# Patient Record
Sex: MALE | Race: WHITE | Employment: OTHER | URBAN - METROPOLITAN AREA
[De-identification: names, ages, dates, MRNs, and addresses within clinical notes are randomized per-mention and may not be internally consistent; named-entity substitution may affect disease eponyms.]

---

## 2017-02-09 ENCOUNTER — TRANSFERRED RECORDS (OUTPATIENT)
Dept: HEALTH INFORMATION MANAGEMENT | Facility: CLINIC | Age: 72
End: 2017-02-09

## 2017-02-15 ENCOUNTER — TRANSFERRED RECORDS (OUTPATIENT)
Dept: HEALTH INFORMATION MANAGEMENT | Facility: CLINIC | Age: 72
End: 2017-02-15

## 2017-02-21 ENCOUNTER — APPOINTMENT (OUTPATIENT)
Dept: CT IMAGING | Facility: CLINIC | Age: 72
End: 2017-02-21
Attending: EMERGENCY MEDICINE
Payer: COMMERCIAL

## 2017-02-21 ENCOUNTER — HOSPITAL ENCOUNTER (EMERGENCY)
Facility: CLINIC | Age: 72
Discharge: HOME | End: 2017-02-21
Attending: EMERGENCY MEDICINE | Admitting: EMERGENCY MEDICINE
Payer: COMMERCIAL

## 2017-02-21 VITALS
TEMPERATURE: 98.1 F | RESPIRATION RATE: 16 BRPM | DIASTOLIC BLOOD PRESSURE: 96 MMHG | SYSTOLIC BLOOD PRESSURE: 144 MMHG | OXYGEN SATURATION: 100 % | HEART RATE: 89 BPM

## 2017-02-21 DIAGNOSIS — R07.89 ATYPICAL CHEST PAIN: ICD-10-CM

## 2017-02-21 LAB
ANION GAP SERPL CALCULATED.3IONS-SCNC: 9 MMOL/L (ref 3–14)
BASOPHILS # BLD AUTO: 0 10E9/L (ref 0–0.2)
BASOPHILS NFR BLD AUTO: 0 %
BUN SERPL-MCNC: 13 MG/DL (ref 7–30)
CALCIUM SERPL-MCNC: 8.7 MG/DL (ref 8.5–10.1)
CHLORIDE SERPL-SCNC: 96 MMOL/L (ref 94–109)
CO2 SERPL-SCNC: 25 MMOL/L (ref 20–32)
CREAT SERPL-MCNC: 0.69 MG/DL (ref 0.66–1.25)
D DIMER PPP FEU-MCNC: 0.9 UG/ML FEU (ref 0–0.5)
DIFFERENTIAL METHOD BLD: ABNORMAL
EOSINOPHIL # BLD AUTO: 0.1 10E9/L (ref 0–0.7)
EOSINOPHIL NFR BLD AUTO: 1 %
ERYTHROCYTE [DISTWIDTH] IN BLOOD BY AUTOMATED COUNT: 13.5 % (ref 10–15)
GFR SERPL CREATININE-BSD FRML MDRD: ABNORMAL ML/MIN/1.7M2
GLUCOSE SERPL-MCNC: 127 MG/DL (ref 70–99)
HCT VFR BLD AUTO: 35.9 % (ref 40–53)
HGB BLD-MCNC: 11.9 G/DL (ref 13.3–17.7)
LYMPHOCYTES # BLD AUTO: 2.3 10E9/L (ref 0.8–5.3)
LYMPHOCYTES NFR BLD AUTO: 22 %
MCH RBC QN AUTO: 28.8 PG (ref 26.5–33)
MCHC RBC AUTO-ENTMCNC: 33.1 G/DL (ref 31.5–36.5)
MCV RBC AUTO: 87 FL (ref 78–100)
METAMYELOCYTES # BLD: 1.1 10E9/L
METAMYELOCYTES NFR BLD MANUAL: 11 %
MONOCYTES # BLD AUTO: 0.5 10E9/L (ref 0–1.3)
MONOCYTES NFR BLD AUTO: 5 %
MYELOCYTES # BLD: 0.4 10E9/L
MYELOCYTES NFR BLD MANUAL: 4 %
NEUTROPHILS # BLD AUTO: 5.9 10E9/L (ref 1.6–8.3)
NEUTROPHILS NFR BLD AUTO: 57 %
PLATELET # BLD AUTO: 235 10E9/L (ref 150–450)
PLATELET # BLD EST: NORMAL 10*3/UL
POTASSIUM SERPL-SCNC: 3.9 MMOL/L (ref 3.4–5.3)
RBC # BLD AUTO: 4.13 10E12/L (ref 4.4–5.9)
RBC MORPH BLD: ABNORMAL
SODIUM SERPL-SCNC: 130 MMOL/L (ref 133–144)
TROPONIN I SERPL-MCNC: NORMAL UG/L (ref 0–0.04)
WBC # BLD AUTO: 10.3 10E9/L (ref 4–11)

## 2017-02-21 PROCEDURE — 85379 FIBRIN DEGRADATION QUANT: CPT | Performed by: EMERGENCY MEDICINE

## 2017-02-21 PROCEDURE — 96374 THER/PROPH/DIAG INJ IV PUSH: CPT

## 2017-02-21 PROCEDURE — 71260 CT THORAX DX C+: CPT

## 2017-02-21 PROCEDURE — 85025 COMPLETE CBC W/AUTO DIFF WBC: CPT | Performed by: EMERGENCY MEDICINE

## 2017-02-21 PROCEDURE — 80048 BASIC METABOLIC PNL TOTAL CA: CPT | Performed by: EMERGENCY MEDICINE

## 2017-02-21 PROCEDURE — 25000128 H RX IP 250 OP 636: Performed by: EMERGENCY MEDICINE

## 2017-02-21 PROCEDURE — 93005 ELECTROCARDIOGRAM TRACING: CPT

## 2017-02-21 PROCEDURE — 25500064 ZZH RX 255 OP 636: Performed by: EMERGENCY MEDICINE

## 2017-02-21 PROCEDURE — 99285 EMERGENCY DEPT VISIT HI MDM: CPT | Mod: 25

## 2017-02-21 PROCEDURE — 96361 HYDRATE IV INFUSION ADD-ON: CPT

## 2017-02-21 PROCEDURE — 84484 ASSAY OF TROPONIN QUANT: CPT | Performed by: EMERGENCY MEDICINE

## 2017-02-21 RX ORDER — SODIUM CHLORIDE 9 MG/ML
1000 INJECTION, SOLUTION INTRAVENOUS CONTINUOUS
Status: DISCONTINUED | OUTPATIENT
Start: 2017-02-21 | End: 2017-02-21 | Stop reason: HOSPADM

## 2017-02-21 RX ORDER — IOPAMIDOL 755 MG/ML
500 INJECTION, SOLUTION INTRAVASCULAR ONCE
Status: COMPLETED | OUTPATIENT
Start: 2017-02-21 | End: 2017-02-21

## 2017-02-21 RX ORDER — KETOROLAC TROMETHAMINE 15 MG/ML
15 INJECTION, SOLUTION INTRAMUSCULAR; INTRAVENOUS ONCE
Status: COMPLETED | OUTPATIENT
Start: 2017-02-21 | End: 2017-02-21

## 2017-02-21 RX ORDER — ASPIRIN 81 MG/1
324 TABLET, CHEWABLE ORAL ONCE
Status: DISCONTINUED | OUTPATIENT
Start: 2017-02-21 | End: 2017-02-21 | Stop reason: CLARIF

## 2017-02-21 RX ORDER — LIDOCAINE 40 MG/G
CREAM TOPICAL
Status: DISCONTINUED | OUTPATIENT
Start: 2017-02-21 | End: 2017-02-21 | Stop reason: HOSPADM

## 2017-02-21 RX ADMIN — KETOROLAC TROMETHAMINE 15 MG: 15 INJECTION, SOLUTION INTRAMUSCULAR; INTRAVENOUS at 20:05

## 2017-02-21 RX ADMIN — SODIUM CHLORIDE 1000 ML: 9 INJECTION, SOLUTION INTRAVENOUS at 20:04

## 2017-02-21 RX ADMIN — SODIUM CHLORIDE 85 ML: 9 INJECTION, SOLUTION INTRAVENOUS at 20:44

## 2017-02-21 RX ADMIN — IOPAMIDOL 71 ML: 755 INJECTION, SOLUTION INTRAVENOUS at 20:44

## 2017-02-21 ASSESSMENT — ENCOUNTER SYMPTOMS
FEVER: 0
NAUSEA: 0
VOMITING: 0
ABDOMINAL PAIN: 0
MYALGIAS: 1
SHORTNESS OF BREATH: 0
DIARRHEA: 1

## 2017-02-21 NOTE — ED AVS SNAPSHOT
[facility] Emergency Department    [address]    [facility] [address]    Phone:  [phone]    Fax:  [phone]                                       John Batista   MRN: [id number]    Department:  [facility] Emergency Department   Date of Visit:  2/21/2017           After Visit Summary Signature Page     I have received my discharge instructions, and my questions have been answered. I have discussed any challenges I see with this plan with the nurse or doctor.    ..........................................................................................................................................  Patient/Patient Representative Signature      ..........................................................................................................................................  Patient Representative Print Name and Relationship to Patient    ..................................................               ................................................  Date                                            Time    ..........................................................................................................................................  Reviewed by Signature/Title    ...................................................              ..............................................  Date                                                            Time

## 2017-02-21 NOTE — ED AVS SNAPSHOT
[facility] Emergency Department    [address] [place] [address]    Phone:  [phone]    Fax:  [phone]                                       John Batista   MRN: [id number]    Department:  [facility] Emergency Department   Date of Visit:  2/21/2017           Patient Information     Date Of Birth          1945        Your diagnoses for this visit were:     Atypical chest pain        You were seen by Luis Alberto Montanez MD.      Follow-up Information     Follow up with Rosario Martinez MD In 3 days.    Specialty:  Internal Medicine    Why:  As needed    Contact information:    [facility]  [address]  [facility] [id number] [phone]          Discharge Instructions       Discharge Instructions  Chest Pain    You have been seen today for chest pain or discomfort.  At this time, your doctor has found no signs that your chest pain is due to a serious or life-threatening condition, (or you have declined more testing and/or admission to the hospital). However, sometimes there is a serious problem that does not show up right away. Your evaluation today may not be complete and you may need further testing and evaluation.     You need to follow-up with your regular doctor within 3 days.    Return to the Emergency Department if:    Your chest pain changes, gets worse, starts to happen more often, or comes with less activity.    You are short of breath.    You get very weak or tired.    You pass out or faint.    You have any new symptoms, like fever, cough, numb legs, or you cough up blood.    You have anything else that worries you.    Until you follow-up with your regular doctor please do the following:    Take one aspirin daily unless you have an allergy or are told not to by your doctor.    If a stress test appointment has been made, go to the appointment.    If you have questions, contact your regular doctor.    If your doctor  today has told you to follow-up with your regular doctor, it is very important that you make an appointment with your clinic and go to the appointment.  If you do not follow-up with your primary doctor, it may result in missing an important development which could result in permanent injury or disability and/or lasting pain.  If there is any problem keeping your appointment, call your doctor or return to the Emergency Department.    If you were given a prescription for medicine here today, be sure to read all of the information (including the package insert) that comes with your prescription.  This will include important information about the medicine, its side effects, and any warnings that you need to know about.  The pharmacist who fills the prescription can provide more information and answer questions you may have about the medicine.  If you have questions or concerns that the pharmacist cannot address, please call or return to the Emergency Department.     Opioid Medication Information    Pain medications are among the most commonly prescribed medicines, so we are including this information for all our patients. If you did not receive pain medication or get a prescription for pain medicine, you can ignore it.     You may have been given a prescription for an opioid (narcotic) pain medicine and/or have received a pain medicine while here in the Emergency Department. These medicines can make you drowsy or impaired. You must not drive, operate dangerous equipment, or engage in any other dangerous activities while taking these medications. If you drive while taking these medications, you could be arrested for DUI, or driving under the influence. Do not drink any alcohol while you are taking these medications.     Opioid pain medications can cause addiction. If you have a history of chemical dependency of any type, you are at a higher risk of becoming addicted to pain medications.  Only take these prescribed  medications to treat your pain when all other options have been tried. Take it for as short a time and as few doses as possible. Store your pain pills in a secure place, as they are frequently stolen and provide a dangerous opportunity for children or visitors in your house to start abusing these powerful medications. We will not replace any lost or stolen medicine.  As soon as your pain is better, you should flush all your remaining medication.     Many prescription pain medications contain Tylenol  (acetaminophen), including Vicodin , Tylenol #3 , Norco , Lortab , and Percocet .  You should not take any extra pills of Tylenol  if you are using these prescription medications or you can get very sick.  Do not ever take more than 3000 mg of acetaminophen in any 24 hour period.    All opioids tend to cause constipation. Drink plenty of water and eat foods that have a lot of fiber, such as fruits, vegetables, prune juice, apple juice and high fiber cereal.  Take a laxative if you don t move your bowels at least every other day. Miralax , Milk of Magnesia, Colace , or Senna  can be used to keep you regular.      Remember that you can always come back to the Emergency Department if you are not able to see your regular doctor in the amount of time listed above, if you get any new symptoms, or if there is anything that worries you.         Future Appointments        Provider Department Dept Phone Center    3/16/2017 9:10 AM David Galvan MD [facility] [phone] [facility]      24 Hour Appointment Hotline       To make an appointment at any [facility], call [phone] ([phone]). If you don't have a family doctor or clinic, we will help you find one. [place] clinics are conveniently located to serve the needs of you and your family.             Review of your medicines      Our records show that you are taking the medicines listed below. If these are  incorrect, please call your family doctor or clinic.        Dose / Directions Last dose taken    ELIGARD 45 MG kit   Dose:  45 mg   Generic drug:  leuprolide        Inject 45 mg Subcutaneous every 6 months.   Refills:  0        enzalutamide 40 MG capsule   Commonly known as:  XTANDI   Dose:  160 mg        Take 160 mg by mouth daily   Refills:  0        ferrous sulfate 325 (65 FE) MG tablet   Commonly known as:  IRON   Dose:  325 mg   Quantity:  100 tablet        Take 1 tablet (325 mg) by mouth 2 times daily   Refills:  0        oxyCODONE 5 MG IR tablet   Commonly known as:  ROXICODONE   Dose:  1 tablet        Take 1 tablet by mouth nightly as needed   Refills:  0        vitamin D 1000 UNITS capsule   Dose:  1 capsule        Take 1 capsule by mouth daily   Refills:  0                Procedures and tests performed during your visit     Basic metabolic panel    CBC with platelets differential    Cardiac Continuous Monitoring    Chest CT, IV contrast only - PE protocol    D dimer quantitative    EKG 12 lead    EKG 12-lead, tracing only    Peripheral IV: Standard    Pulse oximetry nursing    Troponin I      Orders Needing Specimen Collection     None      Pending Results     Date and Time Order Name Status Description    2/21/2017 2026 Chest CT, IV contrast only - PE protocol Preliminary     2/21/2017 1942 EKG 12 lead Preliminary             Pending Culture Results     No orders found from 2/19/2017 to 2/22/2017.             Test Results from your hospital stay     2/21/2017  8:44 PM - Interface, [employer] Results      Component Results     Component Value Ref Range & Units Status    WBC 10.3 4.0 - 11.0 10e9/L Final    RBC Count 4.13 (L) 4.4 - 5.9 10e12/L Final    Hemoglobin 11.9 (L) 13.3 - 17.7 g/dL Final    Hematocrit 35.9 (L) 40.0 - 53.0 % Final    MCV 87 78 - 100 fl Final    MCH 28.8 26.5 - 33.0 pg Final    MCHC 33.1 31.5 - 36.5 g/dL Final    RDW 13.5 10.0 - 15.0 % Final    Platelet Count 235 150 - 450 10e9/L Final     Diff Method Manual Differential  Final    % Neutrophils 57.0 % Final    % Lymphocytes 22.0 % Final    % Monocytes 5.0 % Final    % Eosinophils 1.0 % Final    % Basophils 0.0 % Final    % Metamyelocytes 11.0 % Final    % Myelocytes 4.0 % Final    Absolute Neutrophil 5.9 1.6 - 8.3 10e9/L Final    Absolute Lymphocytes 2.3 0.8 - 5.3 10e9/L Final    Absolute Monocytes 0.5 0.0 - 1.3 10e9/L Final    Absolute Eosinophils 0.1 0.0 - 0.7 10e9/L Final    Absolute Basophils 0.0 0.0 - 0.2 10e9/L Final    Absolute Metamyelocytes 1.1 (H) 0 10e9/L Final    Absolute Myelocytes 0.4 (H) 0 10e9/L Final    RBC Morphology   Final    Consistent with reported results    Platelet Estimate Normal  Final         2/21/2017  8:25 PM - Interface, [employer] Results      Component Results     Component Value Ref Range & Units Status    Sodium 130 (L) 133 - 144 mmol/L Final    Potassium 3.9 3.4 - 5.3 mmol/L Final    Chloride 96 94 - 109 mmol/L Final    Carbon Dioxide 25 20 - 32 mmol/L Final    Anion Gap 9 3 - 14 mmol/L Final    Glucose 127 (H) 70 - 99 mg/dL Final    Urea Nitrogen 13 7 - 30 mg/dL Final    Creatinine 0.69 0.66 - 1.25 mg/dL Final    GFR Estimate >90  Non  GFR Calc   >60 mL/min/1.7m2 Final    GFR Estimate If Black >90   GFR Calc   >60 mL/min/1.7m2 Final    Calcium 8.7 8.5 - 10.1 mg/dL Final         2/21/2017  8:25 PM - Interface, [employer] Results      Component Results     Component Value Ref Range & Units Status    Troponin I ES  0.000 - 0.045 ug/L Final    <0.015  The 99th percentile for upper reference range is 0.045 ug/L.  Troponin values in   the range of 0.045 - 0.120 ug/L may be associated with risks of adverse   clinical events.           2/21/2017  8:17 PM - Interface, [employer] Results      Component Results     Component Value Ref Range & Units Status    D Dimer 0.9 (H) 0.0 - 0.50 ug/ml FEU Final    This D-dimer assay is intended for use in conjuntion with a clinical pretest   probability  assessment model to exclude pulmonary embolism (PE) and as an aid   in the diagnosis of deep venous thrombosis (DVT) in outpatients suspected of   PE   or DVT. The cut-off value is 0.5 g/mL FEU.           2/21/2017  9:05 PM - Interface, Radiant Ib      Narrative     CT CHEST PULMONARY EMBOLISM WITH CONTRAST 2/21/2017 8:52 PM     HISTORY: Prostate cancer, d-dimer 0.9 chest pain, history of  metastatic disease, evaluate for PE.    CONTRAST DOSE:  71 mL [id number].    Radiation dose for this scan was reduced using automated exposure  control, adjustment of the mA and/or kV according to patient size, or  iterative reconstruction technique.    FINDINGS:  Contrast bolus within the pulmonary arteries is adequate.  No pulmonary arterial filling defects are demonstrated to indicate  pulmonary embolism. Contrast enhancement is present within the aorta.  There is no evidence of aortic dissection. Coronary artery  calcification is noted. The mediastinum and rene are otherwise  unremarkable. Calcified granulomas are noted at the lung bases.  Nonspecific 0.3 cm nodule is noted adjacent to the confluence of the  right major and minor fissures on image 71. Nonspecific 1.2 cm nodule  is also noted in the right middle lobe abutting the minor fissure.  Calcified pleural plaques are noted posteriorly and along the right  hemidiaphragm.        Impression     IMPRESSION:  1. No CT evidence of pulmonary embolism.  2. Coronary artery calcification.  3. Calcified pleural plaques suggesting asbestos-related pleural  disease.  4. Old granulomatous disease with bilateral pulmonary basilar  calcified granulomas.  5. Right lung 1.2 cm and 0.3 cm nonspecific nodules. Since neoplastic  disease cannot be excluded, at the very least, a 3-month follow-up is  recommended.                Clinical Quality Measure: Blood Pressure Screening     Your blood pressure was checked while you were in the emergency department today. The last reading we obtained  "was  BP: (!) 171/107 . Please read the guidelines below about what these numbers mean and what you should do about them.  If your systolic blood pressure (the top number) is less than 120 and your diastolic blood pressure (the bottom number) is less than 80, then your blood pressure is normal. There is nothing more that you need to do about it.  If your systolic blood pressure (the top number) is 120-139 or your diastolic blood pressure (the bottom number) is 80-89, your blood pressure may be higher than it should be. You should have your blood pressure rechecked within a year by a primary care provider.  If your systolic blood pressure (the top number) is 140 or greater or your diastolic blood pressure (the bottom number) is 90 or greater, you may have high blood pressure. High blood pressure is treatable, but if left untreated over time it can put you at risk for heart attack, stroke, or kidney failure. You should have your blood pressure rechecked by a primary care provider within the next 4 weeks.  If your provider in the emergency department today gave you specific instructions to follow-up with your doctor or provider even sooner than that, you should follow that instruction and not wait for up to 4 weeks for your follow-up visit.        Thank you for choosing [facility]       Thank you for choosing [facility] for your care. Our goal is always to provide you with excellent care. Hearing back from our patients is one way we can continue to improve our services. Please take a few minutes to complete the written survey that you may receive in the mail after you visit with us. Thank you!        [facility] Information     [facility] lets you send messages to your doctor, view your test results, renew your prescriptions, schedule appointments and more. To sign up, go to [email or web address] . Click on \"Log in\" on the left side of the screen, which will take you to the Welcome page. Then click on \"Sign up Now\" on the right " side of the page.     You will be asked to enter the access code listed below, as well as some personal information. Please follow the directions to create your username and password.     Your access code is: [email or web address]  Expires: 3/2/2017  8:48 AM     Your access code will  in 90 days. If you need help or a new code, please call your [place] clinic or [phone].        Care EveryWhere ID     This is your Care EveryWhere ID. This could be used by other organizations to access your [place] medical records  [id number]        After Visit Summary       This is your record. Keep this with you and show to your community pharmacist(s) and doctor(s) at your next visit.

## 2017-02-22 LAB — INTERPRETATION ECG - MUSE: NORMAL

## 2017-02-22 NOTE — ED NOTES
Patient requesting to use restroom. Patient tolerated well. Did not need assistance. Will continue to monitor at this time

## 2017-02-22 NOTE — ED PROVIDER NOTES
History     Chief Complaint:  Chest pain     HPI   John Batista is a 71 year old male with a history of metastatic prostate cancer who presents for evaluation of chest pain. The patient reports onset of intermittent mid sternal chest pain/aching at approximately [id number] this morning. The patient denies any exacerbating factors and pain does not radiate anywhere. The patient is also noting myalgias. He has not taken anything for pain. The patient is currently undergoing chemotherapy. The patient has no cardiac history. The patient's wife is concerned for dehydration as the patient has had some recent diarrhea as well. The patient denies any shortness of breath, vomiting or any other symptoms.    Cardiac/PE/DVT Risk Factors:  The patient has no history of hypertension, hyperlipidemia or diabetes and is a former smoker.  He reports no family history of CAD.  He denies any personal or familial history of PE, DVT or clotting disorder.  He reports no recent travel, surgery or other immobilizations.  He is not on hormone therapy and has a known malignancy.     Allergies:  No known drug allergies.     Medications:    ferrous sulfate (IRON) 325 (65 FE) MG tablet  enzalutamide (XTANDI) 40 MG capsule  Cholecalciferol (VITAMIN D) 1000 UNITS capsule  leuprolide (ELIGARD) 45 MG injection    Past Medical History:    Metastatic prostate cancer    Past Surgical History:    Procedure Laterality Date   Hernia repair, inguinal rt/lt       Family History:    History reviewed. No pertinent family history.     Social History:  Marital Status:     Smoking status: Former smoker  Alcohol use: No   Presents to the ED with significant other     Review of Systems   Constitutional: Negative for fever.   Respiratory: Negative for shortness of breath.    Cardiovascular: Positive for chest pain.   Gastrointestinal: Positive for diarrhea. Negative for abdominal pain, nausea and vomiting.   Musculoskeletal: Positive for myalgias.    All other systems reviewed and are negative.      Physical Exam     Patient Vitals for the past 24 hrs:   BP Temp Temp src Pulse Heart Rate Resp SpO2   02/21/17 2154 - - - - - - 100 %   02/21/17 2152 (!) 144/96 - - - - - 97 %   02/21/17 2141 - - - - 89 - 99 %   02/21/17 2140 - - - - 92 - 100 %   02/21/17 2130 (!) 171/107 - - - - - -   02/21/17 2115 150/83 - - - 86 - 99 %   02/21/17 1949 - 98.1  F (36.7  C) Oral - - - -   02/21/17 1939 (!) 174/114 97.1  F (36.2  C) Temporal 89 89 16 100 %        Physical Exam   Constitutional: He is oriented to person, place, and time. He appears well-developed.   HENT:   Head: Normocephalic and atraumatic.   Right Ear: External ear normal.   Mouth/Throat: Oropharynx is clear and moist.   Eyes: Conjunctivae and EOM are normal. Pupils are equal, round, and reactive to light.   Neck: Normal range of motion. Neck supple. No JVD present.   Cardiovascular: Normal rate, regular rhythm and normal heart sounds.    Pulmonary/Chest: Effort normal and breath sounds normal.   Abdominal: Soft. Bowel sounds are normal. He exhibits no distension. There is no tenderness. There is no rebound.   Musculoskeletal: Normal range of motion.   Lymphadenopathy:     He has no cervical adenopathy.   Neurological: He is alert and oriented to person, place, and time. He displays normal reflexes. No cranial nerve deficit. He exhibits normal muscle tone. Coordination normal.   Skin: Skin is warm and dry.   Psychiatric: He has a normal mood and affect. His behavior is normal. Judgment normal.   Nursing note and vitals reviewed.        Emergency Department Course   ECG:  @ 1944  Indication: chest pain   Vent. Rate 88 bpm. LA interval 184 ms. QRS duration 84 ms. QT/QTc 374/452 ms. P-R-T axis 57 25 39.   Sinus rhythm with premature supraventricular complexes. Otherwise normal ECG.    Read @ 1950 by Dr. Montanez.     Imaging:  Radiographic findings were communicated with the patient who voiced understanding of the  findings.    CT Chest with contrast  IMPRESSION:  1. No CT evidence of pulmonary embolism.  2. Coronary artery calcification.  3. Calcified pleural plaques suggesting asbestos-related pleural  disease.  4. Old granulomatous disease with bilateral pulmonary basilar  calcified granulomas.  5. Right lung 1.2 cm and 0.3 cm nonspecific nodules. Since neoplastic  disease cannot be excluded, at the very least, a 3-month follow-up is  recommended.  Preliminary radiology read.       Laboratory:  CBC:  WBC 10.3, HGB 11.9 (L),   BMP: Na 130 (L), Glucose 127 (H), otherwise WNL (Creatinine 0.69)  Troponin: <0.015  D dimer: 0.9 (H)      Interventions:  (2004) Normal Saline, 1 liter, IV bolus   (2005) Toradol 15 mg IV    Emergency Department Course:  Nursing notes and vitals reviewed.  (1945) I performed an exam of the patient as documented above.    EKG was done, interpretation as above.   Blood was drawn from the patient. This was sent for laboratory testing, findings above.   The patient was sent for a chest CT while in the emergency department, findings above.      Findings and plan explained to the patient. Patient discharged home with instructions regarding supportive care, medications, and reasons to return. The importance of close follow-up was reviewed.     Impression & Plan      Medical Decision Making:  Patient presents with mid sternal chest pain. Patient has history of prostate cancer, is at low risk for PE, his D dimer is positive. CTA shows no sign of PE. There is mention of coronary artery calcification though patient states he regularly exercises at the gym and does not have pain with exercise. Patient was offered admission for serial troponin's and/or stress test due to age and known coronary artery calcification. Patient refuses this at this time. I feel this is reasonable as patient's symptoms completely resolved with IV fluids. Patient does have a history of metastatic prostate cancer. Differential  diagnosis does include chest wall pain. Patient is encouraged to follow up with PCP and return for worsening symptoms.     Diagnosis:    ICD-10-CM   1. Atypical chest pain R07.89       Disposition:  Patient is discharged to home.       Tramaine Louise  2/21/2017   [facility] EMERGENCY DEPARTMENT    I, Tramaine Louise, am serving as a scribe on 2/21/2017 at 7:43 PM to personally document services performed by Dr. Montanez based on my observations and the provider's statements to me.       Luis Alberto Montanez MD  02/27/17 0026

## 2017-02-22 NOTE — DISCHARGE INSTRUCTIONS
Discharge Instructions  Chest Pain    You have been seen today for chest pain or discomfort.  At this time, your doctor has found no signs that your chest pain is due to a serious or life-threatening condition, (or you have declined more testing and/or admission to the hospital). However, sometimes there is a serious problem that does not show up right away. Your evaluation today may not be complete and you may need further testing and evaluation.     You need to follow-up with your regular doctor within 3 days.    Return to the Emergency Department if:    Your chest pain changes, gets worse, starts to happen more often, or comes with less activity.    You are short of breath.    You get very weak or tired.    You pass out or faint.    You have any new symptoms, like fever, cough, numb legs, or you cough up blood.    You have anything else that worries you.    Until you follow-up with your regular doctor please do the following:    Take one aspirin daily unless you have an allergy or are told not to by your doctor.    If a stress test appointment has been made, go to the appointment.    If you have questions, contact your regular doctor.    If your doctor today has told you to follow-up with your regular doctor, it is very important that you make an appointment with your clinic and go to the appointment.  If you do not follow-up with your primary doctor, it may result in missing an important development which could result in permanent injury or disability and/or lasting pain.  If there is any problem keeping your appointment, call your doctor or return to the Emergency Department.    If you were given a prescription for medicine here today, be sure to read all of the information (including the package insert) that comes with your prescription.  This will include important information about the medicine, its side effects, and any warnings that you need to know about.  The pharmacist who fills the prescription can  provide more information and answer questions you may have about the medicine.  If you have questions or concerns that the pharmacist cannot address, please call or return to the Emergency Department.     Opioid Medication Information    Pain medications are among the most commonly prescribed medicines, so we are including this information for all our patients. If you did not receive pain medication or get a prescription for pain medicine, you can ignore it.     You may have been given a prescription for an opioid (narcotic) pain medicine and/or have received a pain medicine while here in the Emergency Department. These medicines can make you drowsy or impaired. You must not drive, operate dangerous equipment, or engage in any other dangerous activities while taking these medications. If you drive while taking these medications, you could be arrested for DUI, or driving under the influence. Do not drink any alcohol while you are taking these medications.     Opioid pain medications can cause addiction. If you have a history of chemical dependency of any type, you are at a higher risk of becoming addicted to pain medications.  Only take these prescribed medications to treat your pain when all other options have been tried. Take it for as short a time and as few doses as possible. Store your pain pills in a secure place, as they are frequently stolen and provide a dangerous opportunity for children or visitors in your house to start abusing these powerful medications. We will not replace any lost or stolen medicine.  As soon as your pain is better, you should flush all your remaining medication.     Many prescription pain medications contain Tylenol  (acetaminophen), including Vicodin , Tylenol #3 , Norco , Lortab , and Percocet .  You should not take any extra pills of Tylenol  if you are using these prescription medications or you can get very sick.  Do not ever take more than 3000 mg of acetaminophen in any 24 hour  period.    All opioids tend to cause constipation. Drink plenty of water and eat foods that have a lot of fiber, such as fruits, vegetables, prune juice, apple juice and high fiber cereal.  Take a laxative if you don t move your bowels at least every other day. Miralax , Milk of Magnesia, Colace , or Senna  can be used to keep you regular.      Remember that you can always come back to the Emergency Department if you are not able to see your regular doctor in the amount of time listed above, if you get any new symptoms, or if there is anything that worries you.

## 2017-02-22 NOTE — ED NOTES
Patient restarted chemotherapy this past Wednesday for prostate cancer.  Chest pain started today, body aches for  A couple of days.

## 2017-03-07 DIAGNOSIS — C61 PROSTATE CANCER (H): Primary | ICD-10-CM

## 2017-03-07 DIAGNOSIS — C61 MALIGNANT NEOPLASM OF PROSTATE (H): Primary | ICD-10-CM

## 2017-03-07 LAB — PSA SERPL-MCNC: 2.97 UG/L (ref 0–4)

## 2017-03-07 PROCEDURE — 36415 COLL VENOUS BLD VENIPUNCTURE: CPT | Performed by: INTERNAL MEDICINE

## 2017-03-07 PROCEDURE — 84153 ASSAY OF PSA TOTAL: CPT | Performed by: INTERNAL MEDICINE

## 2017-03-08 ENCOUNTER — TRANSFERRED RECORDS (OUTPATIENT)
Dept: HEALTH INFORMATION MANAGEMENT | Facility: CLINIC | Age: 72
End: 2017-03-08

## 2017-03-15 ENCOUNTER — TELEPHONE (OUTPATIENT)
Dept: FAMILY MEDICINE | Facility: CLINIC | Age: 72
End: 2017-03-15

## 2017-03-15 ENCOUNTER — TRANSFERRED RECORDS (OUTPATIENT)
Dept: HEALTH INFORMATION MANAGEMENT | Facility: CLINIC | Age: 72
End: 2017-03-15

## 2017-03-15 ENCOUNTER — TELEPHONE (OUTPATIENT)
Dept: NURSING | Facility: CLINIC | Age: 72
End: 2017-03-15

## 2017-03-15 NOTE — TELEPHONE ENCOUNTER
Patient walked into  Clinic today.   Presents with concerns about right forearm redness and bruising following 2nd chemotherapy treatment.     Right forearm reddened (3cm x 2cm) above infusion site and also bruised around area.   Slightly warm to touch, temp 98.3, pulse 80.     Patient and wife will contact [facility] regarding concerns.   He would like to continue with [facility], but at [place] location for chemotherapy if possible.      Virgie Fitzpatrick RN

## 2017-03-15 NOTE — TELEPHONE ENCOUNTER
Patient and wife calling that patient had had a chemo injection earlier in the week. States that there is a reddened area about the size of a quarter at the injection site. Also bruising that happens every time he has an injection. Requested to have appointment to have it looked at. Advised to come to Urgent care as the only provider at [facility] was booked for today. They asked if a nurse could look at it and advised it would need to be seen by a provider. They declined urgent care. Offered patient an appointment for tomorrow and he declined as he said he had other appointments tomorrow.

## 2017-03-15 NOTE — TELEPHONE ENCOUNTER
Patient and wife have call into [facility] and awaiting a return call for appt to assess right forearm.     Appt with [facility] today - they told patient that forearm condition is not uncommon and should resolve in about 1 week.   [facility] instructed pt to continue to watch forearm area for worsening symptoms &/or no resolution of symptoms.     Virgie Fitzpatrick RN

## 2017-03-16 ENCOUNTER — OFFICE VISIT (OUTPATIENT)
Dept: UROLOGY | Facility: CLINIC | Age: 72
End: 2017-03-16
Payer: COMMERCIAL

## 2017-03-16 VITALS
SYSTOLIC BLOOD PRESSURE: 110 MMHG | HEART RATE: 80 BPM | DIASTOLIC BLOOD PRESSURE: 68 MMHG | HEIGHT: 73 IN | WEIGHT: 190 LBS | BODY MASS INDEX: 25.18 KG/M2

## 2017-03-16 DIAGNOSIS — C61 PROSTATE CANCER (H): ICD-10-CM

## 2017-03-16 DIAGNOSIS — C61 MALIGNANT NEOPLASM OF PROSTATE (H): Primary | ICD-10-CM

## 2017-03-16 PROCEDURE — 99212 OFFICE O/P EST SF 10 MIN: CPT | Mod: 25 | Performed by: UROLOGY

## 2017-03-16 PROCEDURE — 96402 CHEMO HORMON ANTINEOPL SQ/IM: CPT | Performed by: UROLOGY

## 2017-03-16 RX ORDER — PREDNISONE 5 MG/1
TABLET ORAL
COMMUNITY
Start: 2017-03-08 | End: 2017-12-21

## 2017-03-16 RX ORDER — PROCHLORPERAZINE MALEATE 10 MG
TABLET ORAL
COMMUNITY
Start: 2017-02-15 | End: 2017-12-21

## 2017-03-16 RX ORDER — PIMECROLIMUS 1 %
CREAM (GRAM) TOPICAL
COMMUNITY
Start: 2017-03-07 | End: 2018-10-27

## 2017-03-16 RX ORDER — HYDROCORTISONE AND IODOQUINOL 10; 10 MG/G; MG/G
CREAM TOPICAL
COMMUNITY
Start: 2017-02-06 | End: 2017-12-21

## 2017-03-16 NOTE — NURSING NOTE
The following medication was given:     MEDICATION: Eligard 45 mg  ROUTE: SQ  SITE: LLQ - Abd.  DOSE: 45mg  LOT #: [id number]  :  Jessica  EXPIRATION DATE:  08/2018  NDC#: [id number]. Marychuy Yousif LPN

## 2017-03-16 NOTE — MR AVS SNAPSHOT
"              After Visit Summary   3/16/2017    John Batista    MRN: [id number]           Patient Information     Date Of Birth          1945        Visit Information        Provider Department      3/16/2017 9:10 AM David Galvan MD [facility] [place]        Today's Diagnoses     Malignant neoplasm of prostate (H)    -  1       Follow-ups after your visit        Follow-up notes from your care team     Return in about 6 months (around 2017) for eligard.      Who to contact     If you have questions or need follow up information about today's clinic visit or your schedule please contact [facility] directly at [phone].  Normal or non-critical lab and imaging results will be communicated to you by True Officehart, letter or phone within 4 business days after the clinic has received the results. If you do not hear from us within 7 days, please contact the clinic through True Officehart or phone. If you have a critical or abnormal lab result, we will notify you by phone as soon as possible.  Submit refill requests through [employer] or call your pharmacy and they will forward the refill request to us. Please allow 3 business days for your refill to be completed.          Additional Information About Your Visit        MyChart Information     [employer] lets you send messages to your doctor, view your test results, renew your prescriptions, schedule appointments and more. To sign up, go to [email or web address] . Click on \"Log in\" on the left side of the screen, which will take you to the Welcome page. Then click on \"Sign up Now\" on the right side of the page.     You will be asked to enter the access code listed below, as well as some personal information. Please follow the directions to create your username and password.     Your access code is: [email or web address]  Expires: 2017  9:33 AM     Your access code will  in 90 days. If " "you need help or a new code, please call your [place] clinic or [phone].        Care EveryWhere ID     This is your Care EveryWhere ID. This could be used by other organizations to access your [place] medical records  [id number]        Your Vitals Were     Pulse Height BMI (Body Mass Index)             80 1.854 m (6' 1\") 25.07 kg/m2          Blood Pressure from Last 3 Encounters:   03/16/17 110/68   02/21/17 (!) 144/96   12/14/16 118/60    Weight from Last 3 Encounters:   03/16/17 86.2 kg (190 lb)   12/14/16 88.5 kg (195 lb)   12/03/16 88.5 kg (195 lb)              Today, you had the following     No orders found for display       Primary Care Provider Office Phone # Fax #    Rosario Carson Martinez [phone]       [facility] [address] [facility] [address]        Thank you!     Thank you for choosing [facility] [place]  for your care. Our goal is always to provide you with excellent care. Hearing back from our patients is one way we can continue to improve our services. Please take a few minutes to complete the written survey that you may receive in the mail after your visit with us. Thank you!             Your Updated Medication List - Protect others around you: Learn how to safely use, store and throw away your medicines at www.disposemymeds.org.          This list is accurate as of: 3/16/17  9:33 AM.  Always use your most recent med list.                   Brand Name Dispense Instructions for use    DERMAZENE 1 % Crea   Generic drug:  Iodoquinol-HC          ELIDEL 1 % cream   Generic drug:  pimecrolimus          ELIGARD 45 MG kit   Generic drug:  leuprolide      Inject 45 mg Subcutaneous every 6 months.       enzalutamide 40 MG capsule    XTANDI     Take 160 mg by mouth daily Reported on 3/16/2017       ferrous sulfate 325 (65 FE) MG tablet    IRON    100 tablet    Take 1 tablet (325 mg) by mouth 2 times daily       oxyCODONE 5 " MG IR tablet    ROXICODONE     Take 1 tablet by mouth nightly as needed Reported on 3/16/2017       predniSONE 5 MG tablet    DELTASONE         prochlorperazine 10 MG tablet    COMPAZINE         vitamin D 1000 UNITS capsule      Take 1 capsule by mouth daily

## 2017-03-16 NOTE — PROGRESS NOTES
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 He is originally diagnosed in 2011 and had a PSA of 39.9 at diagnosis. He had clinical stage C tumor and was treated with hormone therapy and radiation . He is currently on prednisone and receiving Taxotere. He sees me every 6 months for Eligard. His most recent PSA is 2.97  Other past medical history: Rectal ulcer from radiation, iron deficiency anemia, colonic polyps  Medications: Oxycodone, Eligard, iron sulfate, Taxotere, prednisone, vitamin D  Allergies: None  Review of systems: Voiding comfortably, no dysuria or gross hematuria. He has had some GI bleeding-colonoscopy showed healed rectal ulcer  Exam: Normal appearance, normal vital signs, alert and oriented, normocephalic, normal respirations, neuro grossly intact. Digital rectal exam deferred  Assessment: Castrate resistant metastatic prostate cancer  Plan: Eligard every 6 months            PSA follow-ups through Dr. Borrero and Dr. Hugh valencia

## 2017-03-16 NOTE — LETTER
3/16/2017       RE: John Batista  [address] [facility] [address]     Dear Colleague,    Thank you for referring your patient, John Batista, to the [facility] [place] at [facility]. Please see a copy of my visit note below.    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 He is originally diagnosed in 2011 and had a PSA of 39.9 at diagnosis. He had clinical stage C tumor and was treated with hormone therapy and radiation . He is currently on prednisone and receiving Taxotere. He sees me every 6 months for [place]. His most recent PSA is 2.97  Other past medical history: Rectal ulcer from radiation, iron deficiency anemia, colonic polyps  Medications: Oxycodone, Eligard, iron sulfate, Taxotere, prednisone, vitamin D  Allergies: None  Review of systems: Voiding comfortably, no dysuria or gross hematuria. He has had some GI bleeding-colonoscopy showed healed rectal ulcer  Exam: Normal appearance, normal vital signs, alert and oriented, normocephalic, normal respirations, neuro grossly intact. Digital rectal exam deferred  Assessment: Castrate resistant metastatic prostate cancer  Plan: Eligard every 6 months            PSA follow-ups through Dr. Borrero and Dr. Reese only      Again, thank you for allowing me to participate in the care of your patient.      Sincerely,    David Galvan MD

## 2017-03-29 ENCOUNTER — TRANSFERRED RECORDS (OUTPATIENT)
Dept: HEALTH INFORMATION MANAGEMENT | Facility: CLINIC | Age: 72
End: 2017-03-29

## 2017-04-19 ENCOUNTER — TRANSFERRED RECORDS (OUTPATIENT)
Dept: HEALTH INFORMATION MANAGEMENT | Facility: CLINIC | Age: 72
End: 2017-04-19

## 2017-05-10 ENCOUNTER — TRANSFERRED RECORDS (OUTPATIENT)
Dept: HEALTH INFORMATION MANAGEMENT | Facility: CLINIC | Age: 72
End: 2017-05-10

## 2017-05-22 ENCOUNTER — TRANSFERRED RECORDS (OUTPATIENT)
Dept: HEALTH INFORMATION MANAGEMENT | Facility: CLINIC | Age: 72
End: 2017-05-22

## 2017-05-31 ENCOUNTER — TRANSFERRED RECORDS (OUTPATIENT)
Dept: HEALTH INFORMATION MANAGEMENT | Facility: CLINIC | Age: 72
End: 2017-05-31

## 2017-09-14 ENCOUNTER — DOCUMENTATION ONLY (OUTPATIENT)
Dept: OTHER | Facility: CLINIC | Age: 72
End: 2017-09-14

## 2017-09-14 ENCOUNTER — OFFICE VISIT (OUTPATIENT)
Dept: UROLOGY | Facility: CLINIC | Age: 72
End: 2017-09-14
Payer: COMMERCIAL

## 2017-09-14 VITALS
SYSTOLIC BLOOD PRESSURE: 124 MMHG | DIASTOLIC BLOOD PRESSURE: 70 MMHG | HEART RATE: 60 BPM | HEIGHT: 73 IN | BODY MASS INDEX: 24.39 KG/M2 | WEIGHT: 184 LBS

## 2017-09-14 DIAGNOSIS — C61 PROSTATE CANCER (H): ICD-10-CM

## 2017-09-14 DIAGNOSIS — C61 MALIGNANT NEOPLASM OF PROSTATE (H): Primary | ICD-10-CM

## 2017-09-14 PROCEDURE — 99212 OFFICE O/P EST SF 10 MIN: CPT | Mod: 25 | Performed by: UROLOGY

## 2017-09-14 PROCEDURE — 96402 CHEMO HORMON ANTINEOPL SQ/IM: CPT | Performed by: UROLOGY

## 2017-09-14 ASSESSMENT — PAIN SCALES - GENERAL: PAINLEVEL: NO PAIN (0)

## 2017-09-14 NOTE — MR AVS SNAPSHOT
"              After Visit Summary   9/14/2017    John Batista    MRN: [id number]           Patient Information     Date Of Birth          1945        Visit Information        Provider Department      9/14/2017 9:10 AM David Galvan MD [facility] [place]        Today's Diagnoses     Malignant neoplasm of prostate (H)    -  1    Prostate cancer (H)           Follow-ups after your visit        Follow-up notes from your care team     Return in about 6 months (around 3/14/2018) for eligard.      Your next 10 appointments already scheduled     Mar 15, 2018  9:50 AM CDT   Return Visit with David Galvan MD   [facility] [place]    [address]  [address]  [facility] [address] [phone]              Who to contact     If you have questions or need follow up information about today's clinic visit or your schedule please contact [facility] directly at [phone].  Normal or non-critical lab and imaging results will be communicated to you by MyChart, letter or phone within 4 business days after the clinic has received the results. If you do not hear from us within 7 days, please contact the clinic through [employer] or phone. If you have a critical or abnormal lab result, we will notify you by phone as soon as possible.  Submit refill requests through [employer] or call your pharmacy and they will forward the refill request to us. Please allow 3 business days for your refill to be completed.          Additional Information About Your Visit        Kitch[employer] Information     [employer] lets you send messages to your doctor, view your test results, renew your prescriptions, schedule appointments and more. To sign up, go to [email or web address] . Click on \"Log in\" on the left side of the screen, which will take you to the Welcome page. Then click on " "\"Sign up Now\" on the right side of the page.     You will be asked to enter the access code listed below, as well as some personal information. Please follow the directions to create your username and password.     Your access code is: [email or web address]  Expires: 2017  9:25 AM     Your access code will  in 90 days. If you need help or a new code, please call your [place] clinic or [phone].        Care EveryWhere ID     This is your Care EveryWhere ID. This could be used by other organizations to access your [place] medical records  [id number]        Your Vitals Were     Pulse Height BMI (Body Mass Index)             60 1.854 m (6' 1\") 24.28 kg/m2          Blood Pressure from Last 3 Encounters:   17 124/70   17 110/68   17 (!) 144/96    Weight from Last 3 Encounters:   17 83.5 kg (184 lb)   17 86.2 kg (190 lb)   16 88.5 kg (195 lb)              Today, you had the following     No orders found for display       Primary Care Provider Office Phone # Fax #    Rosario Martinez [phone] [address] [facility] [address]        Equal Access to Services     [facility] DAGOBERTO AH: Hadii aad ku hadasho Soomaali, waaxda luqadaha, qaybta kaalmada adeegyada, brandon iyer . So [facility] [phone].    ATENCIÓN: Si habla español, tiene a abdi disposición servicios gratuitos de asistencia lingüística. Llame al [phone].    We comply with applicable federal civil rights laws and [place] laws. We do not discriminate on the basis of race, color, national origin, age, disability sex, sexual orientation or gender identity.            Thank you!     Thank you for choosing [facility]OLOG[facility] CLIN[facility] [place]  for your care. Our goal is always to provide you with excellent care. Hearing back from our patients is one way we can continue to improve our services. Please take a few minutes to complete the written " survey that you may receive in the mail after your visit with us. Thank you!             Your Updated Medication List - Protect others around you: Learn how to safely use, store and throw away your medicines at www.disposemymeds.org.          This list is accurate as of: 9/14/17  9:41 AM.  Always use your most recent med list.                   Brand Name Dispense Instructions for use Diagnosis    DERMAZENE 1 % Crea   Generic drug:  Iodoquinol-HC           ELIDEL 1 % cream   Generic drug:  pimecrolimus           ELIGARD 45 MG kit   Generic drug:  leuprolide      Inject 45 mg Subcutaneous every 6 months.        enzalutamide 40 MG capsule    XTANDI     Take 160 mg by mouth daily Reported on 3/16/2017        ferrous sulfate 325 (65 FE) MG tablet    IRON    100 tablet    Take 1 tablet (325 mg) by mouth 2 times daily    Iron deficiency anemia due to chronic blood loss       oxyCODONE 5 MG IR tablet    ROXICODONE     Take 1 tablet by mouth nightly as needed Reported on 3/16/2017        predniSONE 5 MG tablet    DELTASONE          prochlorperazine 10 MG tablet    COMPAZINE     Reported on 3/16/2017        vitamin D 1000 UNITS capsule      Take 1 capsule by mouth daily

## 2017-09-14 NOTE — PROGRESS NOTES
John Batista is a 72-year-old male with metastatic prostate cancer to bone. He has completed Taxotere and off prednisone. He will be meeting with Dr. Lovett next week for PSA and discussion. He was recently radiated to his lower spine and has no lower extremity numbness, tingling or weakness. His pain has significantly improved.  He is having no voiding difficulty, dysuria or hematuria  Other past medical history: Former smoker, anemia  Medications: Iron sulfate, Eligard  Allergies: None  Exam: Normal appearance, normal vital signs, alert and oriented, normocephalic, normal respirations, neuro grossly intact. With spouse  Assessment: Metastatic, castrate resistant adenocarcinoma of the prostate. Discussed need for Eligard every 6 months. All questions answered  Plan: See me every 6 months for Eligard. Follow-up with Dr. Reese

## 2017-09-14 NOTE — NURSING NOTE
Here for prostate  cancer follow up. Will be seeing Dr Reese at [place] next week andria have PSA test at that time.Marychuy Yousif LPN

## 2017-09-14 NOTE — NURSING NOTE
The following medication was given:     MEDICATION: Eligard 45 mg  ROUTE: SQ  SITE: RLQ - Abd.  DOSE: 45mg  LOT #: [id number]  :  Jessica  EXPIRATION DATE:  02/2019  NDC#: [id number] Marychuy Yousif LPN

## 2017-09-14 NOTE — LETTER
9/14/2017       RE: John Batista  [address] [facility] [address]     Dear Colleague,    Thank you for referring your patient, John Batista, to the [facility] CL[facility] [place] at [facility]. Please see a copy of my visit note below.    John Batista is a 72-year-old male with metastatic prostate cancer to bone. He has completed Taxotere and off prednisone. He will be meeting with Dr. Lovett next week for PSA and discussion. He was recently radiated to his lower spine and has no lower extremity numbness, tingling or weakness. His pain has significantly improved.  He is having no voiding difficulty, dysuria or hematuria  Other past medical history: Former smoker, anemia  Medications: Iron sulfate, Eligard  Allergies: None  Exam: Normal appearance, normal vital signs, alert and oriented, normocephalic, normal respirations, neuro grossly intact. With spouse  Assessment: Metastatic, castrate resistant adenocarcinoma of the prostate. Discussed need for Eligard every 6 months. All questions answered  Plan: See me every 6 months for Eligard. Follow-up with Dr. Reese    Again, thank you for allowing me to participate in the care of your patient.      Sincerely,    David Galvan MD

## 2017-09-27 ENCOUNTER — DOCUMENTATION ONLY (OUTPATIENT)
Dept: OTHER | Facility: CLINIC | Age: 72
End: 2017-09-27

## 2017-10-11 DIAGNOSIS — C79.51 SECONDARY MALIGNANT NEOPLASM OF BONE AND BONE MARROW (H): ICD-10-CM

## 2017-10-11 DIAGNOSIS — C61 PROSTATE CANCER (H): Primary | ICD-10-CM

## 2017-10-11 DIAGNOSIS — C79.52 SECONDARY MALIGNANT NEOPLASM OF BONE AND BONE MARROW (H): ICD-10-CM

## 2017-10-11 PROCEDURE — 36415 COLL VENOUS BLD VENIPUNCTURE: CPT | Performed by: UROLOGY

## 2017-10-11 PROCEDURE — 84153 ASSAY OF PSA TOTAL: CPT | Performed by: UROLOGY

## 2017-10-12 LAB — PSA SERPL-MCNC: 0.75 UG/L (ref 0–4)

## 2017-12-19 ENCOUNTER — TRANSFERRED RECORDS (OUTPATIENT)
Dept: HEALTH INFORMATION MANAGEMENT | Facility: CLINIC | Age: 72
End: 2017-12-19

## 2017-12-21 ENCOUNTER — OFFICE VISIT (OUTPATIENT)
Dept: FAMILY MEDICINE | Facility: CLINIC | Age: 72
End: 2017-12-21
Payer: COMMERCIAL

## 2017-12-21 VITALS
OXYGEN SATURATION: 99 % | HEART RATE: 74 BPM | TEMPERATURE: 97.3 F | SYSTOLIC BLOOD PRESSURE: 116 MMHG | WEIGHT: 181.1 LBS | DIASTOLIC BLOOD PRESSURE: 66 MMHG | BODY MASS INDEX: 23.89 KG/M2 | RESPIRATION RATE: 16 BRPM

## 2017-12-21 DIAGNOSIS — C61 PROSTATE CANCER (H): Primary | ICD-10-CM

## 2017-12-21 DIAGNOSIS — C61 PROSTATE CANCER METASTATIC TO BONE (H): ICD-10-CM

## 2017-12-21 DIAGNOSIS — C79.51 PROSTATE CANCER METASTATIC TO BONE (H): ICD-10-CM

## 2017-12-21 PROCEDURE — 99214 OFFICE O/P EST MOD 30 MIN: CPT | Performed by: INTERNAL MEDICINE

## 2017-12-21 NOTE — PROGRESS NOTES
SUBJECTIVE:   John Batista is a 72 year old male who presents to clinic today for the following health issues:    Hospital Follow-up Visit:  Hospital/Nursing Home/IP Rehab Facility: [facility]  Date of Admission: 12/19/17  Date of Discharge: 12/20/17  Reason(s) for Admission: CT guided radiofrequency ablation, left inferior sacral metastasis            Problems taking medications regularly: None       Medication changes since discharge: None       Problems adhering to non-medication therapy: None    Summary of hospitalization: [facility] records reviewed  See outside records, reviewed. Pt indicates [place] was to send information via Fax.  Diagnostic Tests/Treatments reviewed. Follow-up needed:     Other Healthcare Providers Involved in Patient s Care:  Patient Care Team:  Rosario Martinez MD as PCP - General (Internal Medicine)   Dr. David Galvan, Urology  Dr. Reese, Urology     Update since discharge: improved following surgery    Post Discharge Medication Reconciliation: discharge medications reconciled and changed, per note/orders (see AVS).  Plan of care communicated with patient and family     Coding guidelines for this visit:  Type of Medical   Decision Making Face-to-Face Visit       within 7 Days of discharge Face-to-Face Visit        within 14 days of discharge   Moderate Complexity 17135 [id number]   High Complexity 38328 33300        Problem list and histories reviewed & adjusted, as indicated.  Additional history: as documented    BP Readings from Last 3 Encounters:   12/21/17 116/66   09/14/17 124/70   03/16/17 110/68    Wt Readings from Last 3 Encounters:   12/21/17 181 lb 1.6 oz (82.1 kg)   09/14/17 184 lb (83.5 kg)   03/16/17 190 lb (86.2 kg)        Reviewed and updated as needed this visit by clinical staffTobacco  Allergies  Meds  Med Hx  Surg Hx  Fam Hx  Soc Hx      Reviewed and updated as needed this visit by Provider       ROS:  CONSTITUTIONAL: NEGATIVE  for fever, chills, change in weight  RESP: NEGATIVE for significant cough or SOB  CV: NEGATIVE for chest pain, palpitations or peripheral edema  GI: NEGATIVE for nausea, abdominal pain, heartburn, or change in bowel habits  : Metastatic prostate cancer to bone, left inferior sacral metastasis - chemotherapy finished spring 2017, radiation summer 2017, recent CT guided radiofrequency ablation 12/19/17 per Dr. Reese, Urology; also following with Dr. Galvan, Urology for Eligard every 6 months; NEGATIVE for hematuria, dysuria, or difficulties voiding   MUSCULOSKELETAL: NEGATIVE for significant arthralgias or myalgia  NEURO: NEGATIVE for weakness, dizziness or paresthesias  ENDOCRINE: NEGATIVE for temperature intolerance, skin/hair changes  HEME/ALLERGY/IMMUNE: Metastatic prostate cancer to bone, left inferior sacral metastasis as noted; NEGATIVE for bleeding problems    This document serves as a record of the services and decisions personally performed and made by Rosario Martinez MD. It was created on her behalf by Shu Palafox, a trained medical scribe. The creation of this document is based on the provider's statements to the medical scribe.   Shu Palafox, 12:15 PM, December 21, 2017    OBJECTIVE:   /66  Pulse 74  Temp 97.3  F (36.3  C) (Oral)  Resp 16  Wt 181 lb 1.6 oz (82.1 kg)  SpO2 99%  BMI 23.89 kg/m2  Body mass index is 23.89 kg/(m^2).  GENERAL: healthy, alert and no distress  NECK: no carotid bruits  RESP: lungs clear to auscultation - no rales, rhonchi or wheezes  CV: regular rates and rhythm, normal S1 S2, peripheral pulses strong and no peripheral edema  ABDOMEN: soft, nontender and bowel sounds normal  MS: extremities normal- no gross deformities noted  NEURO: mentation intact and speech normal  PSYCH: mentation appears normal and affect normal/bright    Diagnostic Test Results:  none   ASSESSMENT/PLAN:   (C61) Prostate cancer (H) (primary encounter diagnosis)  Comment: metastatic prostate cancer  to bone; sacrum and ilium; chemotherapy finished spring 2017, radiation summer 2017; left inferior sacral metastasis - CT guided radiofrequency prostatic ablation 12/19/17 per Dr. Reese  Plan: pt following with Dr. Galvan, Urology, Eligard every 6 months     (C61,  C79.51) Prostate cancer metastatic to bone (H)  Comment: sacrum and ilium; Dr. Reese, Urology [facility], CT guided frequency ablation, left inferior sacral metastasis on 12/19/2017  Plan: pt following with Dr. Galvan, [facility], Eligard every 6 months     MEDICATIONS:   No orders of the defined types were placed in this encounter.    Medications Discontinued During This Encounter   Medication Reason     DERMAZENE 1-1 % CREA Therapy completed     enzalutamide (XTANDI) 40 MG capsule Therapy completed     ferrous sulfate (IRON) 325 (65 FE) MG tablet Therapy completed     prochlorperazine (COMPAZINE) 10 MG tablet Therapy completed     predniSONE (DELTASONE) 5 MG tablet Therapy completed         Rosario Martinez MD  Internal Medicine  [facility]    The information in this document, created by a medical scribe for me, accurately reflects the services I personally performed and the decisions made by me. I have reviewed and approved this document for accuracy.  Dr. Rosario Martinez, 12:30 PM, December 21, 2017

## 2017-12-21 NOTE — NURSING NOTE
"Chief Complaint   Patient presents with     Hospital F/U     [facility]       Initial /66  Pulse 74  Temp 97.3  F (36.3  C) (Oral)  Resp 16  Wt 181 lb 1.6 oz (82.1 kg)  SpO2 99%  BMI 23.89 kg/m2 Estimated body mass index is 23.89 kg/(m^2) as calculated from the following:    Height as of 9/14/17: 6' 1\" (1.854 m).    Weight as of this encounter: 181 lb 1.6 oz (82.1 kg).  Medication Reconciliation: complete    "

## 2017-12-21 NOTE — MR AVS SNAPSHOT
"              After Visit Summary   12/21/2017    John Batista    MRN: [id number]           Patient Information     Date Of Birth          1945        Visit Information        Provider Department      12/21/2017 11:30 AM Rosario Martinez MD [facility]        Today's Diagnoses     Prostate cancer (H)    -  1    Prostate cancer metastatic to bone (H)           Follow-ups after your visit        Your next 10 appointments already scheduled     Mar 15, 2018  9:50 AM CDT   Return Visit with David Galvan MD   [facility] [place] ([facility])    [address]  [address]  [facility] [address] [phone]              Who to contact     If you have questions or need follow up information about today's clinic visit or your schedule please contact [facility] directly at [phone].  Normal or non-critical lab and imaging results will be communicated to you by MyChart, letter or phone within 4 business days after the clinic has received the results. If you do not hear from us within 7 days, please contact the clinic through Aviacommhart or phone. If you have a critical or abnormal lab result, we will notify you by phone as soon as possible.  Submit refill requests through Co-Work or call your pharmacy and they will forward the refill request to us. Please allow 3 business days for your refill to be completed.          Additional Information About Your Visit        MyChart Information     Co-Work lets you send messages to your doctor, view your test results, renew your prescriptions, schedule appointments and more. To sign up, go to [email or web address] . Click on \"Log in\" on the left side of the screen, which will take you to the Welcome page. Then click on \"Sign up Now\" on the right side of the page.     You will be asked to enter the access code listed below, as well as some personal information. Please " follow the directions to create your username and password.     Your access code is: [email or web address]  Expires: 3/21/2018 12:29 PM     Your access code will  in 90 days. If you need help or a new code, please call your [facility] clinic or [phone].        Care EveryWhere ID     This is your Care EveryWhere ID. This could be used by other organizations to access your [facility] medical records  [id number]        Your Vitals Were     Pulse Temperature Respirations Pulse Oximetry BMI (Body Mass Index)       74 97.3  F (36.3  C) (Oral) 16 99% 23.89 kg/m2        Blood Pressure from Last 3 Encounters:   17 116/66   17 124/70   17 110/68    Weight from Last 3 Encounters:   17 181 lb 1.6 oz (82.1 kg)   17 184 lb (83.5 kg)   17 190 lb (86.2 kg)              Today, you had the following     No orders found for display       Primary Care Provider Office Phone # Fax #    Rosario Martinez [phone]       [address] [facility] [address]        Equal Access to Services     ANA ARENAS AH: Hadii aad ku hadasho Soomaali, waaxda luqadaha, qaybta kaalmada adeegyada, brandon duongin haydestiney iyer . So [facility] [place] [phone].    ATENCIÓN: Si habla español, tiene a abdi disposición servicios gratuitos de asistencia lingüística. Llame al [phone].    We comply with applicable federal civil rights laws and [place] laws. We do not discriminate on the basis of race, color, national origin, age, disability, sex, sexual orientation, or gender identity.            Thank you!     Thank you for choosing [facility]  for your care. Our goal is always to provide you with excellent care. Hearing back from our patients is one way we can continue to improve our services. Please take a few minutes to complete the written survey that you may receive in the mail after your visit with us. Thank you!             Your Updated Medication List - Protect others around  you: Learn how to safely use, store and throw away your medicines at www.disposemymeds.org.          This list is accurate as of: 12/21/17 12:29 PM.  Always use your most recent med list.                   Brand Name Dispense Instructions for use Diagnosis    ELIDEL 1 % cream   Generic drug:  pimecrolimus           ELIGARD 45 MG kit   Generic drug:  leuprolide      Inject 45 mg Subcutaneous every 6 months.        oxyCODONE IR 5 MG tablet    ROXICODONE     Take 1 tablet by mouth nightly as needed Reported on 3/16/2017        vitamin D 1000 UNITS capsule      Take 1 capsule by mouth daily

## 2017-12-22 LAB
AST SERPL-CCNC: 17 U/L (ref 13–39)
CREAT SERPL-MCNC: 0.88 MG/DL (ref 0.6–1.3)
GFR SERPL CREATININE-BSD FRML MDRD: 67.2 ML/MIN/1.73M2
TSH SERPL-ACNC: 1.83 UIU/ML (ref 0.32–5)

## 2018-01-09 ENCOUNTER — TRANSFERRED RECORDS (OUTPATIENT)
Dept: HEALTH INFORMATION MANAGEMENT | Facility: CLINIC | Age: 73
End: 2018-01-09

## 2018-02-01 DIAGNOSIS — C61 MALIGNANT NEOPLASM OF PROSTATE (H): Primary | ICD-10-CM

## 2018-02-01 PROCEDURE — 36415 COLL VENOUS BLD VENIPUNCTURE: CPT

## 2018-02-01 PROCEDURE — 99000 SPECIMEN HANDLING OFFICE-LAB: CPT

## 2018-02-22 DIAGNOSIS — Z00.00 LABORATORY EXAMINATION ORDERED AS PART OF A ROUTINE GENERAL MEDICAL EXAMINATION: Primary | ICD-10-CM

## 2018-02-22 PROCEDURE — 36415 COLL VENOUS BLD VENIPUNCTURE: CPT

## 2018-02-22 PROCEDURE — 99000 SPECIMEN HANDLING OFFICE-LAB: CPT

## 2018-03-15 ENCOUNTER — OFFICE VISIT (OUTPATIENT)
Dept: UROLOGY | Facility: CLINIC | Age: 73
End: 2018-03-15
Payer: COMMERCIAL

## 2018-03-15 VITALS — HEIGHT: 73 IN | HEART RATE: 72 BPM | WEIGHT: 181 LBS | OXYGEN SATURATION: 99 % | BODY MASS INDEX: 23.99 KG/M2

## 2018-03-15 DIAGNOSIS — C61 MALIGNANT NEOPLASM OF PROSTATE (H): Primary | ICD-10-CM

## 2018-03-15 DIAGNOSIS — C61 PROSTATE CANCER (H): ICD-10-CM

## 2018-03-15 PROCEDURE — 96402 CHEMO HORMON ANTINEOPL SQ/IM: CPT | Performed by: UROLOGY

## 2018-03-15 PROCEDURE — 99213 OFFICE O/P EST LOW 20 MIN: CPT | Mod: 25 | Performed by: UROLOGY

## 2018-03-15 ASSESSMENT — PAIN SCALES - GENERAL: PAINLEVEL: NO PAIN (0)

## 2018-03-15 NOTE — LETTER
3/15/2018      RE: John Batista  [address] [facility] [address]       John is a 72-year-old male with castrate resistant metastatic prostate cancer who receives Eligard 45 mg every 6 months. He is followed at [facility] by Ruben Reese M.D. His last PSA was 5.8. He is voiding comfortably, no dysuria or hematuria. His low back pain is mild.  He'll be starting a new medication-he is unsure of the drug name.  Other past medical history: Bilateral herniorrhaphies, colonoscopies, vasectomy, cystoscopy, former smoker  Family history: No colorectal cancer  Medications: Vitamin D, Eligard, oxycodone  Allergies: None  Exam: Normal appearance, normal vital signs. Alert and oriented, normocephalic, normal respirations, neuro grossly intact  Assessment: Hormone resistant, metastatic prostate cancer to bone.  Plan: See me every 6 months for Eligard            Follow-up with Dr. Reese as scheduled    David Galvan MD

## 2018-03-15 NOTE — NURSING NOTE
The following medication was given:     MEDICATION: Eligard 45 mg  ROUTE: SQ  SITE: LLQ - Abd.  DOSE: 45mg  LOT #: [id number]  :  pebbles  EXPIRATION DATE:    NDC#: 45630-815-29  CELSA Humphreys CMA

## 2018-03-15 NOTE — LETTER
3/15/2018       RE: John Batista  [address] [facility] [address]     Dear Colleague,    Thank you for referring your patient, John Batista, to the [facility] [place] [facility]. Please see a copy of my visit note below.    John is a 72-year-old male with castrate resistant metastatic prostate cancer who receives Eligard 45 mg every 6 months. He is followed at [facility] by Ruben Reese M.D. His last PSA was 5.8. He is voiding comfortably, no dysuria or hematuria. His low back pain is mild.  He'll be starting a new medication-he is unsure of the drug name.  Other past medical history: Bilateral herniorrhaphies, colonoscopies, vasectomy, cystoscopy, former smoker  Family history: No colorectal cancer  Medications: Vitamin D, Eligard, oxycodone  Allergies: None  Exam: Normal appearance, normal vital signs. Alert and oriented, normocephalic, normal respirations, neuro grossly intact  Assessment: Hormone resistant, metastatic prostate cancer to bone.  Plan: See me every 6 months for Eligard            Follow-up with Dr. Reese as scheduled    Again, thank you for allowing me to participate in the care of your patient.      Sincerely,    David Galvan MD

## 2018-03-15 NOTE — NURSING NOTE
Pt states he had his PSA at the [facility] and that it is 5.8 and was done on 3-1-18.  Pt unable to leave a UA at this time.  Pt states no voiding trouble.  CELSA Humphreys, CMA

## 2018-03-15 NOTE — PROGRESS NOTES
John is a 72-year-old male with castrate resistant metastatic prostate cancer who receives Eligard 45 mg every 6 months. He is followed at [facility] by Ruben Reese M.D. His last PSA was 5.8. He is voiding comfortably, no dysuria or hematuria. His low back pain is mild.  He'll be starting a new medication-he is unsure of the drug name.  Other past medical history: Bilateral herniorrhaphies, colonoscopies, vasectomy, cystoscopy, former smoker  Family history: No colorectal cancer  Medications: Vitamin D, Eligard, oxycodone  Allergies: None  Exam: Normal appearance, normal vital signs. Alert and oriented, normocephalic, normal respirations, neuro grossly intact  Assessment: Hormone resistant, metastatic prostate cancer to bone.  Plan: See me every 6 months for Eligard            Follow-up with Dr. Reese as scheduled

## 2018-03-15 NOTE — MR AVS SNAPSHOT
"              After Visit Summary   3/15/2018    John Batista    MRN: [id number]           Patient Information     Date Of Birth          1945        Visit Information        Provider Department      3/15/2018 9:50 AM David Galvan MD [facility]        Today's Diagnoses     Malignant neoplasm of prostate (H)    -  1       Follow-ups after your visit        Follow-up notes from your care team     Return in about 6 months (around 9/15/2018) for eligard.      Your next 10 appointments already scheduled     Sep 18, 2018 10:00 AM CDT   Return Visit with David Galvan MD   [facility] ([facility])    [address]  [address]  [facility] [address] [phone]              Who to contact     If you have questions or need follow up information about today's clinic visit or your schedule please contact [facility] directly at [phone].  Normal or non-critical lab and imaging results will be communicated to you by Nubefyhart, letter or phone within 4 business days after the clinic has received the results. If you do not hear from us within 7 days, please contact the clinic through Openerat or phone. If you have a critical or abnormal lab result, we will notify you by phone as soon as possible.  Submit refill requests through [employer] or call your pharmacy and they will forward the refill request to us. Please allow 3 business days for your refill to be completed.          Additional Information About Your Visit        Nubefyhart Information     [employer] lets you send messages to your doctor, view your test results, renew your prescriptions, schedule appointments and more. To sign up, go to [email or web address] . Click on \"Log in\" on the left side of the screen, which will take you to the Welcome page. Then click on \"Sign up Now\" on the right " "side of the page.     You will be asked to enter the access code listed below, as well as some personal information. Please follow the directions to create your username and password.     Your access code is: [email or web address]  Expires: 3/21/2018  1:29 PM     Your access code will  in 90 days. If you need help or a new code, please call your [facility] or [phone].        Care EveryWhere ID     This is your Care EveryWhere ID. This could be used by other organizations to access your [place] medical records  [id number]        Your Vitals Were     Pulse Height Pulse Oximetry BMI (Body Mass Index)          72 1.854 m (6' 1\") 99% 23.88 kg/m2         Blood Pressure from Last 3 Encounters:   17 116/66   17 124/70   17 110/68    Weight from Last 3 Encounters:   03/15/18 82.1 kg (181 lb)   17 82.1 kg (181 lb 1.6 oz)   17 83.5 kg (184 lb)              Today, you had the following     No orders found for display       Primary Care Provider Office Phone # Fax #    Rosario Martinez [phone]       [address] [facility] [address]        Equal Access to Services     ANA ARENAS AH: Hadii aad ku hadasho Soomaali, waaxda luqadaha, qaybta kaalmada adeegyada, brandon idiin hayaan erick iyer . So [facility] [phone].    ATENCIÓN: Si habla español, tiene a abdi disposición servicios gratuitos de asistencia lingüística. Llame al [phone].    We comply with applicable federal civil rights laws and [place] laws. We do not discriminate on the basis of race, color, national origin, age, disability, sex, sexual orientation, or gender identity.            Thank you!     Thank you for choosing [facility] [place]  for your care. Our goal is always to provide you with excellent care. Hearing back from our patients is one way we can continue to improve our services. Please take a few minutes to complete the written survey that " you may receive in the mail after your visit with us. Thank you!             Your Updated Medication List - Protect others around you: Learn how to safely use, store and throw away your medicines at www.disposemymeds.org.          This list is accurate as of 3/15/18 10:57 AM.  Always use your most recent med list.                   Brand Name Dispense Instructions for use Diagnosis    ELIDEL 1 % cream   Generic drug:  pimecrolimus           ELIGARD 45 MG kit   Generic drug:  leuprolide      Inject 45 mg Subcutaneous every 6 months.        oxyCODONE IR 5 MG tablet    ROXICODONE     Take 1 tablet by mouth nightly as needed Reported on 3/16/2017        vitamin D 1000 UNITS capsule      Take 1 capsule by mouth daily

## 2018-04-02 ENCOUNTER — TRANSFERRED RECORDS (OUTPATIENT)
Dept: HEALTH INFORMATION MANAGEMENT | Facility: CLINIC | Age: 73
End: 2018-04-02

## 2018-04-30 ENCOUNTER — TRANSFERRED RECORDS (OUTPATIENT)
Dept: HEALTH INFORMATION MANAGEMENT | Facility: CLINIC | Age: 73
End: 2018-04-30

## 2018-05-29 ENCOUNTER — TRANSFERRED RECORDS (OUTPATIENT)
Dept: HEALTH INFORMATION MANAGEMENT | Facility: CLINIC | Age: 73
End: 2018-05-29

## 2018-10-03 ENCOUNTER — TRANSFERRED RECORDS (OUTPATIENT)
Dept: HEALTH INFORMATION MANAGEMENT | Facility: CLINIC | Age: 73
End: 2018-10-03

## 2018-10-10 ENCOUNTER — TRANSFERRED RECORDS (OUTPATIENT)
Dept: HEALTH INFORMATION MANAGEMENT | Facility: CLINIC | Age: 73
End: 2018-10-10

## 2018-10-17 ENCOUNTER — TRANSFERRED RECORDS (OUTPATIENT)
Dept: HEALTH INFORMATION MANAGEMENT | Facility: CLINIC | Age: 73
End: 2018-10-17

## 2018-10-24 ENCOUNTER — TRANSFERRED RECORDS (OUTPATIENT)
Dept: HEALTH INFORMATION MANAGEMENT | Facility: CLINIC | Age: 73
End: 2018-10-24

## 2018-10-27 ENCOUNTER — HOSPITAL ENCOUNTER (INPATIENT)
Facility: CLINIC | Age: 73
LOS: 5 days | Discharge: HOME | DRG: 378 | End: 2018-11-01
Attending: EMERGENCY MEDICINE | Admitting: INTERNAL MEDICINE
Payer: COMMERCIAL

## 2018-10-27 DIAGNOSIS — D64.9 SYMPTOMATIC ANEMIA: ICD-10-CM

## 2018-10-27 DIAGNOSIS — K92.2 LOWER GI BLEED: ICD-10-CM

## 2018-10-27 PROBLEM — K92.1 MELENA: Status: ACTIVE | Noted: 2018-10-27

## 2018-10-27 LAB
ALBUMIN SERPL-MCNC: 3.8 G/DL (ref 3.4–5)
ALP SERPL-CCNC: 197 U/L (ref 40–150)
ALT SERPL W P-5'-P-CCNC: 17 U/L (ref 0–70)
ANION GAP SERPL CALCULATED.3IONS-SCNC: 10 MMOL/L (ref 3–14)
ANION GAP SERPL CALCULATED.3IONS-SCNC: 15 MMOL/L (ref 6–17)
AST SERPL W P-5'-P-CCNC: 13 U/L (ref 0–45)
BILIRUB SERPL-MCNC: 0.7 MG/DL (ref 0.2–1.3)
BLD PROD TYP BPU: NORMAL
BLD UNIT ID BPU: 0
BLOOD PRODUCT CODE: NORMAL
BPU ID: NORMAL
BUN SERPL-MCNC: 23 MG/DL (ref 7–30)
BUN SERPL-MCNC: 25 MG/DL (ref 7–30)
CA-I BLD-SCNC: 4.3 MG/DL (ref 4.4–5.2)
CALCIUM SERPL-MCNC: 8.3 MG/DL (ref 8.5–10.1)
CHLORIDE BLD-SCNC: 97 MMOL/L (ref 94–109)
CHLORIDE SERPL-SCNC: 98 MMOL/L (ref 94–109)
CO2 BLD-SCNC: 19 MMOL/L (ref 20–32)
CO2 SERPL-SCNC: 23 MMOL/L (ref 20–32)
CREAT BLD-MCNC: 0.6 MG/DL (ref 0.66–1.25)
CREAT SERPL-MCNC: 0.73 MG/DL (ref 0.66–1.25)
GFR SERPL CREATININE-BSD FRML MDRD: >90 ML/MIN/1.7M2
GFR SERPL CREATININE-BSD FRML MDRD: >90 ML/MIN/1.7M2
GLUCOSE BLD-MCNC: 106 MG/DL (ref 70–99)
GLUCOSE SERPL-MCNC: 103 MG/DL (ref 70–99)
HCT VFR BLD CALC: 28 %PCV (ref 40–53)
HGB BLD CALC-MCNC: 9.5 G/DL (ref 13.3–17.7)
HGB BLD-MCNC: 8.7 G/DL (ref 13.3–17.7)
INR PPP: 0.91 (ref 0.86–1.14)
POTASSIUM BLD-SCNC: 3.6 MMOL/L (ref 3.4–5.3)
POTASSIUM SERPL-SCNC: 3.6 MMOL/L (ref 3.4–5.3)
PROT SERPL-MCNC: 7.2 G/DL (ref 6.8–8.8)
SODIUM BLD-SCNC: 131 MMOL/L (ref 133–144)
SODIUM SERPL-SCNC: 131 MMOL/L (ref 133–144)
TRANSFUSION STATUS PATIENT QL: NORMAL
TRANSFUSION STATUS PATIENT QL: NORMAL
TROPONIN I BLD-MCNC: 0.02 UG/L (ref 0–0.08)
TROPONIN I SERPL-MCNC: <0.015 UG/L (ref 0–0.04)

## 2018-10-27 PROCEDURE — 84484 ASSAY OF TROPONIN QUANT: CPT

## 2018-10-27 PROCEDURE — 99222 1ST HOSP IP/OBS MODERATE 55: CPT | Mod: AI | Performed by: INTERNAL MEDICINE

## 2018-10-27 PROCEDURE — 86850 RBC ANTIBODY SCREEN: CPT | Performed by: EMERGENCY MEDICINE

## 2018-10-27 PROCEDURE — 25000125 ZZHC RX 250: Performed by: INTERNAL MEDICINE

## 2018-10-27 PROCEDURE — 93005 ELECTROCARDIOGRAM TRACING: CPT

## 2018-10-27 PROCEDURE — 85025 COMPLETE CBC W/AUTO DIFF WBC: CPT | Performed by: EMERGENCY MEDICINE

## 2018-10-27 PROCEDURE — 25000128 H RX IP 250 OP 636: Performed by: INTERNAL MEDICINE

## 2018-10-27 PROCEDURE — 25000132 ZZH RX MED GY IP 250 OP 250 PS 637: Performed by: INTERNAL MEDICINE

## 2018-10-27 PROCEDURE — 36430 TRANSFUSION BLD/BLD COMPNT: CPT

## 2018-10-27 PROCEDURE — 36415 COLL VENOUS BLD VENIPUNCTURE: CPT | Performed by: INTERNAL MEDICINE

## 2018-10-27 PROCEDURE — 85018 HEMOGLOBIN: CPT | Performed by: INTERNAL MEDICINE

## 2018-10-27 PROCEDURE — 86900 BLOOD TYPING SEROLOGIC ABO: CPT | Performed by: EMERGENCY MEDICINE

## 2018-10-27 PROCEDURE — P9016 RBC LEUKOCYTES REDUCED: HCPCS | Performed by: EMERGENCY MEDICINE

## 2018-10-27 PROCEDURE — 25000128 H RX IP 250 OP 636: Performed by: EMERGENCY MEDICINE

## 2018-10-27 PROCEDURE — 80047 BASIC METABLC PNL IONIZED CA: CPT

## 2018-10-27 PROCEDURE — 86923 COMPATIBILITY TEST ELECTRIC: CPT | Performed by: EMERGENCY MEDICINE

## 2018-10-27 PROCEDURE — 84484 ASSAY OF TROPONIN QUANT: CPT | Performed by: EMERGENCY MEDICINE

## 2018-10-27 PROCEDURE — 80053 COMPREHEN METABOLIC PANEL: CPT | Performed by: EMERGENCY MEDICINE

## 2018-10-27 PROCEDURE — 12000007 ZZH R&B INTERMEDIATE

## 2018-10-27 PROCEDURE — 99285 EMERGENCY DEPT VISIT HI MDM: CPT | Mod: 25

## 2018-10-27 PROCEDURE — 86901 BLOOD TYPING SEROLOGIC RH(D): CPT | Performed by: EMERGENCY MEDICINE

## 2018-10-27 PROCEDURE — 85610 PROTHROMBIN TIME: CPT | Performed by: EMERGENCY MEDICINE

## 2018-10-27 PROCEDURE — 85014 HEMATOCRIT: CPT

## 2018-10-27 RX ORDER — PROCHLORPERAZINE MALEATE 5 MG
5 TABLET ORAL EVERY 6 HOURS PRN
Status: DISCONTINUED | OUTPATIENT
Start: 2018-10-27 | End: 2018-11-01 | Stop reason: HOSPADM

## 2018-10-27 RX ORDER — ACETAMINOPHEN 325 MG/1
650 TABLET ORAL EVERY 4 HOURS PRN
Status: DISCONTINUED | OUTPATIENT
Start: 2018-10-27 | End: 2018-11-01 | Stop reason: HOSPADM

## 2018-10-27 RX ORDER — PROCHLORPERAZINE 25 MG
12.5 SUPPOSITORY, RECTAL RECTAL EVERY 12 HOURS PRN
Status: DISCONTINUED | OUTPATIENT
Start: 2018-10-27 | End: 2018-11-01 | Stop reason: HOSPADM

## 2018-10-27 RX ORDER — PREDNISONE 5 MG/1
5 TABLET ORAL 2 TIMES DAILY
Status: DISCONTINUED | OUTPATIENT
Start: 2018-10-27 | End: 2018-10-28

## 2018-10-27 RX ORDER — ONDANSETRON 2 MG/ML
4 INJECTION INTRAMUSCULAR; INTRAVENOUS EVERY 6 HOURS PRN
Status: DISCONTINUED | OUTPATIENT
Start: 2018-10-27 | End: 2018-11-01 | Stop reason: HOSPADM

## 2018-10-27 RX ORDER — LIDOCAINE 40 MG/G
CREAM TOPICAL
Status: DISCONTINUED | OUTPATIENT
Start: 2018-10-27 | End: 2018-11-01 | Stop reason: HOSPADM

## 2018-10-27 RX ORDER — OXYCODONE HYDROCHLORIDE 5 MG/1
5 TABLET ORAL EVERY 4 HOURS PRN
Status: DISCONTINUED | OUTPATIENT
Start: 2018-10-27 | End: 2018-11-01 | Stop reason: HOSPADM

## 2018-10-27 RX ORDER — SODIUM CHLORIDE 9 MG/ML
1000 INJECTION, SOLUTION INTRAVENOUS CONTINUOUS
Status: DISCONTINUED | OUTPATIENT
Start: 2018-10-27 | End: 2018-10-27

## 2018-10-27 RX ORDER — PANTOPRAZOLE SODIUM 40 MG/1
40 TABLET, DELAYED RELEASE ORAL
Status: DISCONTINUED | OUTPATIENT
Start: 2018-10-27 | End: 2018-10-28

## 2018-10-27 RX ORDER — SODIUM CHLORIDE 9 MG/ML
INJECTION, SOLUTION INTRAVENOUS CONTINUOUS
Status: DISCONTINUED | OUTPATIENT
Start: 2018-10-27 | End: 2018-11-01

## 2018-10-27 RX ORDER — ONDANSETRON 4 MG/1
4 TABLET, ORALLY DISINTEGRATING ORAL EVERY 6 HOURS PRN
Status: DISCONTINUED | OUTPATIENT
Start: 2018-10-27 | End: 2018-11-01 | Stop reason: HOSPADM

## 2018-10-27 RX ORDER — NITROGLYCERIN 0.4 MG/1
0.4 TABLET SUBLINGUAL EVERY 5 MIN PRN
Status: DISCONTINUED | OUTPATIENT
Start: 2018-10-27 | End: 2018-11-01 | Stop reason: HOSPADM

## 2018-10-27 RX ORDER — PREDNISONE 5 MG/1
5 TABLET ORAL 2 TIMES DAILY
COMMUNITY
End: 2019-01-01

## 2018-10-27 RX ORDER — NALOXONE HYDROCHLORIDE 0.4 MG/ML
.1-.4 INJECTION, SOLUTION INTRAMUSCULAR; INTRAVENOUS; SUBCUTANEOUS
Status: DISCONTINUED | OUTPATIENT
Start: 2018-10-27 | End: 2018-10-29

## 2018-10-27 RX ORDER — HYDROMORPHONE HYDROCHLORIDE 1 MG/ML
0.2 INJECTION, SOLUTION INTRAMUSCULAR; INTRAVENOUS; SUBCUTANEOUS
Status: DISCONTINUED | OUTPATIENT
Start: 2018-10-27 | End: 2018-11-01 | Stop reason: HOSPADM

## 2018-10-27 RX ADMIN — PANTOPRAZOLE SODIUM 40 MG: 40 TABLET, DELAYED RELEASE ORAL at 19:23

## 2018-10-27 RX ADMIN — ACETAMINOPHEN 650 MG: 325 TABLET, FILM COATED ORAL at 23:50

## 2018-10-27 RX ADMIN — SODIUM CHLORIDE: 9 INJECTION, SOLUTION INTRAVENOUS at 19:23

## 2018-10-27 RX ADMIN — Medication 1 MG: at 23:50

## 2018-10-27 RX ADMIN — ACETAMINOPHEN 650 MG: 325 TABLET, FILM COATED ORAL at 20:28

## 2018-10-27 RX ADMIN — SODIUM CHLORIDE 500 ML: 9 INJECTION, SOLUTION INTRAVENOUS at 14:49

## 2018-10-27 RX ADMIN — PREDNISONE 5 MG: 5 TABLET ORAL at 20:29

## 2018-10-27 ASSESSMENT — ACTIVITIES OF DAILY LIVING (ADL)
SWALLOWING: 0-->SWALLOWS FOODS/LIQUIDS WITHOUT DIFFICULTY
TOILETING: 0-->INDEPENDENT
AMBULATION: 0-->INDEPENDENT
DRESS: 0-->INDEPENDENT
BATHING: 0-->INDEPENDENT
ADLS_ACUITY_SCORE: 12
RETIRED_EATING: 0-->INDEPENDENT
TRANSFERRING: 0-->INDEPENDENT
COGNITION: 0 - NO COGNITION ISSUES REPORTED
FALL_HISTORY_WITHIN_LAST_SIX_MONTHS: NO
RETIRED_COMMUNICATION: 0-->UNDERSTANDS/COMMUNICATES WITHOUT DIFFICULTY

## 2018-10-27 ASSESSMENT — ENCOUNTER SYMPTOMS
ABDOMINAL PAIN: 0
BLOOD IN STOOL: 1
FATIGUE: 1

## 2018-10-27 NOTE — ED NOTES
Patient ambulated to bathroom with SBA, VSS, blood infusing. Patient had large bright red blood stool with multiple clots. No c/o lightheadedness or weakness. Patient did have dyspnea with this short walk to bathroom

## 2018-10-27 NOTE — IP AVS SNAPSHOT
[place] [address] [facility]    [address]    [facility] [address]    Phone:  [phone]    Fax:  [phone]                                       After Visit Summary   10/27/2018    John Batista    MRN: [id number]           After Visit Summary Signature Page     I have received my discharge instructions, and my questions have been answered. I have discussed any challenges I see with this plan with the nurse or doctor.    ..........................................................................................................................................  Patient/Patient Representative Signature      ..........................................................................................................................................  Patient Representative Print Name and Relationship to Patient    ..................................................               ................................................  Date                                   Time    ..........................................................................................................................................  Reviewed by Signature/Title    ...................................................              ..............................................  Date                                               Time          22EPIC Rev 08/18

## 2018-10-27 NOTE — PHARMACY-ADMISSION MEDICATION HISTORY
Admission medication history interview status for this patient is complete. See [facility] admission navigator for allergy information, prior to admission medications and immunization status.     Medication history interview source(s):Patient and Family  Medication history resources (including written lists, pill bottles, clinic record):None  [facility]    Changes made to PTA medication list:  Added: prednisone  Deleted: Elidel (discontinued), oxycodone (discontinued)  Changed: vitamin D (increase from 1,000 unit(s) daily to 4,000 unit(s) daily)    Actions taken by pharmacist (provider contacted, etc):None     Additional medication history information: John received Lupron injection about a month ago at Dr. Ac's office at [facility]. He said the next injection (~5 months from now) would be Eligard again.    Medication reconciliation/reorder completed by provider prior to medication history? No    Do you take OTC medications (eg tylenol, ibuprofen, fish oil, eye/ear drops, etc)? Y     Prior to Admission medications    Medication Sig Last Dose Taking? Auth Provider   Cholecalciferol (VITAMIN D) 1000 UNITS capsule Take 2,000 Units by mouth 2 times daily  10/26/2018 at PM Yes Reported, Patient   leuprolide (ELIGARD) 45 MG injection Inject 45 mg Subcutaneous every 6 months. Past Month Yes Reported, Patient   predniSONE (DELTASONE) 5 MG tablet Take 5 mg by mouth 2 times daily 10/27/2018 at 1100 Yes Reported, Patient

## 2018-10-27 NOTE — ED TRIAGE NOTES
Patient presents to the ED reporting heavy rectal bleeding. States for the past day has been passing large amounts of dark red blood approximately every hour. Also reports SOB and chest pain. Patient currently being treated for prostate cancer. Last chemo Wednesday.

## 2018-10-27 NOTE — ED PROVIDER NOTES
History     Chief Complaint:  Rectal Bleeding and Chest Pain      HPI   John Batista is a 73 year old male who presents to the emergency department for evaluation of rectal bleeding. The patient began having a heavy amount of blood in his stool yesterday around 1600. His wife reports that yesterday he had labs drawn at [facility] that revealed a hemoglobin in the 11's. He is easily fatigued on the walk to the bathroom. He reports his last bowel movement was mostly gas. His wife reports that he has had this once before in Dec 2016 when he was admitted for a week and given 5 units of blood. It was believed he had a diverticular bleed but the bleeding had stopped by the time his procedure took place. He denies abdominal pain or loss of consciousness.     Allergies:  No known Drug Allergies    Medications:    Cholecalciferol (VITAMIN D) 1000 UNITS capsule  ELIDEL 1 % cream  leuprolide (ELIGARD) 45 MG injection  oxyCODONE (ROXICODONE) 5 MG IR tablet      Past Medical History:    Prostate cancer    Past Surgical History:    Colonoscopy X2  Cytoscopy  Anus exam under anesthesia   Colonoscopy through stoma  Sigmoidoscopy flexible  Vasectomy     Family History:    History reviewed. No pertinent family history.     Social History:  Marital Status:   [2]  Social History   Substance Use Topics     Smoking status: Former Smoker     Start date: 9/8/1981     Smokeless tobacco: Former User     Alcohol use No        Review of Systems   Constitutional: Positive for fatigue.   Respiratory: Positive for shortness of breath.    Cardiovascular: Negative for chest pain.   Gastrointestinal: Positive for blood in stool. Negative for abdominal pain.   Neurological: Negative for syncope.   All other systems reviewed and are negative.        Physical Exam   First Vitals:  BP: 128/77  Pulse: 93  Heart Rate: 89  Temp: 97.4  F (36.3  C)  Resp: 18  SpO2: 100 %      Physical Exam  Nursing note and vitals  reviewed.  Constitutional: Cooperative.   HENT:   Mouth/Throat: Moist mucous membranes.   Eyes: EOMI, nonicteric sclera  Cardiovascular: Normal rate, regular rhythm, no murmurs, rubs, or gallops  Pulmonary/Chest: Effort normal and breath sounds normal. No respiratory distress. No wheezes. No rales.   Abdominal: Soft. Nontender, nondistended, no guarding or rigidity. BS present.   Musculoskeletal: Normal range of motion.   Neurological: Alert. Moves all extremities spontaneously.   Skin: Skin is warm and dry. No rash noted.   Psychiatric: Normal mood and affect.       Emergency Department Course     ECG:  ECG taken at 1433, ECG read at 1730  Normal sinus rhythm  No STEMI  Normal ECG  Rate 96 bpm. ME interval 154 ms. QRS duration 82 ms. QT/QTc 354/447 ms. P-R-T axes 25 53 58.    Laboratory:  Laboratory findings were communicated with the patient who voiced understanding of the findings.    Troponin POCT: 0.02  ISTAT basic Met: Na 131 (L), Glucose 106 (H), Ca2+ 4.3 (L), creatinine 0.6 (L), HGB 9.5 (L), total CO2 19 (L), hematocrit 28 (L)  Blood type: AB+  INR: 0.91  CBC: WBC 22.4 (H), HGB 8.9 (L),   CMP: Na 131 (L), glucose 103 (H), Ca 8.3 (L), ALKPHOS 197 (H), o/w WNL (Creatinine 0.73)    Interventions:  1449 NS 0.5L IV Bolus     Emergency Department Course:  Nursing notes and vitals reviewed.  I performed an exam of the patient as documented above.   I discussed the treatment plan with the patient. They expressed understanding of this plan and consented to admission. I discussed the patient with Dr. Lange, who will admit the patient to a monitored bed for further evaluation and treatment.     I personally reviewed the lab results with the Patient and spouse and answered all related questions prior to admission.  Impression & Plan      Medical Decision Making:  Pt presents with significant fatigue in the context of what's likely a lower GI bleed based on bright red blood and history of same. Vital signs  stable. Hgb down 2 points from yesterday. Will give unit of blood given pt is so symptomatic he's having difficulty ambulating back and forth to bathroom. Discussed with Dr. Lange from hospitalist service who accepts.     Diagnosis:    ICD-10-CM    1. Lower GI bleed K92.2 Blood component     Troponin I   2. Symptomatic anemia D64.9      Disposition:   Admission    Scribe Disclosure:  I, Baljit Webber, am serving as a scribe at 4:38 PM on 10/27/2018 to document services personally performed by Ramírez Flores MD, based on my observations and the provider's statements to me.      [facility] EMERGENCY DEPARTMENT       Ramírez Flores MD  10/31/18 1600

## 2018-10-27 NOTE — IP AVS SNAPSHOT
MRN:[id number]                      After Visit Summary   10/27/2018    John Batista    MRN: [id number]           Thank you!     Thank you for choosing [facility] for your care. Our goal is always to provide you with excellent care. Hearing back from our patients is one way we can continue to improve our services. Please take a few minutes to complete the written survey that you may receive in the mail after you visit. If you would like to speak to someone directly about your visit please contact Patient Relations at [phone]. Thank you!          Patient Information     Date Of Birth          1945        Designated Caregiver       Most Recent Value    Caregiver    Will someone help with your care after discharge? yes    Name of designated caregiver Wife    Phone number of caregiver Facesheet    Caregiver address Please ask      About your hospital stay     You were admitted on:  October 27, 2018 You last received care in the:  [place] [address] [facility]    You were discharged on:  November 1, 2018       Who to Call     For medical emergencies, please call 911.  For non-urgent questions about your medical care, please call your primary care provider or clinic, [phone]  For questions related to your surgery, please call your surgery clinic        Attending Provider     Provider Specialty    Morgan Lindo MD Emergency Medicine    [facility]    Mango Lange,  Internal Medicine       Primary Care Provider Office Phone # Fax #    Rosario Martinez [phone]      After Care Instructions     Activity       Your activity upon discharge: activity as tolerated            Diet       Follow this diet upon discharge: Regular                  Follow-up Appointments     Follow-up and recommended labs and tests        Follow up with primary care provider, Rosario Martinez, within 7 days for hospital  "follow- up.  The following labs/tests are recommended: CBC and BMP in 7 days.  Follow up with Gastroenterology in 1-2 weeks.                  Further instructions from your care team       The patient has received a copy of the Provation  report the doctor has written and discharge instructions have been discussed with the patient and responsible adult.  All questions were addressed and answered prior to patient discharge.    Pending Results     No orders found from 10/25/2018 to 10/28/2018.            Admission Information     Date & Time Provider Department Dept. Phone    10/27/2018 Mango Lange, DO [place] [address] [facility] [phone]      Your Vitals Were     Blood Pressure Pulse Temperature Respirations Height Weight    152/78 (BP Location: Left arm) 80 97.6  F (36.4  C) (Oral) 16 1.829 m (6') 82.1 kg (181 lb 1.6 oz)    Pulse Oximetry BMI (Body Mass Index)                98% 24.56 kg/m2          [employer] Information     [employer] lets you send messages to your doctor, view your test results, renew your prescriptions, schedule appointments and more. To sign up, go to [email or web address] . Click on \"Log in\" on the left side of the screen, which will take you to the Welcome page. Then click on \"Sign up Now\" on the right side of the page.     You will be asked to enter the access code listed below, as well as some personal information. Please follow the directions to create your username and password.     Your access code is: [email or web address]  Expires: 2019  4:20 PM     Your access code will  in 90 days. If you need help or a new code, please call your [place] clinic or [phone].        Care EveryWhere ID     This is your Care EveryWhere ID. This could be used by other organizations to access your [place] medical records  [id number]        Equal Access to Services     JAMAR ARENAS AH: Smitha Dominique, mayra burgos, qaleelata karavindra hobbs, brandon suarez " malgorzata anderson'aan ah. So [facility] [phone].    ATENCIÓN: Si deliala ron, tiene a abdi disposición servicios gratuitos de asistencia lingüística. Rob quintana [phone].    We comply with applicable federal civil rights laws and [place] laws. We do not discriminate on the basis of race, color, national origin, age, disability, sex, sexual orientation, or gender identity.               Review of your medicines      START taking        Dose / Directions    ferrous sulfate 325 (65 Fe) MG tablet   Commonly known as:  IRON   Used for:  Symptomatic anemia        Dose:  325 mg   Take 1 tablet (325 mg) by mouth 2 times daily (with meals)   Quantity:  60 tablet   Refills:  0       pantoprazole 40 MG EC tablet   Commonly known as:  PROTONIX   Used for:  Symptomatic anemia        Dose:  40 mg   Start taking on:  11/2/2018   Take 1 tablet (40 mg) by mouth every morning (before breakfast)   Quantity:  30 tablet   Refills:  0         CONTINUE these medicines which have NOT CHANGED        Dose / Directions    ELIGARD 45 MG kit   Generic drug:  leuprolide        Dose:  45 mg   Inject 45 mg Subcutaneous every 6 months.   Refills:  0       predniSONE 5 MG tablet   Commonly known as:  DELTASONE        Dose:  5 mg   Take 5 mg by mouth 2 times daily   Refills:  0       vitamin D 1000 units capsule        Dose:  2000 Units   Take 2,000 Units by mouth 2 times daily   Refills:  0            Where to get your medicines      These medications were sent to [facility] [address] [address] [address]     Phone:  [phone]     ferrous sulfate 325 (65 Fe) MG tablet    pantoprazole 40 MG EC tablet                Protect others around you: Learn how to safely use, store and throw away your medicines at www.disposemymeds.org.             Medication List: This is a list of all your medications and when to take them. Check marks below indicate your daily home schedule. Keep this  list as a reference.      Medications           Morning Afternoon Evening Bedtime As Needed    ELIGARD 45 MG kit   Inject 45 mg Subcutaneous every 6 months.   Generic drug:  leuprolide                                ferrous sulfate 325 (65 Fe) MG tablet   Commonly known as:  IRON   Take 1 tablet (325 mg) by mouth 2 times daily (with meals)                                      pantoprazole 40 MG EC tablet   Commonly known as:  PROTONIX   Take 1 tablet (40 mg) by mouth every morning (before breakfast)   Start taking on:  11/2/2018   Last time this was given:  40 mg on 11/1/2018  6:35 AM                                   predniSONE 5 MG tablet   Commonly known as:  DELTASONE   Take 5 mg by mouth 2 times daily   Last time this was given:  5 mg on 10/27/2018  8:29 PM                                      vitamin D 1000 units capsule   Take 2,000 Units by mouth 2 times daily

## 2018-10-27 NOTE — PLAN OF CARE
Problem: Patient Care Overview  Goal: Plan of Care/Patient Progress Review  Outcome: No Change    Care provided 1750 - 1900:    A&O X4. Up SBA. VSS on RA. Denies pain, c/o mild dizziness with ambulation. Bloody stools present, Hemoglobin 8.9 with 1 unit RBC infusing. Continue serial hemoglobins. Full liquid diet, GI consult in place for tomorrow. NS 75 mL/hr, IV Protonix. Tele SR. Discharge plan TBD.

## 2018-10-27 NOTE — ED NOTES
[facility]  ED Nurse Handoff Report    John Batista is a 73 year old male   ED Chief complaint: Rectal Bleeding and Chest Pain  . ED Diagnosis:   Final diagnoses:   Lower GI bleed   Symptomatic anemia     Allergies: No Known Allergies    Code Status: Full Code  Activity level - Baseline/Home:  Stand with Assist. Activity Level - Current:   Stand with Assist. Lift room needed: No. Bariatric: No   Needed: No   Isolation: No. Infection: Not Applicable.     Vital Signs:   Vitals:    10/27/18 1445 10/27/18 1515 10/27/18 1530 10/27/18 1545   BP:       Pulse:       Resp: 12 16 11 13   Temp:       TempSrc:       SpO2: 100% 96% 97% 98%       Cardiac Rhythm:  ,      Pain level:    Patient confused: No. Patient Falls Risk: Yes.   Elimination Status: Has voided   Patient Report - Initial Complaint: bloody stooly. Focused Assessment: GI  Tests Performed:   Labs Ordered and Resulted from Time of ED Arrival Up to the Time of Departure from the ED   CBC WITH PLATELETS DIFFERENTIAL - Abnormal; Notable for the following:        Result Value    WBC 22.4 (*)     RBC Count 2.92 (*)     Hemoglobin 8.9 (*)     Hematocrit 27.1 (*)     Absolute Neutrophil 19.3 (*)     Absolute Metamyelocytes 0.7 (*)     Absolute Myelocytes 0.4 (*)     All other components within normal limits   COMPREHENSIVE METABOLIC PANEL - Abnormal; Notable for the following:     Sodium 131 (*)     Glucose 103 (*)     Calcium 8.3 (*)     Alkaline Phosphatase 197 (*)     All other components within normal limits   ISTAT BASIC MET ICA HCT POCT - Abnormal; Notable for the following:     Sodium 131 (*)     Total CO2 19 (*)     Glucose 106 (*)     Creatinine 0.6 (*)     Calcium Ionized 4.3 (*)     Hemoglobin 9.5 (*)     Hematocrit - POCT 28 (*)     All other components within normal limits   INR   TROPONIN I   CARDIAC CONTINUOUS MONITORING   VITAL SIGNS   PULSE OXIMETRY NURSING   PERIPHERAL IV CATHETER   PERIPHERAL IV CATHETER   TROPONIN POCT    ABO/RH TYPE AND SCREEN   RED BLOOD CELL PREPARE ORDER UNIT   BLOOD COMPONENT   . Abnormal Results: see results.   Treatments provided:iv fluids , labs  Family Comments: wife at bedside  OBS brochure/video discussed/provided to patient:  Yes  ED Medications:   Medications   0.9% sodium chloride BOLUS (500 mLs Intravenous New Bag 10/27/18 4443)     Followed by   sodium chloride 0.9% infusion (not administered)     Drips infusing:  Yes  For the majority of the shift, the patient's behavior Green. Interventions performed were .     Severe Sepsis OR Septic Shock Diagnosis Present: No      ED Nurse Name/Phone Number: Lavinia Swift,   4:17 PM    RECEIVING UNIT ED HANDOFF REVIEW    Above ED Nurse Handoff Report was reviewed: Yes  Reviewed by: Radha Bonner on October 27, 2018 at 5:21 PM

## 2018-10-27 NOTE — H&P
[facility]  Hospitalist H&P    Name: John Batista      MRN: [id number]  YOB: 1945    Age: 73 year old  Date of admission: 10/27/2018  Primary care provider: Rosario Martinez            Assessment and Plan:   John Batista is a 73 year old male with a history of metastatic prostate cancer who presents with melena.    1.  Melena.  Start pantoprazole.  Gastroenterology consult.  IV fluids.  N.p.o. after midnight.    2.  Acute blood loss anemia.  Due to GI bleed.  Start pantoprazole.  Is receiving a unit of packed red blood cells in the emergency room.  Monitor hemoglobin every 6 hours n.p.o. after midnight.  Overnight.  Gastroenterology consult.    3.  Metastatic prostate cancer.  Restart home medications.  Follow-up with oncology in the outpatient setting.    4.  Hyponatremia.  Mild.  Start IV fluids.  Recheck metabolic panel in the morning.    Code status: Full code.  Admit to inpatient.  Prophylaxis: Pneumatic compression devices.  Avoid pharmacologic DVT prophylaxis due to GI bleed.            Chief Complaint:   Blood in stools.         History of Present Illness:   John Batista is a 73 year old male who presents with melena.  Began having dark blood in his stools yesterday afternoon.  Has had approximately 8 bowel movements since this began with blood in the stools.  He has had a small amount of bright red blood in addition to what appears to be quite dark blood.  Stools have been pudding-like in consistency.  He has had a similar episode once a few years ago.  At that time, no obvious source of bleeding was found.  He did require multiple units of packed red blood cells during that hospital stay.  He is feeling short of breath with minimal activity over the past 2 days.  Had not had any shortness of breath prior to yesterday.  Has not had any chest pain.  No cough.  No fevers or chills.  Does feel weak compared to usual.  He does have metastatic prostate cancer.  He  is currently undergoing chemotherapy.  Has noted that he does feel a bit constipated recently.  No other complaints.            Past Medical History:     Past Medical History:   Diagnosis Date     Prostate cancer (H) 12/2011    Dr. Galvan; now seeing Dr. Ruben Reese at [place]             Past Surgical History:     Past Surgical History:   Procedure Laterality Date     COLONOSCOPY N/A 11/29/2016    Procedure: COLONOSCOPY;  Surgeon: Alon Lo MD;  Location: [facility] OR     COLONOSCOPY N/A 12/3/2016    Procedure: COLONOSCOPY;  Surgeon: Carmenza Akbar MD;  Location: [facility] GI     CYSTOSCOPY       EXAM UNDER ANESTHESIA ANUS N/A 11/29/2016    Procedure: EXAM UNDER ANESTHESIA ANUS;  Surgeon: Alon Lo MD;  Location: [facility] OR     HC COLONOSCOPY THRU STOMA, DIAGNOSTIC  2003    normal, 2000 had polyps     HERNIA REPAIR, INGUINAL RT/LT       SIGMOIDOSCOPY FLEXIBLE N/A 9/10/2014    Procedure: SIGMOIDOSCOPY FLEXIBLE;  Surgeon: Madhuri Drake MD;  Location: [facility] GI     VASECTOMY               Social History:     Social History   Substance Use Topics     Smoking status: Former Smoker     Start date: 9/8/1981     Smokeless tobacco: Former User     Alcohol use No             Family History:   The family history was fully reviewed and non-contributory in this case.         Allergies:   No Known Allergies          Medications:     Prior to Admission medications    Medication Sig Last Dose Taking? Auth Provider   Cholecalciferol (VITAMIN D) 1000 UNITS capsule Take 2,000 Units by mouth 2 times daily  10/26/2018 at PM Yes Reported, Patient   leuprolide (ELIGARD) 45 MG injection Inject 45 mg Subcutaneous every 6 months. Past Month Yes Reported, Patient   predniSONE (DELTASONE) 5 MG tablet Take 5 mg by mouth 2 times daily 10/27/2018 at 1100 Yes Reported, Patient             Review of Systems:   A Comprehensive greater than 10 system review of systems was carried out.  Pertinent positives and negatives are noted above.  Otherwise  negative for contributory information.           Physical Exam:   Blood pressure 115/68, pulse 93, temperature (P) 97.8  F (36.6  C), temperature source (P) Oral, resp. rate 20, SpO2 100 %.  Wt Readings from Last 1 Encounters:   03/15/18 82.1 kg (181 lb)     Exam:  GENERAL: No apparent distress. Awake, alert, and fully oriented.  HEENT: Normocephalic, atraumatic. Extraocular movements intact.  CARDIOVASCULAR: Regular rate and rhythm without murmurs or rubs. No S3.  PULMONARY: Clear to auscultation bilaterally.  ABDOMINAL: Soft, non-tender, non-distended. Bowel sounds normoactive.   EXTREMITIES: No cyanosis or clubbing. No appreciable edema.  NEUROLOGICAL: CN 2-12 grossly intact, no focal neurological deficits.  DERMATOLOGICAL: No rash, ulcer, bruising, nor jaundice.          Data:   EKG:  Personally reviewed.  Sinus.  No obvious ischemia.    Laboratory:    Recent Labs  Lab 10/27/18  1448 10/27/18  1439   WBC 22.4*  --    HGB 8.9* 9.5*   HCT 27.1*  --    MCV 93  --      --        Recent Labs  Lab 10/27/18  1448 10/27/18  1439   * 131*   POTASSIUM 3.6 3.6   CHLORIDE 98 97   CO2 23  --    ANIONGAP 10 15   * 106*   BUN 25 23   CR 0.73  --    GFRESTIMATED >90 >90   GFRESTBLACK >90 >90   TIMBO 8.3*  --      No results for input(s): CULT in the last 168 hours.    Imaging:  No results found for this or any previous visit (from the past 24 hour(s)).

## 2018-10-28 ENCOUNTER — APPOINTMENT (OUTPATIENT)
Dept: GENERAL RADIOLOGY | Facility: CLINIC | Age: 73
DRG: 378 | End: 2018-10-28
Attending: INTERNAL MEDICINE
Payer: COMMERCIAL

## 2018-10-28 LAB
ALBUMIN UR-MCNC: NEGATIVE MG/DL
ANION GAP SERPL CALCULATED.3IONS-SCNC: 13 MMOL/L (ref 3–14)
APPEARANCE UR: CLEAR
BILIRUB UR QL STRIP: NEGATIVE
BLD PROD TYP BPU: NORMAL
BLD UNIT ID BPU: 0
BLOOD PRODUCT CODE: NORMAL
BPU ID: NORMAL
BUN SERPL-MCNC: 20 MG/DL (ref 7–30)
CALCIUM SERPL-MCNC: 8 MG/DL (ref 8.5–10.1)
CHLORIDE SERPL-SCNC: 102 MMOL/L (ref 94–109)
CO2 SERPL-SCNC: 17 MMOL/L (ref 20–32)
COLOR UR AUTO: YELLOW
CREAT SERPL-MCNC: 0.63 MG/DL (ref 0.66–1.25)
ERYTHROCYTE [DISTWIDTH] IN BLOOD BY AUTOMATED COUNT: 15.4 % (ref 10–15)
GFR SERPL CREATININE-BSD FRML MDRD: >90 ML/MIN/1.7M2
GLUCOSE BLDC GLUCOMTR-MCNC: 121 MG/DL (ref 70–99)
GLUCOSE BLDC GLUCOMTR-MCNC: 131 MG/DL (ref 70–99)
GLUCOSE BLDC GLUCOMTR-MCNC: 133 MG/DL (ref 70–99)
GLUCOSE BLDC GLUCOMTR-MCNC: 134 MG/DL (ref 70–99)
GLUCOSE SERPL-MCNC: 138 MG/DL (ref 70–99)
GLUCOSE UR STRIP-MCNC: NEGATIVE MG/DL
HCT VFR BLD AUTO: 22.5 % (ref 40–53)
HGB BLD-MCNC: 7.4 G/DL (ref 13.3–17.7)
HGB BLD-MCNC: 7.7 G/DL (ref 13.3–17.7)
HGB BLD-MCNC: 7.8 G/DL (ref 13.3–17.7)
HGB BLD-MCNC: 8.5 G/DL (ref 13.3–17.7)
HGB UR QL STRIP: NEGATIVE
KETONES UR STRIP-MCNC: 5 MG/DL
LACTATE BLD-SCNC: 2.5 MMOL/L (ref 0.7–2)
LACTATE BLD-SCNC: 3 MMOL/L (ref 0.7–2)
LACTATE BLD-SCNC: 5.9 MMOL/L (ref 0.7–2)
LEUKOCYTE ESTERASE UR QL STRIP: NEGATIVE
MCH RBC QN AUTO: 31 PG (ref 26.5–33)
MCHC RBC AUTO-ENTMCNC: 34.2 G/DL (ref 31.5–36.5)
MCV RBC AUTO: 91 FL (ref 78–100)
MRSA DNA SPEC QL NAA+PROBE: NEGATIVE
MUCOUS THREADS #/AREA URNS LPF: PRESENT /LPF
NITRATE UR QL: NEGATIVE
PH UR STRIP: 5 PH (ref 5–7)
PLATELET # BLD AUTO: 171 10E9/L (ref 150–450)
POTASSIUM SERPL-SCNC: 4.4 MMOL/L (ref 3.4–5.3)
RBC # BLD AUTO: 2.48 10E12/L (ref 4.4–5.9)
RBC #/AREA URNS AUTO: 1 /HPF (ref 0–2)
SODIUM SERPL-SCNC: 132 MMOL/L (ref 133–144)
SOURCE: ABNORMAL
SP GR UR STRIP: 1.02 (ref 1–1.03)
SPECIMEN SOURCE: NORMAL
TRANSFUSION STATUS PATIENT QL: NORMAL
UPPER GI ENDOSCOPY: NORMAL
UROBILINOGEN UR STRIP-MCNC: 0 MG/DL (ref 0–2)
WBC # BLD AUTO: 21.5 10E9/L (ref 4–11)
WBC #/AREA URNS AUTO: 2 /HPF (ref 0–5)

## 2018-10-28 PROCEDURE — 0DJ08ZZ INSPECTION OF UPPER INTESTINAL TRACT, VIA NATURAL OR ARTIFICIAL OPENING ENDOSCOPIC: ICD-10-PCS | Performed by: INTERNAL MEDICINE

## 2018-10-28 PROCEDURE — 83605 ASSAY OF LACTIC ACID: CPT | Performed by: INTERNAL MEDICINE

## 2018-10-28 PROCEDURE — 20000003 ZZH R&B ICU

## 2018-10-28 PROCEDURE — 25000128 H RX IP 250 OP 636: Performed by: INTERNAL MEDICINE

## 2018-10-28 PROCEDURE — 71045 X-RAY EXAM CHEST 1 VIEW: CPT

## 2018-10-28 PROCEDURE — G0500 MOD SEDAT ENDO SERVICE >5YRS: HCPCS | Performed by: INTERNAL MEDICINE

## 2018-10-28 PROCEDURE — 85018 HEMOGLOBIN: CPT | Performed by: INTERNAL MEDICINE

## 2018-10-28 PROCEDURE — 82947 ASSAY GLUCOSE BLOOD QUANT: CPT | Performed by: INTERNAL MEDICINE

## 2018-10-28 PROCEDURE — 87641 MR-STAPH DNA AMP PROBE: CPT | Performed by: INTERNAL MEDICINE

## 2018-10-28 PROCEDURE — 25000128 H RX IP 250 OP 636

## 2018-10-28 PROCEDURE — 87640 STAPH A DNA AMP PROBE: CPT | Performed by: INTERNAL MEDICINE

## 2018-10-28 PROCEDURE — 25000132 ZZH RX MED GY IP 250 OP 250 PS 637: Performed by: INTERNAL MEDICINE

## 2018-10-28 PROCEDURE — C9113 INJ PANTOPRAZOLE SODIUM, VIA: HCPCS | Performed by: INTERNAL MEDICINE

## 2018-10-28 PROCEDURE — 36415 COLL VENOUS BLD VENIPUNCTURE: CPT | Performed by: INTERNAL MEDICINE

## 2018-10-28 PROCEDURE — 81001 URINALYSIS AUTO W/SCOPE: CPT | Performed by: INTERNAL MEDICINE

## 2018-10-28 PROCEDURE — 99233 SBSQ HOSP IP/OBS HIGH 50: CPT | Performed by: INTERNAL MEDICINE

## 2018-10-28 PROCEDURE — 80048 BASIC METABOLIC PNL TOTAL CA: CPT | Performed by: INTERNAL MEDICINE

## 2018-10-28 PROCEDURE — 25000125 ZZHC RX 250: Performed by: INTERNAL MEDICINE

## 2018-10-28 PROCEDURE — 00000146 ZZHCL STATISTIC GLUCOSE BY METER IP

## 2018-10-28 PROCEDURE — P9016 RBC LEUKOCYTES REDUCED: HCPCS | Performed by: EMERGENCY MEDICINE

## 2018-10-28 PROCEDURE — 85027 COMPLETE CBC AUTOMATED: CPT | Performed by: INTERNAL MEDICINE

## 2018-10-28 PROCEDURE — 43235 EGD DIAGNOSTIC BRUSH WASH: CPT | Performed by: INTERNAL MEDICINE

## 2018-10-28 RX ORDER — FLUMAZENIL 0.1 MG/ML
0.2 INJECTION, SOLUTION INTRAVENOUS
Status: ACTIVE | OUTPATIENT
Start: 2018-10-28 | End: 2018-10-29

## 2018-10-28 RX ORDER — FENTANYL CITRATE 50 UG/ML
INJECTION, SOLUTION INTRAMUSCULAR; INTRAVENOUS
Status: COMPLETED
Start: 2018-10-28 | End: 2018-10-28

## 2018-10-28 RX ORDER — FLUMAZENIL 0.1 MG/ML
0.2 INJECTION, SOLUTION INTRAVENOUS
Status: ACTIVE | OUTPATIENT
Start: 2018-10-28 | End: 2018-10-30

## 2018-10-28 RX ORDER — NALOXONE HYDROCHLORIDE 0.4 MG/ML
.1-.4 INJECTION, SOLUTION INTRAMUSCULAR; INTRAVENOUS; SUBCUTANEOUS
Status: ACTIVE | OUTPATIENT
Start: 2018-10-28 | End: 2018-10-30

## 2018-10-28 RX ORDER — FENTANYL CITRATE 50 UG/ML
25 INJECTION, SOLUTION INTRAMUSCULAR; INTRAVENOUS EVERY 5 MIN PRN
Status: ACTIVE | OUTPATIENT
Start: 2018-10-28 | End: 2018-10-29

## 2018-10-28 RX ORDER — LIDOCAINE 40 MG/G
CREAM TOPICAL
Status: DISCONTINUED | OUTPATIENT
Start: 2018-10-28 | End: 2018-10-28 | Stop reason: HOSPADM

## 2018-10-28 RX ORDER — NALOXONE HYDROCHLORIDE 0.4 MG/ML
.1-.4 INJECTION, SOLUTION INTRAMUSCULAR; INTRAVENOUS; SUBCUTANEOUS
Status: DISCONTINUED | OUTPATIENT
Start: 2018-10-28 | End: 2018-10-29

## 2018-10-28 RX ORDER — FENTANYL CITRATE 50 UG/ML
50 INJECTION, SOLUTION INTRAMUSCULAR; INTRAVENOUS
Status: COMPLETED | OUTPATIENT
Start: 2018-10-28 | End: 2018-10-28

## 2018-10-28 RX ADMIN — ACETAMINOPHEN 650 MG: 325 TABLET, FILM COATED ORAL at 23:57

## 2018-10-28 RX ADMIN — SODIUM CHLORIDE, POTASSIUM CHLORIDE, SODIUM LACTATE AND CALCIUM CHLORIDE 500 ML: 600; 310; 30; 20 INJECTION, SOLUTION INTRAVENOUS at 06:05

## 2018-10-28 RX ADMIN — SODIUM CHLORIDE 8 MG/HR: 9 INJECTION, SOLUTION INTRAVENOUS at 14:44

## 2018-10-28 RX ADMIN — FENTANYL CITRATE 50 MCG: 50 INJECTION, SOLUTION INTRAMUSCULAR; INTRAVENOUS at 15:04

## 2018-10-28 RX ADMIN — MIDAZOLAM HYDROCHLORIDE 0.5 MG: 2 INJECTION, SOLUTION INTRAMUSCULAR; INTRAVENOUS at 15:06

## 2018-10-28 RX ADMIN — SODIUM CHLORIDE 8 MG/HR: 9 INJECTION, SOLUTION INTRAVENOUS at 21:27

## 2018-10-28 RX ADMIN — SODIUM CHLORIDE 8 MG/HR: 9 INJECTION, SOLUTION INTRAVENOUS at 07:00

## 2018-10-28 RX ADMIN — SODIUM CHLORIDE: 9 INJECTION, SOLUTION INTRAVENOUS at 21:27

## 2018-10-28 RX ADMIN — MIDAZOLAM HYDROCHLORIDE 1 MG: 2 INJECTION, SOLUTION INTRAMUSCULAR; INTRAVENOUS at 15:05

## 2018-10-28 RX ADMIN — HYDROCORTISONE SODIUM SUCCINATE 50 MG: 100 INJECTION, POWDER, FOR SOLUTION INTRAMUSCULAR; INTRAVENOUS at 10:42

## 2018-10-28 RX ADMIN — HYDROCORTISONE SODIUM SUCCINATE 50 MG: 100 INJECTION, POWDER, FOR SOLUTION INTRAMUSCULAR; INTRAVENOUS at 17:41

## 2018-10-28 RX ADMIN — SODIUM CHLORIDE 1000 ML: 9 INJECTION, SOLUTION INTRAVENOUS at 16:33

## 2018-10-28 RX ADMIN — SODIUM CHLORIDE: 9 INJECTION, SOLUTION INTRAVENOUS at 12:39

## 2018-10-28 RX ADMIN — FENTANYL CITRATE 50 MCG: 50 INJECTION INTRAMUSCULAR; INTRAVENOUS at 15:04

## 2018-10-28 RX ADMIN — Medication 1 MG: at 23:57

## 2018-10-28 RX ADMIN — MIDAZOLAM HYDROCHLORIDE 0.5 MG: 2 INJECTION, SOLUTION INTRAMUSCULAR; INTRAVENOUS at 15:10

## 2018-10-28 ASSESSMENT — PAIN DESCRIPTION - DESCRIPTORS: DESCRIPTORS: ACHING

## 2018-10-28 ASSESSMENT — ACTIVITIES OF DAILY LIVING (ADL)
ADLS_ACUITY_SCORE: 10
ADLS_ACUITY_SCORE: 9
ADLS_ACUITY_SCORE: 10
ADLS_ACUITY_SCORE: 9
ADLS_ACUITY_SCORE: 10

## 2018-10-28 NOTE — PLAN OF CARE
Problem: Patient Care Overview  Goal: Plan of Care/Patient Progress Review  Outcome: No Change  Hgb at 0200=7.8, Pt. just got back from restroom, c/o SOB, appears pale, triggered lactic. Has had 4-5 bloody stools tonight. Can have lab do his hemoglobin with lactic this AM.  MD paged.

## 2018-10-28 NOTE — PLAN OF CARE
Problem: Patient Care Overview  Goal: Plan of Care/Patient Progress Review  Outcome: No Change  ICU End of Shift Summary.  For vital signs and complete assessments, please see documentation flowsheets.     Pertinent assessments: Pt is alert and oriented. Reported abdominal aching this morning, pain improved throughout the day.Upon arrival to ICU pt has significant S.O.B. Pt reports that S.O.B has improved today. Pt had several bloody stools with clots today. Total of 18 bloody stools since admission. In sinus tachycardia. Rate increases to 150's with activity. Pt denies dizziness. Gets up with 1 assist to bedside commode. Currently infusing 4 th unit of PRBC since admission.  Major Shift Events: bedside upper GI done today. Pt is tolerating a clear liquid diet. At 16:45 informed Dr. Lange that pt had a larger bloody stool with clots. MD placed order to check hgb. Hgb came back at 7.4. MD aware and ordered another unit of PRBC's. Discussed to stop the current NS bolus that was infusing.   Plan (Upcoming Events): Infuse another unit of PRBC tonight.   Discharge/Transfer Needs: TBD    Bedside Shift Report Completed :   Bedside Safety Check Completed:

## 2018-10-28 NOTE — PLAN OF CARE
Problem: Patient Care Overview  Goal: Plan of Care/Patient Progress Review  Outcome: No Change  Pt. Report given and pt. Transferred to ICU  for closer monitoring. Wife at bedside and all belongings sent over with pt.

## 2018-10-28 NOTE — PROGRESS NOTES
X-Cover    Patient admitted with suspected UGI bleeding and melena. Hb slowly drifting down from 9.5 -> 7.8. Notified about pt having 4-5 bloody stools. BP is stable but becoming more tachycardic HR in 90's at present. Will check STAT labs, CBC, BMP and lactic acid. However, the current Hb may not reflect the recent blood loss, will go ahead and order 1 unit of RBC. Also change to Protonix drip. Monitor closely.     Update:  Patient's lactic acid came back at 5.9. On interview, he is resting in bed, feeling hungry, conversing and mentating normally. Has had 2 more bloody BM in the last hour. These are dark red looking with occasional more fresh blood. He feels that this is coming from lower abdomen. On exam, abd is soft, no significant tenderness, Vitals continue to be stable.     Temp: 97.1  F (36.2  C) Temp src: Oral BP: 134/72 Pulse: 88  Resp: 16 SpO2: 100 % O2 Device: None (Room air)      Of note, he had similar episode in 2016, he had EGD and colonoscopy done at the time. It was ultimately felt to be a diverticular bleed. He did have a rectal ulcer as well (due to radiation proctitis?) and a stomach ulcer and small AVM's although those were not felt to be the cause of bleeding.     # Acute GI Bleeding, based on previous history and current presentation I suspect this is more likely to be lower.   # Acute blood loss anemia. His morning Hb is stable at 7.7, although this probably does not reflect severity of acute bleeding   # Impressive lactic acidosis . Most likely due to the acute blood loss anemia, he has a leukocytosis but no fevers and no signs of sepsis. Hemodynamics are stable.   # Hx of diverticular bleed  # Hx of metastatic prostate cancer    PLAN:  -- 1 litre of LR bolus  -- 2 unit of RBC transfusion STAT  -- Serial lactate and Hb q4h x 3  -- Transfer to ICU for closer monitoring   -- Ensure two large bore peripheral IV's   -- Check UA and CXR for completeness sake   -- Continue NPO status, GI to see  this morning     Discussed plan with pt and bedside nurse.

## 2018-10-28 NOTE — PROGRESS NOTES
Dr. De La Torre from GI called for an update. Informed MD that pt did have a small loose red bloody stool shortly after arrival to ICU. Pt is currently receiving 2nd unit of PRBC. Heart rate increased to 150's with activity. Pt reports abdominal aching. MD is expected to see the patient this afternoon unless pt's condition gets worse and if bleeding continues. MD left his cell phone # to call and report changes.

## 2018-10-28 NOTE — CONSULTS
[facility]  Gastroenterology Consultation    John Batista  [address] [facility] [id number]  73 year old male    Admission Date/Time: 10/27/2018  Primary Care Provider: Rosario Martinez    We were asked to see the patient in consultation by Dr. Lange for evaluation of hematochezia.        HPI:  John Batista is a 73 year old male with PMH listed below including metastatic prostate cancer with radiation proctitis related to treatment for cancer and a hx of diverticular bleed in 12/2016, now presents with 2 day hx of hematochezia initially described as dark ,whitney stool but then progressively more bright red.  No abd pain.  No f/c.  No cp, + sob      ROS: A comprehensive ten point review of systems was negative aside from those in mentioned in the HPI.      MEDICATIONS:   No current facility-administered medications on file prior to encounter.   Current Outpatient Prescriptions on File Prior to Encounter:  Cholecalciferol (VITAMIN D) 1000 UNITS capsule Take 2,000 Units by mouth 2 times daily    leuprolide (ELIGARD) 45 MG injection Inject 45 mg Subcutaneous every 6 months.       ALLERGIES: No Known Allergies    Past Medical History:   Diagnosis Date     Prostate cancer (H) 12/2011    Dr. Galvan; now seeing Dr. Ruben Reese at [place]       Past Surgical History:   Procedure Laterality Date     COLONOSCOPY N/A 11/29/2016    Procedure: COLONOSCOPY;  Surgeon: Alon Lo MD;  Location: [facility] OR     COLONOSCOPY N/A 12/3/2016    Procedure: COLONOSCOPY;  Surgeon: Carmenza Akbar MD;  Location:  GI     CYSTOSCOPY       EXAM UNDER ANESTHESIA ANUS N/A 11/29/2016    Procedure: EXAM UNDER ANESTHESIA ANUS;  Surgeon: Alon Lo MD;  Location:  OR     HC COLONOSCOPY THRU STOMA, DIAGNOSTIC  2003    normal, 2000 had polyps     HERNIA REPAIR, INGUINAL RT/LT       SIGMOIDOSCOPY FLEXIBLE N/A 9/10/2014    Procedure: SIGMOIDOSCOPY FLEXIBLE;  Surgeon: Madhuri Drake MD;   Location: [facility] GI     VASECTOMY           SOCIAL HISTORY:  Social History   Substance Use Topics     Smoking status: Former Smoker     Start date: 9/8/1981     Smokeless tobacco: Former User     Alcohol use No       FAMILY HISTORY:  Family History   Problem Relation Age of Onset     C.A.D. No family hx of      Diabetes No family hx of      Hypertension No family hx of      Breast Cancer No family hx of      Cancer - colorectal No family hx of        PHYSICAL EXAM:   /64  Pulse 102  Temp 98.7  F (37.1  C) (Oral)  Resp 16  Ht 1.829 m (6')  Wt 77.4 kg (170 lb 10.2 oz)  SpO2 99%  BMI 23.14 kg/m2    Constitutional: NAD, comfortable  Cardiovascular: RRR, normal S1 and S2, no r/c/g/m  Respiratory: CTAB  Psychiatric: mentation appears normal and affect normal  Head: Normocephalic. Atraumatic.    Neck: Neck supple. No adenopathy. Thyroid symmetric, normal size, trachea midline  Eyes:  PERRL, no icterus  ENT: Hearing adequate, pharynx normal without erythema or exudate  Abdomen:   Auscultation: + bs,   Appearance: nd  Palpation: no hsm, no masses, soft  NEURO: grossly negative  SKIN: no suspicious lesions or rashes  LYMPH:   anterior cervical: no adenopathy  posterior cervical: no adenopathy  supraclavicular: no adenopathy          ADDITIONAL COMMENTS:   I reviewed the patient's new clinical lab test results.   Recent Labs   Lab Test  10/28/18   1255  10/28/18   0608  10/28/18   0200   10/27/18   1448   02/21/17 2000   WBC   --   21.5*   --    --   22.4*   --   10.3   HGB  8.5*  7.7*  7.8*   < >  8.9*   < >  11.9*   MCV   --   91   --    --   93   --   87   PLT   --   171   --    --   245   --   235   INR   --    --    --    --   0.91   --    --     < > = values in this interval not displayed.     Recent Labs   Lab Test  10/28/18   0608  10/27/18   1448  10/27/18   1439 12/22/17 02/21/17 2000   NA  132*  131*  131*   --   130*   POTASSIUM  4.4  3.6  3.6   --   3.9   CHLORIDE  102  98  97   --   96   CO2  17*   23   --    --   25   BUN  20  25  23   --   13   CR  0.63*  0.73   --   0.88  0.69   ANIONGAP  13  10  15   --   9   TIMBO  8.0*  8.3*   --    --   8.7   GLC  138*  103*  106*   --   127*     Recent Labs   Lab Test  10/28/18   0910  10/27/18   1448 12/22/17 11/28/16   1515  08/15/16   1547   11/08/11   1002  07/19/11   0800   ALBUMIN   --   3.8   --   3.5  3.9   < >   --    --    BILITOTAL   --   0.7   --   0.4  0.4   < >   --    --    ALT   --   17   --   20  24   < >   --    --    AST   --   13  17  14  16   < >   --    --    ALKPHOS   --   197*   --   79  72   < >   --    --    PROTEIN  Negative   --    --    --    --    --   Negative  Negative    < > = values in this interval not displayed.             .    CONSULTATION ASSESSMENT AND PLAN:    74 yo with likely lower gib - diverticular vs radiation proctitis (less likely); UGIB cannot be r/o  -- serial h/h  -- supportive measures  -- egd today to r/o ugib  -- clear liquid diet  Codey De La Torre Md  [facility]  Office:  [phone]

## 2018-10-28 NOTE — OR NURSING
Called in to assess with active gi bleed ,pt in ICU bed ,hemodynamically monitored by ICU Rn leola del valle Md spoken to pt and wife prior to procedure ,sedation given and documented in ICU mars by ICU Rn barbie ,pt received total of 2 mg versed and 50 mcg of fentanyl [see ICU mar for documentation's ,pt tolerated procedure well ,Md visited with pt and family post procedure for the plan of care ,ICU Rn continued monitoring pt

## 2018-10-28 NOTE — PROGRESS NOTES
[facility]    Sepsis Evaluation Progress Note    Date of Service: 10/28/2018    I was called to see John Batista due to abnormal vital signs triggering the Sepsis SIRS screening alert. He is not known to have an infection.     Physical Exam    Vital Signs:  Temp: (P) 96.3  F (35.7  C) Temp src: (P) Oral BP: (P) 131/68 Pulse: 88 Heart Rate: (P) 101 Resp: 16 SpO2: (P) 100 % O2 Device: (P) None (Room air)      Lab:  Lactate for Sepsis Protocol   Date Value Ref Range Status   10/28/2018 5.9 (HH) 0.7 - 2.0 mmol/L Final     Comment:     Critical Value called to and read back by  REX RAE [id number] 10.28.18 JFR         The patient is at baseline mental status.    The rest of their physical exam is significant for no abdominal tenderness, conversant.    Assessment and Plan    The SIRS and exam findings are likely due to acute GI bleeding, there is no sign of sepsis at this time.    Disposition: The patient will be transferred to the ICU. Attending Physician, Payam Guevara MD, was notified.    Payam Guevara MD

## 2018-10-28 NOTE — PLAN OF CARE
Problem: Patient Care Overview  Goal: Plan of Care/Patient Progress Review  Outcome: No Change  Pt. A&Ox4, up with SBA, Tele: SR/ST, IVF infusing without difficulty. Pt. Up several times during the night to have BM. 0200 Hgb=7.8, this AM, MD paged. around 0600. Pt. Up to the restroom and became SOB and appears pale. MD paged and received orders to transfuse this AM. Plan: Transfuse 1 unit of PRBC, start protonix drip, GI consult, Monitor Hemoglobin, IVF, Safety. Pt. Denies any needs at this time. Will continue with POC.

## 2018-10-28 NOTE — PLAN OF CARE
Problem: Patient Care Overview  Goal: Plan of Care/Patient Progress Review  Outcome: Improving  Denies dizziness. Wife present and supportive. Had two bloody stools since admission at 1800. Last trip to the bathroom no stool. 2000 hgb = 8.7. Will recheck at 02. Had vanilla pudding with tylenol for headache and with prednisone.

## 2018-10-28 NOTE — PROVIDER NOTIFICATION
10/28/18 0617   Significant Event   Significant Event Other (see comments)  (Per lab: lactic acid: 5.9)   RN and MD notified

## 2018-10-28 NOTE — PROGRESS NOTES
[facility]  Hospitalist Progress Note  Mango Lange, DO 10/28/2018    Reason for Stay (Diagnosis): GI bleed.         Assessment and Plan:      Summary of Stay: John Batista is a 73 year old male admitted on 10/27/2018 with GI bleed.  Problem List:   1. GI bleed.  Due to suspected diverticular bleed.  Continue to have significant blood loss overnight.  Currently requiring ICU monitoring.  Is in the process of receiving third unit of packed red blood cells this morning.  Continue to monitor hemoglobin closely.  Gastroenterology consult pending.  Continue IV pantoprazole infusion.  2. Acute blood loss anemia.  Due to GI bleed.  Receiving unit #3 of packed red blood cells this morning.  Continue IV pantoprazole infusion.  Gastroenterology consult pending.  Monitor hemoglobin closely throughout the day.  3. Chronic steroid use.  Currently under significantly increased stress.  Will stop prednisone and start IV hydrocortisone 50 mg every 8 hours.  4. Tachycardia.  Possibly due to acute blood loss anemia or adrenal insufficiency.  Transfuse as needed.  Change prednisone to IV hydrocortisone for stress dosing.  5. Metastatic prostate cancer.  Likely follow-up with oncology in the outpatient setting.  6. Lactic acidosis.  Likely hypoperfusion from GI bleed.  Transfuse packed red blood cells as noted above.  Recheck lactic acid at noon and again this evening.  Continue IV fluids.  DVT Prophylaxis: Pneumatic Compression Devices  Code Status: Full Code  Discharge Dispo: Continue close monitoring in the intensive care unit today.  Estimated Disch Date / # of Days until Disch: 2        Interval History (Subjective):      Having more bright red blood in his stools this morning.  Feels dizzy and lightheaded.  Is a bit short of breath at times.  Denies chest pain, fevers, chills, nausea, or vomiting.                  Physical Exam:      Last Vital Signs:  /72  Pulse 102  Temp 98.8  F (37.1  C)  (Oral)  Resp 20  Ht 1.829 m (6')  Wt 77.4 kg (170 lb 10.2 oz)  SpO2 97%  BMI 23.14 kg/m2    Gen:  NAD, A&Ox3.  Eyes:  PERRL, sclera anicteric.  OP:  MMM, no lesions.  Neck:  Supple.  CV:  Regular, tachycardic, no murmurs.  Lung:  CTA b/l, normal effort.  Ab:  +BS, soft.  Skin:  Warm, dry to touch.  No rash.  Ext:  No pitting edema LE b/l.           Medications:      All current medications were reviewed with changes reflected in problem list.         Data:      All new lab and imaging data was reviewed.   Labs:    Recent Labs  Lab 10/28/18  0608   *   POTASSIUM 4.4   CHLORIDE 102   CO2 17*   ANIONGAP 13   *   BUN 20   CR 0.63*   GFRESTIMATED >90   GFRESTBLACK >90   TIMBO 8.0*       Recent Labs  Lab 10/28/18  0608   WBC 21.5*   HGB 7.7*   HCT 22.5*   MCV 91         Imaging:   Recent Results (from the past 24 hour(s))   XR Chest Port 1 View    Narrative    XR CHEST PORT 1 VW 10/28/2018 7:10 AM    HISTORY: Shortness of breath.     COMPARISON: None.      Impression    IMPRESSION: Bilateral pleural plaques. The lungs are otherwise clear.  No pleural effusions or pneumothorax. Normal heart and mediastinum.     LIBRADO TALAMANTES MD

## 2018-10-29 ENCOUNTER — APPOINTMENT (OUTPATIENT)
Dept: NUCLEAR MEDICINE | Facility: CLINIC | Age: 73
DRG: 378 | End: 2018-10-29
Attending: PHYSICIAN ASSISTANT
Payer: COMMERCIAL

## 2018-10-29 LAB
ANION GAP SERPL CALCULATED.3IONS-SCNC: 5 MMOL/L (ref 3–14)
BASOPHILS # BLD AUTO: 0 10E9/L (ref 0–0.2)
BASOPHILS NFR BLD AUTO: 0 %
BLD PROD TYP BPU: NORMAL
BLD PROD TYP BPU: NORMAL
BLD UNIT ID BPU: 0
BLD UNIT ID BPU: 0
BLOOD PRODUCT CODE: NORMAL
BLOOD PRODUCT CODE: NORMAL
BPU ID: NORMAL
BPU ID: NORMAL
BUN SERPL-MCNC: 14 MG/DL (ref 7–30)
CALCIUM SERPL-MCNC: 7.5 MG/DL (ref 8.5–10.1)
CHLORIDE SERPL-SCNC: 107 MMOL/L (ref 94–109)
CO2 SERPL-SCNC: 23 MMOL/L (ref 20–32)
CREAT SERPL-MCNC: 0.68 MG/DL (ref 0.66–1.25)
DIFFERENTIAL METHOD BLD: ABNORMAL
EOSINOPHIL # BLD AUTO: 0 10E9/L (ref 0–0.7)
EOSINOPHIL NFR BLD AUTO: 0 %
ERYTHROCYTE [DISTWIDTH] IN BLOOD BY AUTOMATED COUNT: 14.8 % (ref 10–15)
ERYTHROCYTE [DISTWIDTH] IN BLOOD BY AUTOMATED COUNT: 15 % (ref 10–15)
GFR SERPL CREATININE-BSD FRML MDRD: >90 ML/MIN/1.7M2
GLUCOSE BLDC GLUCOMTR-MCNC: 121 MG/DL (ref 70–99)
GLUCOSE SERPL-MCNC: 123 MG/DL (ref 70–99)
GLUCOSE SERPL-MCNC: 133 MG/DL (ref 70–99)
HCT VFR BLD AUTO: 22.1 % (ref 40–53)
HCT VFR BLD AUTO: 27.1 % (ref 40–53)
HGB BLD-MCNC: 7.4 G/DL (ref 13.3–17.7)
HGB BLD-MCNC: 7.4 G/DL (ref 13.3–17.7)
HGB BLD-MCNC: 7.6 G/DL (ref 13.3–17.7)
HGB BLD-MCNC: 7.6 G/DL (ref 13.3–17.7)
HGB BLD-MCNC: 8.9 G/DL (ref 13.3–17.7)
INTERPRETATION ECG - MUSE: NORMAL
LACTATE BLD-SCNC: 2 MMOL/L (ref 0.7–2)
LYMPHOCYTES # BLD AUTO: 1.3 10E9/L (ref 0.8–5.3)
LYMPHOCYTES NFR BLD AUTO: 6 %
MCH RBC QN AUTO: 30.5 PG (ref 26.5–33)
MCH RBC QN AUTO: 30.6 PG (ref 26.5–33)
MCHC RBC AUTO-ENTMCNC: 32.8 G/DL (ref 31.5–36.5)
MCHC RBC AUTO-ENTMCNC: 33.5 G/DL (ref 31.5–36.5)
MCV RBC AUTO: 91 FL (ref 78–100)
MCV RBC AUTO: 93 FL (ref 78–100)
METAMYELOCYTES # BLD: 0.7 10E9/L
METAMYELOCYTES NFR BLD MANUAL: 3 %
MONOCYTES # BLD AUTO: 0.7 10E9/L (ref 0–1.3)
MONOCYTES NFR BLD AUTO: 3 %
MYELOCYTES # BLD: 0.4 10E9/L
MYELOCYTES NFR BLD MANUAL: 2 %
NEUTROPHILS # BLD AUTO: 19.3 10E9/L (ref 1.6–8.3)
NEUTROPHILS NFR BLD AUTO: 86 %
PLATELET # BLD AUTO: 104 10E9/L (ref 150–450)
PLATELET # BLD AUTO: 245 10E9/L (ref 150–450)
PLATELET # BLD EST: ABNORMAL 10*3/UL
POTASSIUM SERPL-SCNC: 4.3 MMOL/L (ref 3.4–5.3)
RBC # BLD AUTO: 2.42 10E12/L (ref 4.4–5.9)
RBC # BLD AUTO: 2.92 10E12/L (ref 4.4–5.9)
RBC MORPH BLD: ABNORMAL
SODIUM SERPL-SCNC: 135 MMOL/L (ref 133–144)
TRANSFUSION STATUS PATIENT QL: NORMAL
WBC # BLD AUTO: 12.6 10E9/L (ref 4–11)
WBC # BLD AUTO: 22.4 10E9/L (ref 4–11)

## 2018-10-29 PROCEDURE — 85018 HEMOGLOBIN: CPT | Performed by: INTERNAL MEDICINE

## 2018-10-29 PROCEDURE — 20000003 ZZH R&B ICU

## 2018-10-29 PROCEDURE — 34300033 ZZH RX 343: Performed by: INTERNAL MEDICINE

## 2018-10-29 PROCEDURE — 25000132 ZZH RX MED GY IP 250 OP 250 PS 637: Performed by: INTERNAL MEDICINE

## 2018-10-29 PROCEDURE — 99232 SBSQ HOSP IP/OBS MODERATE 35: CPT | Performed by: INTERNAL MEDICINE

## 2018-10-29 PROCEDURE — 00000146 ZZHCL STATISTIC GLUCOSE BY METER IP

## 2018-10-29 PROCEDURE — P9016 RBC LEUKOCYTES REDUCED: HCPCS | Performed by: EMERGENCY MEDICINE

## 2018-10-29 PROCEDURE — 36415 COLL VENOUS BLD VENIPUNCTURE: CPT | Performed by: INTERNAL MEDICINE

## 2018-10-29 PROCEDURE — 83605 ASSAY OF LACTIC ACID: CPT | Performed by: INTERNAL MEDICINE

## 2018-10-29 PROCEDURE — 25000128 H RX IP 250 OP 636: Performed by: INTERNAL MEDICINE

## 2018-10-29 PROCEDURE — 78278 ACUTE GI BLOOD LOSS IMAGING: CPT

## 2018-10-29 PROCEDURE — 85027 COMPLETE CBC AUTOMATED: CPT | Performed by: INTERNAL MEDICINE

## 2018-10-29 PROCEDURE — A9560 TC99M LABELED RBC: HCPCS | Performed by: INTERNAL MEDICINE

## 2018-10-29 PROCEDURE — 80048 BASIC METABOLIC PNL TOTAL CA: CPT | Performed by: INTERNAL MEDICINE

## 2018-10-29 PROCEDURE — C9113 INJ PANTOPRAZOLE SODIUM, VIA: HCPCS | Performed by: INTERNAL MEDICINE

## 2018-10-29 PROCEDURE — 25000128 H RX IP 250 OP 636

## 2018-10-29 RX ORDER — MAGNESIUM CARB/ALUMINUM HYDROX 105-160MG
296 TABLET,CHEWABLE ORAL ONCE
Status: DISCONTINUED | OUTPATIENT
Start: 2018-10-30 | End: 2018-11-01 | Stop reason: HOSPADM

## 2018-10-29 RX ORDER — HEPARIN SODIUM (PORCINE) LOCK FLUSH IV SOLN 100 UNIT/ML 100 UNIT/ML
1 SOLUTION INTRAVENOUS ONCE
Status: COMPLETED | OUTPATIENT
Start: 2018-10-29 | End: 2018-10-29

## 2018-10-29 RX ADMIN — SODIUM CHLORIDE 8 MG/HR: 9 INJECTION, SOLUTION INTRAVENOUS at 13:42

## 2018-10-29 RX ADMIN — HYDROCORTISONE SODIUM SUCCINATE 50 MG: 100 INJECTION, POWDER, FOR SOLUTION INTRAMUSCULAR; INTRAVENOUS at 09:34

## 2018-10-29 RX ADMIN — POLYETHYLENE GLYCOL-3350 AND ELECTROLYTES 4000 ML: 236; 6.74; 5.86; 2.97; 22.74 POWDER, FOR SOLUTION ORAL at 18:03

## 2018-10-29 RX ADMIN — Medication 23 MILLICURIE: at 12:20

## 2018-10-29 RX ADMIN — SODIUM CHLORIDE 8 MG/HR: 9 INJECTION, SOLUTION INTRAVENOUS at 03:43

## 2018-10-29 RX ADMIN — HYDROCORTISONE SODIUM SUCCINATE 50 MG: 100 INJECTION, POWDER, FOR SOLUTION INTRAMUSCULAR; INTRAVENOUS at 18:46

## 2018-10-29 RX ADMIN — HYDROCORTISONE SODIUM SUCCINATE 50 MG: 100 INJECTION, POWDER, FOR SOLUTION INTRAMUSCULAR; INTRAVENOUS at 02:00

## 2018-10-29 RX ADMIN — SODIUM CHLORIDE, POTASSIUM CHLORIDE, SODIUM LACTATE AND CALCIUM CHLORIDE 500 ML: 600; 310; 30; 20 INJECTION, SOLUTION INTRAVENOUS at 20:01

## 2018-10-29 RX ADMIN — SODIUM CHLORIDE: 9 INJECTION, SOLUTION INTRAVENOUS at 09:34

## 2018-10-29 RX ADMIN — HEPARIN 1 ML: 100 SYRINGE at 12:20

## 2018-10-29 ASSESSMENT — ACTIVITIES OF DAILY LIVING (ADL)
ADLS_ACUITY_SCORE: 11

## 2018-10-29 NOTE — PROGRESS NOTES
GASTROENTEROLOGY PROGRESS NOTE       SUBJECTIVE:  Pt reports large black stool at 5 am. Just had a large red, bloody stool at 8:40. No abdominal pain. Receiving additional transfusion.     OBJECTIVE:    BP (!) 123/100  Pulse 102  Temp 97.6  F (36.4  C) (Oral)  Resp 20  Ht 1.829 m (6')  Wt 77.8 kg (171 lb 8.3 oz)  SpO2 99%  BMI 23.26 kg/m2  Temp (24hrs), Av.9  F (37.2  C), Min:97.6  F (36.4  C), Max:99.9  F (37.7  C)    Patient Vitals for the past 72 hrs:   Weight   10/29/18 0000 77.8 kg (171 lb 8.3 oz)   10/28/18 0757 77.4 kg (170 lb 10.2 oz)   10/27/18 1800 78.9 kg (174 lb)       Intake/Output Summary (Last 24 hours) at 10/29/18 0900  Last data filed at 10/29/18 0629   Gross per 24 hour   Intake          4269.25 ml   Output              375 ml   Net          3894.25 ml        PHYSICAL EXAM    Constitutional: pale  Cardiovascular: RRR  Respiratory: good transmission, CTAB  Abdomen: +bowel sounds, non tender, soft  NEURO: CN 2-12 grossly intact, no focal deficits  SKIN: No jaundice         Additional Comments:  ROS, FH, SH: See initial GI consult for details.     I have reviewed the patient's new clinical lab results:     Recent Labs   Lab Test  10/29/18   0545  10/28/18   2356  10/28/18   1703   10/28/18   0608   10/27/18   1448   WBC  12.6*   --    --    --   21.5*   --   22.4*   HGB  7.4*  7.4*  7.4*   < >  7.7*   < >  8.9*   MCV  91   --    --    --   91   --   93   PLT  104*   --    --    --   171   --   245   INR   --    --    --    --    --    --   0.91    < > = values in this interval not displayed.     Recent Labs   Lab Test  10/29/18   0545  10/28/18   0608  10/27/18   1448   POTASSIUM  4.3  4.4  3.6   CHLORIDE  107  102  98   CO2  23  17*  23   BUN  14  20  25   ANIONGAP  5  13  10     Recent Labs   Lab Test  10/28/18   0910  10/27/18   1448 17   1515  08/15/16   1547   11   1002  11   0800   ALBUMIN   --   3.8   --   3.5  3.9   < >   --    --    BILITOTAL   --   0.7    --   0.4  0.4   < >   --    --    ALT   --   17   --   20  24   < >   --    --    AST   --   13  17  14  16   < >   --    --    PROTEIN  Negative   --    --    --    --    --   Negative  Negative    < > = values in this interval not displayed.     EGD 10/28/18  Findings:        The examined esophagus was normal.        A few non-bleeding localized erosions were found in the stomach. There        were no stigmata of recent bleeding.        The exam of the stomach was otherwise normal.        The examined duodenum was normal.                                                                                     Impression:               - Gastric erosions without bleeding.   Recommendation:           - Clear liquid diet.                             - Hematochezia likely due to lower GIB (hx of                             diverticular bleed vs radiation proctitis)                             - observe and may consider flex sig tommorrow if                             bleeding continues      ASSESSMENT/ PLAN  72 yo male with GI bleeding (reporting black and now red stools). EGD 10/28/18 with non-bleeding gastric erosions. Ongoing bleeding likely diverticular. Also with history of radiation proctitis. H/o GI bleed in Dec 2016 suspected from diverticular bleeding.    -Continue to monitor H/H and transfuse PRN per ICU team  -Tagged red blood cell scan ordered    Sinai Beltran PA-C  [facility]

## 2018-10-29 NOTE — PROGRESS NOTES
Tagged RBC scan negative.  Will prep for colonoscopy tomorrow.  If negative, try for small bowel pill camera study Wednesday.  If starts bleeding tonight, could try to repeat tagged RBC study.

## 2018-10-29 NOTE — PLAN OF CARE
Problem: Patient Care Overview  Goal: Plan of Care/Patient Progress Review  Outcome: No Change  ICU End of Shift Summary.  For vital signs and complete assessments, please see documentation flowsheets.     Pertinent assessments: VSS. Afebrile, RA.  Up to bedside commode SBA.  AAOx4.  Reports minimal abdominal pain. Tele reads SR/ST.  LS-clear, some HOLLOWAY.   Tolerates clear liquids, denies nausea.  Voiding well.  Skin intact, redness to bottom.   Major Shift Events: HGB recheck 7.4, 1unit PRBC given this shift.  Pt has had 4 loose/watery red stool, with some small clots.    Plan (Upcoming Events): monitor HGB, monitor stools, IVF infusing, GI following-possible colonoscopy today.  Discharge/Transfer Needs: TBD    Bedside Shift Report Completed : yes  Bedside Safety Check Completed: yes

## 2018-10-29 NOTE — PROGRESS NOTES
[facility]  Hospitalist Progress Note  Mango Lange, DO 10/29/2018    Reason for Stay (Diagnosis): GI Bleed         Assessment and Plan:      Summary of Stay: John Batista is a 73 year old male admitted on 10/27/2018 with GI bleed.  Problem List:   1. GI bleed.  Due to suspected diverticular bleed.  GI following.  Taking red blood cell scan today negative.  Continue IV pantoprazole infusion.  2. Acute blood loss anemia.  Due to GI bleed.  Has now required 6 units of PRBC's in transfusion since admission.  Continue to monitor hemoglobin frequently.  3. Chronic steroid use.  Currently under significantly increased stress.  Continue IV hydrocortisone 50 mg every 8 hours.  4. Tachycardia.  Possibly due to acute blood loss anemia or adrenal insufficiency.  Transfuse as needed.  Continue hydrocortisone.  5. Metastatic prostate cancer.  Likely follow-up with oncology in the outpatient setting.  6. Lactic acidosis.  Likely hypoperfusion from GI bleed.  Transfuse packed red blood cells as noted above.  Now improved.  DVT Prophylaxis: Pneumatic Compression Devices  Code Status: Full Code  Discharge Dispo: Continue close monitoring in the intensive care unit today.  Estimated Disch Date / # of Days until Disch: 1-3        Interval History (Subjective):      Continues having blood in stools.  Short of breath at times.  Dizziness has improved.  Denies chest pain, fevers, chills, nausea, vomiting.                  Physical Exam:      Last Vital Signs:  /70  Pulse 102  Temp 97.7  F (36.5  C)  Resp 12  Ht 1.829 m (6')  Wt 77.8 kg (171 lb 8.3 oz)  SpO2 99%  BMI 23.26 kg/m2    Gen:  NAD, A&Ox3.  Eyes:  PERRL, sclera anicteric.  OP:  MMM, no lesions.  Neck:  Supple.  CV:  Regular, mildly tachycardic, no murmurs.  Lung:  CTA b/l, normal effort.  Ab:  +BS, soft.  Skin:  Warm, dry to touch.  No rash.  Ext:  No pitting edema LE b/l.           Medications:      All current medications were reviewed  with changes reflected in problem list.         Data:      All new lab and imaging data was reviewed.   Labs:    Recent Labs  Lab 10/29/18  0545      POTASSIUM 4.3   CHLORIDE 107   CO2 23   ANIONGAP 5   *   BUN 14   CR 0.68   GFRESTIMATED >90   GFRESTBLACK >90   TIMBO 7.5*       Recent Labs  Lab 10/29/18  1151 10/29/18  0545   WBC  --  12.6*   HGB 7.6* 7.4*   HCT  --  22.1*   MCV  --  91   PLT  --  104*      Imaging:   Recent Results (from the past 24 hour(s))   NM GI Bleed    Narrative    NUCLEAR MEDICINE GASTROINTESTINAL BLEEDING STUDY October 29, 2018 1:20  PM    HISTORY: Hematochezia.    COMPARISON: None.    TECHNIQUE: Tc labeled red blood cells were injected intravenously with  subsequent dynamic imaging of the abdomen for evaluation of GI bleed.     DOSE: 23.0 mCi 99mTc-ULTRATAG RBC's injected intravenously.    FINDINGS: There is no activity projecting within the GI tract  suspicious for GI bleed.      Impression    IMPRESSION: Negative tagged red cell study.    TENISHA LONG MD

## 2018-10-30 LAB
ABO + RH BLD: NORMAL
ABO + RH BLD: NORMAL
ANION GAP SERPL CALCULATED.3IONS-SCNC: 8 MMOL/L (ref 3–14)
BLD GP AB SCN SERPL QL: NORMAL
BLD PROD TYP BPU: NORMAL
BLD UNIT ID BPU: 0
BLD UNIT ID BPU: 0
BLOOD BANK CMNT PATIENT-IMP: NORMAL
BLOOD PRODUCT CODE: NORMAL
BLOOD PRODUCT CODE: NORMAL
BPU ID: NORMAL
BPU ID: NORMAL
BUN SERPL-MCNC: 13 MG/DL (ref 7–30)
CA-I SERPL ISE-MCNC: 4.5 MG/DL (ref 4.4–5.2)
CALCIUM SERPL-MCNC: 7.3 MG/DL (ref 8.5–10.1)
CHLORIDE SERPL-SCNC: 111 MMOL/L (ref 94–109)
CO2 SERPL-SCNC: 21 MMOL/L (ref 20–32)
COLONOSCOPY: NORMAL
CREAT SERPL-MCNC: 0.75 MG/DL (ref 0.66–1.25)
ERYTHROCYTE [DISTWIDTH] IN BLOOD BY AUTOMATED COUNT: 14.3 % (ref 10–15)
GFR SERPL CREATININE-BSD FRML MDRD: >90 ML/MIN/1.7M2
GLUCOSE SERPL-MCNC: 113 MG/DL (ref 70–99)
HCT VFR BLD AUTO: 20.3 % (ref 40–53)
HGB BLD-MCNC: 6.3 G/DL (ref 13.3–17.7)
HGB BLD-MCNC: 6.9 G/DL (ref 13.3–17.7)
HGB BLD-MCNC: 7.5 G/DL (ref 13.3–17.7)
HGB BLD-MCNC: 8 G/DL (ref 13.3–17.7)
HGB BLD-MCNC: 8.3 G/DL (ref 13.3–17.7)
INTERPRETATION ECG - MUSE: NORMAL
MAGNESIUM SERPL-MCNC: 1.6 MG/DL (ref 1.6–2.3)
MCH RBC QN AUTO: 31.1 PG (ref 26.5–33)
MCHC RBC AUTO-ENTMCNC: 34 G/DL (ref 31.5–36.5)
MCV RBC AUTO: 91 FL (ref 78–100)
NUM BPU REQUESTED: 8
PLATELET # BLD AUTO: 103 10E9/L (ref 150–450)
POTASSIUM SERPL-SCNC: 3.4 MMOL/L (ref 3.4–5.3)
RBC # BLD AUTO: 2.22 10E12/L (ref 4.4–5.9)
SODIUM SERPL-SCNC: 140 MMOL/L (ref 133–144)
SPECIMEN EXP DATE BLD: NORMAL
TRANSFUSION STATUS PATIENT QL: NORMAL
WBC # BLD AUTO: 9 10E9/L (ref 4–11)

## 2018-10-30 PROCEDURE — 25000128 H RX IP 250 OP 636: Performed by: INTERNAL MEDICINE

## 2018-10-30 PROCEDURE — 36415 COLL VENOUS BLD VENIPUNCTURE: CPT | Performed by: INTERNAL MEDICINE

## 2018-10-30 PROCEDURE — 85018 HEMOGLOBIN: CPT | Performed by: INTERNAL MEDICINE

## 2018-10-30 PROCEDURE — 88305 TISSUE EXAM BY PATHOLOGIST: CPT | Mod: 26 | Performed by: INTERNAL MEDICINE

## 2018-10-30 PROCEDURE — 45380 COLONOSCOPY AND BIOPSY: CPT | Performed by: INTERNAL MEDICINE

## 2018-10-30 PROCEDURE — 99233 SBSQ HOSP IP/OBS HIGH 50: CPT | Performed by: INTERNAL MEDICINE

## 2018-10-30 PROCEDURE — 82330 ASSAY OF CALCIUM: CPT | Performed by: INTERNAL MEDICINE

## 2018-10-30 PROCEDURE — C9113 INJ PANTOPRAZOLE SODIUM, VIA: HCPCS | Performed by: INTERNAL MEDICINE

## 2018-10-30 PROCEDURE — 83735 ASSAY OF MAGNESIUM: CPT

## 2018-10-30 PROCEDURE — G0500 MOD SEDAT ENDO SERVICE >5YRS: HCPCS | Performed by: INTERNAL MEDICINE

## 2018-10-30 PROCEDURE — 93010 ELECTROCARDIOGRAM REPORT: CPT | Performed by: INTERNAL MEDICINE

## 2018-10-30 PROCEDURE — 20000003 ZZH R&B ICU

## 2018-10-30 PROCEDURE — P9016 RBC LEUKOCYTES REDUCED: HCPCS | Performed by: EMERGENCY MEDICINE

## 2018-10-30 PROCEDURE — 45385 COLONOSCOPY W/LESION REMOVAL: CPT | Performed by: INTERNAL MEDICINE

## 2018-10-30 PROCEDURE — 93005 ELECTROCARDIOGRAM TRACING: CPT

## 2018-10-30 PROCEDURE — 0DBN8ZX EXCISION OF SIGMOID COLON, VIA NATURAL OR ARTIFICIAL OPENING ENDOSCOPIC, DIAGNOSTIC: ICD-10-PCS | Performed by: INTERNAL MEDICINE

## 2018-10-30 PROCEDURE — 80048 BASIC METABOLIC PNL TOTAL CA: CPT

## 2018-10-30 PROCEDURE — 40000275 ZZH STATISTIC RCP TIME EA 10 MIN

## 2018-10-30 PROCEDURE — 36415 COLL VENOUS BLD VENIPUNCTURE: CPT

## 2018-10-30 PROCEDURE — 99207 ZZC CDG-MDM COMPONENT: MEETS MODERATE - UP CODED: CPT | Performed by: INTERNAL MEDICINE

## 2018-10-30 PROCEDURE — 85027 COMPLETE CBC AUTOMATED: CPT | Performed by: INTERNAL MEDICINE

## 2018-10-30 PROCEDURE — 99153 MOD SED SAME PHYS/QHP EA: CPT | Performed by: INTERNAL MEDICINE

## 2018-10-30 PROCEDURE — 88305 TISSUE EXAM BY PATHOLOGIST: CPT | Performed by: INTERNAL MEDICINE

## 2018-10-30 PROCEDURE — 25000132 ZZH RX MED GY IP 250 OP 250 PS 637: Performed by: INTERNAL MEDICINE

## 2018-10-30 PROCEDURE — 0DJD8ZZ INSPECTION OF LOWER INTESTINAL TRACT, VIA NATURAL OR ARTIFICIAL OPENING ENDOSCOPIC: ICD-10-PCS | Performed by: INTERNAL MEDICINE

## 2018-10-30 RX ORDER — POTASSIUM CL/LIDO/0.9 % NACL 10MEQ/0.1L
10 INTRAVENOUS SOLUTION, PIGGYBACK (ML) INTRAVENOUS
Status: DISCONTINUED | OUTPATIENT
Start: 2018-10-30 | End: 2018-11-01 | Stop reason: HOSPADM

## 2018-10-30 RX ORDER — FENTANYL CITRATE 50 UG/ML
INJECTION, SOLUTION INTRAMUSCULAR; INTRAVENOUS PRN
Status: DISCONTINUED | OUTPATIENT
Start: 2018-10-30 | End: 2018-10-30 | Stop reason: HOSPADM

## 2018-10-30 RX ORDER — POTASSIUM CHLORIDE 7.45 MG/ML
10 INJECTION INTRAVENOUS
Status: DISCONTINUED | OUTPATIENT
Start: 2018-10-30 | End: 2018-11-01 | Stop reason: HOSPADM

## 2018-10-30 RX ORDER — MAGNESIUM SULFATE HEPTAHYDRATE 40 MG/ML
4 INJECTION, SOLUTION INTRAVENOUS EVERY 4 HOURS PRN
Status: DISCONTINUED | OUTPATIENT
Start: 2018-10-30 | End: 2018-11-01 | Stop reason: HOSPADM

## 2018-10-30 RX ORDER — NALOXONE HYDROCHLORIDE 0.4 MG/ML
.1-.4 INJECTION, SOLUTION INTRAMUSCULAR; INTRAVENOUS; SUBCUTANEOUS
Status: ACTIVE | OUTPATIENT
Start: 2018-10-30 | End: 2018-10-31

## 2018-10-30 RX ORDER — POTASSIUM CHLORIDE 1.5 G/1.58G
20-40 POWDER, FOR SOLUTION ORAL
Status: DISCONTINUED | OUTPATIENT
Start: 2018-10-30 | End: 2018-11-01 | Stop reason: HOSPADM

## 2018-10-30 RX ORDER — POTASSIUM CHLORIDE 1500 MG/1
20-40 TABLET, EXTENDED RELEASE ORAL
Status: DISCONTINUED | OUTPATIENT
Start: 2018-10-30 | End: 2018-11-01 | Stop reason: HOSPADM

## 2018-10-30 RX ORDER — LIDOCAINE 40 MG/G
CREAM TOPICAL
Status: DISCONTINUED | OUTPATIENT
Start: 2018-10-30 | End: 2018-11-01 | Stop reason: CLARIF

## 2018-10-30 RX ORDER — POTASSIUM CHLORIDE 29.8 MG/ML
20 INJECTION INTRAVENOUS
Status: DISCONTINUED | OUTPATIENT
Start: 2018-10-30 | End: 2018-11-01 | Stop reason: HOSPADM

## 2018-10-30 RX ORDER — FLUMAZENIL 0.1 MG/ML
0.2 INJECTION, SOLUTION INTRAVENOUS
Status: ACTIVE | OUTPATIENT
Start: 2018-10-30 | End: 2018-10-31

## 2018-10-30 RX ADMIN — HYDROCORTISONE SODIUM SUCCINATE 50 MG: 100 INJECTION, POWDER, FOR SOLUTION INTRAMUSCULAR; INTRAVENOUS at 02:18

## 2018-10-30 RX ADMIN — HYDROCORTISONE SODIUM SUCCINATE 50 MG: 100 INJECTION, POWDER, FOR SOLUTION INTRAMUSCULAR; INTRAVENOUS at 17:49

## 2018-10-30 RX ADMIN — SODIUM CHLORIDE 8 MG/HR: 9 INJECTION, SOLUTION INTRAVENOUS at 10:38

## 2018-10-30 RX ADMIN — SODIUM CHLORIDE 8 MG/HR: 9 INJECTION, SOLUTION INTRAVENOUS at 00:13

## 2018-10-30 RX ADMIN — SODIUM CHLORIDE: 9 INJECTION, SOLUTION INTRAVENOUS at 05:20

## 2018-10-30 RX ADMIN — SODIUM CHLORIDE: 9 INJECTION, SOLUTION INTRAVENOUS at 23:38

## 2018-10-30 RX ADMIN — ACETAMINOPHEN 650 MG: 325 TABLET, FILM COATED ORAL at 23:33

## 2018-10-30 RX ADMIN — HYDROCORTISONE SODIUM SUCCINATE 50 MG: 100 INJECTION, POWDER, FOR SOLUTION INTRAMUSCULAR; INTRAVENOUS at 12:27

## 2018-10-30 RX ADMIN — SODIUM CHLORIDE: 9 INJECTION, SOLUTION INTRAVENOUS at 15:30

## 2018-10-30 RX ADMIN — SODIUM CHLORIDE: 9 INJECTION, SOLUTION INTRAVENOUS at 00:13

## 2018-10-30 ASSESSMENT — ACTIVITIES OF DAILY LIVING (ADL)
ADLS_ACUITY_SCORE: 11

## 2018-10-30 NOTE — PLAN OF CARE
Problem: Gastrointestinal Bleeding (Adult)  Goal: Signs and Symptoms of Listed Potential Problems Will be Absent, Minimized or Managed (Gastrointestinal Bleeding)  Signs and symptoms of listed potential problems will be absent, minimized or managed by discharge/transition of care (reference Gastrointestinal Bleeding (Adult) CPG).   Outcome: Improving  ICU End of Shift Summary.  For vital signs and complete assessments, please see documentation flowsheets.     Pertinent assessments: states able to sleep better last night and feeling better this am. Hgb stable >8 today with no s/s bleeding. Colonoscopy done this am. Amb to BR with SBA. Wife at bedside, supportive.  Major Shift Events: HR with varying rate this am. Becoming bradycardic to 40's with freq PVCs not sustained and pt asymptomatic. DR Lange notified. EKG no signs of ischemia. Resolved this vicki.  Plan (Upcoming Events): Camera study in AM. Npo after MN.  Discharge/Transfer Needs: TBD    Bedside Shift Report Completed : Y  Bedside Safety Check Completed:Y

## 2018-10-30 NOTE — PROCEDURES
Pre-Endoscopy History and Physical     John Batista MRN# [id number]   YOB: 1945 Age: 73 year old     Date of Procedure: 10/27/2018  Primary care provider: Rosario Martinez  Type of Endoscopy: colonoscopy  Reason for Procedure: GI bleeding  Type of Anesthesia Anticipated: Moderate (conscious) sedation    HPI:    John is a 73 year old male who will be undergoing the above procedure.      A history and physical has been performed. The patient's medications and allergies have been reviewed. The risks and benefits of the procedure and the sedation options and risks were discussed with the patient.  All questions were answered and informed consent was obtained.      No Known Allergies     Current Facility-Administered Medications   Medication     acetaminophen (TYLENOL) tablet 650 mg     flumazenil (ROMAZICON) injection 0.2 mg     flumazenil (ROMAZICON) injection 0.2 mg     hydrocortisone sodium succinate PF (solu-CORTEF) injection 50 mg     HYDROmorphone (PF) (DILAUDID) injection 0.2 mg     lactated ringers BOLUS 500 mL     lidocaine (LMX4) cream     lidocaine (LMX4) cream     lidocaine 1 % 1 mL     lidocaine 1 % 1 mL     magnesium citrate solution 296 mL     magnesium sulfate 4 g in 100 mL sterile water (premade)     May continue current IV fluids if patient has IV fluids infusing.     May continue current IV fluids if patient has IV fluids infusing.     May take regular AM medications except those listed below     melatonin tablet 1 mg     naloxone (NARCAN) injection 0.1-0.4 mg     nitroGLYcerin (NITROSTAT) sublingual tablet 0.4 mg     ondansetron (ZOFRAN-ODT) ODT tab 4 mg    Or     ondansetron (ZOFRAN) injection 4 mg     oxyCODONE IR (ROXICODONE) tablet 5 mg     pantoprazole (PROTONIX) 80 mg in sodium chloride 0.9 % 100 mL infusion     potassium chloride (KLOR-CON) Packet 20-40 mEq     potassium chloride 10 mEq in 100 mL intermittent infusion with 10 mg lidocaine     potassium chloride 10 mEq  in 100 mL sterile water intermittent infusion (premix)     potassium chloride 20 mEq in 50 mL intermittent infusion     potassium chloride SA (K-DUR/KLOR-CON M) CR tablet 20-40 mEq     prochlorperazine (COMPAZINE) injection 5 mg    Or     prochlorperazine (COMPAZINE) tablet 5 mg    Or     prochlorperazine (COMPAZINE) Suppository 12.5 mg     sodium chloride (PF) 0.9% PF flush 3 mL     sodium chloride (PF) 0.9% PF flush 3 mL     sodium chloride (PF) 0.9% PF flush 3 mL     sodium chloride (PF) 0.9% PF flush 3 mL     sodium chloride (PF) 0.9% PF flush 3 mL     sodium chloride 0.9% infusion       Patient Active Problem List   Diagnosis     Benign neoplasm of colon     CARDIOVASCULAR SCREENING; LDL GOAL LESS THAN 160     Advance Care Planning     Iron deficiency anemia     Rectal ulcer     Prostate cancer (H)     Acute lower gastrointestinal hemorrhage     GI bleeding     Melena        Past Medical History:   Diagnosis Date     Prostate cancer (H) 12/2011    Dr. Galvan; now seeing Dr. Ruben Reese at [place]        Past Surgical History:   Procedure Laterality Date     COLONOSCOPY N/A 11/29/2016    Procedure: COLONOSCOPY;  Surgeon: Alon Lo MD;  Location: [facility] OR     COLONOSCOPY N/A 12/3/2016    Procedure: COLONOSCOPY;  Surgeon: Carmenza Akbar MD;  Location:  GI     CYSTOSCOPY       ESOPHAGOSCOPY, GASTROSCOPY, DUODENOSCOPY (EGD), COMBINED N/A 10/28/2018    Procedure: COMBINED ESOPHAGOSCOPY, GASTROSCOPY, DUODENOSCOPY (EGD);  Surgeon: Codey De La Torre MD;  Location:  GI     EXAM UNDER ANESTHESIA ANUS N/A 11/29/2016    Procedure: EXAM UNDER ANESTHESIA ANUS;  Surgeon: Alon Lo MD;  Location: [facility] OR     HC COLONOSCOPY THRU STOMA, DIAGNOSTIC  2003    normal, 2000 had polyps     HERNIA REPAIR, INGUINAL RT/LT       SIGMOIDOSCOPY FLEXIBLE N/A 9/10/2014    Procedure: SIGMOIDOSCOPY FLEXIBLE;  Surgeon: Madhuri Drake MD;  Location:  GI     VASECTOMY         Social History   Substance Use Topics      Smoking status: Former Smoker     Start date: 9/8/1981     Smokeless tobacco: Former User     Alcohol use No       Family History   Problem Relation Age of Onset     C.A.D. No family hx of      Diabetes No family hx of      Hypertension No family hx of      Breast Cancer No family hx of      Cancer - colorectal No family hx of             Medications:     Prescriptions Prior to Admission   Medication Sig Dispense Refill Last Dose     Cholecalciferol (VITAMIN D) 1000 UNITS capsule Take 2,000 Units by mouth 2 times daily    10/26/2018 at PM     leuprolide (ELIGARD) 45 MG injection Inject 45 mg Subcutaneous every 6 months.   Past Month     predniSONE (DELTASONE) 5 MG tablet Take 5 mg by mouth 2 times daily   10/27/2018 at 1100       Scheduled Medications:    hydrocortisone sodium succinate PF  50 mg Intravenous Q8H     lactated ringers  500 mL Intravenous Once     magnesium citrate  296 mL Oral Once     sodium chloride (PF)  3 mL Intracatheter Q8H     sodium chloride (PF)  3 mL Intracatheter Q8H       PRN:  acetaminophen, flumazenil, flumazenil, HYDROmorphone, lidocaine 4%, lidocaine 4%, lidocaine (buffered or not buffered), lidocaine (buffered or not buffered), magnesium sulfate, - MEDICATION INSTRUCTIONS -, - MEDICATION INSTRUCTIONS -, - MEDICATION INSTRUCTIONS -, melatonin, naloxone, nitroGLYcerin, ondansetron **OR** ondansetron, oxyCODONE IR, potassium chloride, potassium chloride with lidocaine, potassium chloride, potassium chloride, potassium chloride, prochlorperazine **OR** prochlorperazine **OR** prochlorperazine, sodium chloride (PF), sodium chloride (PF), sodium chloride (PF)    PHYSICAL EXAM:   /74  Pulse 95  Temp 98.9  F (37.2  C) (Oral)  Resp 25  Ht 1.829 m (6')  Wt 82.3 kg (181 lb 7 oz)  SpO2 100%  BMI 24.61 kg/m2 Estimated body mass index is 24.61 kg/(m^2) as calculated from the following:    Height as of this encounter: 1.829 m (6').    Weight as of this encounter: 82.3 kg (181 lb 7 oz).    RESP: lungs clear to auscultation - no rales, rhonchi or wheezes  CV: regular rates and rhythm    IMPRESSION   ASA Class 2 - Mild systemic disease      Signed Electronically by: Anatoly Tam MD  October 30, 2018    .

## 2018-10-30 NOTE — PROGRESS NOTES
Progress Note:    Patient has a sinus tachycardia.  Sitting on commode getting golytely prep.  Hemoglobin has been stable.  He has light brown stool.  Plan to give 500 mL lactated ringers.  Follow heart rate.  Next hemoglobin check at midnight.    Leonel Be MD

## 2018-10-30 NOTE — PLAN OF CARE
Problem: Gastrointestinal Bleeding (Adult)  Goal: Signs and Symptoms of Listed Potential Problems Will be Absent, Minimized or Managed (Gastrointestinal Bleeding)  Signs and symptoms of listed potential problems will be absent, minimized or managed by discharge/transition of care (reference Gastrointestinal Bleeding (Adult) CPG).   Outcome: No Change  ICU End of Shift Summary.  For vital signs and complete assessments, please see documentation flowsheets.     Pertinent assessments: Alert and oriented. Up to BR freq with black tarry to red bloody stools. 1UPRBCs given this am. Increase in stools. Taken to Nuc Med for red tag study. No bleeding noted at that time. This afternoon lrg maroon stool noted. Taken back to nuc Med. Started bowel prep. Dyspnea when he gets up. Becoming more tachycardic with activity 's. Hgb recheck 7.6 Tele HUB [place] notified. Waiting for MD  Major Shift Events: See above  Plan (Upcoming Events): Serial Hgb, Monitor bleeding.  Discharge/Transfer Needs:TBD     Bedside Shift Report Completed :Y   Bedside Safety Check Completed:Y

## 2018-10-30 NOTE — PROGRESS NOTES
[facility]  Hospitalist Progress Note  Mango Lange, DO 10/30/2018    Reason for Stay (Diagnosis): GI bleed         Assessment and Plan:      Summary of Stay: John Batista is a 73 year old male admitted on 10/27/2018 with GI bleed.  Problem List:   1. GI bleed.  GI following.  Colonoscopy done today which showed multiple diverticula and did have 2 polyps removed.  Plan for pill endoscopy tomorrow.  Continue IV pantoprazole infusion.  2. Acute blood loss anemia.  Due to GI bleed.  Did require additional 2 units of packed red blood cells early this morning.  Has now required 8 units of PRBC's in transfusion since admission.  Continue to monitor hemoglobin frequently.  3. Chronic steroid use.  Currently under significantly increased stress.  Continue IV hydrocortisone 50 mg every 8 hours.  4. Tachycardia.  Possibly due to acute blood loss anemia or adrenal insufficiency.  Transfuse as needed.  Continue hydrocortisone.  5. Metastatic prostate cancer.  Likely follow-up with oncology in the outpatient setting.  6. Lactic acidosis.  Likely hypoperfusion from GI bleed.  Transfuse packed red blood cells as noted above.  Now improved.  DVT Prophylaxis: Pneumatic Compression Devices  Code Status: Full Code  Discharge Dispo: Home  Estimated Disch Date / # of Days until Disch: 2        Interval History (Subjective):      Continues having blood in stools.  Denies chest pain, shortness of breath, fevers, chills, nausea, vomiting, or lightheadedness.                  Physical Exam:      Last Vital Signs:  BP (!) 106/92  Pulse 95  Temp 98.9  F (37.2  C) (Oral)  Resp 16  Ht 1.829 m (6')  Wt 82.3 kg (181 lb 7 oz)  SpO2 99%  BMI 24.61 kg/m2    Gen:  NAD, A&Ox3.  Eyes:  PERRL, sclera anicteric.  OP:  MMM, no lesions.  Neck:  Supple.  CV:  Mildly irregular, no murmurs.  Lung:  CTA b/l, normal effort.  Ab:  +BS, soft.  Skin:  Warm, dry to touch.  No rash.  Ext:  No pitting edema LE b/l.            Medications:      All current medications were reviewed with changes reflected in problem list.         Data:      All new lab and imaging data was reviewed.   Labs:    Recent Labs  Lab 10/30/18  0218      POTASSIUM 3.4   CHLORIDE 111*   CO2 21   ANIONGAP 8   *   BUN 13   CR 0.75   GFRESTIMATED >90   GFRESTBLACK >90   TIMBO 7.3*       Recent Labs  Lab 10/30/18  0948 10/30/18  0519   WBC  --  9.0   HGB 8.0* 6.9*   HCT  --  20.3*   MCV  --  91   PLT  --  103*      Imaging:   Recent Results (from the past 24 hour(s))   NM GI Bleed    Addendum: 10/30/2018    PATIENT: SHARON BANEGAS  ACCESSION NUMBER : [id number]    The original report on this patient was dictated by Dr. Long.    Patient was brought down for delayed images at 6 hours. No suspicious  activity in the GI tract or convincing evidence for GI bleed.    Ernesto Ricketts MD (Date of addendum: 10/29/2018 6:13 PM)    ERNESTO RICKETTS MD      Narrative    NUCLEAR MEDICINE GASTROINTESTINAL BLEEDING STUDY October 29, 2018 1:20  PM    HISTORY: Hematochezia.    COMPARISON: None.    TECHNIQUE: Tc labeled red blood cells were injected intravenously with  subsequent dynamic imaging of the abdomen for evaluation of GI bleed.     DOSE: 23.0 mCi 99mTc-ULTRATAG RBC's injected intravenously.    FINDINGS: There is no activity projecting within the GI tract  suspicious for GI bleed.      Impression    IMPRESSION: Negative tagged red cell study.    TENISHA LONG MD

## 2018-10-30 NOTE — PLAN OF CARE
Problem: Patient Care Overview  Goal: Plan of Care/Patient Progress Review  Outcome: Declining  ICU End of Shift Summary.  For vital signs and complete assessments, please see documentation flowsheets.     Pertinent assessments: Pt alert, oriented, pleasant.  BP became soft as morning went.  HR initially tachycardiac became very irregular with PACs, SA-labs drawn.  WNL.  Completed golytely prep-stools loose and bloody but not bright red blood.  Voiding per pt.  Hgb at MN was 6.3-received 1 unit PRBCs.  protonix drip infusing  Major Shift Events: transfusion, golytely prep  Plan (Upcoming Events): plan for colonoscopy today and possibly repeat tagged RBC, continue to monitor Hgb  Discharge/Transfer Needs: ?remain in ICU    Bedside Shift Report Completed : y  Bedside Safety Check Completed: y

## 2018-10-31 LAB
ALBUMIN SERPL-MCNC: 2.9 G/DL (ref 3.4–5)
ALP SERPL-CCNC: 114 U/L (ref 40–150)
ALT SERPL W P-5'-P-CCNC: 19 U/L (ref 0–70)
ANION GAP SERPL CALCULATED.3IONS-SCNC: 8 MMOL/L (ref 3–14)
AST SERPL W P-5'-P-CCNC: 28 U/L (ref 0–45)
BILIRUB SERPL-MCNC: 0.5 MG/DL (ref 0.2–1.3)
BUN SERPL-MCNC: 6 MG/DL (ref 7–30)
CA-I SERPL ISE-MCNC: 4.6 MG/DL (ref 4.4–5.2)
CALCIUM SERPL-MCNC: 7.7 MG/DL (ref 8.5–10.1)
CHLORIDE SERPL-SCNC: 108 MMOL/L (ref 94–109)
CO2 SERPL-SCNC: 24 MMOL/L (ref 20–32)
COPATH REPORT: NORMAL
CREAT SERPL-MCNC: 0.62 MG/DL (ref 0.66–1.25)
ERYTHROCYTE [DISTWIDTH] IN BLOOD BY AUTOMATED COUNT: 13.8 % (ref 10–15)
GFR SERPL CREATININE-BSD FRML MDRD: >90 ML/MIN/1.7M2
GLUCOSE BLDC GLUCOMTR-MCNC: 118 MG/DL (ref 70–99)
GLUCOSE BLDC GLUCOMTR-MCNC: 123 MG/DL (ref 70–99)
GLUCOSE SERPL-MCNC: 103 MG/DL (ref 70–99)
HCT VFR BLD AUTO: 24 % (ref 40–53)
HGB BLD-MCNC: 7.1 G/DL (ref 13.3–17.7)
HGB BLD-MCNC: 7.6 G/DL (ref 13.3–17.7)
HGB BLD-MCNC: 8 G/DL (ref 13.3–17.7)
HGB BLD-MCNC: 8.4 G/DL (ref 13.3–17.7)
MCH RBC QN AUTO: 31.2 PG (ref 26.5–33)
MCHC RBC AUTO-ENTMCNC: 35 G/DL (ref 31.5–36.5)
MCV RBC AUTO: 89 FL (ref 78–100)
PLATELET # BLD AUTO: 139 10E9/L (ref 150–450)
POTASSIUM SERPL-SCNC: 2.9 MMOL/L (ref 3.4–5.3)
POTASSIUM SERPL-SCNC: 3.1 MMOL/L (ref 3.4–5.3)
POTASSIUM SERPL-SCNC: 3.5 MMOL/L (ref 3.4–5.3)
PROT SERPL-MCNC: 5.2 G/DL (ref 6.8–8.8)
RBC # BLD AUTO: 2.69 10E12/L (ref 4.4–5.9)
SODIUM SERPL-SCNC: 140 MMOL/L (ref 133–144)
UPPER GI ENDOSCOPY: NORMAL
WBC # BLD AUTO: 16.8 10E9/L (ref 4–11)

## 2018-10-31 PROCEDURE — 12000007 ZZH R&B INTERMEDIATE

## 2018-10-31 PROCEDURE — 0DJ07ZZ INSPECTION OF UPPER INTESTINAL TRACT, VIA NATURAL OR ARTIFICIAL OPENING: ICD-10-PCS | Performed by: INTERNAL MEDICINE

## 2018-10-31 PROCEDURE — 99232 SBSQ HOSP IP/OBS MODERATE 35: CPT | Performed by: INTERNAL MEDICINE

## 2018-10-31 PROCEDURE — 25000132 ZZH RX MED GY IP 250 OP 250 PS 637

## 2018-10-31 PROCEDURE — G0500 MOD SEDAT ENDO SERVICE >5YRS: HCPCS | Performed by: INTERNAL MEDICINE

## 2018-10-31 PROCEDURE — 84132 ASSAY OF SERUM POTASSIUM: CPT | Performed by: INTERNAL MEDICINE

## 2018-10-31 PROCEDURE — 80053 COMPREHEN METABOLIC PANEL: CPT | Performed by: INTERNAL MEDICINE

## 2018-10-31 PROCEDURE — 36415 COLL VENOUS BLD VENIPUNCTURE: CPT | Performed by: INTERNAL MEDICINE

## 2018-10-31 PROCEDURE — 82330 ASSAY OF CALCIUM: CPT | Performed by: INTERNAL MEDICINE

## 2018-10-31 PROCEDURE — 85027 COMPLETE CBC AUTOMATED: CPT | Performed by: INTERNAL MEDICINE

## 2018-10-31 PROCEDURE — 25000125 ZZHC RX 250: Performed by: INTERNAL MEDICINE

## 2018-10-31 PROCEDURE — 25000128 H RX IP 250 OP 636: Performed by: INTERNAL MEDICINE

## 2018-10-31 PROCEDURE — 99153 MOD SED SAME PHYS/QHP EA: CPT | Performed by: INTERNAL MEDICINE

## 2018-10-31 PROCEDURE — 43235 EGD DIAGNOSTIC BRUSH WASH: CPT | Performed by: INTERNAL MEDICINE

## 2018-10-31 PROCEDURE — 85018 HEMOGLOBIN: CPT | Performed by: INTERNAL MEDICINE

## 2018-10-31 RX ORDER — FENTANYL CITRATE 50 UG/ML
INJECTION, SOLUTION INTRAMUSCULAR; INTRAVENOUS PRN
Status: DISCONTINUED | OUTPATIENT
Start: 2018-10-31 | End: 2018-10-31 | Stop reason: HOSPADM

## 2018-10-31 RX ORDER — NALOXONE HYDROCHLORIDE 0.4 MG/ML
.1-.4 INJECTION, SOLUTION INTRAMUSCULAR; INTRAVENOUS; SUBCUTANEOUS
Status: ACTIVE | OUTPATIENT
Start: 2018-10-31 | End: 2018-11-01

## 2018-10-31 RX ORDER — FLUMAZENIL 0.1 MG/ML
0.2 INJECTION, SOLUTION INTRAVENOUS
Status: ACTIVE | OUTPATIENT
Start: 2018-10-31 | End: 2018-10-31

## 2018-10-31 RX ORDER — PANTOPRAZOLE SODIUM 40 MG/1
40 TABLET, DELAYED RELEASE ORAL
Status: DISCONTINUED | OUTPATIENT
Start: 2018-11-01 | End: 2018-11-01 | Stop reason: HOSPADM

## 2018-10-31 RX ADMIN — HYDROCORTISONE SODIUM SUCCINATE 50 MG: 100 INJECTION, POWDER, FOR SOLUTION INTRAMUSCULAR; INTRAVENOUS at 22:03

## 2018-10-31 RX ADMIN — POTASSIUM CHLORIDE 40 MEQ: 1500 TABLET, EXTENDED RELEASE ORAL at 08:47

## 2018-10-31 RX ADMIN — HYDROCORTISONE SODIUM SUCCINATE 50 MG: 100 INJECTION, POWDER, FOR SOLUTION INTRAMUSCULAR; INTRAVENOUS at 11:17

## 2018-10-31 RX ADMIN — POTASSIUM CHLORIDE 20 MEQ: 1500 TABLET, EXTENDED RELEASE ORAL at 16:15

## 2018-10-31 RX ADMIN — SODIUM CHLORIDE: 9 INJECTION, SOLUTION INTRAVENOUS at 11:15

## 2018-10-31 RX ADMIN — SODIUM CHLORIDE: 9 INJECTION, SOLUTION INTRAVENOUS at 20:23

## 2018-10-31 RX ADMIN — POTASSIUM CHLORIDE 40 MEQ: 1500 TABLET, EXTENDED RELEASE ORAL at 06:59

## 2018-10-31 RX ADMIN — POTASSIUM CHLORIDE 40 MEQ: 1500 TABLET, EXTENDED RELEASE ORAL at 14:15

## 2018-10-31 RX ADMIN — HYDROCORTISONE SODIUM SUCCINATE 50 MG: 100 INJECTION, POWDER, FOR SOLUTION INTRAMUSCULAR; INTRAVENOUS at 02:02

## 2018-10-31 ASSESSMENT — ACTIVITIES OF DAILY LIVING (ADL)
ADLS_ACUITY_SCORE: 11

## 2018-10-31 ASSESSMENT — ENCOUNTER SYMPTOMS: SHORTNESS OF BREATH: 1

## 2018-10-31 NOTE — OR NURSING
Pt transferred back to his primary care unit ,senia  physician assistant  Spoken to charge nurse regarding pt diet while pt has the pill cam in

## 2018-10-31 NOTE — PROGRESS NOTES
Report called to AARON Griffin on 3rd floor.  Discussed course of hospital stay, reviewing previous testing that has been performed and results, discussed overnight hgb levels, tele and history of SA.  Informed AARON Griffin that pt is NPO for pill camera testing today.  All questions answered.  Will transfer pt over with all his belongings.

## 2018-10-31 NOTE — PROGRESS NOTES
Spoke with STANFORD Beltran from GI regarding diet plan as mentioned in her note, she states that she will enter an ADAT order for RN to advance at the appropriate times to CL and regular.  Also, she requested that evening shift staff keep all equipment from pill study (black bag, belt with case and pill if passed by pt) in the pt's room when it is removed at 1830.  That's where she will go to get it in the morning.

## 2018-10-31 NOTE — PLAN OF CARE
Problem: Patient Care Overview  Goal: Plan of Care/Patient Progress Review  Outcome: Improving  ICU End of Shift Summary.  For vital signs and complete assessments, please see documentation flowsheets.     Pertinent assessments: Pt alert, oriented, pleasant, slept little during the night.  BP stable, Tele SR, occ PVC.  Hgb was 8-dropped to 7.5, rechecked and was 8.  Pt had a bloody stool after that draw and morning recheck was 8.4.  MTOF  Major Shift Events: Slept, Hgb monitoring  Plan (Upcoming Events):  pill camera today  Discharge/Transfer Needs: transfer to [facility] this morning,    Bedside Shift Report Completed : y  Bedside Safety Check Completed: y

## 2018-10-31 NOTE — OR NURSING
Post procedure pt denies pain ,pt has the pill cam in and has to follow certain instruction regarding oral intake and timing ,full report given to addie rivas primary care Rn and the PHYSICIANS ASSISTANT WILL COMMUNICATE WITH PT  NURSE ,pt aware of instruction,

## 2018-10-31 NOTE — PLAN OF CARE
Problem: Patient Care Overview  Goal: Plan of Care/Patient Progress Review  Outcome: Improving  Vss, no co pain/cp/sob.   Tele SR. IVF continue, K+ 2.9, replaced with recheck pending, [employer], up SBA.  Pill study in progress, watch for passing and collect in small box in pt room, see progress note for details, able to advance diet SLOWLY, pt aware of this.  Bloody BM once this shift, PCDs on per pt preference as he has restless legs.  Continue poc and monitoring.

## 2018-10-31 NOTE — PROGRESS NOTES
Pill camera today. Set up in patient room but he was unable to swallow capsule.  Discussed with Dr. Tam and will plan for endoscopic placement of capsule in endo around 9 Am.    Discussed with nurse and charge nurse.    Pt okay to have clear liquids 2 hours after placement (which will be about 11:30 AM). Okay for light meal 4 hours after placement (which will be about 1:30 PM).    Okay to remove belt and recorder at 6:30 PM or once pill is passed, whichever is earlier, and place in briefcase.    I will  equipment in morning.    Please call/page with any questions.

## 2018-10-31 NOTE — PLAN OF CARE
Problem: Patient Care Overview  Goal: Plan of Care/Patient Progress Review  Outcome: Improving  Patient arrived to floor around 0600. Alert and oriented, stand by assist. Denies chest pain and shortness of breath. NS running at 100 ML/HR. Plan for pill endoscopy, NPO since MN.

## 2018-10-31 NOTE — OR NURSING
Pt in endoscopy for pill cam placement via egd as was unsuccessful in swallowing pill cam. Difficulty placing despite extra sedation but after placing overtube able to insert into duodenum using pediatric lockwood net. Report called to station nurse. Will keep pt in endoscopy for 30 minutes then send back to station on stretcher.

## 2018-10-31 NOTE — PROCEDURES
Pre-Endoscopy History and Physical     John Batista MRN# [id number]   YOB: 1945 Age: 73 year old     Date of Procedure: 10/27/2018  Primary care provider: Rosario Martinez  Type of Endoscopy: esophagogastroduodenoscopy (upper GI endoscopy)  Reason for Procedure: place pill cam  Type of Anesthesia Anticipated: Moderate (conscious) sedation    HPI:    John is a 73 year old male who will be undergoing the above procedure.      A history and physical has been performed. The patient's medications and allergies have been reviewed. The risks and benefits of the procedure and the sedation options and risks were discussed with the patient.  All questions were answered and informed consent was obtained.      No Known Allergies     Current Facility-Administered Medications   Medication     acetaminophen (TYLENOL) tablet 650 mg     hydrocortisone sodium succinate PF (solu-CORTEF) injection 50 mg     HYDROmorphone (PF) (DILAUDID) injection 0.2 mg     lactated ringers BOLUS 500 mL     lidocaine (LMX4) cream     lidocaine (LMX4) cream     lidocaine 1 % 1 mL     lidocaine 1 % 1 mL     magnesium citrate solution 296 mL     magnesium sulfate 4 g in 100 mL sterile water (premade)     May continue current IV fluids if patient has IV fluids infusing.     May continue current IV fluids if patient has IV fluids infusing.     May continue current IV fluids if patient has IV fluids infusing.     May take regular AM medications except those listed below     melatonin tablet 1 mg     naloxone (NARCAN) injection 0.1-0.4 mg     nitroGLYcerin (NITROSTAT) sublingual tablet 0.4 mg     ondansetron (ZOFRAN-ODT) ODT tab 4 mg    Or     ondansetron (ZOFRAN) injection 4 mg     oxyCODONE IR (ROXICODONE) tablet 5 mg     potassium chloride (KLOR-CON) Packet 20-40 mEq     potassium chloride 10 mEq in 100 mL intermittent infusion with 10 mg lidocaine     potassium chloride 10 mEq in 100 mL sterile water intermittent infusion (premix)      potassium chloride 20 mEq in 50 mL intermittent infusion     potassium chloride SA (K-DUR/KLOR-CON M) CR tablet 20-40 mEq     prochlorperazine (COMPAZINE) injection 5 mg    Or     prochlorperazine (COMPAZINE) tablet 5 mg    Or     prochlorperazine (COMPAZINE) Suppository 12.5 mg     sodium chloride (PF) 0.9% PF flush 3 mL     sodium chloride (PF) 0.9% PF flush 3 mL     sodium chloride (PF) 0.9% PF flush 3 mL     sodium chloride (PF) 0.9% PF flush 3 mL     sodium chloride (PF) 0.9% PF flush 3 mL     sodium chloride (PF) 0.9% PF flush 3 mL     sodium chloride 0.9% infusion       Patient Active Problem List   Diagnosis     Benign neoplasm of colon     CARDIOVASCULAR SCREENING; LDL GOAL LESS THAN 160     Advance Care Planning     Iron deficiency anemia     Rectal ulcer     Prostate cancer (H)     Acute lower gastrointestinal hemorrhage     GI bleeding     Melena        Past Medical History:   Diagnosis Date     Prostate cancer (H) 12/2011    Dr. Galvan; now seeing Dr. Ruben Reese at [place]        Past Surgical History:   Procedure Laterality Date     COLONOSCOPY N/A 11/29/2016    Procedure: COLONOSCOPY;  Surgeon: Alon Lo MD;  Location: [facility] OR     COLONOSCOPY N/A 12/3/2016    Procedure: COLONOSCOPY;  Surgeon: Carmenza Akbar MD;  Location: [facility] GI     CYSTOSCOPY       ESOPHAGOSCOPY, GASTROSCOPY, DUODENOSCOPY (EGD), COMBINED N/A 10/28/2018    Procedure: COMBINED ESOPHAGOSCOPY, GASTROSCOPY, DUODENOSCOPY (EGD);  Surgeon: Codey De La Torre MD;  Location: [facility] GI     EXAM UNDER ANESTHESIA ANUS N/A 11/29/2016    Procedure: EXAM UNDER ANESTHESIA ANUS;  Surgeon: Alon Lo MD;  Location: [facility] OR     HC COLONOSCOPY THRU STOMA, DIAGNOSTIC  2003    normal, 2000 had polyps     HERNIA REPAIR, INGUINAL RT/LT       SIGMOIDOSCOPY FLEXIBLE N/A 9/10/2014    Procedure: SIGMOIDOSCOPY FLEXIBLE;  Surgeon: Madhuri Drake MD;  Location: [facility] GI     VASECTOMY         Social History   Substance Use Topics     Smoking  status: Former Smoker     Start date: 9/8/1981     Smokeless tobacco: Former User     Alcohol use No       Family History   Problem Relation Age of Onset     C.A.D. No family hx of      Diabetes No family hx of      Hypertension No family hx of      Breast Cancer No family hx of      Cancer - colorectal No family hx of             Medications:     Prescriptions Prior to Admission   Medication Sig Dispense Refill Last Dose     Cholecalciferol (VITAMIN D) 1000 UNITS capsule Take 2,000 Units by mouth 2 times daily    10/26/2018 at PM     leuprolide (ELIGARD) 45 MG injection Inject 45 mg Subcutaneous every 6 months.   Past Month     predniSONE (DELTASONE) 5 MG tablet Take 5 mg by mouth 2 times daily   10/27/2018 at 1100       Scheduled Medications:    hydrocortisone sodium succinate PF  50 mg Intravenous Q8H     lactated ringers  500 mL Intravenous Once     magnesium citrate  296 mL Oral Once     sodium chloride (PF)  3 mL Intracatheter Q8H     sodium chloride (PF)  3 mL Intracatheter Q8H       PRN:  acetaminophen, HYDROmorphone, lidocaine 4%, lidocaine 4%, lidocaine (buffered or not buffered), lidocaine (buffered or not buffered), magnesium sulfate, - MEDICATION INSTRUCTIONS -, - MEDICATION INSTRUCTIONS -, - MEDICATION INSTRUCTIONS -, - MEDICATION INSTRUCTIONS -, melatonin, naloxone, nitroGLYcerin, ondansetron **OR** ondansetron, oxyCODONE IR, potassium chloride, potassium chloride with lidocaine, potassium chloride, potassium chloride, potassium chloride, prochlorperazine **OR** prochlorperazine **OR** prochlorperazine, sodium chloride (PF), sodium chloride (PF), sodium chloride (PF), sodium chloride (PF)    PHYSICAL EXAM:   BP (!) 154/93  Pulse 80  Temp 97.3  F (36.3  C) (Oral)  Resp 16  Ht 1.829 m (6')  Wt 82.1 kg (181 lb 1.6 oz)  SpO2 99%  BMI 24.56 kg/m2 Estimated body mass index is 24.56 kg/(m^2) as calculated from the following:    Height as of this encounter: 1.829 m (6').    Weight as of this encounter:  82.1 kg (181 lb 1.6 oz).   RESP: lungs clear to auscultation - no rales, rhonchi or wheezes  CV: regular rates and rhythm    IMPRESSION   ASA Class 2 - Mild systemic disease      Signed Electronically by: Anatoly Tam MD  October 31, 2018    .

## 2018-10-31 NOTE — PROGRESS NOTES
[facility]  Hospitalist Progress Note  Mango Lange, DO 10/31/2018    Reason for Stay (Diagnosis): GI bleed         Assessment and Plan:      Summary of Stay: John Batista is a 73 year old male admitted on 10/27/2018 with GI bleed.  Problem List:   1. GI bleed.  GI following.  Colonoscopy done 10/30 showed multiple diverticula and 2 polyps removed.  Plan for pill endoscopy today.  Change IV pantoprazole infusion to oral 40 mg/day.  2. Acute blood loss anemia.  Due to GI bleed.  Has now required 8 units of PRBC's in transfusion since admission.  Continue to monitor hemoglobin.  3. Chronic steroid use.  Change IV hydrocortisone 50 mg every 8 hours to every 12 hours.  4. Tachycardia.  Improved.  Continue to monitor on telemetry.  5. Metastatic prostate cancer.  Follow-up with oncology in the outpatient setting.  6. Lactic acidosis.  Likely hypoperfusion from GI bleed.  Transfuse packed red blood cells as noted above.  Now improved.  7. Hypokalemia.  Potassium replacement protocol.  DVT Prophylaxis: Pneumatic Compression Devices  Code Status: Full Code  Discharge Dispo: Home  Estimated Disch Date / # of Days until Disch: 1-2        Interval History (Subjective):      Has continued to have blood in stools today.  Denies chest pain, shortness of breath, fevers, chills, nausea, or vomiting.                  Physical Exam:      Last Vital Signs:  /66 (BP Location: Left arm)  Pulse 80  Temp 97.4  F (36.3  C) (Oral)  Resp 16  Ht 1.829 m (6')  Wt 82.1 kg (181 lb 1.6 oz)  SpO2 97%  BMI 24.56 kg/m2    Gen:  NAD, A&Ox3.  Eyes:  PERRL, sclera anicteric.  OP:  MMM, no lesions.  Neck:  Supple.  CV:  Regular, no murmurs.  Lung:  CTA b/l, normal effort.  Ab:  +BS, soft.  Skin:  Warm, dry to touch.  No rash.  Ext:  No pitting edema LE b/l.           Medications:      All current medications were reviewed with changes reflected in problem list.         Data:      All new lab and imaging data was  reviewed.   Labs:    Recent Labs  Lab 10/31/18  1302 10/31/18  0529   NA  --  140   POTASSIUM 3.1* 2.9*   CHLORIDE  --  108   CO2  --  24   ANIONGAP  --  8   GLC  --  103*   BUN  --  6*   CR  --  0.62*   GFRESTIMATED  --  >90   GFRESTBLACK  --  >90   TIMBO  --  7.7*       Recent Labs  Lab 10/31/18  0529   WBC 16.8*   HGB 8.4*   HCT 24.0*   MCV 89   *      Imaging:   No results found for this or any previous visit (from the past 24 hour(s)).

## 2018-11-01 ENCOUNTER — TRANSFERRED RECORDS (OUTPATIENT)
Dept: HEALTH INFORMATION MANAGEMENT | Facility: CLINIC | Age: 73
End: 2018-11-01

## 2018-11-01 VITALS
OXYGEN SATURATION: 98 % | SYSTOLIC BLOOD PRESSURE: 152 MMHG | HEIGHT: 72 IN | BODY MASS INDEX: 24.53 KG/M2 | DIASTOLIC BLOOD PRESSURE: 78 MMHG | WEIGHT: 181.1 LBS | HEART RATE: 80 BPM | RESPIRATION RATE: 16 BRPM | TEMPERATURE: 97.6 F

## 2018-11-01 LAB
ANION GAP SERPL CALCULATED.3IONS-SCNC: 8 MMOL/L (ref 3–14)
BUN SERPL-MCNC: 5 MG/DL (ref 7–30)
CALCIUM SERPL-MCNC: 7.8 MG/DL (ref 8.5–10.1)
CHLORIDE SERPL-SCNC: 106 MMOL/L (ref 94–109)
CO2 SERPL-SCNC: 25 MMOL/L (ref 20–32)
CREAT SERPL-MCNC: 0.68 MG/DL (ref 0.66–1.25)
ERYTHROCYTE [DISTWIDTH] IN BLOOD BY AUTOMATED COUNT: 14.4 % (ref 10–15)
GFR SERPL CREATININE-BSD FRML MDRD: >90 ML/MIN/1.7M2
GLUCOSE SERPL-MCNC: 93 MG/DL (ref 70–99)
HCT VFR BLD AUTO: 24.4 % (ref 40–53)
HGB BLD-MCNC: 8.1 G/DL (ref 13.3–17.7)
HGB BLD-MCNC: 8.3 G/DL (ref 13.3–17.7)
LACTATE BLD-SCNC: 0.9 MMOL/L (ref 0.7–2)
MCH RBC QN AUTO: 31 PG (ref 26.5–33)
MCHC RBC AUTO-ENTMCNC: 34 G/DL (ref 31.5–36.5)
MCV RBC AUTO: 91 FL (ref 78–100)
PLATELET # BLD AUTO: 209 10E9/L (ref 150–450)
POTASSIUM SERPL-SCNC: 3.4 MMOL/L (ref 3.4–5.3)
RBC # BLD AUTO: 2.68 10E12/L (ref 4.4–5.9)
SODIUM SERPL-SCNC: 139 MMOL/L (ref 133–144)
WBC # BLD AUTO: 29.7 10E9/L (ref 4–11)

## 2018-11-01 PROCEDURE — 85018 HEMOGLOBIN: CPT | Performed by: INTERNAL MEDICINE

## 2018-11-01 PROCEDURE — 25000132 ZZH RX MED GY IP 250 OP 250 PS 637: Performed by: INTERNAL MEDICINE

## 2018-11-01 PROCEDURE — 83605 ASSAY OF LACTIC ACID: CPT | Performed by: INTERNAL MEDICINE

## 2018-11-01 PROCEDURE — 99239 HOSP IP/OBS DSCHRG MGMT >30: CPT | Performed by: INTERNAL MEDICINE

## 2018-11-01 PROCEDURE — 36415 COLL VENOUS BLD VENIPUNCTURE: CPT | Performed by: INTERNAL MEDICINE

## 2018-11-01 PROCEDURE — 85027 COMPLETE CBC AUTOMATED: CPT | Performed by: INTERNAL MEDICINE

## 2018-11-01 PROCEDURE — 80048 BASIC METABOLIC PNL TOTAL CA: CPT | Performed by: INTERNAL MEDICINE

## 2018-11-01 PROCEDURE — 25000128 H RX IP 250 OP 636: Performed by: INTERNAL MEDICINE

## 2018-11-01 RX ORDER — FERROUS SULFATE 325(65) MG
325 TABLET ORAL 2 TIMES DAILY WITH MEALS
Qty: 60 TABLET | Refills: 0 | Status: SHIPPED | OUTPATIENT
Start: 2018-11-01 | End: 2019-01-01

## 2018-11-01 RX ORDER — PREDNISONE 5 MG/1
5 TABLET ORAL 2 TIMES DAILY
Status: DISCONTINUED | OUTPATIENT
Start: 2018-11-01 | End: 2018-11-01 | Stop reason: HOSPADM

## 2018-11-01 RX ORDER — HYDRALAZINE HYDROCHLORIDE 20 MG/ML
10 INJECTION INTRAMUSCULAR; INTRAVENOUS EVERY 4 HOURS PRN
Status: DISCONTINUED | OUTPATIENT
Start: 2018-11-01 | End: 2018-11-01 | Stop reason: HOSPADM

## 2018-11-01 RX ORDER — PANTOPRAZOLE SODIUM 40 MG/1
40 TABLET, DELAYED RELEASE ORAL
Qty: 30 TABLET | Refills: 0 | Status: SHIPPED | OUTPATIENT
Start: 2018-11-02 | End: 2018-11-12

## 2018-11-01 RX ORDER — FERROUS SULFATE 325(65) MG
325 TABLET ORAL 2 TIMES DAILY WITH MEALS
Status: DISCONTINUED | OUTPATIENT
Start: 2018-11-01 | End: 2018-11-01 | Stop reason: HOSPADM

## 2018-11-01 RX ADMIN — ACETAMINOPHEN 650 MG: 325 TABLET, FILM COATED ORAL at 00:36

## 2018-11-01 RX ADMIN — HYDROCORTISONE SODIUM SUCCINATE 50 MG: 100 INJECTION, POWDER, FOR SOLUTION INTRAMUSCULAR; INTRAVENOUS at 09:45

## 2018-11-01 RX ADMIN — PANTOPRAZOLE SODIUM 40 MG: 40 TABLET, DELAYED RELEASE ORAL at 06:35

## 2018-11-01 ASSESSMENT — ACTIVITIES OF DAILY LIVING (ADL)
ADLS_ACUITY_SCORE: 11

## 2018-11-01 NOTE — PLAN OF CARE
Problem: Patient Care Overview  Goal: Discharge Needs Assessment  Outcome: Adequate for Discharge Date Met: 11/01/18  Discharge order reviewed with patient and spouse. Verbalized understanding and all questions answered. Tele off. PIV removed. Prescription medication with pt. All belonging with patient. Hgb recheck 8.1 per MD okay to discharge home and follow up instruction given. Wheelchair off unit, discharged home spouse provide transportation.

## 2018-11-01 NOTE — PROVIDER NOTIFICATION
Dr luz at bedside: discussed stopping IVF, aware of elevated BP and will order lactic acid. Awaiting orders.    Orders received.

## 2018-11-01 NOTE — PLAN OF CARE
Problem: Patient Care Overview  Goal: Plan of Care/Patient Progress Review  Outcome: Improving  VSS. A&Ox4. SBA. Capsule has not passed as of now. K 3.5. Hgb 7.6. Continue to monitor. Continue w/ POC.

## 2018-11-01 NOTE — PLAN OF CARE
Problem: Patient Care Overview  Goal: Plan of Care/Patient Progress Review  Outcome: No Change  Tele-SR.  VSS.  Patient had pain in legs was given tylenol and pain decreased.

## 2018-11-01 NOTE — DISCHARGE SUMMARY
Discharge Summary  Hospitalist Service    John Batista MRN# [id number]   YOB: 1945 Age: 73 year old     Date of Admission:  10/27/2018  Date of Discharge:  11/1/2018  Admitting Physician:  Mango Lange DO  Discharge Physician: Mango Lange DO  Discharging Service: Hospitalist Service     Primary Provider: Rosario Martinez  Primary Care Physician Phone Number: [phone]         Discharge Diagnoses/Problem Oriented Hospital Course (Providers):    John Batista was admitted on 10/27/2018 by Mango Lange DO and I would refer you to their history and physical.  The following problems were addressed during his hospitalization:  1. GI bleed.  GI following.  Colonoscopy done 10/30 showed multiple diverticula and 2 polyps removed.  Pill endoscopy done 10/31/18 with results pending.  Continue pantoprazole 40 mg a day.  2. Acute blood loss anemia.  Due to GI bleed.  Did require 8 units of packed red blood cells during hospital stay and transfusion.  Recheck CBC with primary care visit follow-up in 1 week.  3. Chronic steroid use.  Did require IV hydrocortisone during hospital stay.  Now back to oral prednisone.  4. Tachycardia.  Due to GI bleed with anemia.  Was monitored on telemetry during hospital stay.  Has now resolved.  5. Metastatic prostate cancer.  Follow-up with oncology in the outpatient setting.  6. Lactic acidosis.  Likely hypoperfusion from GI bleed.  Transfuse packed red blood cells as noted above.  Now improved.  7. Hypokalemia.  Potassium replacement protocol during hospital stay.         Code Status:      Full Code          Pending Results:        Unresulted Labs Ordered in the Past 30 Days of this Admission     No orders found from 8/28/2018 to 10/28/2018.            Discharge Instructions and Follow-Up:      Follow-up Appointments     Follow-up and recommended labs and tests        Follow up with primary care provider, Rosario Martinez, within 7 days    for hospital follow- up.  The following labs/tests are recommended: CBC   and BMP in 7 days.  Follow up with Gastroenterology in 1-2 weeks.                      Discharge Disposition:      Discharged to home         Discharge Medications:        Current Discharge Medication List      START taking these medications    Details   ferrous sulfate (IRON) 325 (65 Fe) MG tablet Take 1 tablet (325 mg) by mouth 2 times daily (with meals)  Qty: 60 tablet, Refills: 0    Associated Diagnoses: Symptomatic anemia      pantoprazole (PROTONIX) 40 MG EC tablet Take 1 tablet (40 mg) by mouth every morning (before breakfast)  Qty: 30 tablet, Refills: 0    Associated Diagnoses: Symptomatic anemia         CONTINUE these medications which have NOT CHANGED    Details   Cholecalciferol (VITAMIN D) 1000 UNITS capsule Take 2,000 Units by mouth 2 times daily       leuprolide (ELIGARD) 45 MG injection Inject 45 mg Subcutaneous every 6 months.      predniSONE (DELTASONE) 5 MG tablet Take 5 mg by mouth 2 times daily               Allergies:       No Known Allergies        Condition and Physical on Discharge:      Discharge condition: Stable   Vitals: Blood pressure 152/78, pulse 80, temperature 97.6  F (36.4  C), temperature source Oral, resp. rate 16, height 1.829 m (6'), weight 82.1 kg (181 lb 1.6 oz), SpO2 98 %.   Gen:  NAD, A&Ox3.  Eyes:  PERRL, sclera anicteric.  OP:  MMM, no lesions.  Neck:  Supple.  CV:  Regular, no murmurs.  Lung:  CTA b/l, normal effort.  Ab:  +BS, soft.  Skin:  Warm, dry to touch.  No rash.  Ext:  No pitting edema LE b/l.        Discharge Time:      I spent 40 minutes with Mr. Batista and working on discharge on 11/1/18.        Image Results From This Hospital Stay (For Non-EPIC Providers):        Results for orders placed or performed during the hospital encounter of 10/27/18   XR Chest Port 1 View    Narrative    XR CHEST PORT 1 VW 10/28/2018 7:10 AM    HISTORY: Shortness of breath.     COMPARISON: None.       Impression    IMPRESSION: Bilateral pleural plaques. The lungs are otherwise clear.  No pleural effusions or pneumothorax. Normal heart and mediastinum.     LIBRADO TALAMANTES MD   NM GI Bleed    Addendum: 10/30/2018    PATIENT: SHARON BANEGAS  ACCESSION NUMBER : [id number]    The original report on this patient was dictated by Dr. Long.    Patient was brought down for delayed images at 6 hours. No suspicious  activity in the GI tract or convincing evidence for GI bleed.    Ernesto Ricketts MD (Date of addendum: 10/29/2018 6:13 PM)    ERNESTO RICKETTS MD      [facility]    NUCLEAR MEDICINE GASTROINTESTINAL BLEEDING STUDY October 29, 2018 1:20  PM    HISTORY: Hematochezia.    COMPARISON: None.    TECHNIQUE: Tc labeled red blood cells were injected intravenously with  subsequent dynamic imaging of the abdomen for evaluation of GI bleed.     DOSE: 23.0 mCi 99mTc-ULTRATAG RBC's injected intravenously.    FINDINGS: There is no activity projecting within the GI tract  suspicious for GI bleed.      Impression    IMPRESSION: Negative tagged red cell study.    TENISHA LONG MD           Most Recent Lab Results In EPIC (For Non-EPIC Providers):    Most Recent 3 CBC's:  Recent Labs   Lab Test  11/01/18   0654  10/31/18   2114  10/31/18   1502  10/31/18   0529   10/30/18   0519   WBC  29.7*   --    --   16.8*   --   9.0   HGB  8.3*  7.6*  7.1*  8.4*   < >  6.9*   MCV  91   --    --   89   --   91   PLT  209   --    --   139*   --   103*    < > = values in this interval not displayed.      Most Recent 3 BMP's:  Recent Labs   Lab Test  11/01/18   0654  10/31/18   2114  10/31/18   1302  10/31/18   0529  10/30/18   0218   NA  139   --    --   140  140   POTASSIUM  3.4  3.5  3.1*  2.9*  3.4   CHLORIDE  106   --    --   108  111*   CO2  25   --    --   24  21   BUN  5*   --    --   6*  13   CR  0.68   --    --   0.62*  0.75   ANIONGAP  8   --    --   8  8   TIMBO  7.8*   --    --   7.7*  7.3*   GLC  93   --    --   103*  113*     Most  Recent 3 Troponin's:No lab results found.  Most Recent 3 INR's:  Recent Labs   Lab Test  10/27/18   1448   INR  0.91     Most Recent 2 LFT's:  Recent Labs   Lab Test  10/31/18   0529  10/27/18   1448   AST  28  13   ALT  19  17   ALKPHOS  114  197*   BILITOTAL  0.5  0.7     Most Recent Cholesterol Panel:  Recent Labs   Lab Test  11/07/12   0918   CHOL  244*   LDL  159*   HDL  49   TRIG  185*     Most Recent 6 Bacteria Isolates From Any Culture (See EPIC Reports for Culture Details):  Recent Labs   Lab Test  06/14/11   1557   CULT  No growth     Most Recent TSH, T4 and HgbA1c:   Recent Labs   Lab Test 12/22/17   TSH  1.83

## 2018-11-01 NOTE — PROGRESS NOTES
GASTROENTEROLOGY PROGRESS NOTE       SUBJECTIVE:  Picked up pill camera equipment. Patient is feeling better. One small, loose stool yesterday reported without blood. Tolerating diet with good appetite.     OBJECTIVE:    /77 (BP Location: Right arm)  Pulse 80  Temp 97.6  F (36.4  C) (Oral)  Resp 16  Ht 1.829 m (6')  Wt 82.1 kg (181 lb 1.6 oz)  SpO2 98%  BMI 24.56 kg/m2  Temp (24hrs), Av.8  F (36.6  C), Min:97.4  F (36.3  C), Max:98.4  F (36.9  C)    Patient Vitals for the past 72 hrs:   Weight   10/31/18 0610 82.1 kg (181 lb 1.6 oz)   10/30/18 0515 82.3 kg (181 lb 7 oz)       Intake/Output Summary (Last 24 hours) at 18 0853  Last data filed at 18 0810   Gross per 24 hour   Intake            01514 ml   Output                0 ml   Net            92965 ml        PHYSICAL EXAM    Constitutional: no acute distress  NEURO: A&Ox4  SKIN: No jaundice         Additional Comments:  ROS, FH, SH: See initial GI consult for details.     I have reviewed the patient's new clinical lab results:     Recent Labs   Lab Test  11/01/18   0654  10/31/18   2114  10/31/18   1502  10/31/18   0529   10/30/18   0519   10/27/18   1448   WBC  29.7*   --    --   16.8*   --   9.0   < >  22.4*   HGB  8.3*  7.6*  7.1*  8.4*   < >  6.9*   < >  8.9*   MCV  91   --    --   89   --   91   < >  93   PLT  209   --    --   139*   --   103*   < >  245   INR   --    --    --    --    --    --    --   0.91    < > = values in this interval not displayed.     Recent Labs   Lab Test  11/01/18   0654  10/31/18   2114  10/31/18   1302  10/31/18   0529  10/30/18   0218   POTASSIUM  3.4  3.5  3.1*  2.9*  3.4   CHLORIDE  106   --    --   108  111*   CO2  25   --    --   24  21   BUN  5*   --    --   6*  13   ANIONGAP  8   --    --   8  8     Recent Labs   Lab Test  10/31/18   0529  10/28/18   0910  10/27/18   1448 17   1515   11   1002  11   0800   ALBUMIN  2.9*   --   3.8   --   3.5   < >   --    --     BILITOTAL  0.5   --   0.7   --   0.4   < >   --    --    ALT  19   --   17   --   20   < >   --    --    AST  28   --   13  17  14   < >   --    --    PROTEIN   --   Negative   --    --    --    --   Negative  Negative    < > = values in this interval not displayed.   10/28/18 EGD  Impression:               - Gastric erosions without bleeding.   Recommendation:           - Clear liquid diet.                             - Hematochezia likely due to lower GIB (hx of                             diverticular bleed vs radiation proctitis)                             - observe and may consider flex sig tommorrow if                             bleeding continues     10/30/18 Colonoscopy  Impression:               - Preparation of the colon was fair.                             - One 10 mm polyp in the transverse colon, removed                             with a cold snare. Resected and retrieved.                             - One 3 mm polyp in the transverse colon, removed                             with a cold biopsy forceps. Resected and retrieved.                             - Diverticulosis in the sigmoid colon, in the                             transverse colon and in the ascending colon.                             - Mucosal ulceration from prior radiaton.                             - Blood in the sigmoid colon.   Recommendation:           No reason for bleeding seen. Cannot exclude a                             diverticular bleed, but would have expected fresher                             blood. Similarly, the rectal ulcer would have                             caused fresh blood. Will proceed with a small bowel                             pill camera study tomorrow. When better, high fiber                             diet. Repeat the colonoscopy in three years.                                                                                ASSESSMENT/ PLAN  74 yo male with GI bleeding (reporting black and now  red stools). EGD 10/28/18 with non-bleeding gastric erosions, colonoscopy 10/30/18 polyps removed, diverticulosis and radiation proctitis. (-) Tagged RBC scan. H/o GI bleed in Dec 2016 suspected from diverticular bleeding. Bleed may have been diverticular but unable to rule out small bowel source given dark appearance so pill camera done yesterday, read pending. Bleeding has now stopped.      -Our office will review pill camera pictures and contact pt with results  -Okay for discharge from GI perspective  -Please call with further questions    Sinai Beltran PA-C  [facility]

## 2018-11-01 NOTE — PLAN OF CARE
Problem: Gastrointestinal Bleeding (Adult)  Goal: Signs and Symptoms of Listed Potential Problems Will be Absent, Minimized or Managed (Gastrointestinal Bleeding)  Signs and symptoms of listed potential problems will be absent, minimized or managed by discharge/transition of care (reference Gastrointestinal Bleeding (Adult) CPG).   Outcome: No Change  Rn- VSS, afeb. Alert and oriented. Denies pain. Up in room with SBA. Continues to have small amount of bright red blood with BM's. Pt reports that the volume has decreased. Had soup and ice cream for dinner. Denies nausea. NSR. K and HGB recheck at 2100 tonight. Continue current POC.

## 2018-11-01 NOTE — PLAN OF CARE
Problem: Patient Care Overview  Goal: Plan of Care/Patient Progress Review  Outcome: Improving  Vss ex elevated SBP this am during an anxiety provoking conversation about the camera passing or not passing, PRN hydralazine ordered for > 180 (did not need to give as recheck was lower), no co pain/cp/sob.   Tele SR. IVF dc'd, K+ 3.4, [place], up IND.  Still watching for it to pass as it has not yet per pt, able to tolerate diet, brown/watery BM once this shift.  HGB 8.3, recheck ordered for 1500, WBC up to 29.7 from 16.8 yesterday, lactic was 0.9.  OK to dc from GI standpoint.  Continue poc and monitoring.

## 2018-11-02 ENCOUNTER — TELEPHONE (OUTPATIENT)
Dept: FAMILY MEDICINE | Facility: CLINIC | Age: 73
End: 2018-11-02

## 2018-11-02 NOTE — TELEPHONE ENCOUNTER
"ED / Discharge Outreach Protocol    Patient Contact    Attempt # 1    Was call answered?  Yes.  \"May I please speak with John\"  Is patient available?   Yes    Hospital/TCU/ED for chronic condition Discharge Protocol    \"Hi, my name is Bertha Muhammad, a registered nurse, and I am calling from [facility].  I am calling to follow up and see how things are going for you after your recent emergency visit/hospital/TCU stay.\"    Tell me how you are doing now that you are home?\" Good, feel like I have a lot of fluid on me.     Discharge Instructions    \"Let's review your discharge instructions.  What is/are the follow-up recommendations?  Pt. Response: No questions. Just need to f/u w/ Dr. Martinez.     \"Has an appointment with your primary care provider been scheduled?\"   No (schedule appointment)    \"When you see the provider, I would recommend that you bring your medications with you.\"    Medications    \"Tell me what changed about your medicines when you discharged?\"    Changes to chronic meds?    0-1    \"What questions do you have about your medications?\"    None     New diagnoses of heart failure, COPD, diabetes, or MI?    No              Medication reconciliation completed? Yes  Was MTM referral placed (*Make sure to put transitions as reason for referral)?   No    Call Summary    \"What questions or concerns do you have about your recent visit and your follow-up care?\"     none    \"If you have questions or things don't continue to improve, we encourage you contact us through the main clinic number (give number).  Even if the clinic is not open, triage nurses are available 24/7 to help you.     We would like you to know that our clinic has extended hours (provide information).  We also have urgent care (provide details on closest location and hours/contact info)\"      \"Thank you for your time and take care!\"    Bertha MALDONADO RN              "

## 2018-11-02 NOTE — TELEPHONE ENCOUNTER
Please contact patient for In-patient follow up.  [phone] (home)     Visit date:  11-   Diagnosis listed: lower gi bleed  Number of visits in past 12 months:0/1

## 2018-11-12 ENCOUNTER — OFFICE VISIT (OUTPATIENT)
Dept: FAMILY MEDICINE | Facility: CLINIC | Age: 73
End: 2018-11-12
Payer: COMMERCIAL

## 2018-11-12 VITALS
SYSTOLIC BLOOD PRESSURE: 138 MMHG | DIASTOLIC BLOOD PRESSURE: 72 MMHG | WEIGHT: 177.7 LBS | OXYGEN SATURATION: 100 % | HEART RATE: 68 BPM | BODY MASS INDEX: 24.1 KG/M2 | TEMPERATURE: 98 F

## 2018-11-12 DIAGNOSIS — C61 PROSTATE CANCER (H): ICD-10-CM

## 2018-11-12 DIAGNOSIS — D62 ANEMIA DUE TO BLOOD LOSS, ACUTE: ICD-10-CM

## 2018-11-12 DIAGNOSIS — Z09 HOSPITAL DISCHARGE FOLLOW-UP: Primary | ICD-10-CM

## 2018-11-12 LAB — HGB BLD-MCNC: 9.8 G/DL (ref 13.3–17.7)

## 2018-11-12 PROCEDURE — 99495 TRANSJ CARE MGMT MOD F2F 14D: CPT | Performed by: INTERNAL MEDICINE

## 2018-11-12 PROCEDURE — 85018 HEMOGLOBIN: CPT | Performed by: INTERNAL MEDICINE

## 2018-11-12 PROCEDURE — 80048 BASIC METABOLIC PNL TOTAL CA: CPT | Performed by: INTERNAL MEDICINE

## 2018-11-12 PROCEDURE — 36415 COLL VENOUS BLD VENIPUNCTURE: CPT | Performed by: INTERNAL MEDICINE

## 2018-11-12 NOTE — PROGRESS NOTES
SUBJECTIVE:   John Batista is a 73 year old male who presents to clinic today for the following health issues:      Hospital Follow-up Visit:    Hospital/Nursing Home/IP Rehab Facility: [facility]  Date of Admission: 10/27/18  Date of Discharge: 11/1/18  Reason(s) for Admission: GI bleed, acute blood loss anemia, chronic steroid use, tachycardia, metastatic prostate cancer, lactic acidosis, hypokalemia            Problems taking medications regularly:  None       Medication changes since discharge: Iron 325 mg bid, Protonix 40 mg daily       Problems adhering to non-medication therapy:  None    Summary of hospitalization:  [facility] discharge summary reviewed  Diagnostic Tests/Treatments reviewed.  Follow up needed: reassess labs- HGB, GI and continue with Oncology  Other Healthcare Providers Involved in Patient s Care:         Specialist appointment - Oncology, GI  Update since discharge: improved.     Post Discharge Medication Reconciliation: discharge medications reconciled and changed, per note/orders (see AVS).  Plan of care communicated with patient and spouse     Coding guidelines for this visit:  Type of Medical   Decision Making Face-to-Face Visit       within 7 Days of discharge Face-to-Face Visit        within 14 days of discharge   Moderate Complexity [id number]   High Complexity [id number]              Problem list and histories reviewed & adjusted, as indicated.  Additional history: as documented    Patient Active Problem List   Diagnosis     Benign neoplasm of colon     CARDIOVASCULAR SCREENING; LDL GOAL LESS THAN 160     Advance Care Planning     Iron deficiency anemia     Rectal ulcer     Prostate cancer (H)     Acute lower gastrointestinal hemorrhage     GI bleeding     Melena     Past Surgical History:   Procedure Laterality Date     COLONOSCOPY N/A 11/29/2016    Procedure: COLONOSCOPY;  Surgeon: Alon Lo MD;  Location: [facility] OR     COLONOSCOPY N/A 12/3/2016     Procedure: COLONOSCOPY;  Surgeon: Carmenza Akbar MD;  Location: [facility] GI     COLONOSCOPY N/A 10/30/2018    Procedure: COMBINED COLONOSCOPY, SINGLE OR MULTIPLE BIOPSY/POLYPECTOMY BY BIOPSY;  Surgeon: Anatoly Tam MD;  Location: [facility] GI     CYSTOSCOPY       ESOPHAGOSCOPY, GASTROSCOPY, DUODENOSCOPY (EGD), COMBINED N/A 10/28/2018    Procedure: COMBINED ESOPHAGOSCOPY, GASTROSCOPY, DUODENOSCOPY (EGD);  Surgeon: Codey De La Torre MD;  Location: [facility] GI     ESOPHAGOSCOPY, GASTROSCOPY, DUODENOSCOPY (EGD), COMBINED Left 10/31/2018    Procedure: ESOPHAGOSCOPY, GASTROSCOPY, DUODENOSCOPY (EGD) with Pill Cam  Rm 334;  Surgeon: Anatoly Tam MD;  Location: [facility] GI     EXAM UNDER ANESTHESIA ANUS N/A 11/29/2016    Procedure: EXAM UNDER ANESTHESIA ANUS;  Surgeon: Alon Lo MD;  Location: [facility] OR     HC COLONOSCOPY THRU STOMA, DIAGNOSTIC  2003    normal, 2000 had polyps     HERNIA REPAIR, INGUINAL RT/LT       SIGMOIDOSCOPY FLEXIBLE N/A 9/10/2014    Procedure: SIGMOIDOSCOPY FLEXIBLE;  Surgeon: Madhuri Drake MD;  Location: [facility] GI     VASECTOMY         Social History   Substance Use Topics     Smoking status: Former Smoker     Start date: 9/8/1981     Smokeless tobacco: Former User     Alcohol use No     Family History   Problem Relation Age of Onset     C.A.D. No family hx of      Diabetes No family hx of      Hypertension No family hx of      Breast Cancer No family hx of      Cancer - colorectal No family hx of          Current Outpatient Prescriptions   Medication Sig Dispense Refill     Cholecalciferol (VITAMIN D) 1000 UNITS capsule Take 2,000 Units by mouth 2 times daily        ferrous sulfate (IRON) 325 (65 Fe) MG tablet Take 1 tablet (325 mg) by mouth 2 times daily (with meals) 60 tablet 0     leuprolide (ELIGARD) 45 MG injection Inject 45 mg Subcutaneous every 6 months.       predniSONE (DELTASONE) 5 MG tablet Take 5 mg by mouth 2 times daily       No Known Allergies  BP Readings from Last 3  Encounters:   11/12/18 138/72   11/01/18 152/78   12/21/17 116/66    Wt Readings from Last 3 Encounters:   11/12/18 177 lb 11.2 oz (80.6 kg)   10/31/18 181 lb 1.6 oz (82.1 kg)   03/15/18 181 lb (82.1 kg)                  Labs reviewed in EPIC    Reviewed and updated as needed this visit by clinical staff  Tobacco  Allergies  Meds  Problems  Med Hx  Surg Hx  Fam Hx  Soc Hx        Reviewed and updated as needed this visit by Provider  Allergies  Meds  Problems         ROS:  CONSTITUTIONAL: NEGATIVE for fever, chills, change in weight  INTEGUMENTARY/SKIN: no skin changes  ENT/MOUTH: NEGATIVE for ear, mouth and throat problems  RESP: NEGATIVE for significant cough or SOB  CV: NEGATIVE for chest pain, palpitations or peripheral edema  GI: recent GI bleed related to presumed diverticular bleed; GI bleeding evaluation included EGD, Colonoscopy; tagged RBC study; capsule study; [facility]- Dr. Tam reported ( to patient) probable source was distal small bowel diverticular bleed with spontaneous resolution;  : Prostate Cancer initial Dx 2011; recurrent and now metastatic to bone.   MUSCULOSKELETAL: NEGATIVE for significant arthralgias or myalgia  NEURO: NEGATIVE for weakness, dizziness or paresthesias  ENDOCRINE: NEGATIVE for temperature intolerance, skin/hair changes  HEME/ALLERGY/IMMUNE: significant GI bleed; received 8 units of PRBC and at time of discharge, HGB 8.1; he has been on iron supplementation 2 per day. feeling better, wife notes improved color, less pallor  PSYCHIATRIC: NEGATIVE for changes in mood or affect    OBJECTIVE:     /72  Pulse 68  Temp 98  F (36.7  C) (Oral)  Wt 177 lb 11.2 oz (80.6 kg)  SpO2 100%  BMI 24.1 kg/m2  Body mass index is 24.1 kg/(m^2).  GENERAL: healthy, alert and no distress  NECK: no adenopathy, no asymmetry, masses, or scars and thyroid normal to palpation  RESP: lungs clear to auscultation - no rales, rhonchi or wheezes  CV: regular rate and rhythm, normal S1 S2, no  S3 or S4, no murmur, click or rub, no peripheral edema and peripheral pulses strong  ABDOMEN: soft, nontender; bowel osunds present;  Rectal: deferred   (male): deferred  MS: no gross musculoskeletal defects noted, no edema  SKIN: no suspicious lesions or rashes  NEURO: Normal strength and tone, mentation intact and speech normal  PSYCH: mentation appears normal and affect normal/bright      ASSESSMENT/PLAN:     (Z09) Hospital discharge follow-up  (primary encounter diagnosis)  Comment: [facility] 10/27- 11/1/2018, GI Bleed; hospital records reviewed; 8 units of pRBC  Plan: reassess HGB    (D62) Anemia due to blood loss, acute  Comment: received 8 units of PRBC during recent hospitalization; HGB at time of discharge 8.1; desires recheck HGB  Plan: Hemoglobin, Basic metabolic panel          (C61) Prostate cancer (H)  Comment: bony metastases; seeing [facility]- chemotherapy  Plan: [facility]  Chemo later this tierra Martinez MD  Internal Medicine   [facility]

## 2018-11-12 NOTE — PATIENT INSTRUCTIONS
Lab Results   Component Value Date    HGB 9.8 11/12/2018    HGB 8.1 11/01/2018     Much improved Hemoglobin  Continue with oral iron twice per day.  Best taken with Vitamin C to enhance iron absorption.

## 2018-11-12 NOTE — MR AVS SNAPSHOT
"              After Visit Summary   11/12/2018    John Batista    MRN: [id number]           Patient Information     Date Of Birth          1945        Visit Information        Provider Department      11/12/2018 11:30 AM Rosario Martinez MD [facility]        Today's Diagnoses     Hospital discharge follow-up    -  1    Anemia due to blood loss, acute        Prostate cancer (H)          Care Instructions    Lab Results   Component Value Date    HGB 9.8 11/12/2018    HGB 8.1 11/01/2018     Much improved Hemoglobin  Continue with oral iron twice per day.  Best taken with Vitamin C to enhance iron absorption.          Follow-ups after your visit        Who to contact     If you have questions or need follow up information about today's clinic visit or your schedule please contact [facility] directly at [phone].  Normal or non-critical lab and imaging results will be communicated to you by TOOVIAhart, letter or phone within 4 business days after the clinic has received the results. If you do not hear from us within 7 days, please contact the clinic through TOOVIAhart or phone. If you have a critical or abnormal lab result, we will notify you by phone as soon as possible.  Submit refill requests through Autotask or call your pharmacy and they will forward the refill request to us. Please allow 3 business days for your refill to be completed.          Additional Information About Your Visit        MyChart Information     Autotask lets you send messages to your doctor, view your test results, renew your prescriptions, schedule appointments and more. To sign up, go to [email or web address] . Click on \"Log in\" on the left side of the screen, which will take you to the Welcome page. Then click on \"Sign up Now\" on the right side of the page.     You will be asked to enter the access code listed below, as well as some personal information. Please follow the directions to create your " username and password.     Your access code is: [email or web address]  Expires: 2019  3:20 PM     Your access code will  in 90 days. If you need help or a new code, please call your [facility] clinic or [phone].        Care EveryWhere ID     This is your Care EveryWhere ID. This could be used by other organizations to access your [facility] medical records  [id number]        Your Vitals Were     Pulse Temperature Pulse Oximetry BMI (Body Mass Index)          68 98  F (36.7  C) (Oral) 100% 24.1 kg/m2         Blood Pressure from Last 3 Encounters:   18 138/72   18 152/78   17 116/66    Weight from Last 3 Encounters:   18 177 lb 11.2 oz (80.6 kg)   10/31/18 181 lb 1.6 oz (82.1 kg)   03/15/18 181 lb (82.1 kg)              We Performed the Following     Basic metabolic panel     Hemoglobin          Today's Medication Changes          These changes are accurate as of 18 12:51 PM.  If you have any questions, ask your nurse or doctor.               Stop taking these medicines if you haven't already. Please contact your care team if you have questions.     pantoprazole 40 MG EC tablet   Commonly known as:  PROTONIX   Stopped by:  Rosario Martinez MD                    Primary Care Provider Office Phone # Fax #    Rosario Martinez [phone] [address] [facility] [address]        Equal Access to Services     [facility] AH: Hadii aad ku hadasho Soomaali, waaxda luqadaha, qaybta kaalmada adeegyada, brandon iyer . So [facility] [place] [phone].    ATENCIÓN: Si habla español, tiene a abdi disposición servicios gratuitos de asistencia lingüística. Llame al [phone].    We comply with applicable federal civil rights laws and [place] laws. We do not discriminate on the basis of race, color, national origin, age, disability, sex, sexual orientation, or gender identity.            Thank you!     Thank you for choosing [facility]  MARINA  for your care. Our goal is always to provide you with excellent care. Hearing back from our patients is one way we can continue to improve our services. Please take a few minutes to complete the written survey that you may receive in the mail after your visit with us. Thank you!             Your Updated Medication List - Protect others around you: Learn how to safely use, store and throw away your medicines at www.disposemymeds.org.          This list is accurate as of 11/12/18 12:51 PM.  Always use your most recent med list.                   Brand Name Dispense Instructions for use Diagnosis    ELIGARD 45 MG kit   Generic drug:  leuprolide      Inject 45 mg Subcutaneous every 6 months.        ferrous sulfate 325 (65 Fe) MG tablet    IRON    60 tablet    Take 1 tablet (325 mg) by mouth 2 times daily (with meals)    Symptomatic anemia       predniSONE 5 MG tablet    DELTASONE     Take 5 mg by mouth 2 times daily        vitamin D 1000 units capsule      Take 2,000 Units by mouth 2 times daily

## 2018-11-13 LAB
ANION GAP SERPL CALCULATED.3IONS-SCNC: 9 MMOL/L (ref 3–14)
BUN SERPL-MCNC: 12 MG/DL (ref 7–30)
CALCIUM SERPL-MCNC: 9 MG/DL (ref 8.5–10.1)
CHLORIDE SERPL-SCNC: 99 MMOL/L (ref 94–109)
CO2 SERPL-SCNC: 25 MMOL/L (ref 20–32)
CREAT SERPL-MCNC: 0.66 MG/DL (ref 0.66–1.25)
GFR SERPL CREATININE-BSD FRML MDRD: >90 ML/MIN/1.7M2
GLUCOSE SERPL-MCNC: 87 MG/DL (ref 70–99)
POTASSIUM SERPL-SCNC: 4.2 MMOL/L (ref 3.4–5.3)
SODIUM SERPL-SCNC: 133 MMOL/L (ref 133–144)

## 2018-11-14 ENCOUNTER — TRANSFERRED RECORDS (OUTPATIENT)
Dept: HEALTH INFORMATION MANAGEMENT | Facility: CLINIC | Age: 73
End: 2018-11-14

## 2018-12-05 ENCOUNTER — TRANSFERRED RECORDS (OUTPATIENT)
Dept: HEALTH INFORMATION MANAGEMENT | Facility: CLINIC | Age: 73
End: 2018-12-05

## 2018-12-18 ENCOUNTER — TRANSFERRED RECORDS (OUTPATIENT)
Dept: HEALTH INFORMATION MANAGEMENT | Facility: CLINIC | Age: 73
End: 2018-12-18

## 2018-12-26 ENCOUNTER — TRANSFERRED RECORDS (OUTPATIENT)
Dept: HEALTH INFORMATION MANAGEMENT | Facility: CLINIC | Age: 73
End: 2018-12-26

## 2019-01-01 ENCOUNTER — TRANSFERRED RECORDS (OUTPATIENT)
Dept: HEALTH INFORMATION MANAGEMENT | Facility: CLINIC | Age: 74
End: 2019-01-01

## 2019-01-01 ENCOUNTER — TELEPHONE (OUTPATIENT)
Dept: FAMILY MEDICINE | Facility: CLINIC | Age: 74
End: 2019-01-01

## 2019-01-01 ENCOUNTER — APPOINTMENT (OUTPATIENT)
Dept: CT IMAGING | Facility: CLINIC | Age: 74
End: 2019-01-01
Attending: PHYSICIAN ASSISTANT
Payer: COMMERCIAL

## 2019-01-01 ENCOUNTER — APPOINTMENT (OUTPATIENT)
Dept: MRI IMAGING | Facility: CLINIC | Age: 74
End: 2019-01-01
Attending: PHYSICIAN ASSISTANT
Payer: COMMERCIAL

## 2019-01-01 ENCOUNTER — OFFICE VISIT (OUTPATIENT)
Dept: UROLOGY | Facility: CLINIC | Age: 74
End: 2019-01-01
Payer: COMMERCIAL

## 2019-01-01 ENCOUNTER — HOSPITAL ENCOUNTER (EMERGENCY)
Facility: CLINIC | Age: 74
Discharge: HOME | End: 2019-09-01
Attending: NURSE PRACTITIONER | Admitting: NURSE PRACTITIONER
Payer: COMMERCIAL

## 2019-01-01 ENCOUNTER — APPOINTMENT (OUTPATIENT)
Dept: CT IMAGING | Facility: CLINIC | Age: 74
End: 2019-01-01
Attending: EMERGENCY MEDICINE
Payer: COMMERCIAL

## 2019-01-01 ENCOUNTER — ALLIED HEALTH/NURSE VISIT (OUTPATIENT)
Dept: UROLOGY | Facility: CLINIC | Age: 74
End: 2019-01-01
Payer: COMMERCIAL

## 2019-01-01 ENCOUNTER — HOSPITAL ENCOUNTER (EMERGENCY)
Facility: CLINIC | Age: 74
Discharge: HOME | End: 2019-08-28
Attending: EMERGENCY MEDICINE | Admitting: EMERGENCY MEDICINE
Payer: COMMERCIAL

## 2019-01-01 ENCOUNTER — HOSPITAL ENCOUNTER (OUTPATIENT)
Facility: CLINIC | Age: 74
Setting detail: OBSERVATION
Discharge: HOME | End: 2019-12-09
Attending: EMERGENCY MEDICINE | Admitting: INTERNAL MEDICINE
Payer: COMMERCIAL

## 2019-01-01 ENCOUNTER — APPOINTMENT (OUTPATIENT)
Dept: CT IMAGING | Facility: CLINIC | Age: 74
End: 2019-01-01
Attending: NURSE PRACTITIONER
Payer: COMMERCIAL

## 2019-01-01 ENCOUNTER — HOSPITAL ENCOUNTER (EMERGENCY)
Facility: CLINIC | Age: 74
Discharge: HOME | End: 2019-11-21
Attending: EMERGENCY MEDICINE | Admitting: EMERGENCY MEDICINE
Payer: COMMERCIAL

## 2019-01-01 ENCOUNTER — PREP FOR PROCEDURE (OUTPATIENT)
Dept: UROLOGY | Facility: CLINIC | Age: 74
End: 2019-01-01

## 2019-01-01 ENCOUNTER — HOSPITAL ENCOUNTER (OUTPATIENT)
Facility: CLINIC | Age: 74
Setting detail: OBSERVATION
Discharge: HOME | End: 2019-12-24
Attending: EMERGENCY MEDICINE | Admitting: INTERNAL MEDICINE
Payer: COMMERCIAL

## 2019-01-01 ENCOUNTER — HOSPITAL ENCOUNTER (OUTPATIENT)
Dept: LAB | Facility: CLINIC | Age: 74
Discharge: HOME | End: 2019-12-31
Attending: UROLOGY | Admitting: UROLOGY
Payer: COMMERCIAL

## 2019-01-01 ENCOUNTER — OFFICE VISIT (OUTPATIENT)
Dept: FAMILY MEDICINE | Facility: CLINIC | Age: 74
End: 2019-01-01
Payer: COMMERCIAL

## 2019-01-01 ENCOUNTER — HOSPITAL ENCOUNTER (OUTPATIENT)
Dept: ULTRASOUND IMAGING | Facility: CLINIC | Age: 74
Discharge: HOME | End: 2019-11-12
Attending: INTERNAL MEDICINE | Admitting: INTERNAL MEDICINE
Payer: COMMERCIAL

## 2019-01-01 ENCOUNTER — TELEPHONE (OUTPATIENT)
Dept: EMERGENCY MEDICINE | Facility: CLINIC | Age: 74
End: 2019-01-01

## 2019-01-01 ENCOUNTER — HOSPITAL ENCOUNTER (EMERGENCY)
Facility: CLINIC | Age: 74
Discharge: HOME | End: 2019-08-27
Attending: PHYSICIAN ASSISTANT | Admitting: PHYSICIAN ASSISTANT
Payer: COMMERCIAL

## 2019-01-01 ENCOUNTER — NURSE TRIAGE (OUTPATIENT)
Dept: NURSING | Facility: CLINIC | Age: 74
End: 2019-01-01

## 2019-01-01 VITALS
RESPIRATION RATE: 17 BRPM | BODY MASS INDEX: 22.7 KG/M2 | TEMPERATURE: 98.2 F | WEIGHT: 167.4 LBS | DIASTOLIC BLOOD PRESSURE: 60 MMHG | OXYGEN SATURATION: 99 % | SYSTOLIC BLOOD PRESSURE: 112 MMHG | HEART RATE: 82 BPM

## 2019-01-01 VITALS
SYSTOLIC BLOOD PRESSURE: 151 MMHG | RESPIRATION RATE: 18 BRPM | OXYGEN SATURATION: 98 % | HEART RATE: 97 BPM | TEMPERATURE: 98.4 F | DIASTOLIC BLOOD PRESSURE: 92 MMHG

## 2019-01-01 VITALS
HEART RATE: 104 BPM | OXYGEN SATURATION: 100 % | BODY MASS INDEX: 23.06 KG/M2 | WEIGHT: 170 LBS | TEMPERATURE: 98.2 F | SYSTOLIC BLOOD PRESSURE: 153 MMHG | RESPIRATION RATE: 16 BRPM | DIASTOLIC BLOOD PRESSURE: 78 MMHG

## 2019-01-01 VITALS
HEIGHT: 72 IN | DIASTOLIC BLOOD PRESSURE: 54 MMHG | TEMPERATURE: 98.6 F | OXYGEN SATURATION: 97 % | SYSTOLIC BLOOD PRESSURE: 112 MMHG | HEART RATE: 83 BPM | RESPIRATION RATE: 16 BRPM | WEIGHT: 151.4 LBS | BODY MASS INDEX: 20.51 KG/M2

## 2019-01-01 VITALS
TEMPERATURE: 98 F | OXYGEN SATURATION: 98 % | SYSTOLIC BLOOD PRESSURE: 130 MMHG | RESPIRATION RATE: 18 BRPM | HEART RATE: 80 BPM | DIASTOLIC BLOOD PRESSURE: 82 MMHG

## 2019-01-01 VITALS
TEMPERATURE: 97.8 F | OXYGEN SATURATION: 96 % | HEART RATE: 76 BPM | RESPIRATION RATE: 17 BRPM | BODY MASS INDEX: 21.66 KG/M2 | DIASTOLIC BLOOD PRESSURE: 60 MMHG | WEIGHT: 159.7 LBS | SYSTOLIC BLOOD PRESSURE: 134 MMHG

## 2019-01-01 VITALS
TEMPERATURE: 98.1 F | RESPIRATION RATE: 17 BRPM | WEIGHT: 162.5 LBS | BODY MASS INDEX: 22.04 KG/M2 | OXYGEN SATURATION: 97 % | DIASTOLIC BLOOD PRESSURE: 72 MMHG | SYSTOLIC BLOOD PRESSURE: 118 MMHG | HEART RATE: 84 BPM

## 2019-01-01 VITALS
TEMPERATURE: 98.8 F | HEIGHT: 72 IN | SYSTOLIC BLOOD PRESSURE: 107 MMHG | HEART RATE: 87 BPM | RESPIRATION RATE: 16 BRPM | DIASTOLIC BLOOD PRESSURE: 69 MMHG | OXYGEN SATURATION: 97 % | WEIGHT: 153.8 LBS | BODY MASS INDEX: 20.83 KG/M2

## 2019-01-01 VITALS — BODY MASS INDEX: 23.7 KG/M2 | HEART RATE: 76 BPM | HEIGHT: 72 IN | WEIGHT: 175 LBS | OXYGEN SATURATION: 98 %

## 2019-01-01 VITALS
OXYGEN SATURATION: 97 % | HEART RATE: 78 BPM | SYSTOLIC BLOOD PRESSURE: 154 MMHG | DIASTOLIC BLOOD PRESSURE: 73 MMHG | TEMPERATURE: 97.9 F | RESPIRATION RATE: 16 BRPM

## 2019-01-01 DIAGNOSIS — A04.72 C. DIFFICILE COLITIS: ICD-10-CM

## 2019-01-01 DIAGNOSIS — C61 PROSTATE CANCER (H): Primary | ICD-10-CM

## 2019-01-01 DIAGNOSIS — R68.83 CHILLS (WITHOUT FEVER): ICD-10-CM

## 2019-01-01 DIAGNOSIS — T45.1X5A CHEMOTHERAPY-INDUCED NEUTROPENIA (H): ICD-10-CM

## 2019-01-01 DIAGNOSIS — C79.51 PROSTATE CANCER METASTATIC TO BONE (H): ICD-10-CM

## 2019-01-01 DIAGNOSIS — D70.1 CHEMOTHERAPY-INDUCED NEUTROPENIA (H): ICD-10-CM

## 2019-01-01 DIAGNOSIS — M25.50 ARTHRALGIA, UNSPECIFIED JOINT: ICD-10-CM

## 2019-01-01 DIAGNOSIS — C61 PROSTATE CANCER METASTATIC TO BONE (H): Primary | ICD-10-CM

## 2019-01-01 DIAGNOSIS — C61 PROSTATE CANCER METASTATIC TO BONE (H): ICD-10-CM

## 2019-01-01 DIAGNOSIS — C79.51 PROSTATE CANCER METASTATIC TO BONE (H): Primary | ICD-10-CM

## 2019-01-01 DIAGNOSIS — Z86.19 HISTORY OF CLOSTRIDIUM DIFFICILE COLITIS: ICD-10-CM

## 2019-01-01 DIAGNOSIS — R73.09 ELEVATED GLUCOSE: ICD-10-CM

## 2019-01-01 DIAGNOSIS — R82.71 BACTERIURIA WITH PYURIA: ICD-10-CM

## 2019-01-01 DIAGNOSIS — Z01.818 PREOP GENERAL PHYSICAL EXAM: Primary | ICD-10-CM

## 2019-01-01 DIAGNOSIS — R31.0 GROSS HEMATURIA: Primary | ICD-10-CM

## 2019-01-01 DIAGNOSIS — E86.0 DEHYDRATION: Primary | ICD-10-CM

## 2019-01-01 DIAGNOSIS — A04.72 C. DIFFICILE COLITIS: Primary | ICD-10-CM

## 2019-01-01 DIAGNOSIS — K52.9 COLITIS: ICD-10-CM

## 2019-01-01 DIAGNOSIS — N39.0 URINARY TRACT INFECTION: ICD-10-CM

## 2019-01-01 DIAGNOSIS — R53.83 FATIGUE, UNSPECIFIED TYPE: ICD-10-CM

## 2019-01-01 DIAGNOSIS — R33.9 URINARY RETENTION: ICD-10-CM

## 2019-01-01 DIAGNOSIS — R82.81 BACTERIURIA WITH PYURIA: ICD-10-CM

## 2019-01-01 DIAGNOSIS — R31.9 HEMATURIA: ICD-10-CM

## 2019-01-01 DIAGNOSIS — R19.7 DIARRHEA: ICD-10-CM

## 2019-01-01 DIAGNOSIS — A04.72 COLITIS DUE TO CLOSTRIDIUM DIFFICILE: ICD-10-CM

## 2019-01-01 DIAGNOSIS — R68.83 CHILLS: ICD-10-CM

## 2019-01-01 DIAGNOSIS — R31.9 HEMATURIA: Primary | ICD-10-CM

## 2019-01-01 DIAGNOSIS — N13.30 HYDRONEPHROSIS, UNSPECIFIED HYDRONEPHROSIS TYPE: ICD-10-CM

## 2019-01-01 DIAGNOSIS — I82.409 DVT (DEEP VENOUS THROMBOSIS) (H): ICD-10-CM

## 2019-01-01 DIAGNOSIS — C61 PROSTATE CANCER, PRIMARY, WITH METASTASIS FROM PROSTATE TO OTHER SITE (H): ICD-10-CM

## 2019-01-01 DIAGNOSIS — E87.20 LACTIC ACIDOSIS: ICD-10-CM

## 2019-01-01 DIAGNOSIS — G47.9 SLEEP DISTURBANCE: ICD-10-CM

## 2019-01-01 DIAGNOSIS — Z01.812 PRE-OPERATIVE LABORATORY EXAMINATION: ICD-10-CM

## 2019-01-01 DIAGNOSIS — N13.30 BILATERAL HYDRONEPHROSIS: ICD-10-CM

## 2019-01-01 DIAGNOSIS — R31.0 GROSS HEMATURIA: ICD-10-CM

## 2019-01-01 DIAGNOSIS — R10.84 ABDOMINAL PAIN, GENERALIZED: ICD-10-CM

## 2019-01-01 LAB
ABO + RH BLD: NORMAL
ALBUMIN SERPL-MCNC: 3.4 G/DL (ref 3.4–5)
ALBUMIN SERPL-MCNC: 3.5 G/DL (ref 3.4–5)
ALBUMIN SERPL-MCNC: 3.7 G/DL (ref 3.4–5)
ALBUMIN SERPL-MCNC: 3.8 G/DL (ref 3.4–5)
ALBUMIN UR-MCNC: 100 MG/DL
ALBUMIN UR-MCNC: 30 MG/DL
ALBUMIN UR-MCNC: NEGATIVE MG/DL
ALBUMIN UR-MCNC: NEGATIVE MG/DL
ALP SERPL-CCNC: 142 U/L (ref 40–150)
ALP SERPL-CCNC: 174 U/L (ref 40–150)
ALP SERPL-CCNC: 176 U/L (ref 40–150)
ALP SERPL-CCNC: 79 U/L (ref 40–150)
ALT SERPL W P-5'-P-CCNC: 32 U/L (ref 0–70)
ALT SERPL W P-5'-P-CCNC: 32 U/L (ref 0–70)
ALT SERPL W P-5'-P-CCNC: 33 U/L (ref 0–70)
ALT SERPL W P-5'-P-CCNC: 47 U/L (ref 0–70)
ANION GAP SERPL CALCULATED.3IONS-SCNC: 5 MMOL/L (ref 3–14)
ANION GAP SERPL CALCULATED.3IONS-SCNC: 5 MMOL/L (ref 3–14)
ANION GAP SERPL CALCULATED.3IONS-SCNC: 6 MMOL/L (ref 3–14)
ANION GAP SERPL CALCULATED.3IONS-SCNC: 6 MMOL/L (ref 3–14)
ANION GAP SERPL CALCULATED.3IONS-SCNC: 7 MMOL/L (ref 3–14)
ANION GAP SERPL CALCULATED.3IONS-SCNC: 8 MMOL/L (ref 3–14)
ANION GAP SERPL CALCULATED.3IONS-SCNC: 9 MMOL/L (ref 3–14)
APPEARANCE UR: ABNORMAL
APPEARANCE UR: CLEAR
AST SERPL W P-5'-P-CCNC: 27 U/L (ref 0–45)
AST SERPL W P-5'-P-CCNC: 33 U/L (ref 0–45)
AST SERPL W P-5'-P-CCNC: 34 U/L (ref 0–45)
AST SERPL W P-5'-P-CCNC: 40 U/L (ref 0–45)
BACTERIA SPEC CULT: ABNORMAL
BACTERIA SPEC CULT: NO GROWTH
BASOPHILS # BLD AUTO: 0 10E9/L (ref 0–0.2)
BASOPHILS NFR BLD AUTO: 0 %
BASOPHILS NFR BLD AUTO: 0.1 %
BASOPHILS NFR BLD AUTO: 0.3 %
BASOPHILS NFR BLD AUTO: 0.4 %
BASOPHILS NFR BLD AUTO: 0.5 %
BILIRUB SERPL-MCNC: 0.3 MG/DL (ref 0.2–1.3)
BILIRUB SERPL-MCNC: 0.6 MG/DL (ref 0.2–1.3)
BILIRUB SERPL-MCNC: 0.7 MG/DL (ref 0.2–1.3)
BILIRUB SERPL-MCNC: 1.1 MG/DL (ref 0.2–1.3)
BILIRUB UR QL STRIP: NEGATIVE
BLD GP AB SCN SERPL QL: NORMAL
BLD GP AB SCN SERPL QL: NORMAL
BLOOD BANK CMNT PATIENT-IMP: NORMAL
BLOOD BANK CMNT PATIENT-IMP: NORMAL
BUN SERPL-MCNC: 10 MG/DL (ref 7–30)
BUN SERPL-MCNC: 10 MG/DL (ref 7–30)
BUN SERPL-MCNC: 13 MG/DL (ref 7–30)
BUN SERPL-MCNC: 15 MG/DL (ref 7–30)
BUN SERPL-MCNC: 15 MG/DL (ref 7–30)
BUN SERPL-MCNC: 17 MG/DL (ref 7–30)
BUN SERPL-MCNC: 17 MG/DL (ref 7–30)
BUN SERPL-MCNC: 22 MG/DL (ref 7–30)
BUN SERPL-MCNC: 7 MG/DL (ref 7–30)
BUN SERPL-MCNC: 9 MG/DL (ref 7–30)
C DIFF TOX B STL QL: POSITIVE
CALCIUM SERPL-MCNC: 8.2 MG/DL (ref 8.5–10.1)
CALCIUM SERPL-MCNC: 8.3 MG/DL (ref 8.5–10.1)
CALCIUM SERPL-MCNC: 8.7 MG/DL (ref 8.5–10.1)
CALCIUM SERPL-MCNC: 8.7 MG/DL (ref 8.5–10.1)
CALCIUM SERPL-MCNC: 8.9 MG/DL (ref 8.5–10.1)
CALCIUM SERPL-MCNC: 9.1 MG/DL (ref 8.5–10.1)
CALCIUM SERPL-MCNC: 9.2 MG/DL (ref 8.5–10.1)
CALCIUM SERPL-MCNC: 9.3 MG/DL (ref 8.5–10.1)
CALCIUM SERPL-MCNC: 9.4 MG/DL (ref 8.5–10.1)
CALCIUM SERPL-MCNC: 9.4 MG/DL (ref 8.5–10.1)
CHLORIDE SERPL-SCNC: 100 MMOL/L (ref 94–109)
CHLORIDE SERPL-SCNC: 101 MMOL/L (ref 94–109)
CHLORIDE SERPL-SCNC: 101 MMOL/L (ref 94–109)
CHLORIDE SERPL-SCNC: 102 MMOL/L (ref 94–109)
CHLORIDE SERPL-SCNC: 102 MMOL/L (ref 94–109)
CHLORIDE SERPL-SCNC: 103 MMOL/L (ref 94–109)
CHLORIDE SERPL-SCNC: 104 MMOL/L (ref 94–109)
CHLORIDE SERPL-SCNC: 104 MMOL/L (ref 94–109)
CHLORIDE SERPL-SCNC: 106 MMOL/L (ref 94–109)
CHLORIDE SERPL-SCNC: 109 MMOL/L (ref 94–109)
CO2 BLDCOV-SCNC: 21 MMOL/L (ref 21–28)
CO2 BLDCOV-SCNC: 21 MMOL/L (ref 21–28)
CO2 SERPL-SCNC: 22 MMOL/L (ref 20–32)
CO2 SERPL-SCNC: 22 MMOL/L (ref 20–32)
CO2 SERPL-SCNC: 23 MMOL/L (ref 20–32)
CO2 SERPL-SCNC: 23 MMOL/L (ref 20–32)
CO2 SERPL-SCNC: 24 MMOL/L (ref 20–32)
CO2 SERPL-SCNC: 25 MMOL/L (ref 20–32)
CO2 SERPL-SCNC: 25 MMOL/L (ref 20–32)
CO2 SERPL-SCNC: 27 MMOL/L (ref 20–32)
COLOR UR AUTO: ABNORMAL
CREAT BLD-MCNC: 0.5 MG/DL (ref 0.66–1.25)
CREAT SERPL-MCNC: 0.5 MG/DL (ref 0.66–1.25)
CREAT SERPL-MCNC: 0.55 MG/DL (ref 0.66–1.25)
CREAT SERPL-MCNC: 0.6 MG/DL (ref 0.66–1.25)
CREAT SERPL-MCNC: 0.64 MG/DL (ref 0.66–1.25)
CREAT SERPL-MCNC: 0.65 MG/DL (ref 0.66–1.25)
CREAT SERPL-MCNC: 0.69 MG/DL (ref 0.66–1.25)
CREAT SERPL-MCNC: 0.71 MG/DL (ref 0.66–1.25)
CREAT SERPL-MCNC: 0.73 MG/DL (ref 0.66–1.25)
CREAT SERPL-MCNC: 0.94 MG/DL (ref 0.66–1.25)
CREAT SERPL-MCNC: 0.96 MG/DL (ref 0.66–1.25)
DIFFERENTIAL METHOD BLD: ABNORMAL
DOHLE BOD BLD QL SMEAR: PRESENT
DOHLE BOD BLD QL SMEAR: PRESENT
EOSINOPHIL # BLD AUTO: 0 10E9/L (ref 0–0.7)
EOSINOPHIL NFR BLD AUTO: 0 %
EOSINOPHIL NFR BLD AUTO: 0.1 %
EOSINOPHIL NFR BLD AUTO: 0.2 %
EOSINOPHIL NFR BLD AUTO: 0.3 %
EOSINOPHIL NFR BLD AUTO: 1 %
ERYTHROCYTE [DISTWIDTH] IN BLOOD BY AUTOMATED COUNT: 13.2 % (ref 10–15)
ERYTHROCYTE [DISTWIDTH] IN BLOOD BY AUTOMATED COUNT: 13.3 % (ref 10–15)
ERYTHROCYTE [DISTWIDTH] IN BLOOD BY AUTOMATED COUNT: 13.5 % (ref 10–15)
ERYTHROCYTE [DISTWIDTH] IN BLOOD BY AUTOMATED COUNT: 13.6 % (ref 10–15)
ERYTHROCYTE [DISTWIDTH] IN BLOOD BY AUTOMATED COUNT: 13.7 % (ref 10–15)
ERYTHROCYTE [DISTWIDTH] IN BLOOD BY AUTOMATED COUNT: 13.8 % (ref 10–15)
ERYTHROCYTE [DISTWIDTH] IN BLOOD BY AUTOMATED COUNT: 13.8 % (ref 10–15)
ERYTHROCYTE [DISTWIDTH] IN BLOOD BY AUTOMATED COUNT: 13.9 % (ref 10–15)
ERYTHROCYTE [DISTWIDTH] IN BLOOD BY AUTOMATED COUNT: 13.9 % (ref 10–15)
ERYTHROCYTE [DISTWIDTH] IN BLOOD BY AUTOMATED COUNT: 14 % (ref 10–15)
ERYTHROCYTE [DISTWIDTH] IN BLOOD BY AUTOMATED COUNT: 15.1 % (ref 10–15)
GFR SERPL CREATININE-BSD FRML MDRD: 76 ML/MIN/{1.73_M2}
GFR SERPL CREATININE-BSD FRML MDRD: 78 ML/MIN/{1.73_M2}
GFR SERPL CREATININE-BSD FRML MDRD: 90 ML/MIN/{1.73_M2}
GFR SERPL CREATININE-BSD FRML MDRD: >90 ML/MIN/{1.73_M2}
GLUCOSE SERPL-MCNC: 104 MG/DL (ref 70–99)
GLUCOSE SERPL-MCNC: 112 MG/DL (ref 70–99)
GLUCOSE SERPL-MCNC: 116 MG/DL (ref 70–99)
GLUCOSE SERPL-MCNC: 120 MG/DL (ref 70–99)
GLUCOSE SERPL-MCNC: 120 MG/DL (ref 70–99)
GLUCOSE SERPL-MCNC: 122 MG/DL (ref 70–99)
GLUCOSE SERPL-MCNC: 124 MG/DL (ref 70–99)
GLUCOSE SERPL-MCNC: 128 MG/DL (ref 70–99)
GLUCOSE SERPL-MCNC: 142 MG/DL (ref 70–99)
GLUCOSE SERPL-MCNC: 96 MG/DL (ref 70–99)
GLUCOSE UR STRIP-MCNC: NEGATIVE MG/DL
HBA1C MFR BLD: 4.9 % (ref 0–5.6)
HCT VFR BLD AUTO: 24.2 % (ref 40–53)
HCT VFR BLD AUTO: 24.9 % (ref 40–53)
HCT VFR BLD AUTO: 25.1 % (ref 40–53)
HCT VFR BLD AUTO: 26.2 % (ref 40–53)
HCT VFR BLD AUTO: 27.3 % (ref 40–53)
HCT VFR BLD AUTO: 27.6 % (ref 40–53)
HCT VFR BLD AUTO: 27.6 % (ref 40–53)
HCT VFR BLD AUTO: 28.9 % (ref 40–53)
HCT VFR BLD AUTO: 29 % (ref 40–53)
HCT VFR BLD AUTO: 29.3 % (ref 40–53)
HCT VFR BLD AUTO: 30.1 % (ref 40–53)
HGB BLD-MCNC: 7.6 G/DL (ref 13.3–17.7)
HGB BLD-MCNC: 7.9 G/DL (ref 13.3–17.7)
HGB BLD-MCNC: 8 G/DL (ref 13.3–17.7)
HGB BLD-MCNC: 8.6 G/DL (ref 13.3–17.7)
HGB BLD-MCNC: 8.7 G/DL (ref 13.3–17.7)
HGB BLD-MCNC: 9.2 G/DL (ref 13.3–17.7)
HGB BLD-MCNC: 9.2 G/DL (ref 13.3–17.7)
HGB BLD-MCNC: 9.3 G/DL (ref 13.3–17.7)
HGB BLD-MCNC: 9.7 G/DL (ref 13.3–17.7)
HGB BLD-MCNC: 9.7 G/DL (ref 13.3–17.7)
HGB BLD-MCNC: 9.9 G/DL (ref 13.3–17.7)
HGB UR QL STRIP: ABNORMAL
HGB UR QL STRIP: NEGATIVE
IMM GRANULOCYTES # BLD: 0.1 10E9/L (ref 0–0.4)
IMM GRANULOCYTES NFR BLD: 0.8 %
IMM GRANULOCYTES NFR BLD: 0.8 %
IMM GRANULOCYTES NFR BLD: 1 %
IMM GRANULOCYTES NFR BLD: 1.7 %
INTERPRETATION ECG - MUSE: NORMAL
KETONES UR STRIP-MCNC: NEGATIVE MG/DL
LACTATE BLD-SCNC: 0.9 MMOL/L (ref 0.7–2)
LACTATE BLD-SCNC: 0.9 MMOL/L (ref 0.7–2.1)
LACTATE BLD-SCNC: 2.1 MMOL/L (ref 0.7–2)
LACTATE BLD-SCNC: 2.4 MMOL/L (ref 0.7–2.1)
LEUKOCYTE ESTERASE UR QL STRIP: ABNORMAL
LEUKOCYTE ESTERASE UR QL STRIP: ABNORMAL
LEUKOCYTE ESTERASE UR QL STRIP: NEGATIVE
LEUKOCYTE ESTERASE UR QL STRIP: NEGATIVE
LIPASE SERPL-CCNC: 119 U/L (ref 73–393)
LIPASE SERPL-CCNC: 69 U/L (ref 73–393)
LIPASE SERPL-CCNC: 99 U/L (ref 73–393)
LYMPHOCYTES # BLD AUTO: 0.2 10E9/L (ref 0.8–5.3)
LYMPHOCYTES # BLD AUTO: 0.3 10E9/L (ref 0.8–5.3)
LYMPHOCYTES # BLD AUTO: 0.7 10E9/L (ref 0.8–5.3)
LYMPHOCYTES # BLD AUTO: 0.7 10E9/L (ref 0.8–5.3)
LYMPHOCYTES # BLD AUTO: 0.8 10E9/L (ref 0.8–5.3)
LYMPHOCYTES # BLD AUTO: 1.1 10E9/L (ref 0.8–5.3)
LYMPHOCYTES NFR BLD AUTO: 25 %
LYMPHOCYTES NFR BLD AUTO: 3.8 %
LYMPHOCYTES NFR BLD AUTO: 5.4 %
LYMPHOCYTES NFR BLD AUTO: 6 %
LYMPHOCYTES NFR BLD AUTO: 7 %
LYMPHOCYTES NFR BLD AUTO: 7.2 %
LYMPHOCYTES NFR BLD AUTO: 9 %
LYMPHOCYTES NFR BLD AUTO: 9.9 %
Lab: ABNORMAL
Lab: NORMAL
MAGNESIUM SERPL-MCNC: 2 MG/DL (ref 1.6–2.3)
MCH RBC QN AUTO: 30.4 PG (ref 26.5–33)
MCH RBC QN AUTO: 30.6 PG (ref 26.5–33)
MCH RBC QN AUTO: 30.9 PG (ref 26.5–33)
MCH RBC QN AUTO: 31.5 PG (ref 26.5–33)
MCH RBC QN AUTO: 31.9 PG (ref 26.5–33)
MCH RBC QN AUTO: 31.9 PG (ref 26.5–33)
MCH RBC QN AUTO: 32 PG (ref 26.5–33)
MCH RBC QN AUTO: 32 PG (ref 26.5–33)
MCH RBC QN AUTO: 32.4 PG (ref 26.5–33)
MCH RBC QN AUTO: 32.8 PG (ref 26.5–33)
MCH RBC QN AUTO: 33.5 PG (ref 26.5–33)
MCHC RBC AUTO-ENTMCNC: 31.4 G/DL (ref 31.5–36.5)
MCHC RBC AUTO-ENTMCNC: 31.5 G/DL (ref 31.5–36.5)
MCHC RBC AUTO-ENTMCNC: 31.7 G/DL (ref 31.5–36.5)
MCHC RBC AUTO-ENTMCNC: 31.8 G/DL (ref 31.5–36.5)
MCHC RBC AUTO-ENTMCNC: 31.9 G/DL (ref 31.5–36.5)
MCHC RBC AUTO-ENTMCNC: 32.2 G/DL (ref 31.5–36.5)
MCHC RBC AUTO-ENTMCNC: 33.1 G/DL (ref 31.5–36.5)
MCHC RBC AUTO-ENTMCNC: 33.2 G/DL (ref 31.5–36.5)
MCHC RBC AUTO-ENTMCNC: 33.3 G/DL (ref 31.5–36.5)
MCHC RBC AUTO-ENTMCNC: 33.7 G/DL (ref 31.5–36.5)
MCHC RBC AUTO-ENTMCNC: 34.1 G/DL (ref 31.5–36.5)
MCV RBC AUTO: 100 FL (ref 78–100)
MCV RBC AUTO: 101 FL (ref 78–100)
MCV RBC AUTO: 101 FL (ref 78–100)
MCV RBC AUTO: 102 FL (ref 78–100)
MCV RBC AUTO: 90 FL (ref 78–100)
MCV RBC AUTO: 91 FL (ref 78–100)
MCV RBC AUTO: 93 FL (ref 78–100)
MCV RBC AUTO: 98 FL (ref 78–100)
MCV RBC AUTO: 99 FL (ref 78–100)
METAMYELOCYTES # BLD: 0.1 10E9/L
METAMYELOCYTES # BLD: 0.2 10E9/L
METAMYELOCYTES # BLD: 0.6 10E9/L
METAMYELOCYTES NFR BLD MANUAL: 15 %
METAMYELOCYTES NFR BLD MANUAL: 2 %
METAMYELOCYTES NFR BLD MANUAL: 6 %
MONOCYTES # BLD AUTO: 0.1 10E9/L (ref 0–1.3)
MONOCYTES # BLD AUTO: 0.1 10E9/L (ref 0–1.3)
MONOCYTES # BLD AUTO: 0.2 10E9/L (ref 0–1.3)
MONOCYTES # BLD AUTO: 0.5 10E9/L (ref 0–1.3)
MONOCYTES # BLD AUTO: 0.8 10E9/L (ref 0–1.3)
MONOCYTES # BLD AUTO: 0.8 10E9/L (ref 0–1.3)
MONOCYTES # BLD AUTO: 1 10E9/L (ref 0–1.3)
MONOCYTES # BLD AUTO: 1.3 10E9/L (ref 0–1.3)
MONOCYTES NFR BLD AUTO: 10 %
MONOCYTES NFR BLD AUTO: 10.3 %
MONOCYTES NFR BLD AUTO: 11 %
MONOCYTES NFR BLD AUTO: 11 %
MONOCYTES NFR BLD AUTO: 2 %
MONOCYTES NFR BLD AUTO: 4 %
MONOCYTES NFR BLD AUTO: 5.9 %
MONOCYTES NFR BLD AUTO: 6.1 %
MUCOUS THREADS #/AREA URNS LPF: PRESENT /LPF
MYELOCYTES # BLD: 0 10E9/L
MYELOCYTES # BLD: 0 10E9/L
MYELOCYTES NFR BLD MANUAL: 1 %
MYELOCYTES NFR BLD MANUAL: 1 %
NEUTROPHILS # BLD AUTO: 0.6 10E9/L (ref 1.6–8.3)
NEUTROPHILS # BLD AUTO: 11.6 10E9/L (ref 1.6–8.3)
NEUTROPHILS # BLD AUTO: 3.2 10E9/L (ref 1.6–8.3)
NEUTROPHILS # BLD AUTO: 3.3 10E9/L (ref 1.6–8.3)
NEUTROPHILS # BLD AUTO: 6 10E9/L (ref 1.6–8.3)
NEUTROPHILS # BLD AUTO: 7.2 10E9/L (ref 1.6–8.3)
NEUTROPHILS # BLD AUTO: 7.8 10E9/L (ref 1.6–8.3)
NEUTROPHILS # BLD AUTO: 9.4 10E9/L (ref 1.6–8.3)
NEUTROPHILS NFR BLD AUTO: 56 %
NEUTROPHILS NFR BLD AUTO: 74 %
NEUTROPHILS NFR BLD AUTO: 78 %
NEUTROPHILS NFR BLD AUTO: 78.2 %
NEUTROPHILS NFR BLD AUTO: 81.4 %
NEUTROPHILS NFR BLD AUTO: 87.6 %
NEUTROPHILS NFR BLD AUTO: 88 %
NEUTROPHILS NFR BLD AUTO: 91 %
NITRATE UR QL: NEGATIVE
NRBC # BLD AUTO: 0 10*3/UL
NRBC BLD AUTO-RTO: 0 /100
PCO2 BLDV: 24 MM HG (ref 40–50)
PCO2 BLDV: 29 MM HG (ref 40–50)
PH BLDV: 7.47 PH (ref 7.32–7.43)
PH BLDV: 7.55 PH (ref 7.32–7.43)
PH UR STRIP: 7 PH (ref 5–7)
PH UR STRIP: 7.5 PH (ref 5–7)
PH UR STRIP: 8 PH (ref 5–7)
PH UR STRIP: 8.5 PH (ref 5–7)
PLATELET # BLD AUTO: 102 10E9/L (ref 150–450)
PLATELET # BLD AUTO: 116 10E9/L (ref 150–450)
PLATELET # BLD AUTO: 124 10E9/L (ref 150–450)
PLATELET # BLD AUTO: 155 10E9/L (ref 150–450)
PLATELET # BLD AUTO: 155 10E9/L (ref 150–450)
PLATELET # BLD AUTO: 168 10E9/L (ref 150–450)
PLATELET # BLD AUTO: 215 10E9/L (ref 150–450)
PLATELET # BLD AUTO: 222 10E9/L (ref 150–450)
PLATELET # BLD AUTO: 264 10E9/L (ref 150–450)
PLATELET # BLD AUTO: 336 10E9/L (ref 150–450)
PLATELET # BLD AUTO: 343 10E9/L (ref 150–450)
PLATELET # BLD EST: ABNORMAL 10*3/UL
PO2 BLDV: 18 MM HG (ref 25–47)
PO2 BLDV: 27 MM HG (ref 25–47)
POTASSIUM SERPL-SCNC: 3.4 MMOL/L (ref 3.4–5.3)
POTASSIUM SERPL-SCNC: 3.5 MMOL/L (ref 3.4–5.3)
POTASSIUM SERPL-SCNC: 3.7 MMOL/L (ref 3.4–5.3)
POTASSIUM SERPL-SCNC: 3.8 MMOL/L (ref 3.4–5.3)
POTASSIUM SERPL-SCNC: 3.8 MMOL/L (ref 3.4–5.3)
POTASSIUM SERPL-SCNC: 3.9 MMOL/L (ref 3.4–5.3)
POTASSIUM SERPL-SCNC: 4 MMOL/L (ref 3.4–5.3)
POTASSIUM SERPL-SCNC: 4.9 MMOL/L (ref 3.4–5.3)
PROCALCITONIN SERPL-MCNC: 0.11 NG/ML
PROT SERPL-MCNC: 6.8 G/DL (ref 6.8–8.8)
PROT SERPL-MCNC: 7.5 G/DL (ref 6.8–8.8)
PROT SERPL-MCNC: 7.7 G/DL (ref 6.8–8.8)
PROT SERPL-MCNC: 7.9 G/DL (ref 6.8–8.8)
RBC # BLD AUTO: 2.41 10E12/L (ref 4.4–5.9)
RBC # BLD AUTO: 2.47 10E12/L (ref 4.4–5.9)
RBC # BLD AUTO: 2.5 10E12/L (ref 4.4–5.9)
RBC # BLD AUTO: 2.7 10E12/L (ref 4.4–5.9)
RBC # BLD AUTO: 2.78 10E12/L (ref 4.4–5.9)
RBC # BLD AUTO: 2.82 10E12/L (ref 4.4–5.9)
RBC # BLD AUTO: 2.88 10E12/L (ref 4.4–5.9)
RBC # BLD AUTO: 2.96 10E12/L (ref 4.4–5.9)
RBC # BLD AUTO: 2.99 10E12/L (ref 4.4–5.9)
RBC # BLD AUTO: 3.03 10E12/L (ref 4.4–5.9)
RBC # BLD AUTO: 3.24 10E12/L (ref 4.4–5.9)
RBC #/AREA URNS AUTO: <1 /HPF (ref 0–2)
RBC #/AREA URNS AUTO: >182 /HPF (ref 0–2)
RBC MORPH BLD: ABNORMAL
SAO2 % BLDV FROM PO2: 38 %
SAO2 % BLDV FROM PO2: 57 %
SODIUM SERPL-SCNC: 131 MMOL/L (ref 133–144)
SODIUM SERPL-SCNC: 132 MMOL/L (ref 133–144)
SODIUM SERPL-SCNC: 134 MMOL/L (ref 133–144)
SODIUM SERPL-SCNC: 135 MMOL/L (ref 133–144)
SODIUM SERPL-SCNC: 136 MMOL/L (ref 133–144)
SODIUM SERPL-SCNC: 138 MMOL/L (ref 133–144)
SOURCE: ABNORMAL
SP GR UR STRIP: 1.01 (ref 1–1.03)
SP GR UR STRIP: 1.01 (ref 1–1.03)
SP GR UR STRIP: 1.02 (ref 1–1.03)
SP GR UR STRIP: 1.02 (ref 1–1.03)
SPECIMEN EXP DATE BLD: NORMAL
SPECIMEN EXP DATE BLD: NORMAL
SPECIMEN SOURCE: ABNORMAL
SPECIMEN SOURCE: ABNORMAL
SPECIMEN SOURCE: NORMAL
SQUAMOUS #/AREA URNS AUTO: 1 /HPF (ref 0–1)
TOXIC GRANULES BLD QL SMEAR: PRESENT
TROPONIN I SERPL-MCNC: <0.015 UG/L (ref 0–0.04)
UROBILINOGEN UR STRIP-MCNC: NORMAL MG/DL (ref 0–2)
WBC # BLD AUTO: 0.4 10E9/L (ref 4–11)
WBC # BLD AUTO: 1 10E9/L (ref 4–11)
WBC # BLD AUTO: 12 10E9/L (ref 4–11)
WBC # BLD AUTO: 13.3 10E9/L (ref 4–11)
WBC # BLD AUTO: 17.9 10E9/L (ref 4–11)
WBC # BLD AUTO: 2 10E9/L (ref 4–11)
WBC # BLD AUTO: 3.6 10E9/L (ref 4–11)
WBC # BLD AUTO: 4.3 10E9/L (ref 4–11)
WBC # BLD AUTO: 7.6 10E9/L (ref 4–11)
WBC # BLD AUTO: 8.2 10E9/L (ref 4–11)
WBC # BLD AUTO: 9.6 10E9/L (ref 4–11)
WBC #/AREA URNS AUTO: 102 /HPF (ref 0–5)
WBC #/AREA URNS AUTO: 12 /HPF (ref 0–5)
WBC #/AREA URNS AUTO: 28 /HPF (ref 0–5)
WBC #/AREA URNS AUTO: <1 /HPF (ref 0–5)
WBC CLUMPS #/AREA URNS HPF: PRESENT /HPF

## 2019-01-01 PROCEDURE — 84145 PROCALCITONIN (PCT): CPT | Performed by: INTERNAL MEDICINE

## 2019-01-01 PROCEDURE — 87040 BLOOD CULTURE FOR BACTERIA: CPT | Performed by: PHYSICIAN ASSISTANT

## 2019-01-01 PROCEDURE — 80048 BASIC METABOLIC PNL TOTAL CA: CPT | Performed by: UROLOGY

## 2019-01-01 PROCEDURE — 85025 COMPLETE CBC W/AUTO DIFF WBC: CPT | Performed by: PHYSICIAN ASSISTANT

## 2019-01-01 PROCEDURE — 96361 HYDRATE IV INFUSION ADD-ON: CPT | Mod: 59

## 2019-01-01 PROCEDURE — 85025 COMPLETE CBC W/AUTO DIFF WBC: CPT | Performed by: EMERGENCY MEDICINE

## 2019-01-01 PROCEDURE — 83605 ASSAY OF LACTIC ACID: CPT | Performed by: INTERNAL MEDICINE

## 2019-01-01 PROCEDURE — 99285 EMERGENCY DEPT VISIT HI MDM: CPT | Mod: 25

## 2019-01-01 PROCEDURE — 80053 COMPREHEN METABOLIC PANEL: CPT | Performed by: EMERGENCY MEDICINE

## 2019-01-01 PROCEDURE — 25000132 ZZH RX MED GY IP 250 OP 250 PS 637: Performed by: INTERNAL MEDICINE

## 2019-01-01 PROCEDURE — 25800030 ZZH RX IP 258 OP 636: Performed by: INTERNAL MEDICINE

## 2019-01-01 PROCEDURE — 96374 THER/PROPH/DIAG INJ IV PUSH: CPT

## 2019-01-01 PROCEDURE — 87077 CULTURE AEROBIC IDENTIFY: CPT | Performed by: PHYSICIAN ASSISTANT

## 2019-01-01 PROCEDURE — 83690 ASSAY OF LIPASE: CPT | Performed by: EMERGENCY MEDICINE

## 2019-01-01 PROCEDURE — 96376 TX/PRO/DX INJ SAME DRUG ADON: CPT

## 2019-01-01 PROCEDURE — 73718 MRI LOWER EXTREMITY W/O DYE: CPT | Mod: RT

## 2019-01-01 PROCEDURE — 83036 HEMOGLOBIN GLYCOSYLATED A1C: CPT | Performed by: INTERNAL MEDICINE

## 2019-01-01 PROCEDURE — 96361 HYDRATE IV INFUSION ADD-ON: CPT

## 2019-01-01 PROCEDURE — 86850 RBC ANTIBODY SCREEN: CPT | Performed by: PHYSICIAN ASSISTANT

## 2019-01-01 PROCEDURE — 25000125 ZZHC RX 250: Performed by: EMERGENCY MEDICINE

## 2019-01-01 PROCEDURE — 99232 SBSQ HOSP IP/OBS MODERATE 35: CPT | Performed by: UROLOGY

## 2019-01-01 PROCEDURE — 99211 OFF/OP EST MAY X REQ PHY/QHP: CPT

## 2019-01-01 PROCEDURE — 86901 BLOOD TYPING SEROLOGIC RH(D): CPT | Performed by: UROLOGY

## 2019-01-01 PROCEDURE — 96402 CHEMO HORMON ANTINEOPL SQ/IM: CPT | Performed by: UROLOGY

## 2019-01-01 PROCEDURE — 87086 URINE CULTURE/COLONY COUNT: CPT | Performed by: NURSE PRACTITIONER

## 2019-01-01 PROCEDURE — 25000132 ZZH RX MED GY IP 250 OP 250 PS 637: Performed by: PHYSICIAN ASSISTANT

## 2019-01-01 PROCEDURE — 25800030 ZZH RX IP 258 OP 636: Performed by: EMERGENCY MEDICINE

## 2019-01-01 PROCEDURE — 36415 COLL VENOUS BLD VENIPUNCTURE: CPT | Performed by: PHYSICIAN ASSISTANT

## 2019-01-01 PROCEDURE — 74176 CT ABD & PELVIS W/O CONTRAST: CPT

## 2019-01-01 PROCEDURE — 25000128 H RX IP 250 OP 636: Performed by: NURSE PRACTITIONER

## 2019-01-01 PROCEDURE — 96374 THER/PROPH/DIAG INJ IV PUSH: CPT | Mod: 59

## 2019-01-01 PROCEDURE — 83735 ASSAY OF MAGNESIUM: CPT | Performed by: EMERGENCY MEDICINE

## 2019-01-01 PROCEDURE — 85027 COMPLETE CBC AUTOMATED: CPT

## 2019-01-01 PROCEDURE — 25000132 ZZH RX MED GY IP 250 OP 250 PS 637: Performed by: NURSE PRACTITIONER

## 2019-01-01 PROCEDURE — 99214 OFFICE O/P EST MOD 30 MIN: CPT | Performed by: INTERNAL MEDICINE

## 2019-01-01 PROCEDURE — 36415 COLL VENOUS BLD VENIPUNCTURE: CPT | Performed by: INTERNAL MEDICINE

## 2019-01-01 PROCEDURE — 25000131 ZZH RX MED GY IP 250 OP 636 PS 637: Performed by: PHYSICIAN ASSISTANT

## 2019-01-01 PROCEDURE — 82565 ASSAY OF CREATININE: CPT

## 2019-01-01 PROCEDURE — 93971 EXTREMITY STUDY: CPT | Mod: RT

## 2019-01-01 PROCEDURE — 87040 BLOOD CULTURE FOR BACTERIA: CPT | Performed by: EMERGENCY MEDICINE

## 2019-01-01 PROCEDURE — 85025 COMPLETE CBC W/AUTO DIFF WBC: CPT | Performed by: UROLOGY

## 2019-01-01 PROCEDURE — 85025 COMPLETE CBC W/AUTO DIFF WBC: CPT | Performed by: NURSE PRACTITIONER

## 2019-01-01 PROCEDURE — 51702 INSERT TEMP BLADDER CATH: CPT

## 2019-01-01 PROCEDURE — 80048 BASIC METABOLIC PNL TOTAL CA: CPT | Performed by: INTERNAL MEDICINE

## 2019-01-01 PROCEDURE — 25000128 H RX IP 250 OP 636: Performed by: EMERGENCY MEDICINE

## 2019-01-01 PROCEDURE — 87800 DETECT AGNT MULT DNA DIREC: CPT | Performed by: PHYSICIAN ASSISTANT

## 2019-01-01 PROCEDURE — G0378 HOSPITAL OBSERVATION PER HR: HCPCS

## 2019-01-01 PROCEDURE — 25000128 H RX IP 250 OP 636: Performed by: INTERNAL MEDICINE

## 2019-01-01 PROCEDURE — 71275 CT ANGIOGRAPHY CHEST: CPT

## 2019-01-01 PROCEDURE — 93005 ELECTROCARDIOGRAM TRACING: CPT

## 2019-01-01 PROCEDURE — 74177 CT ABD & PELVIS W/CONTRAST: CPT

## 2019-01-01 PROCEDURE — 80053 COMPREHEN METABOLIC PANEL: CPT | Performed by: PHYSICIAN ASSISTANT

## 2019-01-01 PROCEDURE — 83605 ASSAY OF LACTIC ACID: CPT

## 2019-01-01 PROCEDURE — 99207 ZZC CDG-CHARGE REQUIRED MANUAL ENTRY: CPT | Performed by: PHYSICIAN ASSISTANT

## 2019-01-01 PROCEDURE — 36415 COLL VENOUS BLD VENIPUNCTURE: CPT

## 2019-01-01 PROCEDURE — 85027 COMPLETE CBC AUTOMATED: CPT | Performed by: INTERNAL MEDICINE

## 2019-01-01 PROCEDURE — 25000125 ZZHC RX 250: Performed by: PHYSICIAN ASSISTANT

## 2019-01-01 PROCEDURE — 80048 BASIC METABOLIC PNL TOTAL CA: CPT | Performed by: EMERGENCY MEDICINE

## 2019-01-01 PROCEDURE — 99217 ZZC OBSERVATION CARE DISCHARGE: CPT | Performed by: PHYSICIAN ASSISTANT

## 2019-01-01 PROCEDURE — 25000132 ZZH RX MED GY IP 250 OP 250 PS 637

## 2019-01-01 PROCEDURE — 99220 ZZC INITIAL OBSERVATION CARE,LEVL III: CPT | Performed by: INTERNAL MEDICINE

## 2019-01-01 PROCEDURE — 87040 BLOOD CULTURE FOR BACTERIA: CPT | Performed by: INTERNAL MEDICINE

## 2019-01-01 PROCEDURE — 96360 HYDRATION IV INFUSION INIT: CPT | Mod: 59

## 2019-01-01 PROCEDURE — 99225 ZZC SUBSEQUENT OBSERVATION CARE,LEVEL II: CPT | Performed by: PHYSICIAN ASSISTANT

## 2019-01-01 PROCEDURE — 36415 COLL VENOUS BLD VENIPUNCTURE: CPT | Performed by: EMERGENCY MEDICINE

## 2019-01-01 PROCEDURE — 81001 URINALYSIS AUTO W/SCOPE: CPT | Performed by: EMERGENCY MEDICINE

## 2019-01-01 PROCEDURE — 87086 URINE CULTURE/COLONY COUNT: CPT | Performed by: PHYSICIAN ASSISTANT

## 2019-01-01 PROCEDURE — 87086 URINE CULTURE/COLONY COUNT: CPT | Performed by: EMERGENCY MEDICINE

## 2019-01-01 PROCEDURE — 82803 BLOOD GASES ANY COMBINATION: CPT | Mod: 91

## 2019-01-01 PROCEDURE — 36415 COLL VENOUS BLD VENIPUNCTURE: CPT | Performed by: UROLOGY

## 2019-01-01 PROCEDURE — 87186 SC STD MICRODIL/AGAR DIL: CPT | Performed by: PHYSICIAN ASSISTANT

## 2019-01-01 PROCEDURE — 99215 OFFICE O/P EST HI 40 MIN: CPT | Performed by: INTERNAL MEDICINE

## 2019-01-01 PROCEDURE — 83605 ASSAY OF LACTIC ACID: CPT | Performed by: EMERGENCY MEDICINE

## 2019-01-01 PROCEDURE — 86900 BLOOD TYPING SEROLOGIC ABO: CPT | Performed by: UROLOGY

## 2019-01-01 PROCEDURE — 86901 BLOOD TYPING SEROLOGIC RH(D): CPT | Performed by: PHYSICIAN ASSISTANT

## 2019-01-01 PROCEDURE — 84484 ASSAY OF TROPONIN QUANT: CPT | Performed by: PHYSICIAN ASSISTANT

## 2019-01-01 PROCEDURE — 81001 URINALYSIS AUTO W/SCOPE: CPT | Performed by: PHYSICIAN ASSISTANT

## 2019-01-01 PROCEDURE — 80048 BASIC METABOLIC PNL TOTAL CA: CPT | Performed by: NURSE PRACTITIONER

## 2019-01-01 PROCEDURE — 86850 RBC ANTIBODY SCREEN: CPT | Performed by: UROLOGY

## 2019-01-01 PROCEDURE — 87493 C DIFF AMPLIFIED PROBE: CPT | Performed by: EMERGENCY MEDICINE

## 2019-01-01 PROCEDURE — 99283 EMERGENCY DEPT VISIT LOW MDM: CPT

## 2019-01-01 PROCEDURE — 99212 OFFICE O/P EST SF 10 MIN: CPT | Mod: 25 | Performed by: UROLOGY

## 2019-01-01 PROCEDURE — 99213 OFFICE O/P EST LOW 20 MIN: CPT | Performed by: UROLOGY

## 2019-01-01 PROCEDURE — 85025 COMPLETE CBC W/AUTO DIFF WBC: CPT | Performed by: INTERNAL MEDICINE

## 2019-01-01 PROCEDURE — 25000131 ZZH RX MED GY IP 250 OP 636 PS 637: Performed by: INTERNAL MEDICINE

## 2019-01-01 PROCEDURE — 96375 TX/PRO/DX INJ NEW DRUG ADDON: CPT

## 2019-01-01 PROCEDURE — 25000128 H RX IP 250 OP 636: Performed by: PHYSICIAN ASSISTANT

## 2019-01-01 PROCEDURE — 86900 BLOOD TYPING SEROLOGIC ABO: CPT | Performed by: PHYSICIAN ASSISTANT

## 2019-01-01 PROCEDURE — 25000125 ZZHC RX 250: Performed by: NURSE PRACTITIONER

## 2019-01-01 PROCEDURE — 96365 THER/PROPH/DIAG IV INF INIT: CPT | Mod: 59

## 2019-01-01 RX ORDER — VANCOMYCIN HYDROCHLORIDE 50 MG/ML
125 KIT ORAL ONCE
Status: COMPLETED | OUTPATIENT
Start: 2019-01-01 | End: 2019-01-01

## 2019-01-01 RX ORDER — HYDROMORPHONE HYDROCHLORIDE 4 MG/1
4 TABLET ORAL EVERY 4 HOURS PRN
Status: ON HOLD | COMMUNITY
End: 2020-01-01

## 2019-01-01 RX ORDER — ALUMINA, MAGNESIA, AND SIMETHICONE 2400; 2400; 240 MG/30ML; MG/30ML; MG/30ML
SUSPENSION ORAL
Status: COMPLETED
Start: 2019-01-01 | End: 2019-01-01

## 2019-01-01 RX ORDER — VANCOMYCIN HYDROCHLORIDE 50 MG/ML
125 KIT ORAL 2 TIMES DAILY
Status: DISCONTINUED | OUTPATIENT
Start: 2019-01-01 | End: 2019-01-01 | Stop reason: HOSPADM

## 2019-01-01 RX ORDER — HYDROMORPHONE HYDROCHLORIDE 1 MG/ML
0.5 INJECTION, SOLUTION INTRAMUSCULAR; INTRAVENOUS; SUBCUTANEOUS ONCE
Status: COMPLETED | OUTPATIENT
Start: 2019-01-01 | End: 2019-01-01

## 2019-01-01 RX ORDER — DICYCLOMINE HCL 20 MG
20 TABLET ORAL 2 TIMES DAILY
Qty: 20 TABLET | Refills: 0 | Status: SHIPPED | OUTPATIENT
Start: 2019-01-01 | End: 2019-01-01

## 2019-01-01 RX ORDER — SODIUM CHLORIDE 9 MG/ML
INJECTION, SOLUTION INTRAVENOUS CONTINUOUS
Status: DISCONTINUED | OUTPATIENT
Start: 2019-01-01 | End: 2019-01-01 | Stop reason: HOSPADM

## 2019-01-01 RX ORDER — SACCHAROMYCES BOULARDII 250 MG
500 CAPSULE ORAL 2 TIMES DAILY
COMMUNITY

## 2019-01-01 RX ORDER — FERROUS SULFATE 325(65) MG
325 TABLET ORAL
Status: ON HOLD | COMMUNITY
End: 2019-01-01

## 2019-01-01 RX ORDER — PROCHLORPERAZINE 25 MG
12.5 SUPPOSITORY, RECTAL RECTAL EVERY 12 HOURS PRN
Status: DISCONTINUED | OUTPATIENT
Start: 2019-01-01 | End: 2019-01-01 | Stop reason: HOSPADM

## 2019-01-01 RX ORDER — AMOXICILLIN 250 MG
2 CAPSULE ORAL 2 TIMES DAILY PRN
Status: DISCONTINUED | OUTPATIENT
Start: 2019-01-01 | End: 2019-01-01 | Stop reason: HOSPADM

## 2019-01-01 RX ORDER — IOPAMIDOL 755 MG/ML
500 INJECTION, SOLUTION INTRAVASCULAR ONCE
Status: COMPLETED | OUTPATIENT
Start: 2019-01-01 | End: 2019-01-01

## 2019-01-01 RX ORDER — HYDROMORPHONE HYDROCHLORIDE 1 MG/ML
0.5 INJECTION, SOLUTION INTRAMUSCULAR; INTRAVENOUS; SUBCUTANEOUS
Status: DISCONTINUED | OUTPATIENT
Start: 2019-01-01 | End: 2019-01-01

## 2019-01-01 RX ORDER — CEFTRIAXONE 1 G/1
1 INJECTION, POWDER, FOR SOLUTION INTRAMUSCULAR; INTRAVENOUS EVERY 24 HOURS
Status: DISCONTINUED | OUTPATIENT
Start: 2019-01-01 | End: 2019-01-01

## 2019-01-01 RX ORDER — LIDOCAINE 4 G/G
1 PATCH TOPICAL
Status: DISCONTINUED | OUTPATIENT
Start: 2019-01-01 | End: 2019-01-01 | Stop reason: HOSPADM

## 2019-01-01 RX ORDER — FERROUS SULFATE 325(65) MG
325 TABLET ORAL
Status: DISCONTINUED | OUTPATIENT
Start: 2019-01-01 | End: 2019-01-01 | Stop reason: HOSPADM

## 2019-01-01 RX ORDER — AMOXICILLIN 250 MG
1 CAPSULE ORAL 2 TIMES DAILY PRN
Status: DISCONTINUED | OUTPATIENT
Start: 2019-01-01 | End: 2019-01-01 | Stop reason: HOSPADM

## 2019-01-01 RX ORDER — ACETAMINOPHEN 325 MG/1
650 TABLET ORAL EVERY 4 HOURS PRN
Status: DISCONTINUED | OUTPATIENT
Start: 2019-01-01 | End: 2019-01-01 | Stop reason: HOSPADM

## 2019-01-01 RX ORDER — METOCLOPRAMIDE HYDROCHLORIDE 5 MG/ML
5 INJECTION INTRAMUSCULAR; INTRAVENOUS EVERY 6 HOURS PRN
Status: DISCONTINUED | OUTPATIENT
Start: 2019-01-01 | End: 2019-01-01 | Stop reason: HOSPADM

## 2019-01-01 RX ORDER — ONDANSETRON 4 MG/1
4 TABLET, ORALLY DISINTEGRATING ORAL EVERY 6 HOURS PRN
Status: DISCONTINUED | OUTPATIENT
Start: 2019-01-01 | End: 2019-01-01 | Stop reason: HOSPADM

## 2019-01-01 RX ORDER — OXYCODONE HYDROCHLORIDE 5 MG/1
5-10 TABLET ORAL
Status: DISCONTINUED | OUTPATIENT
Start: 2019-01-01 | End: 2019-01-01 | Stop reason: ALTCHOICE

## 2019-01-01 RX ORDER — ONDANSETRON 2 MG/ML
4 INJECTION INTRAMUSCULAR; INTRAVENOUS EVERY 6 HOURS PRN
Status: DISCONTINUED | OUTPATIENT
Start: 2019-01-01 | End: 2019-01-01 | Stop reason: HOSPADM

## 2019-01-01 RX ORDER — OXYCODONE HYDROCHLORIDE 5 MG/1
5-10 TABLET ORAL
Status: DISCONTINUED | OUTPATIENT
Start: 2019-01-01 | End: 2019-01-01 | Stop reason: HOSPADM

## 2019-01-01 RX ORDER — ACETAMINOPHEN 500 MG
500 TABLET ORAL EVERY 4 HOURS PRN
COMMUNITY

## 2019-01-01 RX ORDER — HYDROMORPHONE HYDROCHLORIDE 1 MG/ML
0.5 INJECTION, SOLUTION INTRAMUSCULAR; INTRAVENOUS; SUBCUTANEOUS
Status: DISCONTINUED | OUTPATIENT
Start: 2019-01-01 | End: 2019-01-01 | Stop reason: HOSPADM

## 2019-01-01 RX ORDER — VANCOMYCIN HYDROCHLORIDE 50 MG/ML
125 KIT ORAL 4 TIMES DAILY
Qty: 100 ML | Refills: 0 | Status: SHIPPED | OUTPATIENT
Start: 2019-01-01 | End: 2019-01-01

## 2019-01-01 RX ORDER — ONDANSETRON 2 MG/ML
4 INJECTION INTRAMUSCULAR; INTRAVENOUS ONCE
Status: COMPLETED | OUTPATIENT
Start: 2019-01-01 | End: 2019-01-01

## 2019-01-01 RX ORDER — CEFDINIR 300 MG/1
300 CAPSULE ORAL EVERY 12 HOURS
Qty: 19 CAPSULE | Refills: 0 | Status: ON HOLD | OUTPATIENT
Start: 2019-01-01 | End: 2019-01-01

## 2019-01-01 RX ORDER — ALUMINA, MAGNESIA, AND SIMETHICONE 2400; 2400; 240 MG/30ML; MG/30ML; MG/30ML
30 SUSPENSION ORAL EVERY 4 HOURS PRN
Status: DISCONTINUED | OUTPATIENT
Start: 2019-01-01 | End: 2019-01-01 | Stop reason: HOSPADM

## 2019-01-01 RX ORDER — PREDNISONE 5 MG/1
5 TABLET ORAL DAILY
Status: ON HOLD | COMMUNITY
Start: 2019-01-01 | End: 2020-01-01

## 2019-01-01 RX ORDER — ATROPA BELLADONNA AND OPIUM 16.2; 3 MG/1; MG/1
15 SUPPOSITORY RECTAL ONCE
Status: COMPLETED | OUTPATIENT
Start: 2019-01-01 | End: 2019-01-01

## 2019-01-01 RX ORDER — LANOLIN ALCOHOL/MO/W.PET/CERES
3 CREAM (GRAM) TOPICAL
Status: DISCONTINUED | OUTPATIENT
Start: 2019-01-01 | End: 2019-01-01 | Stop reason: HOSPADM

## 2019-01-01 RX ORDER — CEFTRIAXONE 1 G/1
1 INJECTION, POWDER, FOR SOLUTION INTRAMUSCULAR; INTRAVENOUS ONCE
Status: COMPLETED | OUTPATIENT
Start: 2019-01-01 | End: 2019-01-01

## 2019-01-01 RX ORDER — VANCOMYCIN HYDROCHLORIDE 50 MG/ML
125 KIT ORAL 2 TIMES DAILY
Qty: 100 ML | Refills: 0 | Status: ON HOLD | OUTPATIENT
Start: 2019-01-01 | End: 2020-01-01

## 2019-01-01 RX ORDER — ACETAMINOPHEN 650 MG/1
650 SUPPOSITORY RECTAL EVERY 4 HOURS PRN
Status: DISCONTINUED | OUTPATIENT
Start: 2019-01-01 | End: 2019-01-01 | Stop reason: HOSPADM

## 2019-01-01 RX ORDER — OXYCODONE HYDROCHLORIDE 5 MG/1
5-10 TABLET ORAL EVERY 6 HOURS PRN
Qty: 28 TABLET | Refills: 0 | Status: ON HOLD | OUTPATIENT
Start: 2019-01-01 | End: 2019-01-01

## 2019-01-01 RX ORDER — FENTANYL CITRATE 50 UG/ML
50 INJECTION, SOLUTION INTRAMUSCULAR; INTRAVENOUS ONCE
Status: COMPLETED | OUTPATIENT
Start: 2019-01-01 | End: 2019-01-01

## 2019-01-01 RX ORDER — PREDNISONE 5 MG/1
5 TABLET ORAL DAILY
Status: DISCONTINUED | OUTPATIENT
Start: 2019-01-01 | End: 2019-01-01 | Stop reason: HOSPADM

## 2019-01-01 RX ORDER — CEFDINIR 300 MG/1
300 CAPSULE ORAL EVERY 12 HOURS SCHEDULED
Status: DISCONTINUED | OUTPATIENT
Start: 2019-01-01 | End: 2019-01-01 | Stop reason: HOSPADM

## 2019-01-01 RX ORDER — PROCHLORPERAZINE MALEATE 5 MG
5 TABLET ORAL EVERY 6 HOURS PRN
Status: DISCONTINUED | OUTPATIENT
Start: 2019-01-01 | End: 2019-01-01 | Stop reason: HOSPADM

## 2019-01-01 RX ORDER — NALOXONE HYDROCHLORIDE 0.4 MG/ML
.1-.4 INJECTION, SOLUTION INTRAMUSCULAR; INTRAVENOUS; SUBCUTANEOUS
Status: DISCONTINUED | OUTPATIENT
Start: 2019-01-01 | End: 2019-01-01 | Stop reason: HOSPADM

## 2019-01-01 RX ORDER — DOCUSATE SODIUM 100 MG/1
100 CAPSULE, LIQUID FILLED ORAL 2 TIMES DAILY
Status: DISCONTINUED | OUTPATIENT
Start: 2019-01-01 | End: 2019-01-01 | Stop reason: HOSPADM

## 2019-01-01 RX ORDER — LORAZEPAM 2 MG/ML
0.5 INJECTION INTRAMUSCULAR ONCE
Status: COMPLETED | OUTPATIENT
Start: 2019-01-01 | End: 2019-01-01

## 2019-01-01 RX ORDER — METOCLOPRAMIDE 5 MG/1
5 TABLET ORAL EVERY 6 HOURS PRN
Status: DISCONTINUED | OUTPATIENT
Start: 2019-01-01 | End: 2019-01-01 | Stop reason: HOSPADM

## 2019-01-01 RX ORDER — HYDROMORPHONE HYDROCHLORIDE 4 MG/1
4 TABLET ORAL EVERY 6 HOURS PRN
Status: DISCONTINUED | OUTPATIENT
Start: 2019-01-01 | End: 2019-01-01 | Stop reason: HOSPADM

## 2019-01-01 RX ORDER — CEFAZOLIN SODIUM 1 G
1 VIAL (EA) INJECTION SEE ADMIN INSTRUCTIONS
Status: CANCELLED | OUTPATIENT
Start: 2019-01-01

## 2019-01-01 RX ORDER — LANOLIN ALCOHOL/MO/W.PET/CERES
3-9 CREAM (GRAM) TOPICAL
Qty: 90 TABLET | Refills: 1 | Status: ON HOLD | COMMUNITY
Start: 2019-01-01 | End: 2020-01-01

## 2019-01-01 RX ORDER — SACCHAROMYCES BOULARDII 250 MG
500 CAPSULE ORAL 2 TIMES DAILY
Status: DISCONTINUED | OUTPATIENT
Start: 2019-01-01 | End: 2019-01-01 | Stop reason: HOSPADM

## 2019-01-01 RX ORDER — POLYETHYLENE GLYCOL 3350 17 G/17G
17 POWDER, FOR SOLUTION ORAL DAILY PRN
Status: DISCONTINUED | OUTPATIENT
Start: 2019-01-01 | End: 2019-01-01 | Stop reason: HOSPADM

## 2019-01-01 RX ORDER — KETOROLAC TROMETHAMINE 15 MG/ML
15 INJECTION, SOLUTION INTRAMUSCULAR; INTRAVENOUS ONCE
Status: DISCONTINUED | OUTPATIENT
Start: 2019-01-01 | End: 2019-01-01

## 2019-01-01 RX ADMIN — VANCOMYCIN HYDROCHLORIDE 125 MG: KIT at 12:36

## 2019-01-01 RX ADMIN — MELATONIN TAB 3 MG 3 MG: 3 TAB at 02:08

## 2019-01-01 RX ADMIN — LIDOCAINE 1 PATCH: 560 PATCH PERCUTANEOUS; TOPICAL; TRANSDERMAL at 10:42

## 2019-01-01 RX ADMIN — HYDROMORPHONE HYDROCHLORIDE 0.5 MG: 1 INJECTION, SOLUTION INTRAMUSCULAR; INTRAVENOUS; SUBCUTANEOUS at 22:58

## 2019-01-01 RX ADMIN — LORAZEPAM 0.5 MG: 2 INJECTION INTRAMUSCULAR; INTRAVENOUS at 22:49

## 2019-01-01 RX ADMIN — VANCOMYCIN HYDROCHLORIDE 125 MG: KIT at 20:23

## 2019-01-01 RX ADMIN — FERROUS SULFATE TAB 325 MG (65 MG ELEMENTAL FE) 325 MG: 325 (65 FE) TAB at 11:35

## 2019-01-01 RX ADMIN — Medication 500 MG: at 20:23

## 2019-01-01 RX ADMIN — SODIUM CHLORIDE: 9 INJECTION, SOLUTION INTRAVENOUS at 01:46

## 2019-01-01 RX ADMIN — SODIUM CHLORIDE: 9 INJECTION, SOLUTION INTRAVENOUS at 02:41

## 2019-01-01 RX ADMIN — SODIUM CHLORIDE: 9 INJECTION, SOLUTION INTRAVENOUS at 08:17

## 2019-01-01 RX ADMIN — VANCOMYCIN HYDROCHLORIDE 125 MG: KIT at 01:18

## 2019-01-01 RX ADMIN — SODIUM CHLORIDE 61 ML: 9 INJECTION, SOLUTION INTRAVENOUS at 23:48

## 2019-01-01 RX ADMIN — IOPAMIDOL 88 ML: 755 INJECTION, SOLUTION INTRAVENOUS at 18:01

## 2019-01-01 RX ADMIN — FENTANYL CITRATE 50 MCG: 50 INJECTION, SOLUTION INTRAMUSCULAR; INTRAVENOUS at 17:31

## 2019-01-01 RX ADMIN — SODIUM CHLORIDE 62 ML: 9 INJECTION, SOLUTION INTRAVENOUS at 18:01

## 2019-01-01 RX ADMIN — ACETAMINOPHEN 650 MG: 325 TABLET, FILM COATED ORAL at 08:24

## 2019-01-01 RX ADMIN — ALUMINA, MAGNESIA, AND SIMETHICONE 30 ML: 2400; 2400; 240 SUSPENSION ORAL at 18:32

## 2019-01-01 RX ADMIN — SODIUM CHLORIDE 1000 ML: 9 INJECTION, SOLUTION INTRAVENOUS at 18:14

## 2019-01-01 RX ADMIN — IOPAMIDOL 85 ML: 755 INJECTION, SOLUTION INTRAVENOUS at 23:47

## 2019-01-01 RX ADMIN — HYDROMORPHONE HYDROCHLORIDE 0.5 MG: 1 INJECTION, SOLUTION INTRAMUSCULAR; INTRAVENOUS; SUBCUTANEOUS at 05:44

## 2019-01-01 RX ADMIN — HYDROMORPHONE HYDROCHLORIDE 4 MG: 4 TABLET ORAL at 18:15

## 2019-01-01 RX ADMIN — Medication 500 MG: at 12:36

## 2019-01-01 RX ADMIN — HYDROMORPHONE HYDROCHLORIDE 0.5 MG: 1 INJECTION, SOLUTION INTRAMUSCULAR; INTRAVENOUS; SUBCUTANEOUS at 11:40

## 2019-01-01 RX ADMIN — FERROUS SULFATE TAB 325 MG (65 MG ELEMENTAL FE) 325 MG: 325 (65 FE) TAB at 08:20

## 2019-01-01 RX ADMIN — ALUMINUM HYDROXIDE, MAGNESIUM HYDROXIDE, AND DIMETHICONE 30 ML: 400; 400; 40 SUSPENSION ORAL at 18:32

## 2019-01-01 RX ADMIN — ACETAMINOPHEN 650 MG: 325 TABLET, FILM COATED ORAL at 18:15

## 2019-01-01 RX ADMIN — SODIUM CHLORIDE: 9 INJECTION, SOLUTION INTRAVENOUS at 12:54

## 2019-01-01 RX ADMIN — VANCOMYCIN HYDROCHLORIDE 125 MG: KIT at 20:19

## 2019-01-01 RX ADMIN — HYDROMORPHONE HYDROCHLORIDE 0.5 MG: 1 INJECTION, SOLUTION INTRAMUSCULAR; INTRAVENOUS; SUBCUTANEOUS at 22:03

## 2019-01-01 RX ADMIN — PREDNISONE 5 MG: 5 TABLET ORAL at 08:12

## 2019-01-01 RX ADMIN — CEFTRIAXONE SODIUM 1 G: 1 INJECTION, POWDER, FOR SOLUTION INTRAMUSCULAR; INTRAVENOUS at 21:30

## 2019-01-01 RX ADMIN — VANCOMYCIN HYDROCHLORIDE 125 MG: KIT at 08:24

## 2019-01-01 RX ADMIN — ONDANSETRON HYDROCHLORIDE 4 MG: 2 INJECTION, SOLUTION INTRAMUSCULAR; INTRAVENOUS at 17:30

## 2019-01-01 RX ADMIN — SODIUM CHLORIDE: 9 INJECTION, SOLUTION INTRAVENOUS at 11:43

## 2019-01-01 RX ADMIN — HYDROMORPHONE HYDROCHLORIDE 4 MG: 4 TABLET ORAL at 08:24

## 2019-01-01 RX ADMIN — HYDROMORPHONE HYDROCHLORIDE 4 MG: 4 TABLET ORAL at 17:21

## 2019-01-01 RX ADMIN — HYDROMORPHONE HYDROCHLORIDE 0.5 MG: 1 INJECTION, SOLUTION INTRAMUSCULAR; INTRAVENOUS; SUBCUTANEOUS at 01:49

## 2019-01-01 RX ADMIN — Medication 500 MG: at 08:24

## 2019-01-01 RX ADMIN — Medication 500 MG: at 08:12

## 2019-01-01 RX ADMIN — Medication 500 MG: at 20:19

## 2019-01-01 RX ADMIN — ACETAMINOPHEN 650 MG: 325 TABLET, FILM COATED ORAL at 13:55

## 2019-01-01 RX ADMIN — ATROPA BELLADONNA AND OPIUM 0.5 SUPPOSITORY: 16.2; 3 SUPPOSITORY RECTAL at 23:10

## 2019-01-01 RX ADMIN — OXYCODONE HYDROCHLORIDE 10 MG: 5 TABLET ORAL at 11:43

## 2019-01-01 RX ADMIN — DOCUSATE SODIUM 100 MG: 100 CAPSULE, LIQUID FILLED ORAL at 20:23

## 2019-01-01 RX ADMIN — PREDNISONE 5 MG: 5 TABLET ORAL at 08:19

## 2019-01-01 RX ADMIN — SODIUM CHLORIDE 80 ML: 9 INJECTION, SOLUTION INTRAVENOUS at 14:08

## 2019-01-01 RX ADMIN — HYDROMORPHONE HYDROCHLORIDE 0.5 MG: 1 INJECTION, SOLUTION INTRAMUSCULAR; INTRAVENOUS; SUBCUTANEOUS at 18:14

## 2019-01-01 RX ADMIN — SODIUM CHLORIDE: 9 INJECTION, SOLUTION INTRAVENOUS at 04:18

## 2019-01-01 RX ADMIN — HYDROMORPHONE HYDROCHLORIDE 0.5 MG: 1 INJECTION, SOLUTION INTRAMUSCULAR; INTRAVENOUS; SUBCUTANEOUS at 23:12

## 2019-01-01 RX ADMIN — Medication 1 MG: at 02:51

## 2019-01-01 RX ADMIN — OXYCODONE HYDROCHLORIDE 10 MG: 5 TABLET ORAL at 08:20

## 2019-01-01 RX ADMIN — PREDNISONE 5 MG: 5 TABLET ORAL at 08:24

## 2019-01-01 RX ADMIN — ACETAMINOPHEN 650 MG: 325 TABLET, FILM COATED ORAL at 04:01

## 2019-01-01 RX ADMIN — ONDANSETRON 4 MG: 2 INJECTION INTRAMUSCULAR; INTRAVENOUS at 13:09

## 2019-01-01 RX ADMIN — HYDROMORPHONE HYDROCHLORIDE 4 MG: 4 TABLET ORAL at 00:21

## 2019-01-01 RX ADMIN — VANCOMYCIN HYDROCHLORIDE 125 MG: KIT at 11:35

## 2019-01-01 RX ADMIN — OXYCODONE HYDROCHLORIDE 10 MG: 5 TABLET ORAL at 02:08

## 2019-01-01 RX ADMIN — OXYCODONE HYDROCHLORIDE 10 MG: 5 TABLET ORAL at 15:32

## 2019-01-01 RX ADMIN — IOPAMIDOL 65 ML: 755 INJECTION, SOLUTION INTRAVENOUS at 14:08

## 2019-01-01 RX ADMIN — HYDROMORPHONE HYDROCHLORIDE 4 MG: 4 TABLET ORAL at 22:55

## 2019-01-01 RX ADMIN — PREDNISONE 5 MG: 5 TABLET ORAL at 12:36

## 2019-01-01 RX ADMIN — SODIUM CHLORIDE 1000 ML: 9 INJECTION, SOLUTION INTRAVENOUS at 13:10

## 2019-01-01 RX ADMIN — HYDROMORPHONE HYDROCHLORIDE 4 MG: 4 TABLET ORAL at 04:32

## 2019-01-01 RX ADMIN — HYDROMORPHONE HYDROCHLORIDE 4 MG: 4 TABLET ORAL at 12:16

## 2019-01-01 RX ADMIN — SODIUM CHLORIDE 1000 ML: 9 INJECTION, SOLUTION INTRAVENOUS at 23:56

## 2019-01-01 RX ADMIN — SODIUM CHLORIDE 1000 ML: 9 INJECTION, SOLUTION INTRAVENOUS at 17:32

## 2019-01-01 RX ADMIN — VANCOMYCIN HYDROCHLORIDE 125 MG: KIT at 08:12

## 2019-01-01 ASSESSMENT — ENCOUNTER SYMPTOMS
HEMATURIA: 1
CHILLS: 1
ABDOMINAL PAIN: 1
FEVER: 0
SHORTNESS OF BREATH: 0
SHORTNESS OF BREATH: 0
HEMATURIA: 1
COUGH: 0
FATIGUE: 1
NAUSEA: 0
SHORTNESS OF BREATH: 1
CHILLS: 1
FEVER: 0
FEVER: 0
VOMITING: 0
APPETITE CHANGE: 1
DYSURIA: 0
CHILLS: 0
FEVER: 0
ABDOMINAL PAIN: 1
FEVER: 0
FEVER: 0
BLOOD IN STOOL: 0
DYSURIA: 1
VOMITING: 0
FLANK PAIN: 1
NAUSEA: 0
CHILLS: 0
DIAPHORESIS: 1
VOMITING: 1
DIAPHORESIS: 1
DIARRHEA: 1
DIARRHEA: 0
FLANK PAIN: 1
DIFFICULTY URINATING: 1
DIARRHEA: 1
HEMATURIA: 1
FREQUENCY: 0
ABDOMINAL PAIN: 1

## 2019-01-01 ASSESSMENT — MIFFLIN-ST. JEOR
SCORE: 1454.75
SCORE: 1561.79
SCORE: 1465.63
SCORE: 1452.02
SCORE: 1448.4
SCORE: 1484.68

## 2019-01-01 ASSESSMENT — PAIN SCALES - GENERAL: PAINLEVEL: NO PAIN (0)

## 2019-01-16 ENCOUNTER — TRANSFERRED RECORDS (OUTPATIENT)
Dept: HEALTH INFORMATION MANAGEMENT | Facility: CLINIC | Age: 74
End: 2019-01-16

## 2019-02-08 ENCOUNTER — TRANSFERRED RECORDS (OUTPATIENT)
Dept: HEALTH INFORMATION MANAGEMENT | Facility: CLINIC | Age: 74
End: 2019-02-08

## 2019-02-11 DIAGNOSIS — C61 PROSTATE CANCER (H): ICD-10-CM

## 2019-03-01 ENCOUNTER — TRANSFERRED RECORDS (OUTPATIENT)
Dept: HEALTH INFORMATION MANAGEMENT | Facility: CLINIC | Age: 74
End: 2019-03-01

## 2019-03-14 ENCOUNTER — OFFICE VISIT (OUTPATIENT)
Dept: FAMILY MEDICINE | Facility: CLINIC | Age: 74
End: 2019-03-14
Payer: COMMERCIAL

## 2019-03-14 VITALS
BODY MASS INDEX: 25.65 KG/M2 | WEIGHT: 189.4 LBS | SYSTOLIC BLOOD PRESSURE: 138 MMHG | DIASTOLIC BLOOD PRESSURE: 72 MMHG | TEMPERATURE: 97.7 F | HEART RATE: 81 BPM | RESPIRATION RATE: 16 BRPM | HEIGHT: 72 IN | OXYGEN SATURATION: 98 %

## 2019-03-14 DIAGNOSIS — D50.0 IRON DEFICIENCY ANEMIA DUE TO CHRONIC BLOOD LOSS: ICD-10-CM

## 2019-03-14 DIAGNOSIS — R39.89 URINARY PROBLEM: ICD-10-CM

## 2019-03-14 DIAGNOSIS — C61 PROSTATE CANCER METASTATIC TO BONE (H): ICD-10-CM

## 2019-03-14 DIAGNOSIS — R31.29 MICROSCOPIC HEMATURIA: ICD-10-CM

## 2019-03-14 DIAGNOSIS — R30.0 DYSURIA: Primary | ICD-10-CM

## 2019-03-14 DIAGNOSIS — C79.51 PROSTATE CANCER METASTATIC TO BONE (H): ICD-10-CM

## 2019-03-14 LAB
ALBUMIN UR-MCNC: 100 MG/DL
APPEARANCE UR: ABNORMAL
BILIRUB UR QL STRIP: NEGATIVE
COLOR UR AUTO: YELLOW
GLUCOSE UR STRIP-MCNC: NEGATIVE MG/DL
HGB UR QL STRIP: ABNORMAL
KETONES UR STRIP-MCNC: NEGATIVE MG/DL
LEUKOCYTE ESTERASE UR QL STRIP: ABNORMAL
MUCOUS THREADS #/AREA URNS LPF: PRESENT /LPF
NITRATE UR QL: NEGATIVE
PH UR STRIP: 7 PH (ref 5–7)
RBC #/AREA URNS AUTO: ABNORMAL /HPF
SOURCE: ABNORMAL
SP GR UR STRIP: 1.02 (ref 1–1.03)
UROBILINOGEN UR STRIP-ACNC: 0.2 EU/DL (ref 0.2–1)
WBC #/AREA URNS AUTO: ABNORMAL /HPF

## 2019-03-14 PROCEDURE — 81001 URINALYSIS AUTO W/SCOPE: CPT | Performed by: FAMILY MEDICINE

## 2019-03-14 PROCEDURE — 99213 OFFICE O/P EST LOW 20 MIN: CPT | Performed by: FAMILY MEDICINE

## 2019-03-14 PROCEDURE — 87086 URINE CULTURE/COLONY COUNT: CPT | Performed by: FAMILY MEDICINE

## 2019-03-14 RX ORDER — PIMECROLIMUS 10 MG/G
CREAM TOPICAL PRN
COMMUNITY
End: 2019-01-01

## 2019-03-14 RX ORDER — DOXEPIN HYDROCHLORIDE 50 MG/G
CREAM TOPICAL DAILY PRN
Status: ON HOLD | COMMUNITY
End: 2020-01-01

## 2019-03-14 ASSESSMENT — MIFFLIN-ST. JEOR: SCORE: 1632.11

## 2019-03-14 NOTE — PATIENT INSTRUCTIONS
Component      Latest Ref Rng & Units 3/14/2019   Color Urine       Yellow   Appearance Urine       Slightly Cloudy   Glucose Urine      NEG:Negative mg/dL Negative   Bilirubin Urine      NEG:Negative Negative   Ketones Urine      NEG:Negative mg/dL Negative   Specific Gravity Urine      1.003 - 1.035 1.020   Blood Urine      NEG:Negative Moderate (A)   pH Urine      5.0 - 7.0 pH 7.0   Protein Albumin Urine      NEG:Negative mg/dL 100 (A)   Urobilinogen Urine      0.2 - 1.0 EU/dL 0.2   Nitrite Urine      NEG:Negative Negative   Leukocyte Esterase Urine      NEG:Negative Trace (A)   Source       Midstream Urine   WBC Urine      OTO5:0 - 5 /HPF 5-10 (A)   RBC Urine      OTO2:O - 2 /HPF 10-25 (A)   Mucous Urine      NEG:Negative /LPF Present (A)       We are running a culture. We will let you know those results.  At this time, holding on antibiotics.

## 2019-03-14 NOTE — PROGRESS NOTES
SUBJECTIVE:   John Batista is a 73 year old male who presents to clinic today for the following health issues:      Genitourinary symptoms      Duration:x 2 days ago    Description:  dysuria and frequency    Intensity:  Mild to moderate    Accompanying signs and symptoms (fever/discharge/nausea/vomiting/back or abdominal pain):  None    History (frequent UTI's/kidney stones/prostate problems): prostate cancer   Sexually active: no     Precipitating or alleviating factors: None    Therapies tried and outcome: none   Outcome:           Problem list and histories reviewed & adjusted, as indicated.  Additional history:     See under ROS     Patient Active Problem List   Diagnosis     Benign neoplasm of colon     CARDIOVASCULAR SCREENING; LDL GOAL LESS THAN 160     Advance Care Planning     Iron deficiency anemia     Rectal ulcer     Prostate cancer (H)     Acute lower gastrointestinal hemorrhage     GI bleeding     Melena       Current Outpatient Medications   Medication Sig Dispense Refill     Cholecalciferol (VITAMIN D) 1000 UNITS capsule Take 2,000 Units by mouth 2 times daily        doxepin (ZONALON) 5 % external cream Apply topically as needed for itching       ferrous sulfate (IRON) 325 (65 Fe) MG tablet Take 1 tablet (325 mg) by mouth 2 times daily (with meals) 60 tablet 0     leuprolide (ELIGARD) 45 MG injection Inject 45 mg Subcutaneous every 6 months.       pimecrolimus (ELIDEL) 1 % external cream Apply topically as needed       predniSONE (DELTASONE) 5 MG tablet Take 5 mg by mouth 2 times daily       TMC 0.1 % as needed       UNABLE TO FIND MEDICATION NAME: N-A-C Sustain - takes 1 capsule daily           Reviewed and updated as needed this visit by clinical staff       Reviewed and updated as needed this visit by Provider         ROS:  CONSTITUTIONAL:NEGATIVE for fever, chills, change in weight  RESP:NEGATIVE for significant cough or SOB  CV: NEGATIVE for chest pain, palpitations or peripheral  edema  GI: NEGATIVE for nausea, abdominal pain, heartburn, or change in bowel habits  : see below  PSYCHIATRIC: NEGATIVE for changes in mood or affect    Has recently had skin irritation in groin and beyond... Around rectum.  Open sores near rectum.    Thinks has gone to penile area and may have a UTI.  In the last couple days has noted Dysuria. Burning sensation in bladder.   Frequency; not a lot of urine. Dribbling.   May have been related to cancer; this has been going on quite awhile.  [facility] is where he goes for his cancer.     Not feeling he is emptying bladder; this has been going on a couple weeks.    Dysuria a couple days ago.    On iron through [facility] for hemoglobin.   Wondering what his last hemoglobin was.    He has gone through several rounds of chemo for his prostate cancer. Notes overall he is tolerating well.     OBJECTIVE:     /72 (BP Location: Right arm, Cuff Size: Adult Regular)   Pulse 81   Temp 97.7  F (36.5  C) (Oral)   Resp 16   Ht 1.829 m (6')   Wt 85.9 kg (189 lb 6.4 oz)   SpO2 98%   BMI 25.69 kg/m    Body mass index is 25.69 kg/m .  GENERAL APPEARANCE: alert and no distress  CV: regular rates and rhythm  ABDOMEN: soft, nontender, without hepatosplenomegaly or masses and bowel sounds normal  PSYCH: mentation appears normal and affect normal/bright    Diagnostic Test Results:  Results for orders placed or performed in visit on 03/14/19 (from the past 24 hour(s))   *UA reflex to Microscopic and Culture ([place] and [facility] (except [place] and [place])   Result Value Ref Range    Color Urine Yellow     Appearance Urine Slightly Cloudy     Glucose Urine Negative NEG^Negative mg/dL    Bilirubin Urine Negative NEG^Negative    Ketones Urine Negative NEG^Negative mg/dL    Specific Gravity Urine 1.020 1.003 - 1.035    Blood Urine Moderate (A) NEG^Negative    pH Urine 7.0 5.0 - 7.0 pH    Protein Albumin Urine 100 (A) NEG^Negative mg/dL    Urobilinogen Urine 0.2 0.2 - 1.0 EU/dL     Nitrite Urine Negative NEG^Negative    Leukocyte Esterase Urine Trace (A) NEG^Negative    Source Midstream Urine    Urine Microscopic   Result Value Ref Range    WBC Urine 5-10 (A) OTO5^0 - 5 /HPF    RBC Urine 10-25 (A) OTO2^O - 2 /HPF    Mucous Urine Present (A) NEG^Negative /LPF       Hemoglobin   Date Value Ref Range Status   11/12/2018 9.8 (L) 13.3 - 17.7 g/dL Final     Comment:     Results confirmed by repeat test   11/01/2018 8.1 (L) 13.3 - 17.7 g/dL Final     I did not find another one more up to date on Care Everywhere.    ASSESSMENT/PLAN:     1. Dysuria  Urine not normal, but may not be infection either.   Symptoms have been indolent.  Will be doing UC; have held on antibiotics at this time.     2. Prostate cancer metastatic to bone (H)  Undergoing treatment at [facility].    3. Microscopic hematuria  This may be causing some irritation. Suspect it goes along with cancer or its treatment. Will be following [facility]    4. Urinary problem  As above.   - *UA reflex to Microscopic and Culture ([place] and [facility] (except [place] and [place])  - Urine Microscopic    5. Iron deficiency anemia due to chronic blood loss  Stated that [facility] has him on iron. He is anticipating that this may be checked next week.    He is going to [facility] next week; either Tuesday or Wednesday.      Patient Instructions         Component      Latest Ref Rng & Units 3/14/2019   Color Urine       Yellow   Appearance Urine       Slightly Cloudy   Glucose Urine      NEG:Negative mg/dL Negative   Bilirubin Urine      NEG:Negative Negative   Ketones Urine      NEG:Negative mg/dL Negative   Specific Gravity Urine      1.003 - 1.035 1.020   Blood Urine      NEG:Negative Moderate (A)   pH Urine      5.0 - 7.0 pH 7.0   Protein Albumin Urine      NEG:Negative mg/dL 100 (A)   Urobilinogen Urine      0.2 - 1.0 EU/dL 0.2   Nitrite Urine      NEG:Negative Negative   Leukocyte Esterase Urine      NEG:Negative Trace (A)   Source       Midstream Urine    WBC Urine      OTO5:0 - 5 /HPF 5-10 (A)   RBC Urine      OTO2:O - 2 /HPF 10-25 (A)   Mucous Urine      NEG:Negative /LPF Present (A)       We are running a culture. We will let you know those results.  At this time, holding on antibiotics.          Alyssa Purvis MD, MD  [facility]

## 2019-03-15 LAB
BACTERIA SPEC CULT: NO GROWTH
SPECIMEN SOURCE: NORMAL

## 2019-03-18 ENCOUNTER — TELEPHONE (OUTPATIENT)
Dept: FAMILY MEDICINE | Facility: CLINIC | Age: 74
End: 2019-03-18

## 2019-03-18 NOTE — TELEPHONE ENCOUNTER
Results of UA and culture given to patient and wife.     Patient stated that he is still having severe burning with urination. He has been less active and has wanted to sit in his office most of the time because he is having a burning feeling at [facility].     Home Care Instructions given for painful urination at this time.     Please review and advise.     Judi Villagomez RN Flex

## 2019-03-18 NOTE — TELEPHONE ENCOUNTER
Reason for call:  Patient reporting a symptom    Symptom or request: wife is calling and he is very uncomfortable. It is painful when he urinates. She would like Dr. Martinez to look over the results and give him something for the UTI.    Duration (how long have symptoms been present): days    Have you been treated for this before? Yes    Additional comments:     Phone Number patient can be reached at:  Home number on file [phone] (home)    Best Time:  any    Can we leave a detailed message on this number:  YES    Call taken on 3/18/2019 at 11:14 AM by Su Cartwright

## 2019-03-21 ENCOUNTER — TELEPHONE (OUTPATIENT)
Dept: FAMILY MEDICINE | Facility: CLINIC | Age: 74
End: 2019-03-21

## 2019-03-21 ENCOUNTER — OFFICE VISIT (OUTPATIENT)
Dept: FAMILY MEDICINE | Facility: CLINIC | Age: 74
End: 2019-03-21
Payer: COMMERCIAL

## 2019-03-21 VITALS
OXYGEN SATURATION: 97 % | HEART RATE: 87 BPM | WEIGHT: 188.5 LBS | BODY MASS INDEX: 25.57 KG/M2 | RESPIRATION RATE: 17 BRPM | DIASTOLIC BLOOD PRESSURE: 68 MMHG | SYSTOLIC BLOOD PRESSURE: 130 MMHG | TEMPERATURE: 97.3 F

## 2019-03-21 DIAGNOSIS — L30.9 DERMATITIS: ICD-10-CM

## 2019-03-21 DIAGNOSIS — R82.90 NONSPECIFIC FINDING ON EXAMINATION OF URINE: ICD-10-CM

## 2019-03-21 DIAGNOSIS — R30.0 DYSURIA: Primary | ICD-10-CM

## 2019-03-21 DIAGNOSIS — C61 PROSTATE CANCER METASTATIC TO BONE (H): ICD-10-CM

## 2019-03-21 DIAGNOSIS — C79.51 PROSTATE CANCER METASTATIC TO BONE (H): ICD-10-CM

## 2019-03-21 LAB
ALBUMIN UR-MCNC: 100 MG/DL
APPEARANCE UR: CLEAR
BACTERIA #/AREA URNS HPF: ABNORMAL /HPF
BILIRUB UR QL STRIP: NEGATIVE
COLOR UR AUTO: YELLOW
GLUCOSE UR STRIP-MCNC: NEGATIVE MG/DL
HGB UR QL STRIP: ABNORMAL
KETONES UR STRIP-MCNC: NEGATIVE MG/DL
LEUKOCYTE ESTERASE UR QL STRIP: ABNORMAL
NITRATE UR QL: NEGATIVE
NON-SQ EPI CELLS #/AREA URNS LPF: ABNORMAL /LPF
PH UR STRIP: 5.5 PH (ref 5–7)
RBC #/AREA URNS AUTO: ABNORMAL /HPF
SOURCE: ABNORMAL
SP GR UR STRIP: >1.03 (ref 1–1.03)
UROBILINOGEN UR STRIP-ACNC: 0.2 EU/DL (ref 0.2–1)
WBC #/AREA URNS AUTO: ABNORMAL /HPF

## 2019-03-21 PROCEDURE — 87086 URINE CULTURE/COLONY COUNT: CPT | Performed by: INTERNAL MEDICINE

## 2019-03-21 PROCEDURE — 99214 OFFICE O/P EST MOD 30 MIN: CPT | Performed by: INTERNAL MEDICINE

## 2019-03-21 PROCEDURE — 81001 URINALYSIS AUTO W/SCOPE: CPT | Performed by: INTERNAL MEDICINE

## 2019-03-21 RX ORDER — CIPROFLOXACIN 250 MG/1
250 TABLET, FILM COATED ORAL 2 TIMES DAILY
Qty: 20 TABLET | Refills: 0 | Status: SHIPPED | OUTPATIENT
Start: 2019-03-21 | End: 2019-04-11

## 2019-03-21 NOTE — TELEPHONE ENCOUNTER
Wife called and said he was in and saw Dr. Purvis) for a poss UTI and was negative and he has been in such pain since and they think he needs another culture or something and would want to see his PCP.  He also has a rash in groin area and wonders if that affected him. Extreme pain    Please call home number back to discuss further treatment

## 2019-03-21 NOTE — PROGRESS NOTES
SUBJECTIVE:   John Batista is a 73 year old male who presents to clinic today for the following health issues:      Rash      Duration: has been intermittent but clears up with ointment from Dermatology    Description  Location: groin, scrotum and base of penis  Itching: no    Intensity:  moderate    Accompanying signs and symptoms: None    History (similar episodes/previous evaluation): yes and being treated by dermatology with only intermittent relief    Precipitating or alleviating factors:  New exposures:  None  Recent travel: no      Therapies tried and outcome: Doxepin and TMC cream and Elidel.    When he uses the medication it clears and as soon as he stops the rash comes back.     Genitourinary symptoms      Duration: several weeks    Description:  dysuria, frequency, hesitancy and retention    Intensity:  moderate, severe    Accompanying signs and symptoms (fever/discharge/nausea/vomiting/back or abdominal pain):  Lower mid abdominal pain    History (frequent UTI's/kidney stones/prostate problems): None  Sexually active: no     Precipitating or alleviating factors: None    Therapies tried and outcome: cranberry juice    Outcome: has helped a little     Prostate Cancer with metastases to bone.   Followed by Dr. Reese at [facility]   He has recently received Taxotere and Carboplatin and is planning to receive Radium injections, along with another prostate cancer agent ( xoph?)        Problem list and histories reviewed & adjusted, as indicated.  Additional history: as documented    Patient Active Problem List   Diagnosis     Benign neoplasm of colon     CARDIOVASCULAR SCREENING; LDL GOAL LESS THAN 160     Advance Care Planning     Iron deficiency anemia     Rectal ulcer     Prostate cancer metastatic to bone (H)     Acute lower gastrointestinal hemorrhage     GI bleeding     Melena     Past Surgical History:   Procedure Laterality Date     COLONOSCOPY N/A 11/29/2016    Procedure: COLONOSCOPY;  Surgeon:  Alon Lo MD;  Location: [facility] OR     COLONOSCOPY N/A 12/3/2016    Procedure: COLONOSCOPY;  Surgeon: Carmenza Akbar MD;  Location: [facility] GI     COLONOSCOPY N/A 10/30/2018    Procedure: COMBINED COLONOSCOPY, SINGLE OR MULTIPLE BIOPSY/POLYPECTOMY BY BIOPSY;  Surgeon: Anatoly Tam MD;  Location: [facility] GI     CYSTOSCOPY       ESOPHAGOSCOPY, GASTROSCOPY, DUODENOSCOPY (EGD), COMBINED N/A 10/28/2018    Procedure: COMBINED ESOPHAGOSCOPY, GASTROSCOPY, DUODENOSCOPY (EGD);  Surgeon: Codey De La Torre MD;  Location: [facility] GI     ESOPHAGOSCOPY, GASTROSCOPY, DUODENOSCOPY (EGD), COMBINED Left 10/31/2018    Procedure: ESOPHAGOSCOPY, GASTROSCOPY, DUODENOSCOPY (EGD) with Pill Cam  Rm 334;  Surgeon: Anatoly Tam MD;  Location: [facility] GI     EXAM UNDER ANESTHESIA ANUS N/A 11/29/2016    Procedure: EXAM UNDER ANESTHESIA ANUS;  Surgeon: Alon Lo MD;  Location: [facility] OR     HC COLONOSCOPY THRU STOMA, DIAGNOSTIC  2003    normal, 2000 had polyps     HERNIA REPAIR, INGUINAL RT/LT       SIGMOIDOSCOPY FLEXIBLE N/A 9/10/2014    Procedure: SIGMOIDOSCOPY FLEXIBLE;  Surgeon: Madhuri Drake MD;  Location: [facility] GI     VASECTOMY         Social History     Tobacco Use     Smoking status: Former Smoker     Start date: 9/8/1981     Smokeless tobacco: Former User   Substance Use Topics     Alcohol use: No     Family History   Problem Relation Age of Onset     C.A.D. No family hx of      Diabetes No family hx of      Hypertension No family hx of      Breast Cancer No family hx of      Cancer - colorectal No family hx of          Current Outpatient Medications   Medication Sig Dispense Refill     Cholecalciferol (VITAMIN D) 1000 UNITS capsule Take 2,000 Units by mouth 2 times daily        doxepin (ZONALON) 5 % external cream Apply topically as needed for itching       ferrous sulfate (IRON) 325 (65 Fe) MG tablet Take 1 tablet (325 mg) by mouth 2 times daily (with meals) 60 tablet 0     leuprolide (ELIGARD) 45 MG injection  Inject 45 mg Subcutaneous every 6 months.       pimecrolimus (ELIDEL) 1 % external cream Apply topically as needed       predniSONE (DELTASONE) 5 MG tablet Take 5 mg by mouth 2 times daily       UNABLE TO FIND MEDICATION NAME: N-A-C Sustain - takes 1 capsule daily       No Known Allergies  BP Readings from Last 3 Encounters:   04/11/19 112/66   04/11/19 120/78   03/21/19 130/68    Wt Readings from Last 3 Encounters:   04/11/19 85.7 kg (189 lb)   04/11/19 83.9 kg (185 lb)   03/26/19 85.3 kg (188 lb)                  Labs reviewed in EPIC    Reviewed and updated as needed this visit by clinical staff  Tobacco  Allergies  Meds  Med Hx  Surg Hx  Fam Hx  Soc Hx      Reviewed and updated as needed this visit by Provider  Tobacco  Allergies  Meds  Problems  Med Hx  Surg Hx  Fam Hx         ROS:  CONSTITUTIONAL: NEGATIVE for fever, chills, change in weight  INTEGUMENTARY/SKIN: dermatitis- past dermatology evaluation  ENT/MOUTH: NEGATIVE for ear, mouth and throat problems  RESP: NEGATIVE for significant cough or SOB  CV: NEGATIVE for chest pain, palpitations or peripheral edema  : metastatic prostate cancer; treatment has included radiation; followed closely by urology and specialist.  MUSCULOSKELETAL: NEGATIVE for significant arthralgias or myalgia  NEURO: NEGATIVE for weakness, dizziness or paresthesias  HEME/ALLERGY/IMMUNE: oncology- metastatic prostate cancer.  PSYCHIATRIC: NEGATIVE for changes in mood or affect    UA reviewed  OBJECTIVE:     /68   Pulse 87   Temp 97.3  F (36.3  C) (Oral)   Resp 17   Wt 85.5 kg (188 lb 8 oz)   SpO2 97%   BMI 25.57 kg/m    Body mass index is 25.57 kg/m .  GENERAL: healthy, alert and no distress  NECK: no adenopathy, no asymmetry, masses, or scars and thyroid normal to palpation  RESP: lungs clear to auscultation - no rales, rhonchi or wheezes  CV: regular rates and rhythm, normal S1 S2, no S3 or S4, peripheral pulses strong and no peripheral edema  ABDOMEN: soft,  nontender, no hepatosplenomegaly, no masses and bowel sounds normal   (male): no costovertebral angle tenderness   MS: no gross musculoskeletal defects noted, no edema  SKIN: deferred  Psych: affect appropriate        ASSESSMENT/PLAN:       (R30.0) Dysuria  (primary encounter diagnosis)  Comment: UA suggests hemorrhagic cystitis; reviewed with pt and spouse about less risk for urinary infection in men; but he has metastatic prostate cancer and treated with radiation; treat with one week of antibiotics. Suspect he will note improvement within 2-3 days  Plan: UA reflex to Microscopic and Culture, Urine         Microscopic, Urine Culture Aerobic Bacterial,         ciprofloxacin (CIPRO) 250 MG tablet          (R82.90) Nonspecific finding on examination of urine  Comment:   Plan: Urine Culture Aerobic Bacterial          (C61,  C79.51) Prostate cancer metastatic to bone (H)  Comment: continue treatment plan per urology/oncolgy  Plan:     (L30.9) Dermatitis  Comment: request records from Dr. Lopez.  Continue topical treatments per derm.   Plan: per dermatology      Rosario Martinez MD  Internal Medicine   [facility]

## 2019-03-21 NOTE — PATIENT INSTRUCTIONS
Reviewed UA  Ciprofloxacin   Twice per day for 10 day    Recheck urinalysis in 3 weeks and will reassess at that appt.

## 2019-03-21 NOTE — TELEPHONE ENCOUNTER
Called patient/wife. Patient states it hurts/burns at area of bladder, especially when he urinates. They also wonder if rash has gone up into that area (at crease between legs to scrotum/penis and now rectum). They can't get into dermatologist until April 17th. Rash isn't something new but it has gotten bigger.     Patient states he feels he is not draining bladder completely and he is going frequently. No fever noted.    Advised Dr. Martinez has agreed to see him at 2:15 today if that works for them. Patient stated understanding and is in agreement with plan.    Maria Luisa ROBBINS, Triage RN

## 2019-03-22 LAB
BACTERIA SPEC CULT: NORMAL
SPECIMEN SOURCE: NORMAL

## 2019-03-26 ENCOUNTER — OFFICE VISIT (OUTPATIENT)
Dept: UROLOGY | Facility: CLINIC | Age: 74
End: 2019-03-26
Payer: COMMERCIAL

## 2019-03-26 VITALS — HEIGHT: 72 IN | OXYGEN SATURATION: 97 % | HEART RATE: 70 BPM | WEIGHT: 188 LBS | BODY MASS INDEX: 25.47 KG/M2

## 2019-03-26 DIAGNOSIS — C61 PROSTATE CANCER METASTATIC TO BONE (H): ICD-10-CM

## 2019-03-26 DIAGNOSIS — C79.51 PROSTATE CANCER METASTATIC TO BONE (H): ICD-10-CM

## 2019-03-26 DIAGNOSIS — C61 PROSTATE CANCER (H): Primary | ICD-10-CM

## 2019-03-26 PROCEDURE — 96402 CHEMO HORMON ANTINEOPL SQ/IM: CPT | Performed by: UROLOGY

## 2019-03-26 PROCEDURE — 99214 OFFICE O/P EST MOD 30 MIN: CPT | Mod: 25 | Performed by: UROLOGY

## 2019-03-26 ASSESSMENT — MIFFLIN-ST. JEOR: SCORE: 1625.76

## 2019-03-26 ASSESSMENT — PAIN SCALES - GENERAL: PAINLEVEL: NO PAIN (0)

## 2019-03-26 NOTE — NURSING NOTE
Pt here for lupron injection.  psa from [place] from 3-19-19... In care everywhere.  Pt states his Last lupron was 6 months ago at the [facility].  Pt denies gross hem or dysuria.  Pt on cipro 250mg BID for 10 days from dr. Martinez.  CELSA Parson CMA

## 2019-03-26 NOTE — PROGRESS NOTES
John Batista is a pleasant 73-year-old patient with a long history of prostate cancer, grade 4+4.  He is been treated with hormonal therapy, radiation therapy to the prostate, chemotherapy, Zytiga and Xtandi.  He was tested for receptors for checkpoint inhibitors in [place] and apparently is not a candidate for immunotherapy at this time.  Last PSA was 17.8 at [facility].  He now returns for his 6-month Lupron injection.  He recently was started on Cipro for a presumed UTI.  Urine culture was negative but urinalysis showed leukocytes and red blood cells.  He is unable to urinate for a specimen today.  CT scan at [facility] in August of last year was done with contrast and shows a stable bladder wall and no renal stones or tumors.  I suspect his hematuria is related to radiation but he will need to come back to see me for a urinalysis, urine FISH test and office cystoscopy.  Other past medical history, medications and allergies reviewed  Exam: With spouse, normal vital signs, normal appearance, alert and oriented.  Normal respirations, neuro grossly intact  Assessment: Pyuria and microhematuria, metastatic adenocarcinoma of the prostate  Plan: See him back for cystoscopy and urinalysis and urine FISH test.  Lupron every 6 months and keep follow-up with Dr. Reese and Dr. Borrero  25 minutes total time with greater than 50% discussion

## 2019-03-26 NOTE — NURSING NOTE
The following medication was given:     MEDICATION: Lupron Dep[place] 45 mg  ROUTE: IM  SITE: [facility]  DOSE: 45mg  LOT #: [id number]  :  neymar  EXPIRATION DATE:  30may2021  NDC#: 5734-7468-81  CELSA Parson CMA

## 2019-03-26 NOTE — LETTER
RE: John Batista  [address] [facility] [address]     Dear Colleague,    Thank you for referring your patient, John Batista, to the [facility] UROLOGY CLINIC [place] at [facility]. Please see a copy of my visit note below.    John Batista is a pleasant 73-year-old patient with a long history of prostate cancer, grade 4+4.  He is been treated with hormonal therapy, radiation therapy to the prostate, chemotherapy, Zytiga and Xtandi.  He was tested for receptors for checkpoint inhibitors in [place] and apparently is not a candidate for immunotherapy at this time.  Last PSA was 17.8 at [facility].  He now returns for his 6-month Lupron injection.  He recently was started on Cipro for a presumed UTI.  Urine culture was negative but urinalysis showed leukocytes and red blood cells.  He is unable to urinate for a specimen today.  CT scan at [facility] in August of last year was done with contrast and shows a stable bladder wall and no renal stones or tumors.  I suspect his hematuria is related to radiation but he will need to come back to see me for a urinalysis, urine FISH test and office cystoscopy.  Other past medical history, medications and allergies reviewed  Exam: With spouse, normal vital signs, normal appearance, alert and oriented.  Normal respirations, neuro grossly intact  Assessment: Pyuria and microhematuria, metastatic adenocarcinoma of the prostate  Plan: See him back for cystoscopy and urinalysis and urine FISH test.  Lupron every 6 months and keep follow-up with Dr. Reese and Dr. Borrero  25 minutes total time with greater than 50% discussion    Again, thank you for allowing me to participate in the care of your patient.      Sincerely,    David Galvan MD

## 2019-03-26 NOTE — LETTER
3/26/2019      RE: John Batista  [address] [facility] [address]       John Batista is a pleasant 73-year-old patient with a long history of prostate cancer, grade 4+4.  He is been treated with hormonal therapy, radiation therapy to the prostate, chemotherapy, Zytiga and Xtandi.  He was tested for receptors for checkpoint inhibitors in [place] and apparently is not a candidate for immunotherapy at this time.  Last PSA was 17.8 at [facility].  He now returns for his 6-month Lupron injection.  He recently was started on Cipro for a presumed UTI.  Urine culture was negative but urinalysis showed leukocytes and red blood cells.  He is unable to urinate for a specimen today.  CT scan at [facility] in August of last year was done with contrast and shows a stable bladder wall and no renal stones or tumors.  I suspect his hematuria is related to radiation but he will need to come back to see me for a urinalysis, urine FISH test and office cystoscopy.  Other past medical history, medications and allergies reviewed  Exam: With spouse, normal vital signs, normal appearance, alert and oriented.  Normal respirations, neuro grossly intact  Assessment: Pyuria and microhematuria, metastatic adenocarcinoma of the prostate  Plan: See him back for cystoscopy and urinalysis and urine FISH test.  Lupron every 6 months and keep follow-up with Dr. Reese and Dr. Borrero  25 minutes total time with greater than 50% discussion    David Galvan MD

## 2019-04-03 ENCOUNTER — TRANSFERRED RECORDS (OUTPATIENT)
Dept: HEALTH INFORMATION MANAGEMENT | Facility: CLINIC | Age: 74
End: 2019-04-03

## 2019-04-04 ENCOUNTER — DOCUMENTATION ONLY (OUTPATIENT)
Dept: FAMILY MEDICINE | Facility: CLINIC | Age: 74
End: 2019-04-04

## 2019-04-08 DIAGNOSIS — R31.9 HEMATURIA: Primary | ICD-10-CM

## 2019-04-11 ENCOUNTER — OFFICE VISIT (OUTPATIENT)
Dept: UROLOGY | Facility: CLINIC | Age: 74
End: 2019-04-11
Payer: COMMERCIAL

## 2019-04-11 ENCOUNTER — OFFICE VISIT (OUTPATIENT)
Dept: FAMILY MEDICINE | Facility: CLINIC | Age: 74
End: 2019-04-11
Payer: COMMERCIAL

## 2019-04-11 VITALS
WEIGHT: 189 LBS | RESPIRATION RATE: 15 BRPM | OXYGEN SATURATION: 97 % | HEART RATE: 77 BPM | BODY MASS INDEX: 25.63 KG/M2 | DIASTOLIC BLOOD PRESSURE: 66 MMHG | SYSTOLIC BLOOD PRESSURE: 112 MMHG | TEMPERATURE: 97.9 F

## 2019-04-11 VITALS
HEIGHT: 72 IN | WEIGHT: 185 LBS | HEART RATE: 60 BPM | BODY MASS INDEX: 25.06 KG/M2 | SYSTOLIC BLOOD PRESSURE: 120 MMHG | DIASTOLIC BLOOD PRESSURE: 78 MMHG

## 2019-04-11 DIAGNOSIS — R31.21 ASYMPTOMATIC MICROSCOPIC HEMATURIA: ICD-10-CM

## 2019-04-11 DIAGNOSIS — Z79.2 PROPHYLACTIC ANTIBIOTIC: Primary | ICD-10-CM

## 2019-04-11 DIAGNOSIS — C79.51 PROSTATE CANCER METASTATIC TO BONE (H): ICD-10-CM

## 2019-04-11 DIAGNOSIS — R31.9 HEMATURIA: ICD-10-CM

## 2019-04-11 DIAGNOSIS — N30.40 RADIATION CYSTITIS: Primary | ICD-10-CM

## 2019-04-11 DIAGNOSIS — C61 PROSTATE CANCER METASTATIC TO BONE (H): ICD-10-CM

## 2019-04-11 LAB
ALBUMIN UR-MCNC: NEGATIVE MG/DL
APPEARANCE UR: CLEAR
BILIRUB UR QL STRIP: NEGATIVE
COLOR UR AUTO: YELLOW
GLUCOSE UR STRIP-MCNC: NEGATIVE MG/DL
HGB UR QL STRIP: NEGATIVE
KETONES UR STRIP-MCNC: NEGATIVE MG/DL
LEUKOCYTE ESTERASE UR QL STRIP: NEGATIVE
NITRATE UR QL: NEGATIVE
PH UR STRIP: 5 PH (ref 5–7)
SOURCE: NORMAL
SP GR UR STRIP: 1.02 (ref 1–1.03)
UROBILINOGEN UR STRIP-ACNC: 0.2 EU/DL (ref 0.2–1)

## 2019-04-11 PROCEDURE — 88120 CYTP URNE 3-5 PROBES EA SPEC: CPT | Performed by: UROLOGY

## 2019-04-11 PROCEDURE — 52000 CYSTOURETHROSCOPY: CPT | Performed by: UROLOGY

## 2019-04-11 PROCEDURE — 81003 URINALYSIS AUTO W/O SCOPE: CPT | Performed by: UROLOGY

## 2019-04-11 PROCEDURE — 99213 OFFICE O/P EST LOW 20 MIN: CPT | Performed by: INTERNAL MEDICINE

## 2019-04-11 PROCEDURE — 99212 OFFICE O/P EST SF 10 MIN: CPT | Mod: 25 | Performed by: UROLOGY

## 2019-04-11 RX ORDER — CIPROFLOXACIN 500 MG/1
500 TABLET, FILM COATED ORAL ONCE
Qty: 1 TABLET | Refills: 0 | Status: SHIPPED | OUTPATIENT
Start: 2019-04-11 | End: 2019-01-01

## 2019-04-11 ASSESSMENT — MIFFLIN-ST. JEOR: SCORE: 1612.15

## 2019-04-11 ASSESSMENT — PAIN SCALES - GENERAL: PAINLEVEL: NO PAIN (0)

## 2019-04-11 NOTE — PATIENT INSTRUCTIONS

## 2019-04-11 NOTE — LETTER
4/11/2019      RE: John Batista  [address] [facility] [address]       John is a 73-year-old male with widely metastatic adenocarcinoma of the prostate.  He originally presented with high-grade disease in a PSA of 39.  He was treated with hormonal therapy and external beam radiation.  His  disease recurred several years later and he has been through several courses of chemotherapy, Zytiga, Xtandi, immunotherapy and will be receiving Xofigo.  He returns today because of microscopic hematuria and for cystoscopy  Other past medical history previously reviewed.  Medications and allergies reviewed.  Exam: Alert and oriented, normal appearance, normal vital signs, normal external genitalia  Flexible cystoscopy- normal urethra, no obstruction, prostatic fossa open with radiation changes, no raised erythema.  Open bladder neck, trabeculated bladder wall with radiation changes, small left lateral wall bladder diverticulum, no papillary tumors or raised erythema or stones.  Both ureteral orifices normal  Urine sent for FISH test  Assessment: Hematuria almost certainly due to radiation changes in bladder and prostate  Plan: Cipro x1 dose today.  See me in the fall for Lupron injection    David Galvan MD

## 2019-04-11 NOTE — NURSING NOTE
5mL 2% lidocaine hydrochloride Urojet instilled into urethra.    NDC# [address]-93  Lot #:[id number]  Expiration Date:  10/20 Marychuy Yousif LPN

## 2019-04-11 NOTE — LETTER
RE: John Batista  [address] [facility] [address]     Dear Colleague,    Thank you for referring your patient, John Batista, to the [facility] UROLOGY CLINIC [place] at [facility]. Please see a copy of my visit note below.    John is a 73-year-old male with widely metastatic adenocarcinoma of the prostate.  He originally presented with high-grade disease in a PSA of 39.  He was treated with hormonal therapy and external beam radiation.  His  disease recurred several years later and he has been through several courses of chemotherapy, Zytiga, Xtandi, immunotherapy and will be receiving Xofigo.  He returns today because of microscopic hematuria and for cystoscopy  Other past medical history previously reviewed.  Medications and allergies reviewed.  Exam: Alert and oriented, normal appearance, normal vital signs, normal external genitalia  Flexible cystoscopy- normal urethra, no obstruction, prostatic fossa open with radiation changes, no raised erythema.  Open bladder neck, trabeculated bladder wall with radiation changes, small left lateral wall bladder diverticulum, no papillary tumors or raised erythema or stones.  Both ureteral orifices normal  Urine sent for FISH test  Assessment: Hematuria almost certainly due to radiation changes in bladder and prostate  Plan: Cipro x1 dose today.  See me in the fall for Lupron injection    Again, thank you for allowing me to participate in the care of your patient.      Sincerely,    David Galvan MD

## 2019-04-11 NOTE — PROGRESS NOTES
John is a 73-year-old male with widely metastatic adenocarcinoma of the prostate.  He originally presented with high-grade disease in a PSA of 39.  He was treated with hormonal therapy and external beam radiation.  His  disease recurred several years later and he has been through several courses of chemotherapy, Zytiga, Xtandi, immunotherapy and will be receiving Xofigo.  He returns today because of microscopic hematuria and for cystoscopy  Other past medical history previously reviewed.  Medications and allergies reviewed.  Exam: Alert and oriented, normal appearance, normal vital signs, normal external genitalia  Flexible cystoscopy- normal urethra, no obstruction, prostatic fossa open with radiation changes, no raised erythema.  Open bladder neck, trabeculated bladder wall with radiation changes, small left lateral wall bladder diverticulum, no papillary tumors or raised erythema or stones.  Both ureteral orifices normal  Urine sent for FISH test  Assessment: Hematuria almost certainly due to radiation changes in bladder and prostate  Plan: Cipro x1 dose today.  See me in the fall for Lupron injection

## 2019-04-11 NOTE — PROGRESS NOTES
Chief Complaint   Patient presents with     Urinary Problem     follow up on U/A and was seen Urology this morning        SUBJECTIVE:   John Batista is a 73 year old male who presents to clinic today for the following   health issues:      Genitourinary symptoms      Duration: follow up     Description:  Symptoms have resolved     Intensity:  moderate    Accompanying signs and symptoms (fever/discharge/nausea/vomiting/back or abdominal pain):  None    History (frequent UTI's/kidney stones/prostate problems): hx of prostate cancer  Sexually active: no     Precipitating or alleviating factors: None    Therapies tried and outcome: course of antibiotics -    Outcome: improved symptoms     Urine obtained today was negative- no hematuria or signs of infection.     He has seen Dr. Galvan, Urology on 3/26/2019 and most recently 4/11/2019 for a cystoscopy and urine studies including studies related to metastatic prostate cancer with radiation changes noted in the bladder.   He is receiving Lupron injections every 6 months per Urology - Dr. Galvan.       Additional history: as documented    Reviewed  and updated as needed this visit by clinical staff  Tobacco  Allergies  Meds  Med Hx  Surg Hx  Fam Hx  Soc Hx        Reviewed and updated as needed this visit by Provider  Tobacco  Allergies  Meds  Problems  Med Hx  Surg Hx  Fam Hx         Patient Active Problem List   Diagnosis     Benign neoplasm of colon     CARDIOVASCULAR SCREENING; LDL GOAL LESS THAN 160     Advance Care Planning     Iron deficiency anemia     Rectal ulcer     Prostate cancer metastatic to bone (H)     Acute lower gastrointestinal hemorrhage     GI bleeding     Melena     Past Surgical History:   Procedure Laterality Date     COLONOSCOPY N/A 11/29/2016    Procedure: COLONOSCOPY;  Surgeon: Alon Lo MD;  Location:  OR     COLONOSCOPY N/A 12/3/2016    Procedure: COLONOSCOPY;  Surgeon: Carmenza Akbar MD;  Location:  GI      COLONOSCOPY N/A 10/30/2018    Procedure: COMBINED COLONOSCOPY, SINGLE OR MULTIPLE BIOPSY/POLYPECTOMY BY BIOPSY;  Surgeon: Anatoly Tam MD;  Location: [facility] GI     CYSTOSCOPY       ESOPHAGOSCOPY, GASTROSCOPY, DUODENOSCOPY (EGD), COMBINED N/A 10/28/2018    Procedure: COMBINED ESOPHAGOSCOPY, GASTROSCOPY, DUODENOSCOPY (EGD);  Surgeon: Codey De La Torre MD;  Location: [facility] GI     ESOPHAGOSCOPY, GASTROSCOPY, DUODENOSCOPY (EGD), COMBINED Left 10/31/2018    Procedure: ESOPHAGOSCOPY, GASTROSCOPY, DUODENOSCOPY (EGD) with Pill Cam  Rm 334;  Surgeon: Anatoly Tam MD;  Location: [facility] GI     EXAM UNDER ANESTHESIA ANUS N/A 11/29/2016    Procedure: EXAM UNDER ANESTHESIA ANUS;  Surgeon: Alon Lo MD;  Location: [facility] OR     HC COLONOSCOPY THRU STOMA, DIAGNOSTIC  2003    normal, 2000 had polyps     HERNIA REPAIR, INGUINAL RT/LT       SIGMOIDOSCOPY FLEXIBLE N/A 9/10/2014    Procedure: SIGMOIDOSCOPY FLEXIBLE;  Surgeon: Madhuri Drake MD;  Location: [facility] GI     VASECTOMY         Social History     Tobacco Use     Smoking status: Former Smoker     Start date: 9/8/1981     Smokeless tobacco: Former User   Substance Use Topics     Alcohol use: No     Family History   Problem Relation Age of Onset     C.A.D. No family hx of      Diabetes No family hx of      Hypertension No family hx of      Breast Cancer No family hx of      Cancer - colorectal No family hx of          Current Outpatient Medications   Medication Sig Dispense Refill     Cholecalciferol (VITAMIN D) 1000 UNITS capsule Take 2,000 Units by mouth 2 times daily        doxepin (ZONALON) 5 % external cream Apply topically as needed for itching       ferrous sulfate (IRON) 325 (65 Fe) MG tablet Take 1 tablet (325 mg) by mouth 2 times daily (with meals) 60 tablet 0     leuprolide (ELIGARD) 45 MG injection Inject 45 mg Subcutaneous every 6 months.       pimecrolimus (ELIDEL) 1 % external cream Apply topically as needed       predniSONE (DELTASONE) 5 MG tablet  Take 5 mg by mouth 2 times daily       UNABLE TO FIND MEDICATION NAME: N-A-C Sustain - takes 1 capsule daily       No Known Allergies  BP Readings from Last 3 Encounters:   04/11/19 112/66   04/11/19 120/78   03/21/19 130/68    Wt Readings from Last 3 Encounters:   04/11/19 85.7 kg (189 lb)   04/11/19 83.9 kg (185 lb)   03/26/19 85.3 kg (188 lb)                  Labs reviewed in EPIC    ROS:  CONSTITUTIONAL: NEGATIVE for fever, chills, change in weight  RESP: NEGATIVE for significant cough or SOB  CV: NEGATIVE for chest pain, palpitations or peripheral edema  : prostate cancer, periodic changes in urine symptoms  MUSCULOSKELETAL: NEGATIVE for significant arthralgias or myalgia  PSYCHIATRIC: NEGATIVE for changes in mood or affect    OBJECTIVE:     /66   Pulse 77   Temp 97.9  F (36.6  C) (Oral)   Resp 15   Wt 85.7 kg (189 lb)   SpO2 97%   BMI 25.63 kg/m    Body mass index is 25.63 kg/m .  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 cystoscopy  done 4/10/2019 was + for radiation changes; likely culprit 3/21  Plan:     (C61,  C79.51) Prostate cancer metastatic to bone (H)  Comment: working with urology at [facility], [facility] and [facility].   Plan: Lupron injection per urology in Sept 2019      Rosario Martinez MD  Internal Medicine   [facility]

## 2019-04-11 NOTE — NURSING NOTE

## 2019-04-18 ENCOUNTER — TRANSFERRED RECORDS (OUTPATIENT)
Dept: HEALTH INFORMATION MANAGEMENT | Facility: CLINIC | Age: 74
End: 2019-04-18

## 2019-04-24 LAB — COPATH REPORT: NORMAL

## 2019-08-27 NOTE — ED NOTES
Pt received discharge information and medication administration instructions. PT got information for follow up as indicated. PT VSS and ABC's intact.

## 2019-08-27 NOTE — ED TRIAGE NOTES
"Patient reports he has prostate cancer with metastasis and had chemotherapy a week ago. He has been having chills and sweats for 2 days \"and they told us to go to the ED\". Arrives very short of breath.  "

## 2019-08-27 NOTE — ED AVS SNAPSHOT
[facility] Emergency Department  [address]  [facility] [address]  Phone:  [phone]  Fax:  [phone]                                    John Batista   MRN: [id number]    Department:  [facility] Emergency Department   Date of Visit:  8/27/2019           After Visit Summary Signature Page    I have received my discharge instructions, and my questions have been answered. I have discussed any challenges I see with this plan with the nurse or doctor.    ..........................................................................................................................................  Patient/Patient Representative Signature      ..........................................................................................................................................  Patient Representative Print Name and Relationship to Patient    ..................................................               ................................................  Date                                   Time    ..........................................................................................................................................  Reviewed by Signature/Title    ...................................................              ..............................................  Date                                               Time          22EPIC Rev 08/18

## 2019-08-27 NOTE — ED PROVIDER NOTES
History     Chief Complaint:  Chills    HPI   John Batista is a 74 year old male, with a history of metastatic prostate cancer on Cabazitaxel chemo, GI bleed, and proctosigmoiditis s/p radiation, who presents with his wife to the ED for evaluation of chills. The patient reports he developed intermittent chills and diaphoresis 2 nights ago. He developed constant chills beginning at 1:00AM today. He has associated decreased appetite and shortness of breath. He has felt short of breath before due to dehydration. The patient's wife reports he has been staying hydrated. He had 2 bottles of pedialyte with water yesterday and V8 this morning. He began his new chemo a week ago. The patient denies any fever, cough, nausea, vomiting, or other symptoms/complaints.     Allergies:  No known drug allergies     Medications:    Vitamin D  Iron  Eligard  Multivitamin-minerals  Prednisone    Past Medical History:    Lower GI bleed  Anemia   Rectal ulcer  Prostate cancer, metastatic to bone   Colon neoplasm  Proctosigmoiditis, radiation     Past Surgical History:    Inguinal herniorrhaphy  Vasectomy   Right rotator cuff repair    Family History:    History reviewed. No pertinent family history.     Social History:  Smoking status: Former smoker, Quit 1982  Smokeless tobacco: Former user   Alcohol use: No  Presents to ED with wife    Marital Status:   [2]     Review of Systems   Constitutional: Positive for appetite change, chills and diaphoresis. Negative for fever.   Respiratory: Positive for shortness of breath. Negative for cough.    Gastrointestinal: Negative for nausea and vomiting.   All other systems reviewed and are negative.     Physical Exam     Patient Vitals for the past 24 hrs:   BP Temp Temp src Pulse Heart Rate Resp SpO2 Weight   08/27/19 1530 (!) 153/78 -- -- 104 105 16 100 % --   08/27/19 1500 (!) 157/83 -- -- 99 98 15 -- --   08/27/19 1430 -- 98.2  F (36.8  C) Oral -- -- -- -- --   08/27/19 1330 125/71  -- -- 97 95 30 100 % --   08/27/19 1251 (!) 143/71 98.1  F (36.7  C) Temporal -- 104 20 99 % 77.1 kg (170 lb)     Physical Exam  Constitutional: well appearing, no acute distress. No rigors.   Head: No external signs of trauma noted to head or face.   Eyes: Pupils are equal, round, and reactive to light. Conjunctiva normal. EOMI.  ENT: MMM. Oropharynx clear and moist without tonsillar enlargement or exudate. Normal voice.   Neck: normal ROM.    Cardiovascular: Normal rate, regular rhythm, and intact distal pulses.    Respiratory: Effort normal. No respiratory distress. Lungs clear to auscultation bilaterally. No wheezing, rales, or rhonchi.  GI: Soft. Non-tender. No rebound or guarding.   Musculoskeletal: No deformities appreciated. Normal ROM. No edema noted.  Neurological: Alert and Oriented x 3. Speech normal. Moves all extremities equally. CN II-XII intact. Coordination normal. Normal strength and sensation in extremities.   Psychiatric: Appropriate mood, affect, and behavior.   Skin: Skin is warm and dry. no rash or skin lesions noted.       Emergency Department Course     ECG (13:23:44):  Rate 95 bpm. AK interval 164. QRS duration 82. QT/QTc 384/482. P-R-T axes 39 56 53. Normal sinus rhythm. Prolonged QT. Abnormal ECG. Interpreted at 1433 by Dr. Barron and reviewed by Ginette Christianson PA-C.    Imaging:  Radiographic findings were communicated with the patient and family who voiced understanding of the findings.    CT Chest Pulmonary Embolism w Contrast  IMPRESSION:  1. Numerous bilateral pulmonary nodules that are new or larger. This  is consistent with malignancy. Recommend oncologic workup.  2. Large adenopathy at the mediastinum worrisome for malignant  adenopathy. Nonspecific mildly prominent right hilar lymph node as well.  3. No pulmonary embolism or other acute abnormality.  As read by Radiology.     Laboratory:  Laboratory findings were communicated with the patient and family who voiced understanding  of the findings.    Troponin I (1311): <0.015    CBC: WBC 0.4(LL), HGB 9.9(L), (L)  CMP: (L), Glucose 120(H), Creatinine 0.50(L), o/w WNL     Blood cultures x2: In process     UA: pH 8.5(H), o/w Negative     Interventions:  1309: Zofran 4mg IV  1310: NS 1L Bolus IV    Emergency Department Course:  Past medical records, nursing notes, and vitals reviewed.  1253: I performed an exam of the patient and obtained history, as documented above.    1311: IV inserted and blood drawn.    1323: EKG obtained.     The patient was sent for a chest CT while in the emergency department, findings above.    1434: I rechecked the patient. Explained findings to patient and wife.    1518: Patient provided a urine sample.     1535: I spoke with Dr. Simon of oncology. Dr. Simon advised he can be discharged if he is comfortable with the plan.     1546: I rechecked the patient. Explained plan to patient and wife.    Findings and plan explained to the Patient and spouse. Patient discharged home with instructions regarding supportive care, medications, and reasons to return. The importance of close follow-up was reviewed. The patient was prescribed Augmentin.     Impression & Plan      Medical Decision Makin year old male presenting with intermittent sweats and chills x 2 days. He is afebrile on arrival here and on multiple rechecks and has not had any antipyresis. Blood cultures were drawn. His labs are notable for WBC of 0.4 (not able to run differential due to it being so low). His hemoglobin and platelets appear stable. The remainder of his labs are unremarkable aside from mild hyponatremia. He has no specific complaints aside from some mild SOB. PE was a concern given his tachycardia on presentation and his history of cancer so a chest CT was obtained. This showed no evidence of PE, but did note new pulmonary nodules, likely mets. Patient was informed of this and reports this was already known. He has no evidence of  skin or soft tissue infection on exam. There is no evidence of pneumonia. No evidence of UTI at this time. He has a benign abdominal exam and I have low suspicion for any intra-abdominal pathology at this time. There is no other evidence of any active infection at this time. He is otherwise well appearing and has remained afebrile here. I discussed the patient with Dr. Simon of [place] [facility] who did not feel that he required admission for IV antibiotics given no fever, but did recommend starting him on oral antibiotics given his neutropenia. Patient feels comfortable being discharged home and will be discharged with course of Augmentin per oncology. He was instructed to follow-up with oncology clinic in 1-2 days for recheck. Instructed to return to the ED immediately for any new or worsening symptoms, including any fever, vomiting, or other new complaints.     Diagnosis:  1. Chemotherapy-induced neutropenia (H)    2. Chills (without fever)        Disposition: Patient discharged to home with wife     Discharge Medications:  New Prescriptions    AMOXICILLIN-CLAVULANATE (AUGMENTIN) 875-125 MG TABLET    Take 1 tablet by mouth 2 times daily for 7 days     Naida Diaz  8/27/2019   [facility] EMERGENCY DEPARTMENT    Scribe Disclosure:  I, Naida Diaz, am serving as a scribe at 12:53 PM on 8/27/2019 to document services personally performed by Ginette Christianson PA-C based on my observations and the provider's statements to me.        Ginette Christianson PA-C  08/27/19 [id number]

## 2019-08-28 NOTE — ED AVS SNAPSHOT
[facility] Emergency Department  [address]  [facility] [address]  Phone:  [phone]  Fax:  [phone]                                    John Batista   MRN: [id number]    Department:  [facility] Emergency Department   Date of Visit:  8/28/2019           After Visit Summary Signature Page    I have received my discharge instructions, and my questions have been answered. I have discussed any challenges I see with this plan with the nurse or doctor.    ..........................................................................................................................................  Patient/Patient Representative Signature      ..........................................................................................................................................  Patient Representative Print Name and Relationship to Patient    ..................................................               ................................................  Date                                   Time    ..........................................................................................................................................  Reviewed by Signature/Title    ...................................................              ..............................................  Date                                               Time          22EPIC Rev 08/18

## 2019-08-28 NOTE — ED PROVIDER NOTES
I received a call from lab about a positive blood culture result from ED visit yesterday. One of the cultures is growing gram positive cocci in clusters. While this is likely a contaminant, I did attempt to call the patient multiple times to tell him to return to the ED for re-evaluation. I called his home and cell phone at 11:33 am and 11:48 am. I did leave a message on his cell phone for him to return to the emergency department. I will attempt to call again if he does not return to the emergency department or call us back.        Specimen Description 08/27/2019  1:11    Blood Left Arm    Special Requests 08/27/2019  1:11    Aerobic and anaerobic bottles received    Culture Micro Abnormal  08/27/2019  1:11    Cultured on the 1st day of incubation:   Gram positive cocci in clusters          Ginette Christianson PA-C on 8/28/2019 at 11:52 AM       Ginette Christianson PA-C  08/28/19 1154

## 2019-08-29 NOTE — ED PROVIDER NOTES
I attempted to call John again and was able to speak to him and his wife Valerie. They were informed of positive blood culture result. He is feeling somewhat better today, but still having some chills tonight. No fevers though. I discussed that though this positive blood culture is likely a contaminant, it would be best for him to return to the ED as soon as possible for reassessment. They will come the ED tonight.       Gintete Christianson PA-C on 8/28/2019 at 7:32 PM       Ginette Christianson PA-C  08/28/19 1932

## 2019-08-29 NOTE — ED TRIAGE NOTES
"Pt presents with wife for call back for positive blood cultures. Was seen yesterday and told to come back. Has felt better since leaving yesterday \"but now I'm starting to feel worse again\". ABCs intact. Pt last chemo 1 week ago for prostate CA.   "

## 2019-08-29 NOTE — ED PROVIDER NOTES
History     Chief Complaint:    Abnormal Labs    HPI   John Batista is a 74 year old male with a history of metastatic prostate cancer on Cabazitaxel chemo, GI bleed, and proctosigmoiditis s/p radiation who presents with abnormal labs. Per chart review, the patient was evaluated in the emergency department yesterday for symptoms of chills and diaphoresis. He had x2 blood cultures obtained which 1 returned positive growing gram positive cocci in clusters therefore prompting his return to the emergency department. Upon arrival the patient reports that he started to experience chills, diaphoresis (hot flashes) 3 days ago but there was no recorded fever. Last night however he reports that he felt better than he has felt in a long time which lasted up until today around 1500 (6 hours ago) he started to feel unwell again. The patient denies nausea. Lastly, he reports that his most recent chemo was 8 days ago and he started a new type of chemotherapy.     Allergies:  No known drug allergies.       Medications:    Vitamin D   Zonalon  Eligard  Elidel  Deltaosone     Past Medical History:    Prostate cancer metastatic to bone  Rectal ulcer   Anemia  GI bleed  Melena     Past Surgical History:    Colonscopy x4  Cystoscopy  EGD x2  Exam under anesthesia anus  Sigmoidoscopy flexible  Hernia repair  Vasectomy   Tonsillectomy   Appendectomy (Unconfirmed/questionable, patient recalls possibly taken out during hernia repair surgery)  Rotator cuff repair     Family History:    Family history reviewed. No pertinent family history.     Social History:  The patient was accompanied to the ED by wife.  Smoking Status: Former Smoker  Smokeless Tobacco: Former User   Alcohol Use: Negative   Drug Use: Negative  PCP: Rosario Martinez   Marital Status:        Review of Systems   Constitutional: Positive for chills and diaphoresis. Negative for fever.   Gastrointestinal: Negative for nausea.   All other systems reviewed and  are negative.    Physical Exam     Patient Vitals for the past 24 hrs:   BP Temp Temp src Heart Rate Resp SpO2   08/28/19 2125 -- -- -- -- -- 98 %   08/28/19 2100 -- -- -- -- -- 98 %   08/28/19 2003 132/80 98  F (36.7  C) Oral 91 18 99 %      Physical Exam  Constitutional: Vital signs reviewed as above.   Head: No external signs of trauma noted.  Eyes: Pupils are equal, round, and reactive to light.   Neck: No JVD noted  Cardiovascular: Normal rate, regular rhythm and normal heart sounds.  No murmur heard. Equal B/L peripheral pulses.  Pulmonary/Chest: Effort normal and breath sounds normal. No respiratory distress. Patient has no wheezes. Patient has no rales.   Gastrointestinal: Soft. There is no tenderness.   Musculoskeletal/Extremities: No edema noted. Normal tone.  Neurological: Patient is alert and oriented to person, place, and time.   Skin: Skin is warm and dry. There is no diaphoresis noted.   Psychiatric: The patient appears calm.    Emergency Department Course     Laboratory:  Laboratory findings were communicated with the patient who voiced understanding of the findings.    CBC: WBC 1.0 (L), HGB 8.7 (L),  (L)  BMP: Glucose 116 (H), o/w WNL (Creatinine 0.55 (L))    Blood Culture x2: Pending     Emergency Department Course:    2039 IV was inserted and blood was drawn for laboratory testing, results above.     2046 Nursing notes and vitals reviewed. I performed an exam of the patient as documented above.     2302 I spoke with Dr. Yang of the oncology service regarding patient's presentation, findings, and plan of care.     2310 Prior to discharge, I personally reviewed the results with the patient and all related questions were answered. The patient verbalized understanding and is amenable to plan.     Impression & Plan      Medical Decision Making:  John Batista is a 74 year old male who presents to the emergency department today after being called back due to a positive blood culture.  Please  see the HPI and exam for specifics.  It is still likely the blood culture result is a skin contaminant.  The patient states he actually felt well earlier in the day and it was not until later that he started feeling chilled again but had not documented a fever.  The patient's white blood cell count is slightly better today.  I discussed the case with oncology and as the patient has otherwise been doing well we believe he still can be discharged.  He should continue his oral Augmentin and await culture results.  New blood cultures were ordered today.  Anticipatory guidance given prior to discharge.    Diagnosis:    ICD-10-CM    1. Chills R68.83      Disposition:   The patient is discharged to home.     Discharge Medications:    No discharge medications.    Scribe Disclosure:  I, Orla Severson, am serving as a scribe at 8:13 PM on 8/28/2019 to document services personally performed by Ramiro Akins DO based on my observations and the provider's statements to me.    [facility] EMERGENCY DEPARTMENT       Ramiro Akins DO  08/29/19 0346

## 2019-08-29 NOTE — DISCHARGE INSTRUCTIONS
Your blood culture result is non specific. At this time you can be discharged. You should follow up as scheduled and return to the ED if you feel worse. If the new blood cultures are also positive, you will be notified.

## 2019-08-31 NOTE — ED AVS SNAPSHOT
[facility] Emergency Department  [address]  [facility] [address]  Phone:  [phone]  Fax:  [phone]                                    John Batista   MRN: [id number]    Department:  [facility] Emergency Department   Date of Visit:  8/31/2019           After Visit Summary Signature Page    I have received my discharge instructions, and my questions have been answered. I have discussed any challenges I see with this plan with the nurse or doctor.    ..........................................................................................................................................  Patient/Patient Representative Signature      ..........................................................................................................................................  Patient Representative Print Name and Relationship to Patient    ..................................................               ................................................  Date                                   Time    ..........................................................................................................................................  Reviewed by Signature/Title    ...................................................              ..............................................  Date                                               Time          22EPIC Rev 08/18

## 2019-09-01 NOTE — ED TRIAGE NOTES
Pt recently dx with bacteremia. Placed on Amoxacillin 8/29 and has had watery diarrhea since. Concern for dehydration. ABC intact.

## 2019-09-01 NOTE — TELEPHONE ENCOUNTER
Patient's wife reports patient was prescribed Vancomycin for C-diff yesterday. He has been on Augmentin for several days. Doctor said it was okay to take both medications. But patient's wife is wondering if it's okay to take the medications at the same exact time. Transferred patient to [facility] to speak with a pharmacist.     Alma Nathan RN/[facility]    Reason for Disposition    Caller has URGENT medication question about med that PCP prescribed and triager unable to answer question    Protocols used: MEDICATION QUESTION CALL-A-AH

## 2019-09-01 NOTE — ED PROVIDER NOTES
History     Chief Complaint:  Diarrhea      HPI   John Batista is a 74 year old male who presents with diarrhea and abdominal discomfort.  He was recently diagnosed with bacteremia.  He had been placed on Augmentin since 8/28.  Over the last couple days he has had multiple episodes of diarrhea.  He noted a small amount of blood on the tissue as well.  He is concerned for dehydration.  No nausea vomiting.  No fevers.  No other concerns or complaints at this time.    Allergies:  No Known Allergies     Medications:      vancomycin (FIRVANQ) 50 MG/ML oral solution   amoxicillin-clavulanate (AUGMENTIN) 875-125 MG tablet   Cholecalciferol (VITAMIN D) 1000 UNITS capsule   doxepin (ZONALON) 5 % external cream   ferrous sulfate (IRON) 325 (65 Fe) MG tablet   leuprolide (ELIGARD) 45 MG injection   pimecrolimus (ELIDEL) 1 % external cream   predniSONE (DELTASONE) 5 MG tablet   UNABLE TO FIND       Problem List:    Patient Active Problem List    Diagnosis Date Noted     Melena 10/27/2018     Priority: Medium     GI bleeding 11/29/2016     Priority: Medium     Acute lower gastrointestinal hemorrhage 11/28/2016     Priority: Medium     Iron deficiency anemia 09/15/2013     Priority: Medium     secondary to rectal ulcer, slowly healing ; colonoscopy done 5/2013- Dr. Soni; radiation induced ulcer.  Bleeding remains intermittent but has been able to keep HGB >11 with iron supplementation; most rece  HGB     11.9   2/12/2014  HGB     11.7   1/27/2014        Rectal ulcer 09/15/2013     Priority: Medium     Prostate cancer metastatic to bone (H) 09/15/2013     Priority: Medium     12/17/2011 seed implantation; radiation; rectal ulceration developed; Dr. Farrukh LANCA[place] and Dr. Reese @[place]       Advance Care Planning 11/08/2011     Priority: Medium     Discussed advance care planning with patient; information given to patient to review. 11/8/2011        CARDIOVASCULAR SCREENING; LDL GOAL LESS THAN 160 06/14/2011     Priority:  Medium     Benign neoplasm of colon 11/25/2008     Priority: Medium        Past Medical History:    Past Medical History:   Diagnosis Date     Prostate cancer (H) 12/2011       Past Surgical History:    Past Surgical History:   Procedure Laterality Date     COLONOSCOPY N/A 11/29/2016    Procedure: COLONOSCOPY;  Surgeon: Alon Lo MD;  Location: [facility] OR     COLONOSCOPY N/A 12/3/2016    Procedure: COLONOSCOPY;  Surgeon: Carmenza Akbar MD;  Location: [facility] GI     COLONOSCOPY N/A 10/30/2018    Procedure: COMBINED COLONOSCOPY, SINGLE OR MULTIPLE BIOPSY/POLYPECTOMY BY BIOPSY;  Surgeon: Anatoly Tam MD;  Location: [facility] GI     CYSTOSCOPY       ESOPHAGOSCOPY, GASTROSCOPY, DUODENOSCOPY (EGD), COMBINED N/A 10/28/2018    Procedure: COMBINED ESOPHAGOSCOPY, GASTROSCOPY, DUODENOSCOPY (EGD);  Surgeon: Codey De La Torre MD;  Location: [facility] GI     ESOPHAGOSCOPY, GASTROSCOPY, DUODENOSCOPY (EGD), COMBINED Left 10/31/2018    Procedure: ESOPHAGOSCOPY, GASTROSCOPY, DUODENOSCOPY (EGD) with Pill Cam  Rm 334;  Surgeon: Anatoly Tam MD;  Location: [facility]     EXAM UNDER ANESTHESIA ANUS N/A 11/29/2016    Procedure: EXAM UNDER ANESTHESIA ANUS;  Surgeon: Alon Lo MD;  Location: [facility] OR     HC COLONOSCOPY THRU STOMA, DIAGNOSTIC  2003    normal, 2000 had polyps     HERNIA REPAIR, INGUINAL RT/LT       SIGMOIDOSCOPY FLEXIBLE N/A 9/10/2014    Procedure: SIGMOIDOSCOPY FLEXIBLE;  Surgeon: Madhuri Drake MD;  Location: [facility]     VASECTOMY         Family History:    Family History   Problem Relation Age of Onset     C.A.D. No family hx of      Diabetes No family hx of      Hypertension No family hx of      Breast Cancer No family hx of      Cancer - colorectal No family hx of        Social History:  Marital Status:   [2]  Social History     Tobacco Use     Smoking status: Former Smoker     Start date: 9/8/1981     Smokeless tobacco: Former User   Substance Use Topics     Alcohol use: No     Drug use: No         Review of Systems   Constitutional: Positive for fatigue. Negative for chills and fever.   Respiratory: Negative for shortness of breath.    Cardiovascular: Negative for chest pain.   Gastrointestinal: Positive for abdominal pain, diarrhea and vomiting.   Genitourinary: Negative for dysuria and frequency.   All other systems reviewed and are negative.      Physical Exam   First Vitals:  BP: (!) 153/81  Pulse: 78  Heart Rate: 102  Temp: 97.9  F (36.6  C)(last tyenol 1900)  Resp: 16  SpO2: 97 %      Physical Exam  General: Alert, Mild  discomfort, well kept  Eyes: PERRL, conjunctivae pink no scleral icterus or conjunctival injection  ENT:   Moist mucus membranes, posterior oropharynx clear without erythema or exudates, No lymphadenopathy, Normal voice  Resp:  Lungs clear to auscultation bilaterally, no crackles/rubs/wheezes. Good air movement  CV:  Normal rate and rhythm, no murmurs/rubs/gallops  GI:  Abdomen soft and non-distended.  Normoactive BS. RLQ tenderness,  No guarding or rebound, No masses  Skin:  Warm, dry.  No rashes or petechiae  Musculoskeletal: No peripheral edema or calf tenderness, Normal gross ROM   Neuro: Alert and oriented to person/place/time, normal sensation  Psychiatric: Normal affect, cooperative, good eye contact    Emergency Department Course       Imaging:  Recent Results (from the past 24 hour(s))   CT Abdomen Pelvis w Contrast    Narrative    EXAM: CT ABDOMEN PELVIS W CONTRAST  LOCATION: [facility]  DATE/TIME: 8/31/2019 11:45 PM    INDICATION: Diarrhea. Cancer history.  COMPARISON: CT pulmonary angiogram 08/27/2019.  TECHNIQUE: Helical enhanced thin-section CT scan of the abdomen and pelvis was performed following injection of IV contrast. Multiplanar reformats were obtained. Dose reduction techniques were used.  CONTRAST: 85 mL Isovue-370.    FINDINGS:   LUNG BASES: Bilateral pleural plaques compatible with previous asbestos exposure. 9 mm uncalcified nodule in the left  lung base.    ABDOMEN: Tiny hepatic cyst. Spleen, adrenal glands, and the kidneys are negative. 1.4 cm cyst anterior aspect of the proximal body of the pancreas. Vascular calcification.    PELVIS: Colonic diverticula without CT evidence for diverticulitis. There is mild colonic wall thickening and mucosal hyperenhancement compatible with segmental colitis. In addition, there are changes of proctitis present.    MUSCULOSKELETAL: Sclerotic metastatic lesions in the lower lumbar spine. Scoliosis. Lytic and sclerotic metastatic lesion in the sacrum with presacral soft tissue component.      Impression    CONCLUSION:   1.  Changes of pancolitis as well as changes of proctitis.    2.  Skeletal metastases involving the lumbar spine and sacrum where there is also a presacral soft tissue component.    3.  Small bladder diverticulum.    4.  Indeterminant 9 mm uncalcified nodule in the left lung base may be metastatic.    5.  Calcified pleural plaques compatible with previous asbestos exposure.         Laboratory:  Recent Results (from the past 8 hour(s))   CBC with platelets differential    Collection Time: 08/31/19 11:12 PM   Result Value Ref Range    WBC 12.0 (H) 4.0 - 11.0 10e9/L    RBC Count 3.03 (L) 4.4 - 5.9 10e12/L    Hemoglobin 9.2 (L) 13.3 - 17.7 g/dL    Hematocrit 27.6 (L) 40.0 - 53.0 %    MCV 91 78 - 100 fl    MCH 30.4 26.5 - 33.0 pg    MCHC 33.3 31.5 - 36.5 g/dL    RDW 13.3 10.0 - 15.0 %    Platelet Count 168 150 - 450 10e9/L    Diff Method Manual Differential     % Neutrophils 78.0 %    % Lymphocytes 9.0 %    % Monocytes 11.0 %    % Eosinophils 0.0 %    % Basophils 0.0 %    % Metamyelocytes 2.0 %    Absolute Neutrophil 9.4 (H) 1.6 - 8.3 10e9/L    Absolute Lymphocytes 1.1 0.8 - 5.3 10e9/L    Absolute Monocytes 1.3 0.0 - 1.3 10e9/L    Absolute Eosinophils 0.0 0.0 - 0.7 10e9/L    Absolute Basophils 0.0 0.0 - 0.2 10e9/L    Absolute Metamyelocytes 0.2 (H) 0 10e9/L    Dohle Bodies Present     Toxic Granulation Present      RBC Morphology Consistent with reported results     Platelet Estimate       Automated count confirmed.  Platelet morphology is normal.   Basic metabolic panel    Collection Time: 08/31/19 11:12 PM   Result Value Ref Range    Sodium 132 (L) 133 - 144 mmol/L    Potassium 3.5 3.4 - 5.3 mmol/L    Chloride 101 94 - 109 mmol/L    Carbon Dioxide 24 20 - 32 mmol/L    Anion Gap 7 3 - 14 mmol/L    Glucose 104 (H) 70 - 99 mg/dL    Urea Nitrogen 7 7 - 30 mg/dL    Creatinine 0.69 0.66 - 1.25 mg/dL    GFR Estimate >90 >60 mL/min/[1.73_m2]    GFR Estimate If Black >90 >60 mL/min/[1.73_m2]    Calcium 9.1 8.5 - 10.1 mg/dL       Interventions:  Medications   0.9% sodium chloride BOLUS (0 mLs Intravenous Stopped 9/1/19 0136)   iopamidol (ISOVUE-370) solution 500 mL (85 mLs Intravenous Given 8/31/19 2347)   sodium chloride 0.9 % bag 500mL for CT scan flush use (61 mLs Intravenous Given 8/31/19 2348)   vancomycin (FIRVANQ) oral solution 125 mg (125 mg Oral Given 9/1/19 0118)       Emergency Department Course:  patient evaluated    Recheck.  I discussed the laboratory and radiology results with the patient and he understands.  The patient felt improved after the above interventions.  The patient will be discharged home to follow up with primary care doctor per discharge instructions.  Indications for return to the ED were discussed and the patient understands.  All questions were answered prior to discharge.        Impression & Plan      Medical Decision Making:  John Batista is a 74 year old male who presents today for evaluation of diarrhea with concern for dehydration.  He recently been diagnosed with bacteremia and was placed on amoxicillin as a result.  Over the last 2 days he has developed diarrhea with multiple instances since being placed on this antibiotic.  He is concerned for dehydration therefore he presented today.  Mild 1 of blood on the tissue as well.  He was unable to produce stool while in the emergency  department.  However I did obtain a CT scan which shows pan colitis as well as proctitis.  Shows multiple other findings consistent with patient's history of metastatic cancer.  He also has a white blood cell count of 12 today which is up from 1 to 3 days ago.  He did not take any medication to increase his white blood cell count.  Given this picture and antibiotic use this most likely represents C. difficile.  As a result he is treatment empirically.  Again he was unable to produce stool in the emergency department.  Electrolytes are otherwise within normal limits.  No indication for further testing or imaging.  He is tolerating p.o. intake without difficulty.  His preference is to be discharged versus admitted for observational care and hydration as needed.  He was given his first dose of vancomycin tonight.  He is advised to take probiotics.  He does appear to be safe and appropriate for outpatient management follow-up and is discharged      Diagnosis:    ICD-10-CM    1. Diarrhea R19.7        Disposition:  discharged to home    Discharge Medications:  New Prescriptions    VANCOMYCIN (FIRVANQ) 50 MG/ML ORAL SOLUTION    Take 2.5 mLs (125 mg) by mouth 4 times daily for 10 days       EDDIE Steward CNP  8/31/2019   [facility] EMERGENCY DEPARTMENT       Fermin Ahmadi APRN CNP  09/01/19 0142

## 2019-09-05 NOTE — PROGRESS NOTES
Subjective     John Batista is a 74 year old male who presents to clinic today for the following health issues:    HPI   ED/UC Followup:    Facility:  [facility]  Date of visit: 8/27 8/28 8/31  Reason for visit: last visit was for diarrhea  ( was started on medication for D diff)   Current Status: starting to have some form to his stool.   Is still having trouble taking fluids and foods.  Has been drinking some Pedialyte but does not really have an appetite.       Colitis  Diarrhea  Neutropenia  Metastatic prostate cancer      Patient Active Problem List   Diagnosis     Benign neoplasm of colon     CARDIOVASCULAR SCREENING; LDL GOAL LESS THAN 160     Advance Care Planning     Iron deficiency anemia     Rectal ulcer     Prostate cancer metastatic to bone (H)     Acute lower gastrointestinal hemorrhage     GI bleeding     Melena     Past Surgical History:   Procedure Laterality Date     COLONOSCOPY N/A 11/29/2016    Procedure: COLONOSCOPY;  Surgeon: Alon Lo MD;  Location: [facility] OR     COLONOSCOPY N/A 12/3/2016    Procedure: COLONOSCOPY;  Surgeon: Carmenza Akbar MD;  Location:  GI     COLONOSCOPY N/A 10/30/2018    Procedure: COMBINED COLONOSCOPY, SINGLE OR MULTIPLE BIOPSY/POLYPECTOMY BY BIOPSY;  Surgeon: Anatoly Tam MD;  Location:  GI     CYSTOSCOPY       ESOPHAGOSCOPY, GASTROSCOPY, DUODENOSCOPY (EGD), COMBINED N/A 10/28/2018    Procedure: COMBINED ESOPHAGOSCOPY, GASTROSCOPY, DUODENOSCOPY (EGD);  Surgeon: Codey De La Torre MD;  Location:  GI     ESOPHAGOSCOPY, GASTROSCOPY, DUODENOSCOPY (EGD), COMBINED Left 10/31/2018    Procedure: ESOPHAGOSCOPY, GASTROSCOPY, DUODENOSCOPY (EGD) with Pill Cam  Rm 334;  Surgeon: Anatoly Tam MD;  Location:  GI     EXAM UNDER ANESTHESIA ANUS N/A 11/29/2016    Procedure: EXAM UNDER ANESTHESIA ANUS;  Surgeon: Alon Lo MD;  Location: [facility] OR     HC COLONOSCOPY THRU STOMA, DIAGNOSTIC  2003    normal, 2000 had polyps      HERNIA REPAIR, INGUINAL RT/LT       SIGMOIDOSCOPY FLEXIBLE N/A 9/10/2014    Procedure: SIGMOIDOSCOPY FLEXIBLE;  Surgeon: Madhuri Drake MD;  Location: [facility] GI     VASECTOMY         Social History     Tobacco Use     Smoking status: Former Smoker     Start date: 9/8/1981     Smokeless tobacco: Former User   Substance Use Topics     Alcohol use: No     Family History   Problem Relation Age of Onset     C.A.D. No family hx of      Diabetes No family hx of      Hypertension No family hx of      Breast Cancer No family hx of      Cancer - colorectal No family hx of          Current Outpatient Medications   Medication Sig Dispense Refill     Cholecalciferol (VITAMIN D) 1000 UNITS capsule Take 2,000 Units by mouth 2 times daily        doxepin (ZONALON) 5 % external cream Apply topically as needed for itching       ferrous sulfate (IRON) 325 (65 Fe) MG tablet Take 1 tablet (325 mg) by mouth 2 times daily (with meals) 60 tablet 0     leuprolide (ELIGARD) 45 MG injection Inject 45 mg Subcutaneous every 6 months.       melatonin 3 MG tablet Take 1-3 tablets (3-9 mg) by mouth nightly as needed for sleep 90 tablet 1     pimecrolimus (ELIDEL) 1 % external cream Apply topically as needed       predniSONE (DELTASONE) 5 MG tablet Take 5 mg by mouth 2 times daily       saccharomyces boulardii (FLORASTOR) 250 MG capsule Take 250 mg by mouth 2 times daily       UNABLE TO FIND MEDICATION NAME: N-A-C Sustain - takes 1 capsule daily       No Known Allergies  Recent Labs   Lab Test 08/31/19  2312 08/28/19  2039 08/27/19  1311  10/31/18  0529  10/27/18  1448  12/22/17 11/07/12  0918   LDL  --   --   --   --   --   --   --   --   --   --  159*   HDL  --   --   --   --   --   --   --   --   --   --  49   TRIG  --   --   --   --   --   --   --   --   --   --  185*   ALT  --   --  33  --  19  --  17  --   --    < > 43   CR 0.69 0.55* 0.50*   < > 0.62*   < > 0.73  --  0.88   < > 0.69   GFRESTIMATED >90 >90 >90   < > >90   < > >90   < > 67.2  "  < > >90   GFRESTBLACK >90 >90 >90   < > >90   < > >90   < >  --    < > >90   POTASSIUM 3.5 3.8 3.5   < > 2.9*   < > 3.6   < >  --    < > 4.6   TSH  --   --   --   --   --   --   --   --  1.83  --  1.39    < > = values in this interval not displayed.      BP Readings from Last 3 Encounters:   09/05/19 112/60   09/01/19 (!) 154/73   08/28/19 130/82    Wt Readings from Last 3 Encounters:       09/05/19 75.9 kg (167 lb 6.4 oz)   08/27/19 77.1 kg (170 lb)           Reviewed and updated as needed this visit by Provider  Tobacco  Allergies  Meds  Problems  Med Hx  Surg Hx  Fam Hx         Review of Systems   ROS COMP: CONSTITUTIONAL:monitoring weight; down a bit; \"not eating very well\"  ENT/MOUTH: NEGATIVE for ear, mouth and throat problems  RESP:NEGATIVE for significant cough or SOB  CV: NEGATIVE for chest pain, palpitations or peripheral edema  GI: diarrhea and recent Diagnosis of C. Diff  : metastatic prostate cancer; mets to bones; chemo per oncology team in [place]  HEME/ALLERGY/IMMUNE: neutropenia following chemo therapy  PSYCHIATRIC: NEGATIVE for changes in mood or affect; keep up beat; sleep challenges noted.       Objective    /60   Pulse 82   Temp 98.2  F (36.8  C) (Oral)   Resp 17   Wt 75.9 kg (167 lb 6.4 oz)   SpO2 99%   BMI 22.70 kg/m    Body mass index is 22.7 kg/m .  Physical Exam   GENERAL: alert and no distress  EYES: Eyes grossly normal to inspection and anicteric  RESP: lungs clear to auscultation - no rales, rhonchi or wheezes  CV: regular rates and rhythm, normal S1 S2, no S3 or S4 and no peripheral edema  ABDOMEN: soft, nontender and bowel sounds normal  MS: no gross musculoskeletal defects noted, no edema  SKIN: no suspicious lesions or rashes  PSYCH: mentation and affect are appropriate    Diagnostic Test Results:  Labs reviewed in Epic        Assessment & Plan     (E86.0) Dehydration  (primary encounter diagnosis)  Comment: related to decreased oral intake and " increased GI losses with CFiff diarrhea  Plan: continue therapy for CDiff; hydration is key    (C61,  C79.51) Prostate cancer metastatic to bone (H)  Comment: continue treatment as outlined by oncology  Plan:     (A04.72) C. difficile colitis  Comment: complete therapy as prescribed  Plan: keep hydrated    (G47.9) Sleep disturbance  Comment: discussed sleep challenges; OK to try OTC Melatonin 3mg-9 mg per night  Plan: melatonin 3 MG tablet          (D70.1,  T45.1X5A) Chemotherapy-induced neutropenia (H)  Comment: increased risk for infection when WBC low  Plan:        Return in about 4 weeks (around 10/3/2019) for if still having problems staying hydrated; .    Rosario Martinez MD  Internal Medicine   [facility]

## 2019-10-01 NOTE — PROGRESS NOTES
John is a 74-year-old male with a static adenocarcinoma of the prostate who was diagnosed in 2011.  He is on 6-month Lupron injections and being followed by Ruben Reese MD at [facility] as well as [facility] in [place].  He is due to receive additional chemotherapy this afternoon.  Dr. Reese saw him in August of this year after radium 223 treatments and PSA was up to 73.2.  A  C-11 PET choline scan revealed progressive disease  I last saw the patient 6 months ago for Lupron and a cystoscopy because of hematuria.  Radiation changes were noted but no evidence of bladder cancer, stone or renal disease.  He has had no bleeding since then.  He is having no trouble voiding.  Other past medical history: Surgeries reviewed  Medications: Vitamin D, iron sulfate, melatonin, prednisone, probiotic.  Recently finished vancomycin for C. Difficile  Allergies: None  Review of systems: No pain complaints.  No dysuria or hematuria  Exam: Alert and oriented, normal appearance, with spouse, normal vital signs.  Normal respirations, neuro grossly intact  Assessment: Progression of metastatic prostate cancer, castrate resistant.  Patient will continue Lupron every 6 months and chemotherapy.  Apparently not a candidate for immunotherapy

## 2019-10-01 NOTE — NURSING NOTE
The following medication was given:     MEDICATION: Lupron Depot 45 mg  ROUTE: IM  SITE: [facility]  DOSE: 45mg  LOT #: [id number]  :  neymar  EXPIRATION DATE:  18jan2022  NDC#: 1360-2235-65  CELSA Parson CMA

## 2019-10-01 NOTE — LETTER
10/1/2019      RE: John Batista  [address] [facility] [address]       John is a 74-year-old male with a static adenocarcinoma of the prostate who was diagnosed in 2011.  He is on 6-month Lupron injections and being followed by Ruben Reese MD at [facility] as well as [facility] in [place].  He is due to receive additional chemotherapy this afternoon.  Dr. Reese saw him in August of this year after radium 223 treatments and PSA was up to 73.2.  A  C-11 PET choline scan revealed progressive disease  I last saw the patient 6 months ago for Lupron and a cystoscopy because of hematuria.  Radiation changes were noted but no evidence of bladder cancer, stone or renal disease.  He has had no bleeding since then.  He is having no trouble voiding.  Other past medical history: Surgeries reviewed  Medications: Vitamin D, iron sulfate, melatonin, prednisone, probiotic.  Recently finished vancomycin for C. Difficile  Allergies: None  Review of systems: No pain complaints.  No dysuria or hematuria  Exam: Alert and oriented, normal appearance, with spouse, normal vital signs.  Normal respirations, neuro grossly intact  Assessment: Progression of metastatic prostate cancer, castrate resistant.  Patient will continue Lupron every 6 months and chemotherapy.  Apparently not a candidate for immunotherapy    David Galvan MD

## 2019-10-01 NOTE — LETTER
10/1/2019       RE: John Batista  [address] [facility] [address]     Dear Colleague,    Thank you for referring your patient, John Batista, to the [facility] [place] at [facility]. Please see a copy of my visit note below.    John is a 74-year-old male with a static adenocarcinoma of the prostate who was diagnosed in 2011.  He is on 6-month Lupron injections and being followed by Ruben Reese MD at [facility] as well as [facility] in [place].  He is due to receive additional chemotherapy this afternoon.  Dr. Reese saw him in August of this year after radium 223 treatments and PSA was up to 73.2.  A  C-11 PET choline scan revealed progressive disease  I last saw the patient 6 months ago for Lupron and a cystoscopy because of hematuria.  Radiation changes were noted but no evidence of bladder cancer, stone or renal disease.  He has had no bleeding since then.  He is having no trouble voiding.  Other past medical history: Surgeries reviewed  Medications: Vitamin D, iron sulfate, melatonin, prednisone, probiotic.  Recently finished vancomycin for C. Difficile  Allergies: None  Review of systems: No pain complaints.  No dysuria or hematuria  Exam: Alert and oriented, normal appearance, with spouse, normal vital signs.  Normal respirations, neuro grossly intact  Assessment: Progression of metastatic prostate cancer, castrate resistant.  Patient will continue Lupron every 6 months and chemotherapy.  Apparently not a candidate for immunotherapy    Again, thank you for allowing me to participate in the care of your patient.      Sincerely,    David Galvan MD

## 2019-10-01 NOTE — NURSING NOTE
[facility] PSA [facility].  [facility] note scanned into epic on 9-20-19.  Last lupron was here 6 months ago. (3-26-19)  Pt is going for 3rd chemo right when he leaves here.  CELSA Parson CMA

## 2019-10-01 NOTE — LETTER
10/1/2019      RE: John Batista  [address] [facility] [address]       John is a 74-year-old male with a static adenocarcinoma of the prostate who was diagnosed in 2011.  He is on 6-month Lupron injections and being followed by Ruben Reese MD at [facility] as well as [facility] in [place].  He is due to receive additional chemotherapy this afternoon.  Dr. Reese saw him in August of this year after radium 223 treatments and PSA was up to 73.2.  A  C-11 PET choline scan revealed progressive disease  I last saw the patient 6 months ago for Lupron and a cystoscopy because of hematuria.  Radiation changes were noted but no evidence of bladder cancer, stone or renal disease.  He has had no bleeding since then.  He is having no trouble voiding.  Other past medical history: Surgeries reviewed  Medications: Vitamin D, iron sulfate, melatonin, prednisone, probiotic.  Recently finished vancomycin for C. Difficile  Allergies: None  Review of systems: No pain complaints.  No dysuria or hematuria  Exam: Alert and oriented, normal appearance, with spouse, normal vital signs.  Normal respirations, neuro grossly intact  Assessment: Progression of metastatic prostate cancer, castrate resistant.  Patient will continue Lupron every 6 months and chemotherapy.  Apparently not a candidate for immunotherapy    David Galvan MD

## 2019-11-21 NOTE — ED PROVIDER NOTES
History     Chief Complaint:  Diarrhea      HPI   John Batista is a 74 year old male with a history of C Diff and metastatic prostate cancer who presents to the emergency department for evaluation of diarrhea. 6 weeks ago he was diagnosed with C Diff and has since finished his course of antibiotics. The patient finished a dosage of chemo 9 days ago and diarrhea began again today. The patient reports diarrhea, abdominal pain, and blood in his urine. The patient denies fever, back pain or vomiting.  He reports the diarrhea smells similar to previous C. Diff.      Allergies:  No known drug allergies    Medications:    Zonalon  Iron  Eligard  Elidel  Deltasone  Florastor    Past Medical History:    Benight neoplasm of colon  Iron deficiency anemia  Rectal ulcer  Prostate cancer metastatic to bone  Acute lower gastrointestinal hemorrhage  GI bleeding  Melena    Past Surgical History:    Colonoscopy x 4  EGD, combined x2  Exam under anesthesia anus  HC colonoscopy thru stoma, diagnostic  Hernia repair, inguinal RT/LT  Sigmoidoscopy flexible  Vasectomy    Family History:    CAD  Diabetes  Hypertension  Breast cancer  Colorectal cancer    Social History:  The patient reports today with his wife.  Former smoker  Negative for alcohol use.  Negative for drug use.  Marital Status:      Review of Systems   Constitutional: Negative for fever.   Gastrointestinal: Positive for abdominal pain and diarrhea. Negative for vomiting.   Genitourinary: Positive for hematuria.   All other systems reviewed and are negative.      Physical Exam     Patient Vitals for the past 24 hrs:   BP Temp Pulse Resp SpO2   11/21/19 1619 128/84 98.4  F (36.9  C) 137 18 98 %     Physical Exam  Nursing note and vitals reviewed.  Constitutional: Well nourished.   Eyes: Conjunctiva normal.  Pupils are equal, round, and reactive to light.   ENT: Nose normal. Mucous membranes pink and dry.    Neck: Normal range of motion.  CVS: Sinus tachycardia.   Normal heart sounds.  No murmur.  Pulmonary: Lungs clear to auscultation bilaterally. No wheezes/rales/rhonchi.  GI: Abdomen soft. Generalized tenderness. No rigidity or guarding.  No CVA tenderness.  MSK: No calf tenderness or swelling.  Neuro: Alert. Follows simple commands.     Skin: Skin is warm and dry. No rash noted. Generalized pallor.   Psychiatric: Normal affect.     Emergency Department Course     Imaging:  Radiology findings were communicated with the patient who voiced understanding of the findings.    Abd/Pelvis CT, IV contrast only TRAUMA / AAA:  1.  Inflammatory change demonstrated in the distal descending colon and rectum as well as in the transverse colon consistent with colitis and proctitis.  2.  Bilateral mild hydronephrosis with enhancement of both ureters suggests underlying reflux which is new when compared to the study of 10/08/2019.  3.  Moderately urine distended bladder with bladder diverticula.  4.  Postop prostatectomy.  5.  Metastatic bone disease stable and unchanged.  As per radiology.    Laboratory:  Laboratory findings were communicated with the patient who voiced understanding of the findings.    CBC: HGB 9.7 (L) o/w WNL. (WBC 8.2, )   CMP: Glucose 120 (H), Creatinine 0.60 (L), ALKPHOS 174 (H) o/w WNL    ISTAT gases lactate aide POCT: pH: 7.55 (H), PCO2: 24 (L), PO2: 18 (L), Bicarbonate: 21, O2 Sat: 38, Lactic acid: 2.4 (H)     ISTAT gases lactate aide POCT: pH: 7.47 (H), PCO2: 29 (L), PO2: 27, Bicarbonate: 21, O2 Sat: 57, Lactic acid: 0.9    1723 Lipase 99    1723 Magnesium 2.0     Clostridium difficile toxin B PCR In process    Urine Culture Aerobic Bacterial In process    UA with micro: Blood Moderate (A), pH 8.0 (H), RBC/HPF >182 (H), WBC/HPF 12 (H) o/w negative    Interventions:  1730 Zofran 4 mg IV    1731 Sublimaze 50 mcg IV    1732 NS 1L IV    Emergency Department Course:    1702 Nursing notes and vitals reviewed.    1704 I performed an exam of the patient as  documented above.     1723 IV was inserted and blood was drawn for laboratory testing, results above.    1724 Blood was drawn for laboratory testing, results above.    1800 The patient was sent for a Abd/pelvis  IV contrast only TRAUMA / AAA while in the emergency department, results above.     1835 The patient provided a urine sample here in the emergency department. This was sent for laboratory testing, findings above.    1835 A stool sample was obtained for laboratory testing as documented above.    1925 Blood was drawn for laboratory testing, results above.    Findings and plan explained to the Patient. Patient discharged home with instructions regarding supportive care, medications, and reasons to return. The importance of close follow-up was reviewed. The patient was prescribed Bentyl.    Impression & Plan     Medical Decision Making:  John Batista is a 74 year old male who presents to the emergency department today with abdominal pain/diarrhea.  Patient tachycardic on arrival though nontoxic-appearing.  He did have generalized abdominal tenderness.  Decision was made to pursue formal CT.  Labs without evidence of profound leukocytosis though lactic mildly elevated.  I do suspect this is more secondary to dehydration.  Repeat lactate improved after IV fluids.  No significant electrolyte derangements.  UA without gross infection though noted hematuria.  Patient noted to have bilateral hydronephrosis though no obstructing stones.  I discussed this may be secondary to ureteral reflux though recommended close f/u with PCP.  He also was noted to have concerns for colitis on CT.  Patient states this episode feels similar to his C. difficile colitis.  He is obviously at risk given immunosuppression and recent antibiotics as well as recent history of C. difficile colitis.  He provided a stool sample that will be sent.  I did discuss waiting for formal results to return to start before starting antibiotics though  both he and his wife expressed significant concerns about ernestine plans to initiate p.o. vancomycin at this time with plans for close PCP follow-up 1 to 2 days.  He will be discharged home with Bentyl for pain control.  His repeat abdominal exam is benign.  I recommended continue p.o. hydration and to return to the ED for fever, worsening abdominal pain, vomiting or should symptoms worsen or change.    Discharge Diagnosis:    ICD-10-CM    1. Colitis K52.9 Clostridium difficile toxin B PCR   2. Lactic acidosis E87.2    3. Abdominal pain, generalized R10.84    4. Hydronephrosis, unspecified hydronephrosis type N13.30        Disposition:  Discharged to home    Discharge Medications:  New Prescriptions    DICYCLOMINE (BENTYL) 20 MG TABLET    Take 1 tablet (20 mg) by mouth 2 times daily for 10 days       Scribe Disclosure:  NEGAR, Paul Murry, am serving as a scribe at 5:31 PM on 11/21/2019 to document services personally performed by Taina Reed DO based on my observations and the provider's statements to me.        Taina Reed DO  11/21/19 2024

## 2019-11-21 NOTE — TELEPHONE ENCOUNTER
ER sounds like the right move. Already checked in. No callback made.    Maria Luisa ROBBINS, Triage RN

## 2019-11-21 NOTE — ED TRIAGE NOTES
Patient presents to the ED with diarrhea. Began today, but reports stools have been smelling increasingly foul the past few days. Patient states was recently treated for c diff and is concerned it has returned. Patient is also being treated for prostate cancer with last chemo 9 days ago.

## 2019-11-21 NOTE — TELEPHONE ENCOUNTER
Reason for call:  Other   Patient called regarding (reason for call): call back  Additional comments: Pt's spouse Gregoria calling to report concerning symptoms after chemo. Pt is having some weird bowel movement (not diarrhea but dark color and strong odor) Pt would like to have a blood lab and stool specimen taken to discard any possible infection. Also, pt has been very dragged out, fatigue and not acting normal for the last couple of days.    Please call Mrs. Kinney for further questions.   Phone number to reach patient:  Home number on file [phone] (home)    Best Time:  ANY TIME    Can we leave a detailed message on this number?  YES

## 2019-11-22 NOTE — TELEPHONE ENCOUNTER
[facility] Emergency Department Lab result notification [Adult-Male]    [facility] ED lab result protocol used  Clostridium Difficil3    Reason for call  Notify of lab results, assess symptoms,  review ED providers recommendations/discharge instructions (if necessary) and advise per ED lab result f/u protocol    Lab Result (including Rx patient on, if applicable)  Final Clostridium difficile toxin B PCR is POSITIVE.  Toxin producing Clostridium difficile target DNA sequences detected, presumed positive for Clostridium difficile toxin B.   Rx (Flagyl or Vancomycin) prescribed upon discharge from the [facility] ED/UC:  None.  If No Rx, advise and/or treat per [facility] ED Lab Result protocol.    Information table from ED Provider visit on 11/21/19  Symptoms reported at ED visit (Chief complaint, HPI) John Batista is a 74 year old male with a history of C Diff and metastatic prostate cancer who presents to the emergency department for evaluation of diarrhea. 6 weeks ago he was diagnosed with C Diff and has since finished his course of antibiotics. The patient finished a dosage of chemo 9 days ago and diarrhea began again today. The patient reports diarrhea, abdominal pain, and blood in his urine. The patient denies fever, back pain or vomiting.  He reports the diarrhea smells similar to previous C. Diff.        Significant Medical hx, if applicable (i.e. CKD, diabetes) Cancer, D diff   Allergies No Known Allergies   Weight, if applicable Wt Readings from Last 2 Encounters:   10/01/19 79.4 kg (175 lb)   09/05/19 75.9 kg (167 lb 6.4 oz)      Coumadin/Warfarin [Yes /No] No   Creatinine Level (mg/dl) Creatinine   Date Value Ref Range Status   11/21/2019 0.60 (L) 0.66 - 1.25 mg/dL Final      Creatinine clearance (ml/min), if applicable Serum creatinine: 0.6 mg/dL (L) 11/21/19 1723  Estimated creatinine clearance: 117.6 mL/min (A)   ED providers Impression and Plan (applicable information) John AGUIRRE Lester is a  "74 year old male who presents to the emergency department today with abdominal pain/diarrhea.  Patient tachycardic on arrival though nontoxic-appearing.  He did have generalized abdominal tenderness.  Decision was made to pursue formal CT.  Labs without evidence of profound leukocytosis though lactic mildly elevated.  I do suspect this is more secondary to dehydration.  Repeat lactate improved after IV fluids.  No significant electrolyte derangements.  UA without gross infection though noted hematuria.  Patient noted to have bilateral hydronephrosis though no obstructing stones.  I discussed this may be secondary to ureteral reflux though recommended close f/u with PCP.  He also was noted to have concerns for colitis on CT.  Patient states this episode feels similar to his C. difficile colitis.  He is obviously at risk given immunosuppression and recent antibiotics as well as recent history of C. difficile colitis.  He provided a stool sample that will be sent.  I did discuss waiting for formal results to return to start before starting antibiotics though both he and his wife expressed significant concerns about ernestine plans to initiate p.o. vancomycin at this time with plans for close PCP follow-up 1 to 2 days.  He will be discharged home with Bentyl for pain control.  His repeat abdominal exam is benign.  I recommended continue p.o. hydration and to return to the ED for fever, worsening abdominal pain, vomiting or should symptoms worsen or change.      ED diagnosis  Colitis      ED provider Taina Reed, DO      RN Assessment (Patient s current Symptoms), include time called.  [Insert Left message here if message left]  John advised of results.  Did start Vanco 125 mg PO QID x 14 days.  Did confirm abx were prescribed.  Questions answered.  Did review infection control with John and wife.   John does report feeling \"better\" since starting meds last night.        Please Contact your PCP clinic or return " to the Emergency department if your:    Symptoms return.    Symptoms do not resolve after completing antibiotic.    Symptoms worsen or other concerning symptom's.    PCP follow-up Questions asked: YES       Lavinia Hanks, RN    [facility] [place]   Lung Nodule and ED Lab Results F/U RN  Epic pool (ED late result f/u RN) : P [id number]   # [phone]    Copy of Lab result  Order   Clostridium difficile toxin B PCR [[id number]] (Order [id number])   Order Requisition     Clostridium difficile toxin B PCR (Order #[id number]) on 11/21/19   Exam Information     Exam Date Exam Time Accession # Results    11/21/19  7:14 PM [id number]    Specimen Information: Feces        Component Value Flag Ref Range Units Status Collected Lab   Specimen Description Feces     Final 11/21/2019  7:14 PM FrRd   C Diff Toxin B PCR Positive  Abnormal   NEG^Negative  Final 11/21/2019  7:14 PM 51

## 2019-12-02 NOTE — PROGRESS NOTES
"Subjective     John Batista is a 74 year old male who presents to clinic today for the following health issues:    Is due to have his hemoglobin checked tomorrow.    HPI   ED/UC Followup:    Facility:  [facility] Emergency Department  Date of visit: 11/21/19  Reason for visit: diarrhea  Current Status: states is no longer having loose stools.  In that aspect feels much better continues to be very weak and just does not feel well.      Completed Vancomycin oral solution.    Joint Pain    Onset: ongoing for years but is having more trouble in the recent past    Description:   Location: right knee, right hip and outer aspect of right calf  Character: persistent muscle pain  Dull ache    Intensity: moderate    Progression of Symptoms: worse    Accompanying Signs & Symptoms:  Other symptoms: numbness and tingling    History:   Previous similar pain: YES      Precipitating factors:   Trauma or overuse: no     Alleviating factors:  Improved by: massage helps for a short period of time     Therapies Tried and outcome: has tried things in the past.       {additonal problems for provider to add (Optional):431740}    {HIST REVIEW/ LINKS 2 (Optional):374223}    {Additional problems for the provider to add (optional):081465}  Reviewed and updated as needed this visit by Provider  Tobacco  Allergies  Meds  Problems  Med Hx  Surg Hx  Fam Hx         Review of Systems   {ROS COMP (Optional):131639}      Objective    /72   Pulse 84   Temp 98.1  F (36.7  C) (Oral)   Resp 17   Wt 73.7 kg (162 lb 8 oz)   SpO2 97%   BMI 22.04 kg/m    Body mass index is 22.04 kg/m .  Physical Exam   {Exam List (Optional):562959}    {Diagnostic Test Results (Optional):079295::\"Diagnostic Test Results:\",\"Labs reviewed in Epic\"}        {PROVIDER CHARTING PREFERENCE:811481}      "

## 2019-12-02 NOTE — PATIENT INSTRUCTIONS
Patient Education     Back Exercises: Lower Back Stretch    To start, sit in a chair with your feet flat on the floor. Shift your weight slightly forward. Relax, and keep your ears, shoulders, and hips aligned while you do the following:    Sit with your feet well apart.    Bend forward and touch the floor with the backs of your hands. Relax and let your body drop.    Hold for 20 seconds. Return to starting position.    Repeat 2 times.   Date Last Reviewed: 11/1/2017 2000-2018 [employer]. [address] [place] [address]. All rights reserved. This information is not intended as a substitute for professional medical care. Always follow your healthcare professional's instructions.           Patient Education     Back Exercises: Lower Back Rotation    To start, lie on your back with your knees bent and feet flat on the floor. Don t press your neck or lower back to the floor. Breathe deeply. You should feel comfortable and relaxed in this position.    Drop both knees to one side. Turn your head to the other side. Keep your shoulders flat on the floor.    Do not push through pain.    Hold for 20 seconds.    Slowly switch sides.    Repeat 2 to 5 times.  Date Last Reviewed: 3/1/2018    9576-2419 [employer]. [address] [place] [address]. All rights reserved. This information is not intended as a substitute for professional medical care. Always follow your healthcare professional's instructions.           Patient Education     Back Exercises: Lower Back Stretch    To start, sit in a chair with your feet flat on the floor. Shift your weight slightly forward. Relax, and keep your ears, shoulders, and hips aligned while you do the following:    Sit with your feet well apart.    Bend forward and touch the floor with the backs of your hands. Relax and let your body drop.    Hold for 20 seconds. Return to starting position.    Repeat 2 times.   Date Last Reviewed:  11/1/2017 2000-2018 [employer]. [address] [place] [address]. All rights reserved. This information is not intended as a substitute for professional medical care. Always follow your healthcare professional's instructions.           Patient Education     Back Exercises: Lower Back Rotation    To start, lie on your back with your knees bent and feet flat on the floor. Don t press your neck or lower back to the floor. Breathe deeply. You should feel comfortable and relaxed in this position.    Drop both knees to one side. Turn your head to the other side. Keep your shoulders flat on the floor.    Do not push through pain.    Hold for 20 seconds.    Slowly switch sides.    Repeat 2 to 5 times.  Date Last Reviewed: 3/1/2018    0962-9892 [employer]. [address] [place] [address]. All rights reserved. This information is not intended as a substitute for professional medical care. Always follow your healthcare professional's instructions.           Patient Education     Back Exercises: Leg Reach      Do this exercise on your hands and knees. Keep your knees under your hips and your hands under your shoulders. Keep your spine in a neutral position (not arched or sagging). Be sure to maintain your neck s natural curve:    Extend one leg straight back. Don t arch your back or let your head or body sag.    Hold for 5 seconds. Return to starting position.    Repeat 5 times.    Switch legs.   Date Last Reviewed: 3/1/2018    4602-5595 [employer]. [address] [place] [address]. All rights reserved. This information is not intended as a substitute for professional medical care. Always follow your healthcare professional's instructions.           Patient Education     Back Exercises: Leg Pull    To start, lie on your back with your knees bent and feet flat on the floor. Don t press your neck or lower back to the floor. Breathe deeply. You should feel  comfortable and relaxed in this position.    Pull one knee to your chest.    Hold for 30 to 60 seconds. Return to starting position.    Repeat 2 times.    Switch legs.    For a double leg pull, pull both legs to your chest at the same time. Repeat 2 times.  For your safety, check with your healthcare provider before starting an exercise program.  Date Last Reviewed: 3/1/2018    4371-3515 [employer]. [address] [place] [address]. All rights reserved. This information is not intended as a substitute for professional medical care. Always follow your healthcare professional's instructions.           Patient Education     Back Exercises: Arm Reach    Do this exercise on your hands and knees. Keep your knees under your hips and your hands under your shoulders. Keep your spine in a neutral position (not arched or sagging). Be sure to maintain your neck s natural curve:    Stretch one arm straight out in front of you. Don t raise your head or let your supporting shoulder sag.    Hold for 5 seconds.    Return to starting position.    Repeat 5 times.    Switch arms.  Date Last Reviewed: 3/1/2018    5949-2473 [employer]. [address] [place] [address]. All rights reserved. This information is not intended as a substitute for professional medical care. Always follow your healthcare professional's instructions.           Patient Education     Back Exercises: Back Release  Do this exercise on your hands and knees. Keep your knees under your hips and your hands under your shoulders.        Relax your abdominal and buttocks muscles, lift your head, and let your back sag. Be sure to keep your weight evenly distributed. Don t sit back on your hips.     Hold for 5 seconds.    Return to starting position.    Tuck your head and lift (arch) your back.    Hold for 5 seconds    Return to starting position.    Repeat 5 times.  Date Last Reviewed: 3/1/2018    8991-2500 [employer]. [address] [place] [address]. All rights reserved. This information is not intended as a substitute for professional medical care. Always follow your healthcare professional's instructions.           Patient Education     Back Exercises: Knee Lift         To start, lie on your back with your knees bent and feet flat on the floor. Don t press your neck or lower back to the floor. Breathe deeply. You should feel comfortable and relaxed in this position:    Start by tightening your abdominal muscles.    Lift one bent knee off the floor 2 to 4 inches.    Hold for 10 seconds. Return to start position.    Repeat 3 times.    Switch legs.  Date Last Reviewed: 3/1/2018    1924-5983 [employer]. [address] [place] [address]. All rights reserved. This information is not intended as a substitute for professional medical care. Always follow your healthcare professional's instructions.

## 2019-12-08 NOTE — ED TRIAGE NOTES
R flank pain bilateral. Jumped when wife tried to massage the area. Tuesday had chemo for metastatic prostate cancer that has mets to bones. Trouble starting to void and blood noted in the urine.

## 2019-12-09 PROBLEM — R33.9 URINARY RETENTION: Status: ACTIVE | Noted: 2019-01-01

## 2019-12-09 NOTE — PLAN OF CARE
PRIMARY DIAGNOSIS: HEMATURIA/URINARY RETENTION/ACUTE BILATERAL FLANK PAIN   OUTPATIENT/OBSERVATION GOALS TO BE MET BEFORE DISCHARGE:    1. Pain Status: Improved-controlled with oral pain medications.Oxycodone prn.     2. Return to near baseline physical activity: Yes     3. Cleared for discharge by consultants (if involved): N/A     Discharge Planner Nurse   Safe discharge environment identified: Yes  Barriers to discharge: Yes       Entered by: Katina Gautam 12/09/2019 12:00 PM    /62 (BP Location: Left arm)   Pulse 91   Temp 98.7  F (37.1  C) (Oral)   Resp 16   Ht 1.829 m (6')   Wt 69.8 kg (153 lb 12.8 oz)   SpO2 96%   BMI 20.86 kg/m       A&Ox4, sleeping between cares. VSS. Up w/ SBA. Indwelling Rondon catheter patent w/ adequate amt.'s red colored urine output. BS active x4, states he has no appetite this AM, passing flatus. Patient will try to eat lunch. Plan is to ambulate hallway afterward. Denies nausea. Started on po Vancomycin. C/o flank pain, given po Oxycodone prn w/ relief. Hemoglobin 8.6 today. IVF infusing. Enteric precautions maintained. Spouse at bedside. Will continue to monitor.     Please review provider order for any additional goals.   Nurse to notify provider when observation goals have been met and patient is ready for discharge.

## 2019-12-09 NOTE — PLAN OF CARE
PRIMARY DIAGNOSIS: ACUTE BILATERAL KIDNEY PAIN & URINARY RETENTION/HEMATURIA  OUTPATIENT/OBSERVATION GOALS TO BE MET BEFORE DISCHARGE:  1. Pain Status: Improved-controlled with oral pain medications.    2. Return to near baseline physical activity: Yes    3. Cleared for discharge by consultants (if involved): N/A    Pt A&Ox4, VSS ex HR low 100s. Pt c/o 5/10 bilateral back/'kidney' pain and mild 'bladder' pain, PRN oxycodone given. Pt denies nausea, reports poor appetite, hasn't eaten since breakfast 12/8. Up SBA. Rondon in place, urine output red tinged. IVF infusing. IV rocephin for UTI. Enteric precautions maintained. Will continue to monitor.    Discharge Planner Nurse   Safe discharge environment identified: Yes  Barriers to discharge: Yes       Entered by: Sailaja Tobin 12/09/2019     Please review provider order for any additional goals.   Nurse to notify provider when observation goals have been met and patient is ready for discharge.

## 2019-12-09 NOTE — PHARMACY-ADMISSION MEDICATION HISTORY
Admission medication history interview status for this patient is complete. See [facility] admission navigator for allergy information, prior to admission medications and immunization status.     Medication history interview source(s):Patient and Family  Medication history resources (including written lists, pill bottles, clinic record):None    Changes made to PTA medication list:  Added: none  Deleted: elidel cream  Changed: ferrous sulfate and florastor    Actions taken by pharmacist (provider contacted, etc):None     Additional medication history information:None    Medication reconciliation/reorder completed by provider prior to medication history? No    For patients on insulin therapy: no (Yes/No)   Lantus/levemir/NPH/Mix 70/30 dose: ___ in AM/PM or twice daily   Sliding scale Novolog Y/N   If Yes, do you have a baseline novolog pre-meal dose: ______units with meals   Patients eat three meals a day: Y/N ---  How many episodes of hypoglycemia (low blood glucose) do you have weekly: ---   How many missed doses do you have a week: ---  How many times do you check your blood glucose per day: ---  Any Barriers to therapy: cost of medications/comfortable with giving injections (if applicable)/ comfortable and confident with current diabetes regimen ---      Prior to Admission medications    Medication Sig Last Dose Taking? Auth Provider   Cholecalciferol (VITAMIN D) 1000 UNITS capsule Take 2,000 Units by mouth 2 times daily  12/7/2019 at Unknown time Yes Reported, Patient   doxepin (ZONALON) 5 % external cream Apply topically daily as needed for itching  prn Yes Reported, Patient   ferrous sulfate (FEROSUL) 325 (65 Fe) MG tablet Take 325 mg by mouth 3 times daily (with meals) 12/7/2019 at Unknown time Yes Unknown, Entered By History   melatonin 3 MG tablet Take 1-3 tablets (3-9 mg) by mouth nightly as needed for sleep 12/7/2019 at Unknown time Yes Rosario Martinez MD   predniSONE (DELTASONE) 5 MG tablet Take 1 tablet  (5 mg) by mouth daily Per oncology 12/7/2019 at Unknown time Yes Rosario Martinez MD   saccharomyces boulardii (FLORASTOR) 250 MG capsule Take 500 mg by mouth 2 times daily  12/7/2019 at Unknown time Yes Reported, Patient   leuprolide (ELIGARD) 45 MG injection Inject 45 mg Subcutaneous every 6 months. More than a month at Unknown time  Reported, Patient

## 2019-12-09 NOTE — ED PROVIDER NOTES
History     Chief Complaint:    Flank Pain      The history is provided by the patient and the spouse.      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 He cannot distinguish if one side is more painful than the other. He rates his pain as a 7/10 in severity. He feels like he doesn't empty his bladder during urination. He had a large amount of blood in his urine a week ago and the amount decreased throughout the week. He had an appointment with his oncologist this week but didn't mention the hematuria. He denies dysuria, fever, nausea, or vomiting. The patient is not on any blood thinners.       Allergies:  No Known Allergies     Medications:    Iron  Eligard  Deltasone  Florastor    Past Medical History:    Prostate cancer metastatic to bone   Anemia   Benign neoplasm of colon     Past Surgical History:    Cystoscopy  EGD combined  Hernia repair, inguinal, bilateral   Sigmoidoscopy flexible  Vasectomy     Family History:    No past pertinent family history.    Social History:  Presents to the ED with his wife at the bedside  Tobacco Use: former smoker  Alcohol Use: no  Drug Use: no  Marital Status:   [2]     Review of Systems   Constitutional: Negative for chills and fever.   Respiratory: Negative for shortness of breath.    Cardiovascular: Negative for chest pain.   Gastrointestinal: Negative for blood in stool and diarrhea.   Genitourinary: Positive for difficulty urinating, dysuria, flank pain and hematuria.   All other systems reviewed and are negative.      Physical Exam     Patient Vitals for the past 24 hrs:   BP Temp Temp src Pulse Resp SpO2   12/08/19 2200 (!) 149/100 -- -- 103 -- 98 %   12/08/19 2100 (!) 146/81 -- -- 101 -- 96 %   12/08/19 2000 137/74 -- -- 99 -- 96 %   12/08/19 1900 (!) 157/86 -- -- 107 -- 97 %   12/08/19 1721 (!) 143/87 97.4  F (36.3  C) Oral 103 18 100 %        Physical Exam     General: Alert, Moderate discomfort, well kept, chronically ill appearing  Eyes: PERRL, conjunctivae pink no scleral icterus or conjunctival injection  ENT:   Moist mucus membranes, posterior oropharynx clear without erythema or exudates, No lymphadenopathy, Normal voice  Resp:  Lungs clear to auscultation bilaterally, no crackles/rubs/wheezes. Good air movement  CV:  Normal rate and rhythm, no murmurs/rubs/gallops  GI:  Abdomen soft and non-distended.  Normoactive BS.  Suprapubic and left flank tenderness, No guarding or rebound, No masses  Skin:  Warm, dry.  No rashes or petechiae  Musculoskeletal: No peripheral edema or calf tenderness, Normal gross ROM   Neuro: Alert and oriented to person/place/time, normal sensation  Psychiatric: Normal affect, cooperative, good eye contact    Emergency Department Course     Imaging:  Radiology findings were communicated with the patient who voiced understanding of the findings.    CT Abdomen/Pelvis w/o IV contrast-Stone Protocol:   1.  Moderate bilateral hydroureteronephrosis. Degree of necrosis is increased compared to the previous study. No visible ureteral calculi.  2.  The bladder is decompressed with a Rondon catheter but the wall is diffusely thickened. Correlate for cystitis.  3.  Osseous metastases are again seen.  4.  Diverticulosis.  5.  Basilar pulmonary nodules some of which have decreased in size.    Reading as per radiology.    Laboratory:  Laboratory findings were communicated with the patient who voiced understanding of the findings.    CBC+Differential: WBC: 4.3, HGB: 9.3 (L), PLT: 155  CMP: Glucose 122 (H), ALKPHOS 176 (H) o/w WNL (Creatinine 0.71)  Lipase: 69 (L)    UA with Microscopic: Blood large (A), Protein Albumin 100 (A), Leukocyte Esterase moderate (A), WBC/ (H), RBC/HPF >182 (H), Mucous present (A) o/w WNL    Interventions:  1814 0.9% NaCl Bolus 1000mLs IV   1814 Dilaudid 0.5 mg IV  2130 Rocephin 1g IV  2203 Dilaudid  0.5 mg IV  2249 Ativan 0.5 mg IV     Emergency Department Course:  Past medical records, nursing notes, and vitals reviewed.    IV was inserted and blood was drawn for laboratory testing, results above.  The patient provided a urine sample here in the emergency department. This was sent for laboratory testing, findings above.  The patient was sent for a CT while in the emergency department, results above.     1822: I performed an exam of the patient as documented above.   2018: I consulted with pharmacy and then rechecked and updated the patient.   2210: Patient rechecked and updated.   2214: Dr. Zapien is at the bedside evaluating the patient.   2317: I consulted with Dr. Zapien of the hospitalist services who is in agreement to accept the patient for admission.    Findings and plan explained to the Patient who consents to admission. Discussed the patient with Dr. Zapien, who will admit the patient to a observation bed for further monitoring, evaluation, and treatment.    I personally reviewed the laboratory results with the Patient and answered all related questions prior to admission.      Impression & Plan     Medical Decision Making:  John Batista is a 74 year old male who presents today for evaluation of abdominal pain, urinary retention, and hematuria.  His examination showed tender suprapubic and left-sided flank pain.  Laboratory studies showed urinary tract infection.  He has a normal white cell count.  He was retaining approximately 900 cc of urine.  This was hematuria.  There was no obvious clots.  Kidney function is normal.  He has been afebrile and otherwise vitally stable.  Does have a history of metastatic prostate cancer.  Initially he felt significantly improved after decompressing his bladder however as his pain continued to return and I did obtain a CT scan which showed hydroureter and hydronephrosis.  His pain is most likely associated with spasming due to the recent decompression  however he is requiring IV pain medications to manage his discomfort.  Antibiotics are started as above.  Culture is pending.  Plan will be to admit patient to manage his symptoms and pain control.  I spoke to hospitalist who did accept patient to his service.      Discharge Diagnosis:    ICD-10-CM    1. Urinary tract infection N39.0    2. Hematuria R31.9    3. Urinary retention R33.9        Disposition:  Admission to observation.     Scribe Disclosure:  I, Dea Emerson, am serving as a scribe at 6:19 PM on 12/8/2019 to document services personally performed by Fermin Ahmadi APRN based on my observations and the provider's statements to me.     12/8/2019   [facility] EMERGENCY DEPARTMENT       Fermin Ahmadi APRN CNP  12/09/19 0115

## 2019-12-09 NOTE — DISCHARGE SUMMARY
[facility]    Discharge Summary  Hospitalist    Date of Admission:  12/8/2019  Date of Discharge:  12/9/2019  8:27 PM  Discharging Provider: Meri Degroot PA-C  Date of Service (when I saw the patient): 12/09/19    Discharge Diagnoses   Urinary retention   Generalized weakness  Abnormal urinalysis      History of Present Illness   John Batista is a 74 year old male with a significant past medical history of metastatic prostate cancer who has been on chemotherapy who presented with abdominal pain and was admitted to observation on 12/8.  He had been complaining of difficulty urinating, dysuria flank pain hematuria and lower abdominal pain.  Work-up in the ED remarkable for an abnormal urinalysis, significant urinary retention prompting placement of an indwelling urinary catheter. CT scan with bilateral hydroureteronephrosis moderately increased, no calculi.   Patient was admitted to observation for further monitoring and supportive cares.     Hospital Course   John Batista was admitted on 12/8/2019.  The following problems were addressed during his hospitalization:    Urinary retention with abnormal urinalysis   Bladder scan on admission recorded 900 ml urine. Normal kidney function. No report of obstruction or traumatic catheter placement Urinalysis with hematuria, moderate leukocyte Estrace, pyuria, negative nitrates.   Patient follows with Dr. Galvan from urology.  I contacted the urology on-call provider, Dr. Edwards who I discussed the care of this patient with regarding decision to discharge home with indwelling catheter versus voiding trial in house.  Dr. Edwards recommended outpatient voiding trial, discharge home with indwelling catheter while treating urinary tract infection.  -Pain control, prn oxycodone  -Discharged home with indwelling urinary catheter, instructions provided for care  -Omnicef 300 mg BID for 10 days  -Follow-up in 1 to 2 weeks time with  Urology        Generalized weakness, with right leg pain  Likely due to physical deconditioning, malignancy, infection  Pain controlled with oxycodone. Afebrile, no lightheadedness, dizziness.    Planned to undergo imaging of right lower extremity as an outpatient per Oncologist for evaluation of mets however in light of infection and new catheter appointment this imaging would be obtained today instead of as scheduled later in the week.   -Results of right tibia MRI without contrast faxed to [facility] office, no acute fracture identified. Unable to review with patient during observation as he had discharged prior to interpretation  -Follow up as scheduled with Oncology        Metastatic Prostate cancer, adenocarcinoma  Follows with [facility] and [facility].  Currently undergoing chemotherapy with Cabazitaxel, most recent treatment 12/2. Diagnosed 2011.         History of Recent C diff infection  In light of recent resolution of antibiotic induced c diff infection, patient started prophylactic vancomycin oral dosing BID to continue until 10 days following course of antibiotics for urinary tract infection.   -Oral vancomycin twice daily prophylactic treatment to stop 10 days following course of antibiotic treatment for UTI       Chronic anemia on chemotherapy   Stable. History of prior GI bleeding. No complaints of blood in stools, melena.         Pending Results   These results will be followed up by Hospitalist.  Unresulted Labs Ordered in the Past 30 Days of this Admission     Date and Time Order Name Status Description    12/8/2019 2341 Urine Culture Preliminary           Code Status   Full Code       Primary Care Physician   Rosario Martinez      INTERVAL HISTORY  Patient is feeling improved however still somewhat weak.  Indwelling catheter patent with adequate output, still having slight hematuria.   Passing flatus, no bowel movement.  Tolerating oral intake, reports suppressed appetite that is not new  for him.  No nausea, vomiting, diarrhea.   No fevers, no chest pain, shortness of breath.     /62 (BP Location: Left arm)   Pulse 91   Temp 98.7  F (37.1  C) (Oral)   Resp 16   Ht 1.829 m (6')   Wt 69.8 kg (153 lb 12.8 oz)   SpO2 96%   BMI 20.86 kg/m      Constitutional: Awake, alert, no apparent distress  Respiratory:  Normal work of breathing. Lungs clear to auscultation bilaterally, no crackles or wheezing.  Cardiovascular: Regular rate and rhythm, normal S1 and S2, and no murmur appreciated.   GI: Bowel sounds present. soft, non-distended, non-tender.   Skin/Integument: Warm, dry. no peripheral edema. Slight pallor.  Neuro: No focal deficits. Moving all extremities with normal strength. Coordination and sensation grossly intact. Speech clear. No focal deficits.   Psych: Appropriate affect.        Discharge Disposition   Discharged to home  Condition at discharge: Stable    Consultations This Hospital Stay   None    Time Spent on this Encounter   I, Meri Degroot PA-C, personally saw the patient today and spent greater than 30 minutes discharging this patient.    Discharge Orders   No discharge procedures on file.  Discharge Medications   Discharge Medication List as of 12/9/2019  6:56 PM      START taking these medications    Details   cefdinir (OMNICEF) 300 MG capsule Take 1 capsule (300 mg) by mouth every 12 hours for 10 days, Disp-19 capsule, R-0, E-Prescribe      oxyCODONE (ROXICODONE) 5 MG tablet Take 1-2 tablets (5-10 mg) by mouth every 6 hours as needed for breakthrough pain, Disp-28 tablet, R-0, Local Print      vancomycin (FIRVANQ) 50 MG/ML oral solution Take 2.5 mLs (125 mg) by mouth 2 times daily for 20 days, Disp-100 mL, R-0, ROB, E-Prescribe         CONTINUE these medications which have NOT CHANGED    Details   Cholecalciferol (VITAMIN D) 1000 UNITS capsule Take 2,000 Units by mouth 2 times daily , Historical      doxepin (ZONALON) 5 % external cream Apply topically daily as  needed for itching Historical      ferrous sulfate (FEROSUL) 325 (65 Fe) MG tablet Take 325 mg by mouth 3 times daily (with meals), Historical      leuprolide (ELIGARD) 45 MG injection Inject 45 mg Subcutaneous every 6 months., Historical      melatonin 3 MG tablet Take 1-3 tablets (3-9 mg) by mouth nightly as needed for sleep, Disp-90 tablet, R-1, OTC      predniSONE (DELTASONE) 5 MG tablet Take 1 tablet (5 mg) by mouth daily Per oncology, Historical      saccharomyces boulardii (FLORASTOR) 250 MG capsule Take 500 mg by mouth 2 times daily , Historical           Allergies   No Known Allergies  Data      CBC + differential     Status: Abnormal   Result Value Ref Range     WBC 4.3 4.0 - 11.0 10e9/L     RBC Count 2.78 (L) 4.4 - 5.9 10e12/L     Hemoglobin 9.3 (L) 13.3 - 17.7 g/dL     Hematocrit 27.6 (L) 40.0 - 53.0 %     MCV 99 78 - 100 fl     MCH 33.5 (H) 26.5 - 33.0 pg     MCHC 33.7 31.5 - 36.5 g/dL     RDW 13.8 10.0 - 15.0 %     Platelet Count 155 150 - 450 10e9/L     Diff Method Manual Differential       % Neutrophils 74.0 %     % Lymphocytes 7.0 %     % Monocytes 4.0 %     % Eosinophils 0.0 %     % Basophils 0.0 %     % Metamyelocytes 15.0 %     Absolute Neutrophil 3.2 1.6 - 8.3 10e9/L     Absolute Lymphocytes 0.3 (L) 0.8 - 5.3 10e9/L     Absolute Monocytes 0.2 0.0 - 1.3 10e9/L     Absolute Eosinophils 0.0 0.0 - 0.7 10e9/L     Absolute Basophils 0.0 0.0 - 0.2 10e9/L     Absolute Metamyelocytes 0.6 (H) 0 10e9/L     RBC Morphology Consistent with reported results       Platelet Estimate           Automated count confirmed.  Platelet morphology is normal.   Comprehensive metabolic panel     Status: Abnormal   Result Value Ref Range     Sodium 135 133 - 144 mmol/L     Potassium 3.9 3.4 - 5.3 mmol/L     Chloride 103 94 - 109 mmol/L     Carbon Dioxide 23 20 - 32 mmol/L     Anion Gap 9 3 - 14 mmol/L     Glucose 122 (H) 70 - 99 mg/dL     Urea Nitrogen 17 7 - 30 mg/dL     Creatinine 0.71 0.66 - 1.25 mg/dL     GFR Estimate  >90 >60 mL/min/[1.73_m2]     GFR Estimate If Black >90 >60 mL/min/[1.73_m2]     Calcium 8.7 8.5 - 10.1 mg/dL     Bilirubin Total 0.6 0.2 - 1.3 mg/dL     Albumin 3.4 3.4 - 5.0 g/dL     Protein Total 6.8 6.8 - 8.8 g/dL     Alkaline Phosphatase 176 (H) 40 - 150 U/L     ALT 32 0 - 70 U/L     AST 40 0 - 45 U/L   Lipase     Status: Abnormal   Result Value Ref Range     Lipase 69 (L) 73 - 393 U/L   UA with Microscopic     Status: Abnormal   Result Value Ref Range     Color Urine Orange       Appearance Urine Slightly Cloudy       Glucose Urine Negative NEG^Negative mg/dL     Bilirubin Urine Negative NEG^Negative     Ketones Urine Negative NEG^Negative mg/dL     Specific Gravity Urine 1.015 1.003 - 1.035     Blood Urine Large (A) NEG^Negative     pH Urine 7.0 5.0 - 7.0 pH     Protein Albumin Urine 100 (A) NEG^Negative mg/dL     Urobilinogen mg/dL Normal 0.0 - 2.0 mg/dL     Nitrite Urine Negative NEG^Negative     Leukocyte Esterase Urine Moderate (A) NEG^Negative     Source Catheterized Urine       WBC Urine 102 (H) 0 - 5 /HPF     RBC Urine >182 (H) 0 - 2 /HPF     Mucous Urine Present (A) NEG^Negative /LPF                   Recent Results (from the past 48 hour(s))   Abd/pelvis CT no contrast - Stone Protocol     Narrative     EXAM: CT ABDOMEN PELVIS W/O CONTRAST  LOCATION: [facility]  DATE/TIME: 12/8/2019 10:31 PM     INDICATION: Flank pain, stone disease suspected - left  COMPARISON: 11/21/2019 and 08/27/2019  TECHNIQUE: CT scan of the abdomen and pelvis was performed without IV contrast. Multiplanar reformats were obtained. Dose reduction techniques were used.  CONTRAST: None.     FINDINGS:   LOWER CHEST: 2.6 x 2.3 cm right middle lobe nodule abutting the fissure recently measured 2.9 x 2.4 cm. Many of the other previously seen nodules are no longer visualized or decreased in size. For example there is a 6 mm left lower lobe nodule image 27   which previously measured 9 mm. Calcified pleural  plaque.     HEPATOBILIARY: Gallbladder is mildly distended. No biliary dilatation.     PANCREAS: Stable.     SPLEEN: Normal.     ADRENAL GLANDS: Normal.     KIDNEYS/BLADDER: Moderate bilateral hydroureteronephrosis and periureteral stranding. No visible ureteral calculi. Rondon within the bladder. The bladder is diffusely thickened.     BOWEL: Normal caliber. Diverticulosis. Normal appendix.     LYMPH NODES: Normal.     VASCULATURE: Atherosclerotic vascular calcification.     PELVIC ORGANS: Prostatectomy. Presacral edema. Fat-containing inguinal hernias.     OTHER: None.     MUSCULOSKELETAL: Degenerative change osseous structures. Sclerotic osseous metastases in the lower lumbar spine. Permeative change of the sacrum again seen.        Impression     IMPRESSION:   1.  Moderate bilateral hydroureteronephrosis. Degree of necrosis is increased compared to the previous study. No visible ureteral calculi.  2.  The bladder is decompressed with a Rondon catheter but the wall is diffusely thickened. Correlate for cystitis.  3.  Osseous metastases are again seen.  4.  Diverticulosis.  5.  Basilar pulmonary nodules some of which have decreased in size.              Meri Degroot PA-C  [facility]

## 2019-12-09 NOTE — PROGRESS NOTES
ROOM # 229    Living Situation (if not independent, order SW consult): home w/ family  Facility name: n/a  : Valerie (wife)    Activity level at baseline: ind  Activity level on admit: SBA      Patient registered to observation; given Patient Bill of Rights; given the opportunity to ask questions about observation status and their plan of care.  Patient has been oriented to the observation room, bathroom and call light is in place.    Discussed discharge goals and expectations with patient/family.

## 2019-12-09 NOTE — H&P
Hospitalist Observation Admission Note(Formerly [facility], [facility])    Name: John Batista    MRN: [id number]          YOB: 1945    Age: 74 year old    Date of admission: 12/8/2019    Primary care provider: Rosario Martinez  Admitting physician:Guillaume Zapien MD                 Assessment      Brief summary of admission assessment:John Batista is a 74 year old  male with a significant past medical history of metastatic prostate cancer who has been on chemotherapy who presents with abdominal pain.  He has been complaining of difficulty urinating, dysuria flank pain hematuria and lower abdominal pain.    ED evaluation revealed tachycardia but he was afebrile.  He was found to have urinary retention requiring placement of Rondon catheter with relief of his obstruction.  Urine test was concerning for infection and he received IV Rocephin as well.  Patient was admitted to observation team for monitoring.        #1.  Urinary retention in the setting of prostate cancer treated with chemotherapy and radiation.  Patient follows with Dr. Galvan from urology.  #2.  Generalized weakness  #3.  Prostate cancer: Metastatic to bones.  Last chemotherapy was on Tuesday.  #4.  Chronic anemia  #5.  Abnormal urinalysis with hematuria: Suspect UTI  #6.  Bilateral hydro-ureteronephrosis: Moderate start increased from previous study  #7.  Diverticulosis without diverticulitis  #8.  Pulmonary nodule, chronic        Reason for admission:    Observation admission for monitoring and management of urinary retention and abdominal pain    Observation Goals: Pain to be controlled, remained hemodynamically stable and likely discharged with Rondon to follow with Dr. Galvan           Comorbid conditions:      Adenocarcinoma of the prostate diagnosed in 2011.  Follows with [facility] in [place] and also with [facility]    History of C. difficile colitis             Plan     > Admission Status:Admit to  observation     >Care plan:  --Admitted observation unit  --Continue Rondon   --Continue ceftriaxone, changed to Keflex on discharge  --Follow urine culture    >Supportive care:IV fluids started  Nausea and vomiting control measures    >Diet:Diet advanced    >Activity:Advance activity as tolerated    >Education/Counseling :Discussed treatment plan with the patient    >Consults:None  >VTE prophylactic measures:prophylaxis against venous thromboembolism    >Therapies:none       >Additional orders  --Care plan discussed with the patient/family and agreed to care plan   --Patient will be transferred to care of hospitalist attending for further evaluation and management as appropriate   --Old medical orders reviewed   --imaging result independently reviewed by me     (See orders placed for this visit by me )     - Home medication reviewed and will be continued as appropriate once pharmacy reconciliation is completed             Code Status:     Full Code         Disposition:       >anticipate discharge to home and Anticipate discharge tomorrow            Disclaimer: This note consists of symbols derived from keyboarding, dictation and/or voice recognition software. As a result, there may be errors in the script that have gone undetected. Please consider this when interpreting information found in this chart.             Chief Complaint:     Abdominal pain flank pain and urinary retention     History is obtained from the patient          History of Present Illness:   This patient is a 74 year old  male with a significant past medical history of prostate cancer who presents with the following condition requiring a hospital admission:    Urinary retention    Patient is 74-year-old male with metastatic prostate cancer and chronic anemia who came in with lower abdominal pain and bilateral flank pain which started last evening.  Patient's pain is moderate in severity and intermittent.  He had difficulty emptying his bladder.   He noticed blood in his urine as well and has been having some dysuria.  He was diagnosed with prostate cancer in 2011 and was treated nonsurgically with chemotherapy and radiation.            Past Medical History:     Past Medical History:   Diagnosis Date     Prostate cancer (H) 12/2011    Dr. Galvan; now seeing Dr. Ruben Reese at [place]            Past Surgical History:     Past Surgical History:   Procedure Laterality Date     COLONOSCOPY N/A 11/29/2016    Procedure: COLONOSCOPY;  Surgeon: Alon Lo MD;  Location: [facility] OR     COLONOSCOPY N/A 12/3/2016    Procedure: COLONOSCOPY;  Surgeon: Carmenza Akbar MD;  Location: [facility] GI     COLONOSCOPY N/A 10/30/2018    Procedure: COMBINED COLONOSCOPY, SINGLE OR MULTIPLE BIOPSY/POLYPECTOMY BY BIOPSY;  Surgeon: Anatoly Tam MD;  Location: [facility] GI     CYSTOSCOPY       ESOPHAGOSCOPY, GASTROSCOPY, DUODENOSCOPY (EGD), COMBINED N/A 10/28/2018    Procedure: COMBINED ESOPHAGOSCOPY, GASTROSCOPY, DUODENOSCOPY (EGD);  Surgeon: Codey De La Torre MD;  Location: [facility] GI     ESOPHAGOSCOPY, GASTROSCOPY, DUODENOSCOPY (EGD), COMBINED Left 10/31/2018    Procedure: ESOPHAGOSCOPY, GASTROSCOPY, DUODENOSCOPY (EGD) with Pill Cam  Rm 334;  Surgeon: Anatoly Tam MD;  Location: [facility] GI     EXAM UNDER ANESTHESIA ANUS N/A 11/29/2016    Procedure: EXAM UNDER ANESTHESIA ANUS;  Surgeon: Alon Lo MD;  Location: [facility] OR     HC COLONOSCOPY THRU STOMA, DIAGNOSTIC  2003    normal, 2000 had polyps     HERNIA REPAIR, INGUINAL RT/LT       SIGMOIDOSCOPY FLEXIBLE N/A 9/10/2014    Procedure: SIGMOIDOSCOPY FLEXIBLE;  Surgeon: Madhuri Drake MD;  Location:  GI     VASECTOMY               Social History:     Social History     Tobacco Use     Smoking status: Former Smoker     Start date: 9/8/1981     Smokeless tobacco: Former User   Substance Use Topics     Alcohol use: No             Family History:     Family History   Problem Relation Age of Onset     C.A.D. No family hx of       Diabetes No family hx of      Hypertension No family hx of      Breast Cancer No family hx of      Cancer - colorectal No family hx of             Allergies:   No Known Allergies          Medications:         Prior to Admission medications    Medication Sig Last Dose Taking? Auth Provider   Cholecalciferol (VITAMIN D) 1000 UNITS capsule Take 2,000 Units by mouth 2 times daily  12/7/2019 at Unknown time Yes Reported, Patient   doxepin (ZONALON) 5 % external cream Apply topically daily as needed for itching  prn Yes Reported, Patient   ferrous sulfate (FEROSUL) 325 (65 Fe) MG tablet Take 325 mg by mouth 3 times daily (with meals) 12/7/2019 at Unknown time Yes Unknown, Entered By History   melatonin 3 MG tablet Take 1-3 tablets (3-9 mg) by mouth nightly as needed for sleep 12/7/2019 at Unknown time Yes Rosario Martinez MD   predniSONE (DELTASONE) 5 MG tablet Take 1 tablet (5 mg) by mouth daily Per oncology 12/7/2019 at Unknown time Yes Rosario Martinez MD   saccharomyces boulardii (FLORASTOR) 250 MG capsule Take 500 mg by mouth 2 times daily  12/7/2019 at Unknown time Yes Reported, Patient   leuprolide (ELIGARD) 45 MG injection Inject 45 mg Subcutaneous every 6 months. More than a month at Unknown time  Reported, Patient          Review of Systems:     A Comprehensive greater than 10 system review of systems was carried out.  Pertinent positives and negatives are noted above in HPI.  Otherwise negative for contributory information.              Physical Exam:     Vitals were reviewed    Temp:  [97.4  F (36.3  C)] 97.4  F (36.3  C)  Pulse:  [] 103  Resp:  [18] 18  BP: (137-157)/() 149/100  SpO2:  [96 %-100 %] 98 %      GEN:   Alert, oriented x 3, appears comfortable, NAD.  NECK:Supple ,no mass or thyromegaly   HEENT:   Normocephalic/atraumatic, no scleral icterus, no nasal discharge, mouth moist.  CV:   Regular rate and rhythm, no murmur or JVD.  S1 + S2 noted, no S3 or S4.  LUNGS:   Clear to  auscultation bilaterally without rales/rhonchi/wheezing/retractions.  Symmetric chest rise on inhalation noted.  ABD:  Active bowel sounds, soft, non-tender/non-distended.  No rebound/guarding/rigidity.  EXT:  No edema.  No cyanosis.  No joint synovitis noted.  SKIN:  Dry to touch, no exanthems noted in the visualized areas.  Neurologic:Grossly intact,non focal     Neuropsychiatric:  General: normal, calm and normal eye contact  Level of consciousness: alert / normal  Affect: normal  Orientation: oriented to self, place, time and situation           Data:       All laboratory and imaging data in the past 24 hours reviewed     Results for orders placed or performed during the hospital encounter of 12/08/19   CBC + differential     Status: Abnormal   Result Value Ref Range    WBC 4.3 4.0 - 11.0 10e9/L    RBC Count 2.78 (L) 4.4 - 5.9 10e12/L    Hemoglobin 9.3 (L) 13.3 - 17.7 g/dL    Hematocrit 27.6 (L) 40.0 - 53.0 %    MCV 99 78 - 100 fl    MCH 33.5 (H) 26.5 - 33.0 pg    MCHC 33.7 31.5 - 36.5 g/dL    RDW 13.8 10.0 - 15.0 %    Platelet Count 155 150 - 450 10e9/L    Diff Method Manual Differential     % Neutrophils 74.0 %    % Lymphocytes 7.0 %    % Monocytes 4.0 %    % Eosinophils 0.0 %    % Basophils 0.0 %    % Metamyelocytes 15.0 %    Absolute Neutrophil 3.2 1.6 - 8.3 10e9/L    Absolute Lymphocytes 0.3 (L) 0.8 - 5.3 10e9/L    Absolute Monocytes 0.2 0.0 - 1.3 10e9/L    Absolute Eosinophils 0.0 0.0 - 0.7 10e9/L    Absolute Basophils 0.0 0.0 - 0.2 10e9/L    Absolute Metamyelocytes 0.6 (H) 0 10e9/L    RBC Morphology Consistent with reported results     Platelet Estimate       Automated count confirmed.  Platelet morphology is normal.   Comprehensive metabolic panel     Status: Abnormal   Result Value Ref Range    Sodium 135 133 - 144 mmol/L    Potassium 3.9 3.4 - 5.3 mmol/L    Chloride 103 94 - 109 mmol/L    Carbon Dioxide 23 20 - 32 mmol/L    Anion Gap 9 3 - 14 mmol/L    Glucose 122 (H) 70 - 99 mg/dL    Urea Nitrogen 17  7 - 30 mg/dL    Creatinine 0.71 0.66 - 1.25 mg/dL    GFR Estimate >90 >60 mL/min/[1.73_m2]    GFR Estimate If Black >90 >60 mL/min/[1.73_m2]    Calcium 8.7 8.5 - 10.1 mg/dL    Bilirubin Total 0.6 0.2 - 1.3 mg/dL    Albumin 3.4 3.4 - 5.0 g/dL    Protein Total 6.8 6.8 - 8.8 g/dL    Alkaline Phosphatase 176 (H) 40 - 150 U/L    ALT 32 0 - 70 U/L    AST 40 0 - 45 U/L   Lipase     Status: Abnormal   Result Value Ref Range    Lipase 69 (L) 73 - 393 U/L   UA with Microscopic     Status: Abnormal   Result Value Ref Range    Color Urine Orange     Appearance Urine Slightly Cloudy     Glucose Urine Negative NEG^Negative mg/dL    Bilirubin Urine Negative NEG^Negative    Ketones Urine Negative NEG^Negative mg/dL    Specific Gravity Urine 1.015 1.003 - 1.035    Blood Urine Large (A) NEG^Negative    pH Urine 7.0 5.0 - 7.0 pH    Protein Albumin Urine 100 (A) NEG^Negative mg/dL    Urobilinogen mg/dL Normal 0.0 - 2.0 mg/dL    Nitrite Urine Negative NEG^Negative    Leukocyte Esterase Urine Moderate (A) NEG^Negative    Source Catheterized Urine     WBC Urine 102 (H) 0 - 5 /HPF    RBC Urine >182 (H) 0 - 2 /HPF    Mucous Urine Present (A) NEG^Negative /LPF            Recent Results (from the past 48 hour(s))   Abd/pelvis CT no contrast - Stone Protocol    Narrative    EXAM: CT ABDOMEN PELVIS W/O CONTRAST  LOCATION: [facility]  DATE/TIME: 12/8/2019 10:31 PM    INDICATION: Flank pain, stone disease suspected - left  COMPARISON: 11/21/2019 and 08/27/2019  TECHNIQUE: CT scan of the abdomen and pelvis was performed without IV contrast. Multiplanar reformats were obtained. Dose reduction techniques were used.  CONTRAST: None.    FINDINGS:   LOWER CHEST: 2.6 x 2.3 cm right middle lobe nodule abutting the fissure recently measured 2.9 x 2.4 cm. Many of the other previously seen nodules are no longer visualized or decreased in size. For example there is a 6 mm left lower lobe nodule image 27   which previously measured 9 mm.  Calcified pleural plaque.    HEPATOBILIARY: Gallbladder is mildly distended. No biliary dilatation.    PANCREAS: Stable.    SPLEEN: Normal.    ADRENAL GLANDS: Normal.    KIDNEYS/BLADDER: Moderate bilateral hydroureteronephrosis and periureteral stranding. No visible ureteral calculi. Rondon within the bladder. The bladder is diffusely thickened.    BOWEL: Normal caliber. Diverticulosis. Normal appendix.    LYMPH NODES: Normal.    VASCULATURE: Atherosclerotic vascular calcification.    PELVIC ORGANS: Prostatectomy. Presacral edema. Fat-containing inguinal hernias.    OTHER: None.    MUSCULOSKELETAL: Degenerative change osseous structures. Sclerotic osseous metastases in the lower lumbar spine. Permeative change of the sacrum again seen.      Impression    IMPRESSION:   1.  Moderate bilateral hydroureteronephrosis. Degree of necrosis is increased compared to the previous study. No visible ureteral calculi.  2.  The bladder is decompressed with a Rondon catheter but the wall is diffusely thickened. Correlate for cystitis.  3.  Osseous metastases are again seen.  4.  Diverticulosis.  5.  Basilar pulmonary nodules some of which have decreased in size.            All imaging studies reviewed by me.       Patient`s old medical records reviewed and case discussed with the ED physician.    ED course-Reviewed

## 2019-12-09 NOTE — PLAN OF CARE
PRIMARY DIAGNOSIS: HEMATURIA/URINARY RETENTION/ACUTE BILATERAL FLANK PAIN   OUTPATIENT/OBSERVATION GOALS TO BE MET BEFORE DISCHARGE:  1. Pain Status: Improved-controlled with oral pain medications.    2. Return to near baseline physical activity: Yes    3. Cleared for discharge by consultants (if involved): N/A    Discharge Planner Nurse   Safe discharge environment identified: Yes  Barriers to discharge: Yes       Entered by: Katina Gautam 12/09/2019 8:00 AM     /65 (BP Location: Left arm)   Pulse 91   Temp 99.8  F (37.7  C) (Oral)   Resp 18   Ht 1.829 m (6')   Wt 69.8 kg (153 lb 12.8 oz)   SpO2 96%   BMI 20.86 kg/m      Oriented x4, sleepy this AM. VSS. Up w/ SBA. Indwelling Rondon catheter patent w/ adequate amt.'s of clear pink/yellow urine output. BS active x4, states he has no appetite this AM, passing flatus. Denies nausea. Continues IV Rocephin. C/o flank pain, given po Oxycodone prn. Hemoglobin 8.6 today. IVF infusing. Patient wishes to rest this morning. Enteric precautions maintained. Will continue to monitor.    Please review provider order for any additional goals.   Nurse to notify provider when observation goals have been met and patient is ready for discharge.

## 2019-12-10 NOTE — TELEPHONE ENCOUNTER
"ED/Discharge Protocol    \"Hi, my name is Rosina Soares RN, a registered nurse, and I am calling on behalf of Dr. Dr. Martinez's office at [place].  I am calling to follow up and see how things are going for you after your recent visit.\"    \"I see that you were in the (ER/UC/IP) on 12/9/19.    How are you doing now that you are home?\" doing ok, the catheter is not that comfortable though    Is patient experiencing symptoms that may require a hospital visit?  no    Discharge Instructions    \"Let's review your discharge instructions.  What is/are the follow-up recommendations?  Pt. Response: He is waiting for a call back from Dr. Galvan's office to schedule there.  He does need to see Dr. Martinez    \"Were you instructed to make a follow-up appointment?\"  Pt. Response: Yes.  Has appointment been made?   No.  \"Can I help you schedule that appointment?\" yes      \"When you see the provider, I would recommend that you bring your discharge instructions with you.    Medications    \"How many new medications are you on since your hospitalization/ED visit?\"    2 or more - Epic MTM referral needed - acute meds - antibiotic and pain med - declines MTM at this time  \"How many of your current medicines changed (dose, timing, name, etc.) while you were in the hospital/ED visit?\"   0-1  \"Do you have questions about your medications?\"   No   \"Were you newly diagnosed with heart failure, COPD, diabetes or did you have a heart attack?\"   No  For patients on insulin: \"Did you start on insulin in the hospital or did you have your insulin dose changed?\"   No  Post Discharge Medication Reconciliation Status: discharge medications reconciled, continue medications without change.    Was MTM referral placed (*Make sure to put transitions as reason for referral)?   No    Call Summary    \"Do you have any questions or concerns about your condition or care plan at the moment?\"    No  Triage nurse advice given: none    Patient was in ER five in " "the past year (assess appropriateness of ER visits.)      \"If you have questions or things don't continue to improve, we encourage you contact us through the main clinic number,  [phone].  Even if the clinic is not open, triage nurses are available 24/7 to help you.     We would like you to know that our clinic has extended hours (provide information).  We also have urgent care (provide details on closest location and hours/contact info)\"      \"Thank you for your time and take care!\"        "

## 2019-12-10 NOTE — TELEPHONE ENCOUNTER
Please contact patient for In-patient follow up.  [phone] (home)     Visit date:  12 09 2019   Diagnosis listed:Urinary tract infection, Prostate Cancer metastatic to bone (H),   Number of visits in past 12 months: 5/0       done

## 2019-12-10 NOTE — PLAN OF CARE
Patient's After Visit Summary was reviewed with patient and/or spouse.   Patient verbalized understanding of After Visit Summary, recommended follow up and was given an opportunity to ask questions.   Discharge medications sent home with patient/family: YES   Discharged with spouse.    IV access removed. Indwelling Rondon catheter care education reviewed. All personal belongings returned.    OBSERVATION patient END time: 8:00 PM

## 2019-12-10 NOTE — PLAN OF CARE
PRIMARY DIAGNOSIS: HEMATURIA/URINARY RETENTION/ACUTE BILATERAL FLANK PAIN     OUTPATIENT/OBSERVATION GOALS TO BE MET BEFORE DISCHARGE:     1. Pain Status: Improved-controlled with oral pain medications.Oxycodone prn.     2. Return to near baseline physical activity: Yes     3. Cleared for discharge by consultants (if involved): N/A     Discharge Planner Nurse   Safe discharge environment identified: Yes  Barriers to discharge: Yes       Entered by: Katina Gautam 12/09/2019 4:00 PM     /69 (BP Location: Left arm)   Pulse 87   Temp 98.8  F (37.1  C) (Oral)   Resp 16   Ht 1.829 m (6')   Wt 69.8 kg (153 lb 12.8 oz)   SpO2 97%   BMI 20.86 kg/m       A&Ox4, sleeping between cares. VSS. Up w/ SBA. Indwelling Rondon catheter patent w/ adequate amt.'s red colored urine output. BS active x4, states he has no appetite this AM, passing flatus. Patient will try to eat lunch. Plan is to ambulate hallway afterward. Denies nausea. Started on po Vancomycin. C/o flank pain, given po Oxycodone prn w/ relief. Hemoglobin 8.6 today. IVF infusing. Enteric precautions maintained. Spouse at bedside. Plan is to discharge to home this evening w/ Indwelling Rondon catheter w/ spouse providing transportation. Will continue to monitor.      Please review provider order for any additional goals.   Nurse to notify provider when observation goals have been met and patient is ready for discharge.

## 2019-12-18 NOTE — PATIENT INSTRUCTIONS
Before Your Surgery      Call your surgeon if there is any change in your health. This includes signs of a cold or flu (such as a sore throat, runny nose, cough, rash or fever).    Do not smoke, drink alcohol or take over the counter medicine (unless your surgeon or primary care doctor tells you to) for the 24 hours before and after surgery.    If you take prescribed drugs: Follow your doctor s orders about which medicines to take and which to stop until after surgery.    Eating and drinking prior to surgery: follow the instructions from your surgeon    Take a shower or bath the night before surgery. Use the soap your surgeon gave you to gently clean your skin. If you do not have soap from your surgeon, use your regular soap. Do not shave or scrub the surgery site.  Wear clean pajamas and have clean sheets on your bed.     --Approval given to proceed with proposed procedure, without further diagnostic evaluation  --Pt advised to avoid NSAIDS one week prior to surgery/procedure (Motrin, Ibuprofen, Aleve or Naprosyn);  If needed, Tylenol or Acetaminophen are fine to use.  --meds reviewed; may hold meds on AM of surgery.  If procedure is later in the day, may take AM medications.   --Pain medications, time off from work and FMLA following surgery deferred to surgeon.

## 2019-12-18 NOTE — PROGRESS NOTES
John Batista comes into clinic today at the request of Dr Galvan Ordering Provider for catheter removal.  This service provided today was under the supervising provider of the day Dr Edwards, who was available if needed.     John arrives with wife  For medeiros removal. Urine is still grossly bloody  No viable clots. Urine culture was negative . Spoke to Dr Galvan who orders to leave medeiros in place  And encourage water drinking . Schedule for  Cystoscopy under anesthesia next week at [facility]. He is seeing primary MD this afternoon  So informed both to wife and patient to inform primary he needs H&P for clearance  For the procedure . Wife states he has chemo scheduled for 12-26 and this may nee to be delayed and they should   check with oncology . Jazmine will call patient with date and time of the procedure .  Marychuy Yousif LPN

## 2019-12-18 NOTE — PROGRESS NOTES
[facility]  [id number] [facility] [id number] [phone]  Dept: [phone]    PRE-OP EVALUATION:  Today's date: 2019    John Batista (: 1945) presents for pre-operative evaluation assessment as requested by Dr. David Galvan.  He requires evaluation and anesthesia risk assessment prior to undergoing surgery/procedure for treatment of hematuria .    Proposed Surgery/ Procedure: Cystoscopy   Date of Surgery/ Procedure: to be determined  Time of Surgery/ Procedure: to be determined  Hospital/Surgical Facility: [facility]  Fax number for surgical facility:   Primary Physician: Rosario Martinez  Type of Anesthesia Anticipated: General    Patient has a Health Care Directive or Living Will:  YES     1. NO - Do you have a history of heart attack, stroke, stent, bypass or surgery on an artery in the head, neck, heart or legs?  2. NO - Do you ever have any pain or discomfort in your chest?  3. NO - Do you have a history of  Heart Failure?  4. YES - ARE YOUR TROUBLED BY SHORTNESS OF BREATH WHEN WALKING ON THE LEVEL, UP A SLIGHT HILL OR AT NIGHT? Is short of breath due to anemia  5. NO - Do you currently have a cold, bronchitis or other respiratory infection?  6. NO - Do you have a cough, shortness of breath or wheezing?  7. YES - DO YOU SOMETIMES GET PAINS IN THE CALVES OF YOUR LEGS WHEN YOU WALK? Left calf is painful all of the time   8. NO - Do you or anyone in your family have previous history of blood clots?  9. YES - DO YOU OR DOES ANYONE IN YOUR FAMILY HAVE A SERIOUS BLEEDING PROBLEM SUCH AS PROLONGED BLEEDING FOLLOWING SURGERIES OR CUTS?   10. YES - HAVE YOU EVER HAD PROBLEMS WITH ANEMIA OR BEEN TOLD TO TAKE IRON PILLS? Currently on Iron and chronic anemia  11. YES - HAVE YOU HAD ANY ABNORMAL BLOOD LOSS SUCH AS BLACK, TARRY OR BLOODY STOOLS, OR ABNORMAL VAGINAL BLEEDING? hematuria  12. YES - HAVE YOU EVER HAD A BLOOD TRANSFUSION? Without  complications   13. NO - Have you or any of your relatives ever had problems with anesthesia?  14. NO - Do you have sleep apnea, excessive snoring or daytime drowsiness?  15. NO - Do you have any prosthetic heart valves?  16. NO - Do you have prosthetic joints?  17. NO - Is there any chance that you may be pregnant?        Subjective     John Batista is a 74 year old male who presents to clinic today for the following health issues:  He presents today with an indwelling medeiros catheter due to hematuria; recent culture was negative.  He is completing course of Cefdinir .  Since he has recently had C. Diff colitis he was again placed on Vancomycin while on antibiotics.   He denies diarrhea.  Dr. Galvan plans to remove the medeiros catheter and evaluate bladder under anesthesia to assess etiology of hematuria.       HPI       Hospital Follow-up Visit:    Hospital/Nursing Home/IP Rehab Facility: [facility]  Date of Admission: 12/8/19  Date of Discharge: 12/9/19  Reason(s) for Admission: hematuria            Problems taking medications regularly:  None       Medication changes since discharge: None       Problems adhering to non-medication therapy:  None    Summary of hospitalization:  [facility] discharge summary reviewed  Diagnostic Tests/Treatments reviewed.  Follow up needed: medeiros catheter in place   Other Healthcare Providers Involved in Patient s Care:         Oncology, Urology  Update since discharge: improved.     Post Discharge Medication Reconciliation: discharge medications reconciled and changed, per note/orders (see AVS).  Plan of care communicated with patient and spouse     Coding guidelines for this visit:  Type of Medical   Decision Making Face-to-Face Visit       within 7 Days of discharge Face-to-Face Visit        within 14 days of discharge   Moderate Complexity [id number]   High Complexity [id number]            HPI:     HPI related to upcoming procedure:     John Batista  is a 74 year old male who presents to clinic today for the following health issues:  Metastatic prostate cancer- undergoing chemotherapy.  He presents today with an indwelling medeiros catheter due to hematuria; recent culture was negative.  He is completing course of Cefdinir .  Since he has recently had C. Diff colitis he was again placed on Vancomycin while on antibiotics.   He denies diarrhea.  Dr. Galvan plans to remove the medeiros catheter and evaluate bladder under anesthesia to assess etiology of hematuria.       MEDICAL HISTORY:     Patient Active Problem List    Diagnosis Date Noted     Urinary retention 12/09/2019     Priority: Medium     Melena 10/27/2018     Priority: Medium     GI bleeding 11/29/2016     Priority: Medium     Acute lower gastrointestinal hemorrhage 11/28/2016     Priority: Medium     Iron deficiency anemia 09/15/2013     Priority: Medium     secondary to rectal ulcer, slowly healing ; colonoscopy done 5/2013- Dr. Soni; radiation induced ulcer.  Bleeding remains intermittent but has been able to keep HGB >11 with iron supplementation; most rece  HGB     11.9   2/12/2014  HGB     11.7   1/27/2014        Rectal ulcer 09/15/2013     Priority: Medium     Prostate cancer metastatic to bone (H) 09/15/2013     Priority: Medium     12/17/2011 seed implantation; radiation; rectal ulceration developed; Dr. Farrkuh [place] and Dr. Reese @[facility]       Advance Care Planning 11/08/2011     Priority: Medium     Discussed advance care planning with patient; information given to patient to review. 11/8/2011        CARDIOVASCULAR SCREENING; LDL GOAL LESS THAN 160 06/14/2011     Priority: Medium     Benign neoplasm of colon 11/25/2008     Priority: Medium      Past Medical History:   Diagnosis Date     Prostate cancer (H) 12/2011    Dr. Galvan; now seeing Dr. Ruben Reese at [facility]     Past Surgical History:   Procedure Laterality Date     COLONOSCOPY N/A 11/29/2016    Procedure: COLONOSCOPY;  Surgeon: Alon Lo  MD JIMBO;  Location: [facility] OR     COLONOSCOPY N/A 12/3/2016    Procedure: COLONOSCOPY;  Surgeon: Carmenza Akbar MD;  Location: [facility] GI     COLONOSCOPY N/A 10/30/2018    Procedure: COMBINED COLONOSCOPY, SINGLE OR MULTIPLE BIOPSY/POLYPECTOMY BY BIOPSY;  Surgeon: Anatoly Tam MD;  Location: [facility] GI     CYSTOSCOPY       ESOPHAGOSCOPY, GASTROSCOPY, DUODENOSCOPY (EGD), COMBINED N/A 10/28/2018    Procedure: COMBINED ESOPHAGOSCOPY, GASTROSCOPY, DUODENOSCOPY (EGD);  Surgeon: Codey De La Torre MD;  Location: [facility] GI     ESOPHAGOSCOPY, GASTROSCOPY, DUODENOSCOPY (EGD), COMBINED Left 10/31/2018    Procedure: ESOPHAGOSCOPY, GASTROSCOPY, DUODENOSCOPY (EGD) with Pill Cam  Rm 334;  Surgeon: Anatoly Tam MD;  Location: [facility] GI     EXAM UNDER ANESTHESIA ANUS N/A 11/29/2016    Procedure: EXAM UNDER ANESTHESIA ANUS;  Surgeon: Alon Lo MD;  Location: [facility] OR     HC COLONOSCOPY THRU STOMA, DIAGNOSTIC  2003    normal, 2000 had polyps     HERNIA REPAIR, INGUINAL RT/LT       SIGMOIDOSCOPY FLEXIBLE N/A 9/10/2014    Procedure: SIGMOIDOSCOPY FLEXIBLE;  Surgeon: Madhuri Drake MD;  Location: [facility] GI     VASECTOMY       Current Outpatient Medications   Medication Sig Dispense Refill     cefdinir (OMNICEF) 300 MG capsule Take 1 capsule (300 mg) by mouth every 12 hours for 10 days 19 capsule 0     Cholecalciferol (VITAMIN D) 1000 UNITS capsule Take 2,000 Units by mouth 2 times daily        doxepin (ZONALON) 5 % external cream Apply topically daily as needed for itching        ferrous sulfate (FEROSUL) 325 (65 Fe) MG tablet Take 325 mg by mouth 3 times daily (with meals)       leuprolide (ELIGARD) 45 MG injection Inject 45 mg Subcutaneous every 6 months.       melatonin 3 MG tablet Take 1-3 tablets (3-9 mg) by mouth nightly as needed for sleep 90 tablet 1     oxyCODONE (ROXICODONE) 5 MG tablet Take 1-2 tablets (5-10 mg) by mouth every 6 hours as needed for breakthrough pain 28 tablet 0     predniSONE (DELTASONE) 5 MG tablet  Take 1 tablet (5 mg) by mouth daily Per oncology       saccharomyces boulardii (FLORASTOR) 250 MG capsule Take 500 mg by mouth 2 times daily        vancomycin (FIRVANQ) 50 MG/ML oral solution Take 2.5 mLs (125 mg) by mouth 2 times daily for 20 days 100 mL 0     OTC products: None, except as noted above    No Known Allergies   Latex Allergy: NO    Social History     Tobacco Use     Smoking status: Former Smoker     Start date: 9/8/1981     Smokeless tobacco: Former User   Substance Use Topics     Alcohol use: No     History   Drug Use No       REVIEW OF SYSTEMS:   CONSTITUTIONAL:NEGATIVE for fever, chills, change in weight  Wt Readings from Last 3 Encounters:   12/18/19 72.4 kg (159 lb 11.2 oz)   12/09/19 69.8 kg (153 lb 12.8 oz)   12/02/19 73.7 kg (162 lb 8 oz)       ENT/MOUTH: NEGATIVE for ear, mouth and throat problems  RESP: NEGATIVE for significant cough or SOB  CV: NEGATIVE for chest pain, palpitations or peripheral edema  GI: hx of C. Diff colitis recently; currently on Vancomycin while on antibiotics.    male :hematuria; metastatic prostate cancer- oncology follows pt for chemotherapy  MUSCULOSKELETAL: NEGATIVE for significant arthralgias or myalgia  NEURO: NEGATIVE for weakness, dizziness or paresthesias  ENDOCRINE: NEGATIVE for temperature intolerance, skin/hair changes  HEME/ALLERGY/IMMUNE: hematuria; hx of anemia related to chemotherpay  PSYCHIATRIC: NEGATIVE for changes in mood or affect    EXAM:   /60   Pulse 76   Temp 97.8  F (36.6  C) (Oral)   Resp 17   Wt 72.4 kg (159 lb 11.2 oz)   SpO2 96%   BMI 21.66 kg/m      GENERAL APPEARANCE: alert and no distress     EYES: Eyes grossly normal to inspection and conjunctivae and sclerae normal     NECK: no adenopathy, no asymmetry, masses, or scars and thyroid normal to palpation     RESP: lungs clear to auscultation - no rales, rhonchi or wheezes     CV: regular rates and rhythm     ABDOMEN:  soft, nontender, no HSM or masses and bowel sounds  normal     GU_male: medeiros catheter in place; urine clear; hematuria noted     MS: extremities normal- no gross deformities noted, no evidence of inflammation in joints, FROM in all extremities.     NEURO: Normal strength and tone, sensory exam grossly normal, mentation intact and speech normal     PSYCH: mentation appears normal. and affect normal/bright    DIAGNOSTICS:       Recent Labs   Lab Test 12/09/19  0700 12/08/19  1803  10/27/18  1448   HGB 8.6* 9.3*   < > 8.9*   * 155   < > 245   INR  --   --   --  0.91    135   < > 131*   POTASSIUM 3.7 3.9   < > 3.6   CR 0.64* 0.71   < > 0.73    < > = values in this interval not displayed.        IMPRESSION:   Reason for surgery/procedure: hematuria, medeiros catheter in place temporarily  Diagnosis/reason for consult: prostate cancer, metastatic, hematuria, recent hx of C. Diff colitis    The proposed surgical procedure is considered LOW risk.    REVISED CARDIAC RISK INDEX  The patient has the following serious cardiovascular risks for perioperative complications such as (MI, PE, VFib and 3  AV Block):  No serious cardiac risks  INTERPRETATION: 0 risks: Class I (very low risk - 0.4% complication rate)    The patient has the following additional risks for perioperative complications:  No identified additional risks      ICD-10-CM    1. Preop general physical exam Z01.818    2. Gross hematuria R31.0 CBC with platelets   3. Elevated glucose R73.09 Basic metabolic panel     Hemoglobin A1c   4. Prostate cancer metastatic to bone (H) C61     C79.51    5. History of Clostridium difficile colitis Z86.19        RECOMMENDATIONS:     --Approval given to proceed with proposed procedure, without further diagnostic evaluation  --Pt advised to avoid NSAIDS (Motrin, Ibuprofen, Aleve or Naprosyn);  If needed, Tylenol or Acetaminophen are fine to use.    --Pain medications, time off from work and FMLA following surgery deferred to surgeon.      --Approval given to proceed with  proposed procedure, without further diagnostic evaluation  --Pt advised to avoid NSAIDS one week prior to surgery/procedure (Motrin, Ibuprofen, Aleve or Naprosyn);  If needed, Tylenol or Acetaminophen are fine to use.  --meds reviewed; may hold meds on AM of surgery.  If procedure is later in the day, may take AM medications.   --Pain medications, time off from work and FMLA following surgery deferred to surgeon.      APPROVAL GIVEN to proceed with proposed procedure, without further diagnostic evaluation       Signed Electronically by: MD Rosario Oden MD  Internal Medicine  electronically signed  45 minutes is spent with patient, over 50% of that time spent providing counselling, discussing and reviewing meds and potential side effects.      Copy of this evaluation report is provided to requesting physician.    [facility] Preop Guidelines    Revised Cardiac Risk Index

## 2019-12-22 PROBLEM — R10.9 FLANK PAIN: Status: ACTIVE | Noted: 2019-01-01

## 2019-12-22 NOTE — ED TRIAGE NOTES
Patient presents with complaints of abdomen pain and bilateral flank pain. Patient had a medeiros placed for hematuria about a week ago. Patient states that he is now having increased abdomen pain and bilateral flank pain. Patient states cath is still draining.  ABC intact without need for intervention at this time.

## 2019-12-22 NOTE — PHARMACY-ADMISSION MEDICATION HISTORY
Admission medication history interview status for this patient is complete. See [facility] admission navigator for allergy information, prior to admission medications and immunization status.     Medication history interview source(s):Patient  Medication history resources (including written lists, pill bottles, clinic record):[facility] list  [facility]    Changes made to [facility] medication list:  Added: none  Deleted: none  Changed: none    Actions taken by pharmacist (provider contacted, etc):None     Additional medication history information:None    Medication reconciliation/reorder completed by provider prior to medication history?  No     Do you take OTC medications (eg tylenol, ibuprofen, fish oil, eye/ear drops, etc)? Yes     For patients on insulin therapy: No      Prior to Admission medications    Medication Sig Last Dose Taking? Auth Provider   Cholecalciferol (VITAMIN D) 1000 UNITS capsule Take 2,000 Units by mouth 2 times daily  12/21/2019 at Unknown time Yes Reported, Patient   doxepin (ZONALON) 5 % external cream Apply topically daily as needed for itching  Past Week at Unknown time Yes Reported, Patient   ferrous sulfate (FEROSUL) 325 (65 Fe) MG tablet Take 325 mg by mouth 3 times daily (with meals) 12/21/2019 at Unknown time Yes Unknown, Entered By History   HYDROmorphone (DILAUDID) 4 MG tablet Take 4 mg by mouth every 6 hours as needed for severe pain 12/21/2019 at Unknown time Yes Unknown, Entered By History   melatonin 3 MG tablet Take 1-3 tablets (3-9 mg) by mouth nightly as needed for sleep 12/21/2019 at Unknown time Yes Rosario Martinez MD   Polysaccharide Iron Complex (FERREX 150 PO) Take 1 capsule by mouth three times daily 12/21/2019 at Unknown time Yes Unknown, Entered By History   predniSONE (DELTASONE) 5 MG tablet Take 1 tablet (5 mg) by mouth daily Per oncology 12/21/2019 at Unknown time Yes Rosario Martinez MD   saccharomyces boulardii (FLORASTOR) 250 MG capsule Take 500  mg by mouth 2 times daily  12/21/2019 at Unknown time Yes Reported, Patient   vancomycin (FIRVANQ) 50 MG/ML oral solution Take 2.5 mLs (125 mg) by mouth 2 times daily for 20 days 12/21/2019 at am (7 days left) Yes Meri Degroot PA-C   leuprolide (ELIGARD) 45 MG injection Inject 45 mg Subcutaneous every 6 months. 10/1/2019 at Unknown time  Reported, Patient

## 2019-12-22 NOTE — PLAN OF CARE
PRIMARY DIAGNOSIS: ACUTE PAIN in lower back and lower abdomen  OUTPATIENT/OBSERVATION GOALS TO BE MET BEFORE DISCHARGE:  1. Pain Status: Improved but still requiring IV narcotics.  Patient states that the medication has really helped relieve his pain.    2. Return to near baseline physical activity: Stand by assist at this time due to side effects of IV analgesics.    3. Cleared for discharge by consultants (if involved): No, to be seen by urology today.  Instructed patient that he should not eat or drink until  sees in am.  He stated that he understands.  Also instructed him to call when his back pain starts to return so that he can get medicated again, said that he would.  Patient states that he has had indwelling catheter for approximately 1 week due to inability to void, acknowledges that he does well taking care of the catheter.  Will continue to monitor and assess.    Discharge Planner Nurse   Safe discharge environment identified: Yes  Barriers to discharge: none identified.        Entered by: Rosario Levine 12/22/2019 3:18 AM     Please review provider order for any additional goals.   Nurse to notify provider when observation goals have been met and patient is ready for discharge.

## 2019-12-22 NOTE — PLAN OF CARE
PRIMARY DIAGNOSIS: ACUTE PAIN  OUTPATIENT/OBSERVATION GOALS TO BE MET BEFORE DISCHARGE:  1. Pain Status: Improved but still requiring IV narcotics.    2. Return to near baseline physical activity: Yes    3. Cleared for discharge by consultants (if involved): No    Blood pressure 128/70, pulse 82, temperature 98.6  F (37  C), resp. rate 16, height 1.829 m (6'), weight 68 kg (150 lb), SpO2 98 %.    VSS.  LS clear, adequate sats on room air.  Patient currently rates his pain a 3-4 on a 1/10 scale.  Patient changed to oral Dilaudid for pain control.  Patient is a standby assist; ambulating to the bathroom prn.  Rondon patent, urine output tea colored, adequate in amount.    PLAN:  Continue to provide supportive cares. Explore palliative cares through [facility]    Discharge Planner Nurse   Safe discharge environment identified: No  Barriers to discharge: Yes          Entered by: Karen M. Lesch 12/22/2019 16:00 PM     Please review provider order for any additional goals.   Nurse to notify provider when observation goals have been met and patient is ready for discharge.

## 2019-12-22 NOTE — H&P
[facility]  Hospitalist Admission Note  Name: John Batista    MRN: [id number]  YOB: 1945    Age: 74 year old  Date of admission: 12/21/2019  Primary care provider: Rosario Martinez    Chief Complaint:  Flank pain, hydronephrosis    John Batista is a 74 year old male with PMH including metastatic prostate cancer on chemotherapy, known hydronephrosis seen on previous CT scans, recent admission for urinary tract infection discharged from this hospital on 12/9, relatively recent C. difficile infection who presents with bilateral flank pain.  He is on chemotherapy and is actually due for his 7th cycle on December 26.  Following his most recent admission on December 8 to December 9 he was treated for urinary tract infection with 10 days of Omnicef in addition to prophylactic vancomycin.  He returns today with bilateral flank pain which has been mild but became much more severe today.  He has continued to have issues with hematuria via his Medeiros catheter since discharge.  Catheter has been draining.      Here in the ER he was hemodynamically stable.  Basic lab work-up was notable for leukocytosis with white count of 13.3.  Notably renal function was intact with creatinine of 0.96.  Lactic acid was elevated at 2.1.  Dr. Powell with urology was contacted to review his case.  At this point the plan is to admit him under further care and have formal urology consultation later this morning to consider treatment options which might include cystoscopy.    Assessment and Plan:   1. Bilateral flank pain with hydronephrosis: cause unclear.  ?malignancy or stricture.  Hasn't improved in spite of medeiros catheter w/ decompression of his bladder. UA not terribly impressive for infection but he does have leukocytosis.  --[place] under observation status  --Urology already aware of the patient, formal consult ordered  --Continue to monitor ins and outs, Medeiros catheter currently in place  --We will  treat pain with PRN Tylenol, oxycodone and if needed IV Dilaudid for severe breakthrough pain    2.  Metastatic Prostate cancer, adenocarcinoma:  Follows with [facility] and [facility].  Has followed with Dr. Galvan with urology as well but now also plugged in with urology at [facility].  Currently undergoing chemotherapy with Cabazitaxel, most recent treatment 12/2/19, due again for cycle 7 of 8 on 12/26.       3..History of Recent C diff infection: Completed treatment.  During most recent hospital stay was started on prophylactic vancomycin to continue at least 10 days following treatment with antibiotics for UTI.     4.  Chronic anemia on chemotherapy: Stable.   Does have bloody appearing urine.  Continue to monitor.      5.  Leukocytosis: 13.3.  Could be a stress response from pain, rule out infection.  Given metastatic cancer and recent urinary tract infection would be reasonable to check blood cultures, pro calcitonin.        DVT Prophylaxis: Ambulate with assistance  Code Status: Full Code  Discharge Dispo:       History of Present Illness:  John Batista is a 74 year old male with PMH including metastatic prostate cancer on chemotherapy, known hydronephrosis seen on previous CT scans, recent admission for urinary tract infection discharged from this hospital on 12/9, relatively recent C. difficile infection who presents with bilateral flank pain.  He is on chemotherapy and is actually due for his eighth cycle on December 26.  Following his most recent admission on December 8 to December 9 he was treated for urinary tract infection with 10 days of Omnicef in addition to prophylactic vancomycin.  He returns today with bilateral flank pain which has been mild but became much more severe today.  He has continued to have issues with hematuria via his Rondon catheter since discharge.  Catheter has been draining.  He has been following with urology as an outpatient and was supposed to undergo an outpatient  cystoscopy within the next week here at [facility].  He last saw urology in clinic on December 18.         Past Medical History:  Past Medical History:   Diagnosis Date     Prostate cancer (H) 12/2011    Dr. Galvan; now seeing Dr. Ruben Reese at [facility]     Past Surgical History:  Past Surgical History:   Procedure Laterality Date     COLONOSCOPY N/A 11/29/2016    Procedure: COLONOSCOPY;  Surgeon: Alon Lo MD;  Location: [facility] OR     COLONOSCOPY N/A 12/3/2016    Procedure: COLONOSCOPY;  Surgeon: Carmenza Akbar MD;  Location: [facility] GI     COLONOSCOPY N/A 10/30/2018    Procedure: COMBINED COLONOSCOPY, SINGLE OR MULTIPLE BIOPSY/POLYPECTOMY BY BIOPSY;  Surgeon: Anatoly Tam MD;  Location: [facility] GI     CYSTOSCOPY       ESOPHAGOSCOPY, GASTROSCOPY, DUODENOSCOPY (EGD), COMBINED N/A 10/28/2018    Procedure: COMBINED ESOPHAGOSCOPY, GASTROSCOPY, DUODENOSCOPY (EGD);  Surgeon: Codey De La Torre MD;  Location: [facility] GI     ESOPHAGOSCOPY, GASTROSCOPY, DUODENOSCOPY (EGD), COMBINED Left 10/31/2018    Procedure: ESOPHAGOSCOPY, GASTROSCOPY, DUODENOSCOPY (EGD) with Pill Cam  Rm 334;  Surgeon: Anatoly Tam MD;  Location: [facility] GI     EXAM UNDER ANESTHESIA ANUS N/A 11/29/2016    Procedure: EXAM UNDER ANESTHESIA ANUS;  Surgeon: Alon Lo MD;  Location: [facility] OR     HC COLONOSCOPY THRU STOMA, DIAGNOSTIC  2003    normal, 2000 had polyps     HERNIA REPAIR, INGUINAL RT/LT       SIGMOIDOSCOPY FLEXIBLE N/A 9/10/2014    Procedure: SIGMOIDOSCOPY FLEXIBLE;  Surgeon: Madhuri Drake MD;  Location: [facility] GI     VASECTOMY       Social History:  Social History     Tobacco Use     Smoking status: Former Smoker     Start date: 9/8/1981     Smokeless tobacco: Former User   Substance Use Topics     Alcohol use: No     Social History     Social History Narrative     Not on file     Family History:  Family History   Problem Relation Age of Onset     C.A.D. No family hx of      Diabetes No family hx of      Hypertension No family hx of       Breast Cancer No family hx of      Cancer - colorectal No family hx of      Allergies:  No Known Allergies  Medications:  lidocaine 2 % (URO-JET) jelly 5 mL    [] cefdinir (OMNICEF) 300 MG capsule, Take 1 capsule (300 mg) by mouth every 12 hours for 10 days  Cholecalciferol (VITAMIN D) 1000 UNITS capsule, Take 2,000 Units by mouth 2 times daily   doxepin (ZONALON) 5 % external cream, Apply topically daily as needed for itching   ferrous sulfate (FEROSUL) 325 (65 Fe) MG tablet, Take 325 mg by mouth 3 times daily (with meals)  leuprolide (ELIGARD) 45 MG injection, Inject 45 mg Subcutaneous every 6 months.  melatonin 3 MG tablet, Take 1-3 tablets (3-9 mg) by mouth nightly as needed for sleep  oxyCODONE (ROXICODONE) 5 MG tablet, Take 1-2 tablets (5-10 mg) by mouth every 6 hours as needed for breakthrough pain  predniSONE (DELTASONE) 5 MG tablet, Take 1 tablet (5 mg) by mouth daily Per oncology  saccharomyces boulardii (FLORASTOR) 250 MG capsule, Take 500 mg by mouth 2 times daily   vancomycin (FIRVANQ) 50 MG/ML oral solution, Take 2.5 mLs (125 mg) by mouth 2 times daily for 20 days        Review of Systems:  A Comprehensive greater than 10 system review of systems was carried out.  Pertinent positives and negatives are noted above.  Otherwise negative for contributory information.     Physical Exam:  Blood pressure 130/70, pulse 88, temperature 97.7  F (36.5  C), resp. rate 18, height 1.829 m (6'), weight 71.7 kg (158 lb), SpO2 96 %.  Wt Readings from Last 1 Encounters:   19 71.7 kg (158 lb)     Exam:  General: Alert, awake, no acute distress.  HEENT: NC/AT, eyes anicteric, external occular movements intact, face symmetric.  Dentition WNL, MM moist.  Cardiac: RRR, S1, S2.  No murmurs appreciated.  Pulmonary: Normal chest rise, normal work of breathing.  Lungs CTA BL  Abdomen: medeiros draining blood tinged urine.  Bilateral flank ttp.  otherwise soft, non-tender, non-distended.  Bowel Sounds Present.  No  guarding.  Extremities: no deformities.  Warm, well perfused.  Skin: no rashes or lesions noted.  Warm and Dry.  Neuro: No focal deficits noted.  Speech clear.  Coordination and strength grossly normal.  Psych: Appropriate affect.    Data:  EKG:  None.  Imaging:  Recent Results (from the past 48 hour(s))   Abd/pelvis CT no contrast - Stone Protocol    Narrative    EXAM: CT ABDOMEN PELVIS W/O CONTRAST  LOCATION: [facility]  DATE/TIME: 12/21/2019 11:36 PM    INDICATION: Dysuria and hematuria.  COMPARISON: None.  TECHNIQUE: CT scan of the abdomen and pelvis was performed without IV contrast. Multiplanar reformats were obtained. Dose reduction techniques were used.  CONTRAST: None.    FINDINGS:   LOWER CHEST: Calcified granulomata.    HEPATOBILIARY: Normal.    PANCREAS: Normal.    SPLEEN: Normal.    ADRENAL GLANDS: Normal.    KIDNEYS/BLADDER: Moderate bilateral ureteral hydronephrosis, unchanged. Perinephric soft tissue stranding.    BOWEL: Question rectal wall thickening, recommend exclusion of proctitis.    LYMPH NODES: Normal.    VASCULATURE: Unremarkable.    PELVIC ORGANS: Rondon catheter in a decompressed urinary bladder. Presacral soft tissue thickening, stable. Previous prostatectomy.    OTHER: Bilateral fat-containing inguinal hernias.    MUSCULOSKELETAL: Sclerotic metastatic lesions in the lumbar spine are stable. Sacral metastatic lesions.      Impression    IMPRESSION:   1.  No significant change in the moderate bilateral ureteral hydronephrosis extending down to the bladder. The bladder is decompressed with a Rondon catheter. There has been a previous prostatectomy.    2.  Sclerotic metastatic lesions in the lumbar spine and sacrum with presacral soft tissue thickening, stable.    3.  Question rectal wall thickening and proctitis.    4.  Previous granulomatous infection.    5.  Bilateral fat-containing inguinal hernias.         Labs:  Recent Labs   Lab 12/21/19  2248 12/18/19  1639   WBC 13.3*  17.9*   HGB 9.7* 9.2*   HCT 29.3* 28.9*   MCV 98 100    343          Lab Results   Component Value Date     12/21/2019     12/18/2019     12/09/2019    Lab Results   Component Value Date    CHLORIDE 104 12/21/2019    CHLORIDE 101 12/18/2019    CHLORIDE 106 12/09/2019    Lab Results   Component Value Date    BUN 22 12/21/2019    BUN 17 12/18/2019    BUN 13 12/09/2019      Lab Results   Component Value Date    POTASSIUM 4.0 12/21/2019    POTASSIUM 4.9 12/18/2019    POTASSIUM 3.7 12/09/2019    Lab Results   Component Value Date    CO2 22 12/21/2019    CO2 24 12/18/2019    CO2 22 12/09/2019    Lab Results   Component Value Date    CR 0.96 12/21/2019    CR 0.94 12/18/2019    CR 0.64 12/09/2019        Recent Labs   Lab 12/21/19  2248   LACT 2.1*     Recent Labs   Lab 12/22/19  0018   COLOR Light Yellow   APPEARANCE Clear   URINEGLC Negative   URINEBILI Negative   URINEKETONE Negative   SG 1.010   UBLD Large*   URINEPH 7.5*   PROTEIN 30*   NITRITE Negative   LEUKEST Moderate*   RBCU >182*   WBCU 28*         Sorin Rebollar MD  Hospitalist  [facility]

## 2019-12-22 NOTE — ED NOTES
[facility]  ED Nurse Handoff Report    John Batista is a 74 year old male   ED Chief complaint: Flank Pain  . ED Diagnosis:   Final diagnoses:   None     Allergies: No Known Allergies    Code Status: Full Code  Activity level - Baseline/Home:  Independent. Activity Level - Current:   Assist X 1. Lift room needed: No. Bariatric: No   Needed: No   Isolation: No. Infection: Not Applicable.     Vital Signs:   Vitals:    12/21/19 2330 12/22/19 0000 12/22/19 0030 12/22/19 0100   BP: 128/77 107/69 126/69 130/70   Pulse: 87 93 89 88   Resp:       Temp:       SpO2: 97%  97% 96%   Weight:       Height:           Cardiac Rhythm:  ,      Pain level: 0-10 Pain Scale: 2  Patient confused: No. Patient Falls Risk: No.   Elimination Status: Has voided and Catheter in place at time of arrival   Patient Report - Initial Complaint:Patient presents with complaints of abdomen pain and bilateral flank pain. Patient had a medeiros placed for hematuria about a week ago. Patient states that he is now having increased abdomen pain and bilateral flank pain. Patient states cath is still draining.  ABC intact without need for intervention at this time.  .   Focused Assessment:  Gastrointestinal - GI WDL: -WDL except; bowel sounds; appearance/characteristics  Abdominal Appearance: nondistended  All Quadrants Bowel Sounds: audible and normoactive  All Quadrants Abdominal Palpation: soft/nontender   Tests Performed:CT, Labs . Abnormal Results:   Labs Ordered and Resulted from Time of ED Arrival Up to the Time of Departure from the ED   CBC WITH PLATELETS DIFFERENTIAL - Abnormal; Notable for the following components:       Result Value    WBC 13.3 (*)     RBC Count 2.99 (*)     Hemoglobin 9.7 (*)     Hematocrit 29.3 (*)     Absolute Neutrophil 11.6 (*)     Absolute Lymphocytes 0.7 (*)     All other components within normal limits   COMPREHENSIVE METABOLIC PANEL - Abnormal; Notable for the following components:    Glucose  142 (*)     All other components within normal limits   LACTIC ACID WHOLE BLOOD - Abnormal; Notable for the following components:    Lactic Acid 2.1 (*)     All other components within normal limits   ROUTINE UA WITH MICROSCOPIC - Abnormal; Notable for the following components:    Blood Urine Large (*)     pH Urine 7.5 (*)     Protein Albumin Urine 30 (*)     Leukocyte Esterase Urine Moderate (*)     WBC Urine 28 (*)     RBC Urine >182 (*)     WBC Clumps Present (*)     All other components within normal limits   LIPASE   PERIPHERAL IV CATHETER     Abd/pelvis CT no contrast - Stone Protocol   Final Result   IMPRESSION:    1.  No significant change in the moderate bilateral ureteral hydronephrosis extending down to the bladder. The bladder is decompressed with a Rondon catheter. There has been a previous prostatectomy.      2.  Sclerotic metastatic lesions in the lumbar spine and sacrum with presacral soft tissue thickening, stable.      3.  Question rectal wall thickening and proctitis.      4.  Previous granulomatous infection.      5.  Bilateral fat-containing inguinal hernias.         .   Treatments provided: See MAR  Family Comments: Present  OBS brochure/video discussed/provided to patient:  Yes  ED Medications:   Medications   ketorolac (TORADOL) injection 15 mg (15 mg Intravenous Not Given 12/21/19 2258)   opium-belladonna (B&O SUPPRETTES) 30-16.2 MG per suppository 0.5 suppository (0.5 suppositories Rectal Given 12/21/19 2310)   HYDROmorphone (PF) (DILAUDID) injection 0.5 mg (0.5 mg Intravenous Given 12/21/19 2258)     Drips infusing:  No  For the majority of the shift, the patient's behavior Green. Interventions performed were N/A.     Severe Sepsis OR Septic Shock Diagnosis Present: No      ED Nurse Name/Phone Number: Jimy Goss RN,   1:29 AM  RECEIVING UNIT ED HANDOFF REVIEW    Above ED Nurse Handoff Report was reviewed: Yes  Reviewed by: Rosario Levine RN on December 22, 2019 at 1:54 AM

## 2019-12-22 NOTE — PLAN OF CARE
PRIMARY DIAGNOSIS: ACUTE PAIN  OUTPATIENT/OBSERVATION GOALS TO BE MET BEFORE DISCHARGE:  1. Pain Status: Improved but still requiring IV narcotics. Pt c/o lower abd pain. Declined iv meds with 0800 assessement. Pt instructed to call when he needs pain meds    2. Return to near baseline physical activity: No, assist of 1.    3. Cleared for discharge by consultants (if involved): No, Urology to see pt    Discharge Planner Nurse   Safe discharge environment identified: No  Barriers to discharge: Yes       Entered by: Jennifer Alegria 12/22/2019 9:16 AM     Please review provider order for any additional goals.   Nurse to notify provider when observation goals have been met and patient is ready for discharge.

## 2019-12-22 NOTE — PROGRESS NOTES
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  Registered to Observation for evaluation of hydronephrosis.      Abdominal pain  Bilateral ureteral hydronephrosis  Hydronephrosis persisting despite decompression of the bladder.  Not convinced this is definitely the cause of patient's pain.  Other sources include constipation and malignancy.  Urine looks dark.  UA notable for hematuria, moderate LE, and 28 WBC.  Urology consulted, appreciate assistance.       Taxotere-resistant mCRPC  Encourage fluids.  Resume prednisone.  Counts stable - Hgb 9., Plt 336, ANC 11.6.  Follow-up with Patricia Borrero and [facility] for ongoing care     CDiff colitis  Diagnosed 11/21.  Continue oral vancomycin.     Diet: NPO for Medical/Clinical Reasons Except for: Ice Chips, Meds    DVT Prophylaxis: Low Risk/Ambulatory with no VTE prophylaxis indicated  Code Status: Full Code      DISPO:    The patient's care was discussed with the Attending Physician, Dr. Fabian.      STANFORD Alejandre  Hospitalist Service  [facility]    ______________________________________________________________________    Interval History   Flank pain improving, now suprapubic.  Fatigued and somewhat weak.  Rondon in place with dark urine.  Afebrile with VSS.      Data reviewed today: I reviewed all medications, new labs and imaging results over the last 24 hours. I personally reviewed no images or EKG's today.    Physical Exam   Vital Signs: Temp: 99  F (37.2  C) Temp src: Oral BP: 115/68 Pulse: 82 Heart Rate: 103 Resp: 16 SpO2: 96 % O2 Device:  None (Room air)    Weight: 150 lbs 0 oz    GENERAL:  Pleasant, cooperative, alert.  Laying in bed.  Nontoxic.   HEENT: Normocephalic, atraumatic.  Extra occular mm intact.  Sclera clear. PERRL.  Mucous membranes moist, no exudate.    PULMONOLOGY: Clear to auscultation bilaterally    CARDIAC: Regular rate and rhythm.  No appreciated murmur.   ABDOMEN: Soft, nontender non distended.    MUSCULOSKELETAL:  Moving x 4 spontaneously with CMS intact x4.  Normal bulk and tone.  No LE edema.     NEURO: Alert and oriented x3.  CN II-XII grossly intact and symmetric.  Nonfocal exam.       Data   Recent Labs   Lab 12/21/19  2248 12/18/19  1639   WBC 13.3* 17.9*   HGB 9.7* 9.2*   MCV 98 100    343    134   POTASSIUM 4.0 4.9   CHLORIDE 104 101   CO2 22 24   BUN 22 17   CR 0.96 0.94   ANIONGAP 9 9   TIMBO 9.4 9.4   * 124*   ALBUMIN 3.5  --    PROTTOTAL 7.7  --    BILITOTAL 0.3  --    ALKPHOS 142  --    ALT 32  --    AST 33  --    LIPASE 119  --      Recent Results (from the past 24 hour(s))   Abd/pelvis CT no contrast - Stone Protocol    Narrative    EXAM: CT ABDOMEN PELVIS W/O CONTRAST  LOCATION: [facility]  DATE/TIME: 12/21/2019 11:36 PM    INDICATION: Dysuria and hematuria.  COMPARISON: None.  TECHNIQUE: CT scan of the abdomen and pelvis was performed without IV contrast. Multiplanar reformats were obtained. Dose reduction techniques were used.  CONTRAST: None.    FINDINGS:   LOWER CHEST: Calcified granulomata.    HEPATOBILIARY: Normal.    PANCREAS: Normal.    SPLEEN: Normal.    ADRENAL GLANDS: Normal.    KIDNEYS/BLADDER: Moderate bilateral ureteral hydronephrosis, unchanged. Perinephric soft tissue stranding.    BOWEL: Question rectal wall thickening, recommend exclusion of proctitis.    LYMPH NODES: Normal.    VASCULATURE: Unremarkable.    PELVIC ORGANS: Rondon catheter in a decompressed urinary bladder. Presacral soft tissue thickening, stable. Previous prostatectomy.    OTHER: Bilateral  fat-containing inguinal hernias.    MUSCULOSKELETAL: Sclerotic metastatic lesions in the lumbar spine are stable. Sacral metastatic lesions.      Impression    IMPRESSION:   1.  No significant change in the moderate bilateral ureteral hydronephrosis extending down to the bladder. The bladder is decompressed with a Rondon catheter. There has been a previous prostatectomy.    2.  Sclerotic metastatic lesions in the lumbar spine and sacrum with presacral soft tissue thickening, stable.    3.  Question rectal wall thickening and proctitis.    4.  Previous granulomatous infection.    5.  Bilateral fat-containing inguinal hernias.       Medications     sodium chloride 100 mL/hr at 12/22/19 0833       docusate sodium  100 mg Oral BID     predniSONE  5 mg Oral Daily     saccharomyces boulardii  500 mg Oral BID     vancomycin  125 mg Oral BID

## 2019-12-22 NOTE — PLAN OF CARE
PRIMARY DIAGNOSIS: ACUTE PAIN in lower back and lower abdomen  OUTPATIENT/OBSERVATION GOALS TO BE MET BEFORE DISCHARGE:  1. Pain Status: Improved but still requiring IV narcotics.  Patient states that the medication has really helped relieve his pain.  Pain is a 6-7/10 previous to last iv analgesic.  States that medication brings pain level down to a 2-3 which he states is comfortable for him at this time.      2. Return to near baseline physical activity: Stand by assist at this time due to side effects of IV analgesics, IV pole and catheter.      3. Cleared for discharge by consultants (if involved): To be seen by urology today.  Instructed patient that he should not eat or drink until dr sees in am.  He stated that he understands.  Also instructed him to call when his back pain starts to return so that he can get medicated again, said that he would.  Patient states that he has had indwelling catheter for approximately 1 week due to inability to void, acknowledges that he does well taking care of the catheter.  Will continue to monitor and assess.     Discharge Planner Nurse   Safe discharge environment identified: Yes  Barriers to discharge: none identified.        Entered by: Rosario Levine 12/22/2019 3:18 AM  Please review provider order for any additional goals.   Nurse to notify provider when observation goals have been met and patient is ready for discharge.

## 2019-12-22 NOTE — PROGRESS NOTES
ROOM # 212-2    Living Situation (if not independent, order SW consult): lives at home with his wife  Facility name: n/a  : [facility]    Activity level at baseline: independent  Activity level on admit: sba      Patient registered to observation; given Patient Bill of Rights; given the opportunity to ask questions about observation status and their plan of care.  Patient has been oriented to the observation room, bathroom and call light is in place.    Discussed discharge goals and expectations with patient/family.

## 2019-12-22 NOTE — ED PROVIDER NOTES
History     Chief Complaint:  Flank pain    HPI   John Batista is a 74 year old male with a history of prostate cancer on chemotherapy (8th cycle due 12/26) and recent admission from 12/8-12/9 for after presenting with dysuria, hematuria, difficulty urinating, and flank pain for which he received an indwelling catheter and started on Omnicef 300 mg BID x 10 days and Roxicodone who presents to the emergency department with his wife for evaluation of flank pain. The patient reports that 1-2 hours ago his previously mild bilateral flank and abdominal pain became severe. He also continues to have hematuria since discharge. His catheter is draining as normal. He last saw urology on 12/18 and is scheduled for a cystoscopy under anesthesia this coming week here at [facility]. Patient denies fever.    Allergies:  No Known Drug Allergies     Medications:    Zonalon  Ferosul  Eligard  Roxicodone  Deltasone  Florastor  Firvanq     Past Medical History:    Prostate cancer  Rectal ulcer   Iron deficiency anemia  Acute lower GI bleed  Melena     Past Surgical History:    Colonoscopy   EGD  Inguinal hernia repair  Vasectomy     Family History:    History reviewed. No pertinent family history.    Social History:  Tobacco Use: Former  Alcohol Use: No  PCP: Rosario Martinez  Marital Status:        Review of Systems   Constitutional: Negative for fever.   Gastrointestinal: Positive for abdominal pain.   Genitourinary: Positive for flank pain and hematuria.   All other systems reviewed and are negative.        Physical Exam     Patient Vitals for the past 24 hrs:   BP Temp Pulse Heart Rate Resp SpO2 Height Weight   12/22/19 0100 130/70 -- 88 -- -- 96 % -- --   12/22/19 0030 126/69 -- 89 -- -- 97 % -- --   12/22/19 0000 107/69 -- 93 -- -- -- -- --   12/21/19 2330 128/77 -- 87 -- -- 97 % -- --   12/21/19 2302 -- -- -- -- -- 100 % -- --   12/21/19 2300 (!) 163/85 -- 97 -- -- -- -- --   12/21/19 2227 (!) 132/96 97.7  F (36.5   C) 103 103 18 100 % 1.829 m (6') 71.7 kg (158 lb)       Physical Exam  Vitals signs reviewed.   HENT:      Head: Normocephalic.      Right Ear: Tympanic membrane normal.   Cardiovascular:      Rate and Rhythm: Normal rate.   Pulmonary:      Effort: Pulmonary effort is normal.   Abdominal:      General: Abdomen is flat.      Palpations: Abdomen is soft.   Genitourinary:     Comments: Rondon draining bloody  urine  Musculoskeletal: Normal range of motion.   Skin:     General: Skin is warm.      Capillary Refill: Capillary refill takes less than 2 seconds.   Neurological:      General: No focal deficit present.      Mental Status: He is alert.   Psychiatric:         Mood and Affect: Mood normal.         Emergency Department Course   Imaging:  Abdomen/Pelvis CT with IV contrast:  IMPRESSION:   1.  No significant change in the moderate bilateral ureteral hydronephrosis extending down to the bladder. The bladder is decompressed with a Rondon catheter. There has been a previous prostatectomy.  2.  Sclerotic metastatic lesions in the lumbar spine and sacrum with presacral soft tissue thickening, stable.  3.  Question rectal wall thickening and proctitis.  4.  Previous granulomatous infection.  5.  Bilateral fat-containing inguinal hernias.  Report per radiology.     Radiographic findings were communicated with the patient who voiced understanding of the findings.    Laboratory:  CBC: WBC: 13.3 (H), HGB: 9.7 (L), PLT: 336  CMP: Glucose 142 (H), o/w WNL (Creatinine: 0.96)    Lipase: 119    2256 Lactic acid whole blood: 2.1 (H)    UA: Clear light yellow urine, blood: large, pH: 7.5 (H), protein albumin: 30, leukocyte esterase: moderate, WBC: 28 (H), RBC: >182 (H), WBC clumps: present, otherwise WNL    Interventions:  2258 Dilaudid 0.5 mg IV  2258 Toradol 15 mg IV  2310 B&O Suprettes 0.5 suppository rectal     Emergency Department Course:  1033 Nursing notes and vitals reviewed. I performed an exam of the patient as documented  above.     IV inserted. Medicine administered as documented above. Blood drawn. This was sent to the lab for further testing, results above.    The patient provided a urine sample here in the emergency department. This was sent for laboratory testing, findings above.     The patient was sent for an abdomen/pelvis CT while in the emergency department, findings above.     0048 I rechecked the patient and discussed the results of his workup thus far.     0110 I consulted with Dr. Anthony, urology, regarding the patient's history and presentation here in the emergency department.    0115 I rechecked the patient and discussed the results of his workup thus far.     0136  I consulted with Dr. Rebollar of the hospitalist services. They are in agreement to accept the patient for admission.    Findings and plan explained to the Patient who consents to admission. Discussed the patient with Dr. Rebollar, who will admit the patient to an observation bed for further monitoring, evaluation, and treatment.    Impression & Plan    CMS Diagnoses: The Lactic acid level is elevated due to , at this time there is no sign of severe sepsis or septic shock.     Medical Decision Making:  Patient presents with severe lower abdominal pain and bilateral flex flank pain.  Patient has a complicated recent past medical history including multiple CT scan showing bilateral hydronephrosis with Rondon catheter placement.  Patient has known metastatic prostate cancer and clinical concerns were for invasion into the bladder and obstructive uropathy.  Creatinine has not changed.  Pain is been progressively worse patient already on outpatient oral Dilaudid.  Family seem frustrated with outpatient urological care.  Care was discussed with Dr. Powell on-call for urology will admit for pain control consider inpatient urological consultation for discussions of definitive care.    Diagnosis:    ICD-10-CM    1. Bilateral hydronephrosis N13.30    2.  Prostate cancer, primary, with metastasis from prostate to other site (H) C61        Disposition:  Admitted to Dr. Rebollar    Scribe Disclosure:  I, Timothy Velazquez, am serving as a scribe on 12/21/2019 at 10:33 PM to personally document services performed by Luis Alberto Whitehead MD based on my observations and the provider's statements to me.     Timothy Velazquez  12/21/2019   [facility] EMERGENCY DEPARTMENT       Luis Alberto Montanez MD  12/22/19 0881

## 2019-12-22 NOTE — CONSULTS
Urology Consult History and Physical    Name: John Batista    MRN: [id number]   YOB: 1945       We were asked to see John Batista at the request of Dr. Rebollar for evaluation and treatment of bilateral hydronephrosis.        Chief Complaint:   Abdominal pain          History of Present Illness:   John Batista is a 74 year old male who is being seen for evaluation of metastatic prostate cancer and bilateral hydronephrosis. He has previously been seen by Dr. Galvan (in addition to Dr. Reese at [facility], and his medical oncologist through [facility] [place]). He has castrate resistant disease and receiving chemotherapy. Most recently, he has been found to have urinary retention and gross hematuria during previous admission. Now with indwelling catheter. Urine draining well and remains light cherry.    Came to ED last PM with acute worsening of abdomen and flank pain. As of this AM, he states flank pain has resolved and now more centralized in the pelvis and suprapubic region. Had large BM this morning as well. CT done in ED demonstrates ongoing bilateral hydronephrosis that is unchanged from previous scans. Cr remains essentially stable and having good UOP from Rondon.           Past Medical History:     Past Medical History:   Diagnosis Date     Prostate cancer (H) 12/2011    Dr. Galvan; now seeing Dr. Ruben Reese at [facility]   History of c. Diff colitis  Urinary retention         Past Surgical History:     Past Surgical History:   Procedure Laterality Date     COLONOSCOPY N/A 11/29/2016    Procedure: COLONOSCOPY;  Surgeon: Alon Lo MD;  Location:  OR     COLONOSCOPY N/A 12/3/2016    Procedure: COLONOSCOPY;  Surgeon: Carmenza Akbar MD;  Location:  GI     COLONOSCOPY N/A 10/30/2018    Procedure: COMBINED COLONOSCOPY, SINGLE OR MULTIPLE BIOPSY/POLYPECTOMY BY BIOPSY;  Surgeon: Anatoly Tam MD;  Location:  GI     CYSTOSCOPY       ESOPHAGOSCOPY, GASTROSCOPY, DUODENOSCOPY  (EGD), COMBINED N/A 10/28/2018    Procedure: COMBINED ESOPHAGOSCOPY, GASTROSCOPY, DUODENOSCOPY (EGD);  Surgeon: Codey De La Torre MD;  Location: [facility] GI     ESOPHAGOSCOPY, GASTROSCOPY, DUODENOSCOPY (EGD), COMBINED Left 10/31/2018    Procedure: ESOPHAGOSCOPY, GASTROSCOPY, DUODENOSCOPY (EGD) with Pill Cam  Rm 334;  Surgeon: Anatoly Tam MD;  Location: [facility] GI     EXAM UNDER ANESTHESIA ANUS N/A 11/29/2016    Procedure: EXAM UNDER ANESTHESIA ANUS;  Surgeon: Alon Lo MD;  Location: [facility] OR     HC COLONOSCOPY THRU STOMA, DIAGNOSTIC  2003    normal, 2000 had polyps     HERNIA REPAIR, INGUINAL RT/LT       SIGMOIDOSCOPY FLEXIBLE N/A 9/10/2014    Procedure: SIGMOIDOSCOPY FLEXIBLE;  Surgeon: Madhuri Drake MD;  Location: [facility] GI     VASECTOMY            Social History:     Social History     Tobacco Use     Smoking status: Former Smoker     Start date: 9/8/1981     Smokeless tobacco: Former User   Substance Use Topics     Alcohol use: No          Family History:     Family History   Problem Relation Age of Onset     C.A.D. No family hx of      Diabetes No family hx of      Hypertension No family hx of      Breast Cancer No family hx of      Cancer - colorectal No family hx of           Allergies:   No Known Allergies         Medications:     Current Facility-Administered Medications   Medication     acetaminophen (TYLENOL) Suppository 650 mg     acetaminophen (TYLENOL) tablet 650 mg     docusate sodium (COLACE) capsule 100 mg     HYDROmorphone (DILAUDID) tablet 4 mg     HYDROmorphone (PF) (DILAUDID) injection 0.5 mg     melatonin tablet 1 mg     naloxone (NARCAN) injection 0.1-0.4 mg     ondansetron (ZOFRAN-ODT) ODT tab 4 mg    Or     ondansetron (ZOFRAN) injection 4 mg     polyethylene glycol (MIRALAX/GLYCOLAX) Packet 17 g     predniSONE (DELTASONE) tablet 5 mg     saccharomyces boulardii (FLORASTOR) capsule 500 mg     senna-docusate (SENOKOT-S/PERICOLACE) 8.6-50 MG per tablet 1 tablet    Or      senna-docusate (SENOKOT-S/PERICOLACE) 8.6-50 MG per tablet 2 tablet     sodium chloride 0.9% infusion     vancomycin (FIRVANQ) oral solution 125 mg             Review of Systems:    ROS: 10 point ROS neg other than the symptoms noted above in the HPI.          Physical Exam:     Patient Vitals for the past 24 hrs:   BP Temp Temp src Pulse Heart Rate Resp SpO2 Height Weight   12/22/19 1215 -- -- -- -- -- 16 -- -- --   12/22/19 1140 -- -- -- -- -- 16 -- -- --   12/22/19 1120 (!) 141/65 97  F (36.1  C) Oral -- 94 18 99 % -- --   12/22/19 0821 110/58 97.8  F (36.6  C) Oral -- 77 16 97 % -- --   12/22/19 0540 115/68 99  F (37.2  C) Oral 82 -- 16 96 % -- --   12/22/19 0227 (!) 160/74 98.6  F (37  C) Oral 86 -- 16 97 % 1.829 m (6') 68 kg (150 lb)   12/22/19 0200 (!) 148/83 -- -- 85 -- -- 97 % -- --   12/22/19 0130 136/72 -- -- 86 -- -- 96 % -- --   12/22/19 0100 130/70 -- -- 88 -- -- 96 % -- --   12/22/19 0030 126/69 -- -- 89 -- -- 97 % -- --   12/22/19 0000 107/69 -- -- 93 -- -- -- -- --   12/21/19 2330 128/77 -- -- 87 -- -- 97 % -- --   12/21/19 2302 -- -- -- -- -- -- 100 % -- --   12/21/19 2300 (!) 163/85 -- -- 97 -- -- -- -- --   12/21/19 2227 (!) 132/96 97.7  F (36.5  C) -- 103 103 18 100 % 1.829 m (6') 71.7 kg (158 lb)     General: age-appropriate appearing male in NAD  HEENT: Head AT/NC, EOMI, CN Grossly intact  Lungs: no respiratory distress, or pursed lip breathing  Heart: No obvious jugular venous distension present  Back: no bony midline tenderness, no CVAT bilaterally.  Abdomen: soft, non-distended, non-tender. No organomegaly  : normal male external genitalia.   Urethral catheter with light cherry urine output. No clots. Draining well  Lymph: no palpable inguinal lymphadenopathy.  LE: no edema. pneumoboots in place.  Musculoskeltal: extremities normal, no peripheral edema  Skin: no suspicious lesions or rashes  Neuro: Alert, oriented, speech and mentation normal;  moving all 4 extremities equally.  Psych:  affect and mood normal          Data:   All laboratory data reviewed:    Recent Labs   Lab 12/21/19 2248 12/18/19  1639   WBC 13.3* 17.9*   HGB 9.7* 9.2*    343     Recent Labs   Lab 12/21/19 2248 12/18/19  1639    134   POTASSIUM 4.0 4.9   CHLORIDE 104 101   CO2 22 24   BUN 22 17   CR 0.96 0.94   * 124*   TIMBO 9.4 9.4     Recent Labs   Lab 12/22/19  0018   COLOR Light Yellow   APPEARANCE Clear   URINEGLC Negative   URINEBILI Negative   URINEKETONE Negative   SG 1.010   URINEPH 7.5*   PROTEIN 30*   NITRITE Negative   LEUKEST Moderate*   RBCU >182*   WBCU 28*     All pertinent imaging reviewed:    CT abd/pelvis 12/21/2019  IMPRESSION:   1.  No significant change in the moderate bilateral ureteral hydronephrosis extending down to the bladder. The bladder is decompressed with a Rondon catheter. There has been a previous prostatectomy.  2.  Sclerotic metastatic lesions in the lumbar spine and sacrum with presacral soft tissue thickening, stable.  3.  Question rectal wall thickening and proctitis.  4.  Previous granulomatous infection.  5.  Bilateral fat-containing inguinal hernias.         Impression and Plan:   Impression:   74 year old male with metastatic prostate cancer, urinary retention, gross hematuria, and mild bilateral hydronephrosis. He continues to have urethral catheter in place, with cherry colored urine. This is draining well, but mild hematuria continues. This is likely related to progressive prostate cancer, combined with possible radiation cystitis. He follows with Dr. Galvan and plan was for outpatient cystoscopy under anesthesia with possible fulguration with him in the very near future. At this point, continue Rondon, hand irrigate prn.    Regarding the hydronephrosis, this has not changed appreciably from scan completed in November. Cr is essentially stable. His flank pain has improved, and now more centralized over pelvis. We discussed the hydronephrosis, and that indications  for intervention with stents or PNTs would be flank pain, or progressive renal function decline. At this point, his hydro appears stable and he has had good UOP, suggesting he does not have true obstruction of his ureters. Regarding his pain, we discussed that this may be related to his progressing malignancy, for which he follows with oncology and palliative care through [facility]).     At this point, pain control is improved. No indication for urgent urologic intervention. OK to eat today. Will make NPO at MN and re-assess his hematuria and pain in the AM. Will also recheck BMP in AM.      Plan:   - OK for regular diet today  - Optimize pain control  - Continue IVF and Rondon catheter - hand irrigate prn  - NPO at MN - will re-assess hematuria and pain tomorrow AM     Pipe Anthony MD  Urology  [facility]  Clinic Phone [phone]

## 2019-12-22 NOTE — PLAN OF CARE
PRIMARY DIAGNOSIS: ACUTE PAIN  OUTPATIENT/OBSERVATION GOALS TO BE MET BEFORE DISCHARGE:  1. Pain Status: Improved but still requiring IV narcotics.    2. Return to near baseline physical activity: No    3. Cleared for discharge by consultants (if involved): No    Discharge Planner Nurse   Safe discharge environment identified: No  Barriers to discharge: Yes  Vss afebrile. Pt requesting pain meds now. Will give iv meds once Urologist consult completed with pt.        Entered by: Jennifer Alegria 12/22/2019 11:23 AM     Please review provider order for any additional goals.   Nurse to notify provider when observation goals have been met and patient is ready for discharge.

## 2019-12-23 NOTE — PLAN OF CARE
PRIMARY DIAGNOSIS: Abdominal and flank pain, hydronephrosis    OUTPATIENT/OBSERVATION GOALS TO BE MET BEFORE DISCHARGE  1. Pain Status: Improved-controlled with oral pain medications.    2. Tolerating adequate PO diet: yes    3. Surgical Intervention planned: No    4. Cleared by consultants (if involved): No    5. Return to near baseline physical activity: Yes    Discharge Planner Nurse   Safe discharge environment identified: Yes  Barriers to discharge: Yes       Entered by: Joshua Handy 12/23/2019     Pt alert and oriented x4. His pain is under control this AM. Dilaudid available. Up independent. NS @ 100ml/hr. Rondon in place - urine is cherry colored. Lidocaine patch on LL back. Urology following.     /69 (BP Location: Left arm)   Pulse 85   Temp 98.4  F (36.9  C) (Oral)   Resp 14   Ht 1.829 m (6')   Wt 68.4 kg (150 lb 12.8 oz)   SpO2 98%   BMI 20.45 kg/m         Please review provider order for any additional goals.   Nurse to notify provider when observation goals have been met and patient is ready for discharge.

## 2019-12-23 NOTE — PROGRESS NOTES
[facility] Urology Progress Note          Assessment and Plan:   Active Problems:    Flank pain    Assessment: 74 year old male with metastatic prostate cancer, urinary retention, gross hematuria, and mild bilateral hydronephrosis    Plan: NPO, continues with watermelon output. Will review with Dr. Edwards.       Kennedi Cristina PA-C  [facility] Urology  [phone]               Interval History:   Pain much improved, tolerating Rondon. Watermelon, draining.               Review of Systems:   The 5 point Review of Systems is negative other than noted in the HPI             Medications:     Current Facility-Administered Medications Ordered in Epic   Medication Dose Route Frequency Last Rate Last Dose     acetaminophen (TYLENOL) Suppository 650 mg  650 mg Rectal Q4H PRN         acetaminophen (TYLENOL) tablet 650 mg  650 mg Oral Q4H PRN   650 mg at 12/23/19 0401     alum & mag hydroxide-simethicone (MYLANTA ES/MAALOX  ES) suspension 30 mL  30 mL Oral Q4H PRN   30 mL at 12/22/19 1832     docusate sodium (COLACE) capsule 100 mg  100 mg Oral BID   100 mg at 12/22/19 2023     HYDROmorphone (DILAUDID) tablet 4 mg  4 mg Oral Q6H PRN   4 mg at 12/23/19 0432     HYDROmorphone (PF) (DILAUDID) injection 0.5 mg  0.5 mg Intravenous Q2H PRN   0.5 mg at 12/22/19 1140     melatonin tablet 1 mg  1 mg Oral At Bedtime PRN   1 mg at 12/22/19 0251     naloxone (NARCAN) injection 0.1-0.4 mg  0.1-0.4 mg Intravenous Q2 Min PRN         ondansetron (ZOFRAN-ODT) ODT tab 4 mg  4 mg Oral Q6H PRN        Or     ondansetron (ZOFRAN) injection 4 mg  4 mg Intravenous Q6H PRN         polyethylene glycol (MIRALAX/GLYCOLAX) Packet 17 g  17 g Oral Daily PRN         predniSONE (DELTASONE) tablet 5 mg  5 mg Oral Daily   5 mg at 12/22/19 1236     saccharomyces boulardii (FLORASTOR) capsule 500 mg  500 mg Oral BID   500 mg at 12/22/19 2023     senna-docusate (SENOKOT-S/PERICOLACE) 8.6-50 MG per tablet 1 tablet  1 tablet Oral BID PRN        Or      senna-docusate (SENOKOT-S/PERICOLACE) 8.6-50 MG per tablet 2 tablet  2 tablet Oral BID PRN         sodium chloride 0.9% infusion   Intravenous Continuous 100 mL/hr at 12/22/19 1254       vancomycin (FIRVANQ) oral solution 125 mg  125 mg Oral BID   125 mg at 12/22/19 2023     No current [facility]-ordered outpatient medications on file.                  Physical Exam:   Vitals were reviewed  Patient Vitals for the past 8 hrs:   BP Temp Temp src Heart Rate Resp SpO2 Weight   12/23/19 0614 -- -- -- -- -- -- 68.4 kg (150 lb 12.8 oz)   12/23/19 0401 (!) 151/79 98.1  F (36.7  C) Oral 87 12 98 % --     GEN: NAD, lying in bed  EYES: EOMI  MOUTH: MMM  NECK: Supple  RESP: Unlabored breathing  CARDIAC: No LE edema  SKIN: Warm  ABD: soft  NEURO: AAO  : Watermelon, no clots           Data:   No results found for: NTBNPI, NTBNP  Lab Results   Component Value Date    WBC 9.6 12/23/2019    WBC 13.3 (H) 12/21/2019    WBC 17.9 (H) 12/18/2019    HGB 8.0 (L) 12/23/2019    HGB 9.7 (L) 12/21/2019    HGB 9.2 (L) 12/18/2019    HCT 25.1 (L) 12/23/2019    HCT 29.3 (L) 12/21/2019    HCT 28.9 (L) 12/18/2019     12/23/2019    MCV 98 12/21/2019     12/18/2019     12/23/2019     12/21/2019     12/18/2019     Lab Results   Component Value Date    INR 0.91 10/27/2018

## 2019-12-23 NOTE — PLAN OF CARE
PRIMARY DIAGNOSIS: ACUTE PAIN  OUTPATIENT/OBSERVATION GOALS TO BE MET BEFORE DISCHARGE:  1. Pain Status: Pain free currently    2. Return to near baseline physical activity: Yes    3. Cleared for discharge by consultants (if involved): No. Palliative consult, urology following     Blood pressure (!) 172/78, pulse 82, temperature 97.1  F (36.2  C), temperature source Oral, resp. rate 16, height 1.829 m (6'), weight 68 kg (150 lb), SpO2 98 %.    Vitals are Temp: 97.1  F (36.2  C) Temp src: Oral BP: (!) 172/78 Pulse: 82 Heart Rate: 93 Resp: 16 SpO2: 98 %.  Patient is Alert and Oriented x4. They are independent with no assistive devices .  Pt is a Regular diet.  They are denying pain currently.  Patient has Normal Saline 0.9% running at 100 mL per hour.      Discharge Planner Nurse   Safe discharge environment identified: No  Barriers to discharge: Yes          Entered by: Jimy Woo 12/23/2019 0004       Please review provider order for any additional goals.   Nurse to notify provider when observation goals have been met and patient is ready for discharge.

## 2019-12-23 NOTE — PLAN OF CARE
PRIMARY DIAGNOSIS: ACUTE PAIN  OUTPATIENT/OBSERVATION GOALS TO BE MET BEFORE DISCHARGE:  1. Pain Status: Improved but still requiring IV narcotics, later changed to oral narcotics    2. Return to near baseline physical activity: Yes    3. Cleared for discharge by consultants (if involved): No    Blood pressure 130/72, pulse 82, temperature 98.4  F (36.9  C), resp. rate 16, height 1.829 m (6'), weight 68 kg (150 lb), SpO2 95 %.    VSS.  Patient states that his pain has improved after administration of oral Dilaudid.  Patient c/o of  Upper abdominal pain after consuming part of evening meal tray.  Mylanta given per order with later improvement.  Rondon patent, urine pink tinged,  Patient is a standby assist of one, ambulating well to the bathroom.  PLAN:  Continue to provide supportive cares. Explore palliative cares through [facility]    Discharge Planner Nurse   Safe discharge environment identified: No  Barriers to discharge: Yes          Entered by: Karen M. Lesch 12/22/2019 20:00  PM     Please review provider order for any additional goals.   Nurse to notify provider when observation goals have been met and patient is ready for discharge.

## 2019-12-23 NOTE — PROGRESS NOTES
[facility]    Hospitalist Progress Note      Assessment & Plan   John Batista is a 74 year old male with PMH of metastatic prostate cancer on chemotherapy, known hydronephrosis seen on previous CT scans, recent admission for UTI in the setting of urinary retention who presented with bilateral flank pain and was admitted to observation. He has continued to have issues with hematuria via his Medeiros catheter since discharge, and indwelling catheter remained after outpatient Urology follow up.   He was admitted to observation on 12/21 for Urologic consultation and further monitoring.      Abdominal, bilateral flank pain with hydronephrosis:   Again as exidenceUnclear at this time etiology differential including malignancy, stricture. Has not seemed to improve in spite of medeiros catheter w/ decompression of his bladder. UA does not appear infected. Notably renal function remains intact. Dr. Powell with urology was contacted in ED to review his case.    Urology following.   -Continue to monitor ins and outs, urine character. Medeiros catheter currently in place, Urology to re-evaluate in AM  -We will treat pain with PRN Tylenol, oxycodone and if needed IV Dilaudid for severe breakthrough pain, lidoderm patches  -Regular diet, IV NS   -Irrigate catheter prn     Metastatic Prostate cancer, adenocarcinoma:    Follows with [facility] and [facility].  Has followed with Dr. Galvan with urology as well but now also plugged in with urology at [place]. Currently undergoing chemotherapy with Cabazitaxel, most recent treatment 12/2/19, due again for cycle 7 of 8 on 12/26.       History of C diff infection (11/2019):   Per nursing report patient had 3 looser appearing stools today however received stool softener last night. No abdominal pain.   During most recent hospital stay was started on prophylactic vancomycin, 10 days following treatment with antibiotics for UTI.   -Resume ppx Vancomycin     Chronic anemia on  chemotherapy:   Stable, currently with hematuria. Normal MCV. Likely multifactorial given hematuria, chemotherapy.   Patient was taking oral iron supplement prior to admission, likely  May need to revisit this with PCP on discharge. Would hold off on transfusion at this time unless significant drop.  -recheck h/h in am, add on cross & type with this       DVT Prophylaxis: Ambulate   Code Status: Full Code  Expected discharge: Tomorrow, recommended to prior living arrangement once voiding trial complete, plan for indwelling catheter managed. .    Meri Degroot PA-C  Text Page (7am - 6pm, M-F)    Interval History   Patient's pain is controlled. VSS. Urine output light cherry-watermelon in color without obvious clots. No suprapubic pain. No fevers, chills. Has appetite. Ambulating without difficulty.     -Data reviewed today: I reviewed all new labs and imaging results over the last 24 hours.     Physical Exam   Temp: 98.1  F (36.7  C) Temp src: Oral BP: (!) 151/79   Heart Rate: 87 Resp: 12 SpO2: 98 % O2 Device: None (Room air)    Vitals:    12/21/19 2227 12/22/19 0227 12/23/19 0614   Weight: 71.7 kg (158 lb) 68 kg (150 lb) 68.4 kg (150 lb 12.8 oz)     Vital Signs with Ranges  Temp:  [97  F (36.1  C)-98.6  F (37  C)] 98.1  F (36.7  C)  Heart Rate:  [] 87  Resp:  [12-18] 12  BP: (110-172)/(58-79) 151/79  SpO2:  [95 %-99 %] 98 %  I/O last 3 completed shifts:  In: 755 [P.O.:240; I.V.:515]  Out: 2100 [Urine:2100]      Constitutional: Awake, alert, no apparent distress  Respiratory:  Normal work of breathing. Lungs clear to auscultation bilaterally, no crackles or wheezing.  Cardiovascular: Regular rate and rhythm, normal S1 and S2, and no murmur appreciated.   GI: Normal bowel sounds, soft, non-distended, non-tender.   Skin/Integument: pale appearing. No rashes, no cyanosis, no peripheral edema.  Neuro: Moving all extremities with normal strength. Coordination and sensation grossly intact. Speech clear. No  focal deficits.   Psych: Appropriate affect.        Medications     sodium chloride 100 mL/hr at 12/22/19 1254       docusate sodium  100 mg Oral BID     predniSONE  5 mg Oral Daily     saccharomyces boulardii  500 mg Oral BID     vancomycin  125 mg Oral BID       Data   Recent Labs   Lab 12/23/19  0558 12/21/19  2248 12/18/19  1639   WBC 9.6 13.3* 17.9*   HGB 8.0* 9.7* 9.2*    98 100    336 343    135 134   POTASSIUM 3.5 4.0 4.9   CHLORIDE 109 104 101   CO2 24 22 24   BUN 15 22 17   CR 0.73 0.96 0.94   ANIONGAP 5 9 9   TIMBO 8.2* 9.4 9.4   GLC 96 142* 124*   ALBUMIN  --  3.5  --    PROTTOTAL  --  7.7  --    BILITOTAL  --  0.3  --    ALKPHOS  --  142  --    ALT  --  32  --    AST  --  33  --    LIPASE  --  119  --        No results found for this or any previous visit (from the past 24 hour(s)).

## 2019-12-23 NOTE — PLAN OF CARE
Patient to be NPO at midnight and not to remove Indwelling Rondon catheter per new orders placed at 1715.

## 2019-12-23 NOTE — PLAN OF CARE
PRIMARY DIAGNOSIS: ACUTE PAIN  OUTPATIENT/OBSERVATION GOALS TO BE MET BEFORE DISCHARGE:  1. Pain Status: 8/10 abd pain, APAP and PO dilaudid given     2. Return to near baseline physical activity: Yes    3. Cleared for discharge by consultants (if involved): No. Palliative consult, urology following     Blood pressure (!) 151/79, pulse 82, temperature 98.1  F (36.7  C), temperature source Oral, resp. rate 12, height 1.829 m (6'), weight 68 kg (150 lb), SpO2 98 %.      Patient is Alert and Oriented x4. They are independent with no assistive devices .  Pt is a Regular diet.  They are denying pain currently.  Patient has Normal Saline 0.9% running at 100 mL per hour.      Discharge Planner Nurse   Safe discharge environment identified: No  Barriers to discharge: Yes          Entered by: Jimy Woo 12/23/2019 0004       Please review provider order for any additional goals.   Nurse to notify provider when observation goals have been met and patient is ready for discharge.

## 2019-12-23 NOTE — PLAN OF CARE
PRIMARY DIAGNOSIS: Abdominal and flank pain, hydronephrosis     OUTPATIENT/OBSERVATION GOALS TO BE MET BEFORE DISCHARGE  1. Pain Status: Improved-controlled with oral pain medications.     2. Tolerating adequate PO diet: yes     3. Surgical Intervention planned: No     4. Cleared by consultants (if involved): No     5. Return to near baseline physical activity: Yes     Discharge Planner Nurse   Safe discharge environment identified: Yes  Barriers to discharge: Yes       Entered by: Joshua Handy 12/23/2019      Pt alert and oriented x4. Having generalized soreness in his legs, denies abdominal pain. Dilaudid and tylenol given. Up independent. NS @ 100ml/hr. Rondon in place - urine is cherry colored. Voiding trial in AM. Lidocaine patch on LL back. Urology following.      /72 (BP Location: Left arm)   Pulse 91   Temp 98  F (36.7  C) (Oral)   Resp 16   Ht 1.829 m (6')   Wt 68.4 kg (150 lb 12.8 oz)   SpO2 98%   BMI 20.45 kg/m      Please review provider order for any additional goals.   Nurse to notify provider when observation goals have been met and patient is ready for discharge.

## 2019-12-23 NOTE — PLAN OF CARE
"PRIMARY DIAGNOSIS: Abdominal/Flank pain (Hydronephrosis)     OUTPATIENT/OBSERVATION GOALS TO BE MET BEFORE DISCHARGE    1. Pain Status: Improved-controlled with oral pain medications.     2. Tolerating adequate PO diet: Yes     3. Surgical Intervention planned: No     4. Cleared by consultants (if involved): No. Urology following.      5. Return to near baseline physical activity: Yes     Discharge Planner Nurse   Safe discharge environment identified: Yes  Barriers to discharge: Yes  Entered by: Katina Gautam 12/23/2019     BP (!) 141/72 (BP Location: Left arm)   Pulse 100   Temp 97.5  F (36.4  C) (Oral)   Resp 16   Ht 1.829 m (6')   Wt 68.4 kg (150 lb 12.8 oz)   SpO2 97%   BMI 20.45 kg/m      A&Ox4. VSS. CMS intact. C/o of pain in RLE. Ambulated in hallway w/ SBA, tolerated well. Up independently in room. Pain managed w/ po Dilaudid, Tylenol and Lidocaine patch. BS active x4, tolerating regular diet, passing flatus. Last BM 12/22 (loose). Indwelling Medeiros catheter in place w/ adequate amt's of pink/red urine output. Urology following. Possible voiding trial tomorrow AM per Urology note (\"Leave in medeiros, remove medeiros tomorrow AM if urine remains light pink\"). IVF infusing. Spouse at bedside. Will continue to monitor.     Please review provider order for any additional goals.   Nurse to notify provider when observation goals have been met and patient is ready for discharge.  "

## 2019-12-24 PROBLEM — R31.0 GROSS HEMATURIA: Status: ACTIVE | Noted: 2019-01-01

## 2019-12-24 NOTE — DISCHARGE SUMMARY
[facility]  Discharge Summary        John Batista MRN# [id number]   YOB: 1945 Age: 74 year old     Date of Admission:  12/21/2019  Date of Discharge:  12/24/2019 11:37 AM  Admitting Physician:  Mendoza Rebollar MD  Discharge Physician: Dona Fernandes PA-C  Discharging Service: Hospitalist     Primary Provider: Rosario Martinez  Primary Care Physician Phone Number: [phone]         Discharge Diagnoses/Problem Oriented Hospital Course (Providers):    John Batista was admitted on 12/21/2019 by Mendoza Rebollar MD and I would refer you to their history and physical.  The following problems were addressed during his hospitalization:    1. Abdominal and flank pain with hydronephrosis--resolved.   2. Acute on chronic hematuria, followed by Urology. Indwelling cath in place.          Code Status:      Full Code        Brief Hospital Stay Summary Sent Home With Patient in AVS:       Reason for your hospital stay     You were admitted for flank pain and hematuria. You were seen by Urology   and the decision is for you to keep your catheter for now until after your   next CHEMO session on Thursday. Make appt to see Dr. Galvan 1 week after   your chemo appt.        John Batista is a 74 year old male with PMH of metastatic prostate cancer on chemotherapy, known hydronephrosis seen on previous CT scans, recent admission for UTI in the setting of urinary retention who presented with bilateral flank pain and was admitted to observation. He has continued to have issues with hematuria via his Rondon catheter since discharge, and indwelling catheter remained after outpatient Urology follow up.   He was admitted to observation on 12/21 for Urologic consultation and further monitoring.      Abdominal, bilateral flank pain with hydronephrosis:   Pt was seen by Urology, films reviewed and his hydronephrosis is actually not significantly changed from November. Cr  is essentially stable. His flank pain has improved on its own.  At this point, his hydro appears stable and he has had good UOP, suggesting he does not have true obstruction of his ureters so no urologic intervention is required. Regarding his pain, we discussed that this may be related to his progressing malignancy, for which he follows with oncology and palliative care through [facility]).   --His sxs actually improved without much intervention and he is to continue oral dilaudid PRN      Metastatic Prostate cancer, adenocarcinoma:    Follows with [facility] and [facility].  Has followed with Dr. Galvan with urology as well. He has upcoming Chemo on 12/26. After which he will see Dr. Galvan to assess his hematuria and if further intervention is needed.      History of C diff infection (11/2019): On chronic ppx Vancomycin     Chronic anemia on chemotherapy:   Stable, currently with hematuria. But pretty stable around 7-9 in the last month. Will get repeat Hgb in 1 week.          Important Results:      Recent Labs   Lab 01/03/20  0645 12/31/19  1412   WBC  --  3.6*   HGB 7.2* 7.9*   HCT  --  24.9*   MCV  --  101*   PLT  --  155     Recent Labs   Lab 12/31/19  1412      POTASSIUM 3.8   CHLORIDE 102   CO2 27   ANIONGAP 6   *   BUN 15   CR 0.65*   GFRESTIMATED >90   GFRESTBLACK >90   TIMBO 8.9              Pending Results:        Unresulted Labs Ordered in the Past 30 Days of this Admission     Date and Time Order Name Status Description    12/22/2019 0222 Blood culture Preliminary     12/22/2019 0222 Blood culture Preliminary             Discharge Instructions and Follow-Up:      Follow-up Appointments     Follow-up and recommended labs and tests       Follow up with Dr. Galvan 1 week after your chemo treatment on Thursday.           Follow-up and recommended labs and tests       Follow up with primary care provider, Rosario Martinez, within 7 days   for hospital follow- up.  The following labs/tests are  recommended: CBC in   1 week.               Discharge Disposition:      Discharged to home         Discharge Medications:        Current Discharge Medication List      CONTINUE these medications which have NOT CHANGED    Details   Cholecalciferol (VITAMIN D) 1000 UNITS capsule Take 2,000 Units by mouth 2 times daily       doxepin (ZONALON) 5 % external cream Apply topically daily as needed for itching       HYDROmorphone (DILAUDID) 4 MG tablet Take 4 mg by mouth every 6 hours as needed for severe pain      melatonin 3 MG tablet Take 1-3 tablets (3-9 mg) by mouth nightly as needed for sleep  Qty: 90 tablet, Refills: 1    Associated Diagnoses: Sleep disturbance      Polysaccharide Iron Complex (FERREX 150 PO) Take 1 capsule by mouth three times daily      predniSONE (DELTASONE) 5 MG tablet Take 1 tablet (5 mg) by mouth daily Per oncology      saccharomyces boulardii (FLORASTOR) 250 MG capsule Take 500 mg by mouth 2 times daily       vancomycin (FIRVANQ) 50 MG/ML oral solution Take 2.5 mLs (125 mg) by mouth 2 times daily for 20 days  Qty: 100 mL, Refills: 0    Associated Diagnoses: Bacteriuria with pyuria      leuprolide (ELIGARD) 45 MG injection Inject 45 mg Subcutaneous every 6 months.               Allergies:       No Known Allergies        Consultations This Hospital Stay:      Consultation during this admission received from urology         Condition and Physical on Discharge:      Discharge condition: Stable   Vitals: Blood pressure 116/65, pulse 83, temperature 98.4  F (36.9  C), temperature source Oral, resp. rate 16, height 1.829 m (6'), weight 68.7 kg (151 lb 6.4 oz), SpO2 96 %.  151 lbs 6.4 oz      GENERAL:  Comfortable.  PSYCH: pleasant, oriented, No acute distress.  HEENT:  PERRLA. Normal conjunctiva, normal hearing, nasal mucosa and Oropharynx are normal.  NECK:  Supple, no neck vein distention, adenopathy or bruits, normal thyroid.  HEART:  Normal S1, S2 with no murmur, no pericardial rub, gallops or S3  or S4.  LUNGS:  Clear to auscultation, normal Respiratory effort. No wheezing, rales or ronchi.  ABDOMEN:  Soft, no hepatosplenomegaly, normal bowel sounds. Non-tender, non distended.   EXTREMITIES:  No pedal edema, +2 pulses bilateral and equal.   : +medeiros cath, pink tinged urine  SKIN:  Dry to touch, No rash, wound or ulcerations.  NEUROLOGIC:  CN 2-12 intact, BL 5/5 symmetric upper and lower extremity strength, sensation is intact with no focal deficits.         Discharge Time:      <30 mins        Image Results From This Hospital Stay (For Non-EPIC Providers):        Results for orders placed or performed during the hospital encounter of 12/21/19   Abd/pelvis CT no contrast - [facility] Protocol    Narrative    EXAM: CT ABDOMEN PELVIS W/O CONTRAST  LOCATION: [facility]  DATE/TIME: 12/21/2019 11:36 PM    INDICATION: Dysuria and hematuria.  COMPARISON: None.  TECHNIQUE: CT scan of the abdomen and pelvis was performed without IV contrast. Multiplanar reformats were obtained. Dose reduction techniques were used.  CONTRAST: None.    FINDINGS:   LOWER CHEST: Calcified granulomata.    HEPATOBILIARY: Normal.    PANCREAS: Normal.    SPLEEN: Normal.    ADRENAL GLANDS: Normal.    KIDNEYS/BLADDER: Moderate bilateral ureteral hydronephrosis, unchanged. Perinephric soft tissue stranding.    BOWEL: Question rectal wall thickening, recommend exclusion of proctitis.    LYMPH NODES: Normal.    VASCULATURE: Unremarkable.    PELVIC ORGANS: Medeiros catheter in a decompressed urinary bladder. Presacral soft tissue thickening, stable. Previous prostatectomy.    OTHER: Bilateral fat-containing inguinal hernias.    MUSCULOSKELETAL: Sclerotic metastatic lesions in the lumbar spine are stable. Sacral metastatic lesions.      Impression    IMPRESSION:   1.  No significant change in the moderate bilateral ureteral hydronephrosis extending down to the bladder. The bladder is decompressed with a Medeiros catheter. There has been a  previous prostatectomy.    2.  Sclerotic metastatic lesions in the lumbar spine and sacrum with presacral soft tissue thickening, stable.    3.  Question rectal wall thickening and proctitis.    4.  Previous granulomatous infection.    5.  Bilateral fat-containing inguinal hernias.

## 2019-12-24 NOTE — PLAN OF CARE
PRIMARY DIAGNOSIS: Flank Pain   OUTPATIENT/OBSERVATION GOALS TO BE MET BEFORE DISCHARGE:  1. ADLs back to baseline: Yes    2. Activity and level of assistance: Ambulating independently.    3. Pain status: Improved-controlled with oral pain medications.    4. Return to near baseline physical activity: Yes     Discharge Planner Nurse   Safe discharge environment identified: Yes  Barriers to discharge: Yes       Please review provider order for any additional goals.   Nurse to notify provider when observation goals have been met and patient is ready for discharge.

## 2019-12-24 NOTE — PROGRESS NOTES
VSS. Alert and oriented  CT, recent PET/CT at [facility] reviewed  Cr stable  Hb 7.6  Urine clear-light pink  A: widely mCRCP  P: discharge with medeiros       Chemo in [place] on Thursday        Cysto, EUA, possible channel TURP week after if counts OK

## 2019-12-24 NOTE — PLAN OF CARE
Patient's After Visit Summary was reviewed with patient and/or spouse.   Patient verbalized understanding of After Visit Summary, recommended follow up and was given an opportunity to ask questions.   Discharge medications sent home with patient/family: No, Not applicable   Discharged with spouse.    All personal belongings returned. IV access removed. Escorted to exit via wheelchair with staff and spouse.    OBSERVATION patient END time: 11:35 AM

## 2019-12-24 NOTE — PLAN OF CARE
PRIMARY DIAGNOSIS: Flank Pain   OUTPATIENT/OBSERVATION GOALS TO BE MET BEFORE DISCHARGE:  1. ADLs back to baseline: Yes    2. Activity and level of assistance: Ambulating independently.    3. Pain status: Improved-controlled with oral pain medications.    4. Return to near baseline physical activity: Yes    Pt A&Ox4. VSS. Ambulating independently in the room. NPO at midnight. Rondon in place, draining adequately, urine output dark pink.  PO dilaudid given x1. IVF NaCl infusing. I & O monitored. Possible discharge today.     Discharge Planner Nurse   Safe discharge environment identified: Yes  Barriers to discharge: Yes       Please review provider order for any additional goals.   Nurse to notify provider when observation goals have been met and patient is ready for discharge.

## 2019-12-24 NOTE — PLAN OF CARE
PRIMARY DIAGNOSIS: Flank pain  OUTPATIENT/OBSERVATION GOALS TO BE MET BEFORE DISCHARGE:  1. Pain Status: Improved-controlled with oral pain medications.    2. Return to near baseline physical activity: Yes    3. Cleared for discharge by consultants (if involved): N/A    Discharge Planner Nurse   Safe discharge environment identified: Yes  Barriers to discharge: Yes       Entered by: Ginette Sanz 12/23/2019 9:06 PM    VSS, pt is A&Ox4, up SBA, medeiros in- urine is pink with no clot, denies abd pain, urology following, NPO @ [facility], IVF @ 100, will continue to monitor       Please review provider order for any additional goals.   Nurse to notify provider when observation goals have been met and patient is ready for discharge.

## 2019-12-24 NOTE — PLAN OF CARE
PRIMARY DIAGNOSIS: Abdominal/Flank pain (Hydronephrosis)     OUTPATIENT/OBSERVATION GOALS TO BE MET BEFORE DISCHARGE    1. Pain Status: Improved-controlled with oral pain medications.     2. Tolerating adequate PO diet: NPO     3. Surgical Intervention planned: Urology to consult.      4. Cleared by consultants (if involved): No. Urology following.      5. Return to near baseline physical activity: Yes     Discharge Planner Nurse   Safe discharge environment identified: Yes  Barriers to discharge: Yes  Entered by: Katina Gautam 12/24/2019     /65 (BP Location: Right arm)   Pulse 83   Temp 98.4  F (36.9  C) (Oral)   Resp 16   Ht 1.829 m (6')   Wt 68.7 kg (151 lb 6.4 oz)   SpO2 96%   BMI 20.53 kg/m      A&Ox4. VSS. CMS intact. Up independently in room. C/o of generalized pain, rates pain 3/10. Pain managed w/ po Dilaudid, Tylenol and Lidocaine patch. BS active x4, remains NPO since midnight, passing flatus. Last BM 12/22 (loose). Indwelling Rondon catheter in place w/ adequate amt's of pink/red urine output. Urology following. IVF infusing. Will continue to monitor.     Please review provider order for any additional goals.   Nurse to notify provider when observation goals have been met and patient is ready for discharge.

## 2019-12-26 NOTE — TELEPHONE ENCOUNTER
Please contact patient for In-patient follow up.  There are no phone numbers on file.    Visit date: 12/24/2019  Diagnosis listed:Bilateral Hydronephrosis    Number of visits in past 12 months:4/0

## 2020-01-01 ENCOUNTER — APPOINTMENT (OUTPATIENT)
Dept: PHYSICAL THERAPY | Facility: CLINIC | Age: 75
DRG: 871 | End: 2020-01-01
Attending: STUDENT IN AN ORGANIZED HEALTH CARE EDUCATION/TRAINING PROGRAM
Payer: COMMERCIAL

## 2020-01-01 ENCOUNTER — RECORDS - HEALTHEAST (OUTPATIENT)
Dept: LAB | Facility: CLINIC | Age: 75
End: 2020-01-01

## 2020-01-01 ENCOUNTER — MEDICAL CORRESPONDENCE (OUTPATIENT)
Dept: HEALTH INFORMATION MANAGEMENT | Facility: CLINIC | Age: 75
End: 2020-01-01

## 2020-01-01 ENCOUNTER — APPOINTMENT (OUTPATIENT)
Dept: OCCUPATIONAL THERAPY | Facility: CLINIC | Age: 75
DRG: 871 | End: 2020-01-01
Attending: STUDENT IN AN ORGANIZED HEALTH CARE EDUCATION/TRAINING PROGRAM
Payer: COMMERCIAL

## 2020-01-01 ENCOUNTER — TRANSFERRED RECORDS (OUTPATIENT)
Dept: HEALTH INFORMATION MANAGEMENT | Facility: CLINIC | Age: 75
End: 2020-01-01

## 2020-01-01 ENCOUNTER — OFFICE VISIT - HEALTHEAST (OUTPATIENT)
Dept: GERIATRICS | Facility: CLINIC | Age: 75
End: 2020-01-01

## 2020-01-01 ENCOUNTER — TELEPHONE (OUTPATIENT)
Dept: FAMILY MEDICINE | Facility: CLINIC | Age: 75
End: 2020-01-01

## 2020-01-01 ENCOUNTER — OFFICE VISIT (OUTPATIENT)
Dept: UROLOGY | Facility: CLINIC | Age: 75
End: 2020-01-01
Payer: COMMERCIAL

## 2020-01-01 ENCOUNTER — APPOINTMENT (OUTPATIENT)
Dept: PHYSICAL THERAPY | Facility: CLINIC | Age: 75
DRG: 871 | End: 2020-01-01
Attending: INTERNAL MEDICINE
Payer: COMMERCIAL

## 2020-01-01 ENCOUNTER — APPOINTMENT (OUTPATIENT)
Dept: GENERAL RADIOLOGY | Facility: CLINIC | Age: 75
End: 2020-01-01
Attending: EMERGENCY MEDICINE
Payer: COMMERCIAL

## 2020-01-01 ENCOUNTER — AMBULATORY - HEALTHEAST (OUTPATIENT)
Dept: GERIATRICS | Facility: CLINIC | Age: 75
End: 2020-01-01

## 2020-01-01 ENCOUNTER — APPOINTMENT (OUTPATIENT)
Dept: MRI IMAGING | Facility: CLINIC | Age: 75
DRG: 871 | End: 2020-01-01
Attending: PHYSICIAN ASSISTANT
Payer: COMMERCIAL

## 2020-01-01 ENCOUNTER — HOSPITAL ENCOUNTER (OUTPATIENT)
Facility: CLINIC | Age: 75
Discharge: HOME | End: 2020-01-03
Attending: UROLOGY | Admitting: UROLOGY
Payer: COMMERCIAL

## 2020-01-01 ENCOUNTER — HOSPITAL ENCOUNTER (OUTPATIENT)
Dept: LAB | Facility: CLINIC | Age: 75
Discharge: HOME | End: 2020-01-27
Attending: INTERNAL MEDICINE | Admitting: INTERNAL MEDICINE
Payer: COMMERCIAL

## 2020-01-01 ENCOUNTER — APPOINTMENT (OUTPATIENT)
Dept: OCCUPATIONAL THERAPY | Facility: CLINIC | Age: 75
DRG: 871 | End: 2020-01-01
Attending: INTERNAL MEDICINE
Payer: COMMERCIAL

## 2020-01-01 ENCOUNTER — APPOINTMENT (OUTPATIENT)
Dept: CT IMAGING | Facility: CLINIC | Age: 75
End: 2020-01-01
Attending: EMERGENCY MEDICINE
Payer: COMMERCIAL

## 2020-01-01 ENCOUNTER — AMBULATORY - HEALTHEAST (OUTPATIENT)
Dept: ADMINISTRATIVE | Facility: CLINIC | Age: 75
End: 2020-01-01

## 2020-01-01 ENCOUNTER — INFUSION THERAPY VISIT (OUTPATIENT)
Dept: INFUSION THERAPY | Facility: CLINIC | Age: 75
End: 2020-01-01
Attending: INTERNAL MEDICINE
Payer: COMMERCIAL

## 2020-01-01 ENCOUNTER — HOSPITAL ENCOUNTER (EMERGENCY)
Facility: CLINIC | Age: 75
Discharge: HOME | End: 2020-01-24
Attending: EMERGENCY MEDICINE | Admitting: EMERGENCY MEDICINE
Payer: COMMERCIAL

## 2020-01-01 ENCOUNTER — COMMUNICATION - HEALTHEAST (OUTPATIENT)
Dept: GERIATRICS | Facility: CLINIC | Age: 75
End: 2020-01-01

## 2020-01-01 ENCOUNTER — ANESTHESIA EVENT (OUTPATIENT)
Dept: SURGERY | Facility: CLINIC | Age: 75
End: 2020-01-01
Payer: COMMERCIAL

## 2020-01-01 ENCOUNTER — HOSPITAL ENCOUNTER (EMERGENCY)
Facility: CLINIC | Age: 75
Discharge: ANOTHER HOSPITAL | End: 2020-04-18
Attending: EMERGENCY MEDICINE | Admitting: EMERGENCY MEDICINE
Payer: COMMERCIAL

## 2020-01-01 ENCOUNTER — HOSPITAL ENCOUNTER (OUTPATIENT)
Dept: GENERAL RADIOLOGY | Facility: CLINIC | Age: 75
Discharge: HOME | End: 2020-01-07
Attending: PHYSICIAN ASSISTANT | Admitting: PHYSICIAN ASSISTANT
Payer: COMMERCIAL

## 2020-01-01 ENCOUNTER — PATIENT OUTREACH (OUTPATIENT)
Dept: CARE COORDINATION | Facility: CLINIC | Age: 75
End: 2020-01-01

## 2020-01-01 ENCOUNTER — HOSPITAL ENCOUNTER (OUTPATIENT)
Facility: CLINIC | Age: 75
Setting detail: OBSERVATION
Discharge: HOSPICE | End: 2020-05-21
Attending: INTERNAL MEDICINE | Admitting: HOSPITALIST
Payer: COMMERCIAL

## 2020-01-01 ENCOUNTER — HOSPITAL ENCOUNTER (INPATIENT)
Facility: CLINIC | Age: 75
LOS: 10 days | Discharge: NURSING, REHAB OR LONG-TERM CARE | DRG: 871 | End: 2020-04-28
Attending: STUDENT IN AN ORGANIZED HEALTH CARE EDUCATION/TRAINING PROGRAM | Admitting: HOSPITALIST
Payer: COMMERCIAL

## 2020-01-01 ENCOUNTER — ANESTHESIA (OUTPATIENT)
Dept: SURGERY | Facility: CLINIC | Age: 75
End: 2020-01-01
Payer: COMMERCIAL

## 2020-01-01 VITALS
RESPIRATION RATE: 16 BRPM | HEART RATE: 74 BPM | OXYGEN SATURATION: 98 % | SYSTOLIC BLOOD PRESSURE: 119 MMHG | TEMPERATURE: 97.9 F | DIASTOLIC BLOOD PRESSURE: 76 MMHG

## 2020-01-01 VITALS
OXYGEN SATURATION: 98 % | RESPIRATION RATE: 16 BRPM | WEIGHT: 152 LBS | TEMPERATURE: 97.2 F | SYSTOLIC BLOOD PRESSURE: 136 MMHG | HEIGHT: 72 IN | DIASTOLIC BLOOD PRESSURE: 69 MMHG | BODY MASS INDEX: 20.59 KG/M2 | HEART RATE: 81 BPM

## 2020-01-01 VITALS
BODY MASS INDEX: 21.54 KG/M2 | DIASTOLIC BLOOD PRESSURE: 68 MMHG | HEART RATE: 84 BPM | SYSTOLIC BLOOD PRESSURE: 118 MMHG | WEIGHT: 159 LBS | HEIGHT: 72 IN

## 2020-01-01 VITALS
TEMPERATURE: 95.5 F | SYSTOLIC BLOOD PRESSURE: 106 MMHG | RESPIRATION RATE: 16 BRPM | HEART RATE: 79 BPM | DIASTOLIC BLOOD PRESSURE: 62 MMHG | OXYGEN SATURATION: 99 %

## 2020-01-01 VITALS
HEART RATE: 75 BPM | WEIGHT: 146 LBS | BODY MASS INDEX: 19.77 KG/M2 | HEIGHT: 72 IN | OXYGEN SATURATION: 99 % | RESPIRATION RATE: 18 BRPM | SYSTOLIC BLOOD PRESSURE: 137 MMHG | TEMPERATURE: 98 F | DIASTOLIC BLOOD PRESSURE: 73 MMHG

## 2020-01-01 VITALS
RESPIRATION RATE: 22 BRPM | BODY MASS INDEX: 21.54 KG/M2 | TEMPERATURE: 97.8 F | HEART RATE: 86 BPM | WEIGHT: 159 LBS | HEIGHT: 72 IN | DIASTOLIC BLOOD PRESSURE: 63 MMHG | SYSTOLIC BLOOD PRESSURE: 131 MMHG | OXYGEN SATURATION: 97 %

## 2020-01-01 VITALS
DIASTOLIC BLOOD PRESSURE: 70 MMHG | BODY MASS INDEX: 19.64 KG/M2 | WEIGHT: 145 LBS | SYSTOLIC BLOOD PRESSURE: 108 MMHG | HEIGHT: 72 IN

## 2020-01-01 VITALS
SYSTOLIC BLOOD PRESSURE: 156 MMHG | TEMPERATURE: 98.4 F | DIASTOLIC BLOOD PRESSURE: 91 MMHG | RESPIRATION RATE: 18 BRPM | OXYGEN SATURATION: 98 % | HEART RATE: 105 BPM

## 2020-01-01 DIAGNOSIS — E43 SEVERE PROTEIN-CALORIE MALNUTRITION (H): ICD-10-CM

## 2020-01-01 DIAGNOSIS — C79.51 PROSTATE CANCER METASTATIC TO BONE (H): Primary | ICD-10-CM

## 2020-01-01 DIAGNOSIS — C61 PROSTATE CANCER (H): ICD-10-CM

## 2020-01-01 DIAGNOSIS — N39.0 URINARY TRACT INFECTION WITHOUT HEMATURIA, SITE UNSPECIFIED: ICD-10-CM

## 2020-01-01 DIAGNOSIS — C79.9 METASTASIS FROM MALIGNANT NEOPLASM OF PROSTATE (H): ICD-10-CM

## 2020-01-01 DIAGNOSIS — K57.92 DIVERTICULITIS: ICD-10-CM

## 2020-01-01 DIAGNOSIS — R52 PAIN: ICD-10-CM

## 2020-01-01 DIAGNOSIS — C61 PROSTATE CANCER (H): Primary | ICD-10-CM

## 2020-01-01 DIAGNOSIS — D64.9 ANEMIA, UNSPECIFIED TYPE: ICD-10-CM

## 2020-01-01 DIAGNOSIS — R19.7 DIARRHEA OF PRESUMED INFECTIOUS ORIGIN: ICD-10-CM

## 2020-01-01 DIAGNOSIS — R19.7 DIARRHEA: ICD-10-CM

## 2020-01-01 DIAGNOSIS — C79.31 BRAIN METASTASES: ICD-10-CM

## 2020-01-01 DIAGNOSIS — R19.7 DIARRHEA: Primary | ICD-10-CM

## 2020-01-01 DIAGNOSIS — M53.3 SACRAL PAIN: ICD-10-CM

## 2020-01-01 DIAGNOSIS — D50.0 IRON DEFICIENCY ANEMIA DUE TO CHRONIC BLOOD LOSS: ICD-10-CM

## 2020-01-01 DIAGNOSIS — R29.6 FREQUENT FALLS: ICD-10-CM

## 2020-01-01 DIAGNOSIS — C61 ADENOCARCINOMA OF PROSTATE (H): ICD-10-CM

## 2020-01-01 DIAGNOSIS — K92.2 ACUTE LOWER GASTROINTESTINAL HEMORRHAGE: ICD-10-CM

## 2020-01-01 DIAGNOSIS — R33.9 URINARY RETENTION: ICD-10-CM

## 2020-01-01 DIAGNOSIS — C79.51 PROSTATE CANCER METASTATIC TO BONE (H): ICD-10-CM

## 2020-01-01 DIAGNOSIS — C61 METASTASIS FROM MALIGNANT NEOPLASM OF PROSTATE (H): ICD-10-CM

## 2020-01-01 DIAGNOSIS — M79.661 PAIN IN RIGHT LOWER LEG: ICD-10-CM

## 2020-01-01 DIAGNOSIS — A04.72 C. DIFFICILE COLITIS: ICD-10-CM

## 2020-01-01 DIAGNOSIS — R41.82 ALTERED MENTAL STATUS, UNSPECIFIED ALTERED MENTAL STATUS TYPE: ICD-10-CM

## 2020-01-01 DIAGNOSIS — R52 PAIN MANAGEMENT: ICD-10-CM

## 2020-01-01 DIAGNOSIS — C61 PROSTATE CANCER METASTATIC TO BONE (H): Primary | ICD-10-CM

## 2020-01-01 DIAGNOSIS — C61 PROSTATE CANCER METASTATIC TO BONE (H): ICD-10-CM

## 2020-01-01 DIAGNOSIS — D50.0 IRON DEFICIENCY ANEMIA DUE TO CHRONIC BLOOD LOSS: Primary | ICD-10-CM

## 2020-01-01 DIAGNOSIS — Z86.19 H/O CLOSTRIDIUM DIFFICILE INFECTION: ICD-10-CM

## 2020-01-01 DIAGNOSIS — C61 MALIGNANT NEOPLASM OF PROSTATE (H): Primary | ICD-10-CM

## 2020-01-01 DIAGNOSIS — R31.0 GROSS HEMATURIA: ICD-10-CM

## 2020-01-01 DIAGNOSIS — R60.9 DEPENDENT EDEMA: ICD-10-CM

## 2020-01-01 DIAGNOSIS — C79.31 BRAIN METASTASIS: ICD-10-CM

## 2020-01-01 DIAGNOSIS — R19.7 DIARRHEA, UNSPECIFIED TYPE: ICD-10-CM

## 2020-01-01 DIAGNOSIS — R41.0 DISORIENTATION: ICD-10-CM

## 2020-01-01 LAB
ABO + RH BLD: NORMAL
ABO + RH BLD: NORMAL
ALBUMIN SERPL-MCNC: 2.1 G/DL (ref 3.4–5)
ALBUMIN SERPL-MCNC: 2.9 G/DL (ref 3.4–5)
ALBUMIN SERPL-MCNC: 3 G/DL (ref 3.4–5)
ALBUMIN SERPL-MCNC: 3.4 G/DL (ref 3.4–5)
ALBUMIN UR-MCNC: 70 MG/DL
ALP SERPL-CCNC: 108 U/L (ref 40–150)
ALP SERPL-CCNC: 121 U/L (ref 40–150)
ALP SERPL-CCNC: 140 U/L (ref 40–150)
ALP SERPL-CCNC: 91 U/L (ref 40–150)
ALT SERPL W P-5'-P-CCNC: 21 U/L (ref 0–70)
ALT SERPL W P-5'-P-CCNC: 40 U/L (ref 0–70)
ALT SERPL W P-5'-P-CCNC: 41 U/L (ref 0–70)
ALT SERPL W P-5'-P-CCNC: 42 U/L (ref 0–70)
AMMONIA PLAS-SCNC: <10 UMOL/L (ref 10–50)
AMMONIA PLAS-SCNC: <10 UMOL/L (ref 10–50)
ANION GAP SERPL CALCULATED.3IONS-SCNC: 10 MMOL/L (ref 3–14)
ANION GAP SERPL CALCULATED.3IONS-SCNC: 10 MMOL/L (ref 5–18)
ANION GAP SERPL CALCULATED.3IONS-SCNC: 5 MMOL/L (ref 3–14)
ANION GAP SERPL CALCULATED.3IONS-SCNC: 6 MMOL/L (ref 3–14)
ANION GAP SERPL CALCULATED.3IONS-SCNC: 8 MMOL/L (ref 3–14)
ANION GAP SERPL CALCULATED.3IONS-SCNC: 9 MMOL/L (ref 3–14)
ANISOCYTOSIS BLD QL SMEAR: SLIGHT
APPEARANCE UR: ABNORMAL
APTT PPP: 24 SEC (ref 22–37)
AST SERPL W P-5'-P-CCNC: 24 U/L (ref 0–45)
AST SERPL W P-5'-P-CCNC: 28 U/L (ref 0–45)
AST SERPL W P-5'-P-CCNC: 30 U/L (ref 0–45)
AST SERPL W P-5'-P-CCNC: 36 U/L (ref 0–45)
BACTERIA #/AREA URNS HPF: ABNORMAL /HPF
BACTERIA SPEC CULT: ABNORMAL
BACTERIA SPEC CULT: NO GROWTH
BACTERIA SPEC CULT: NO GROWTH
BASE DEFICIT BLDV-SCNC: 2.1 MMOL/L
BASE DEFICIT BLDV-SCNC: 4.5 MMOL/L
BASOPHILS # BLD AUTO: 0 10E9/L (ref 0–0.2)
BASOPHILS # BLD AUTO: 0.1 10E9/L (ref 0–0.2)
BASOPHILS NFR BLD AUTO: 0 %
BASOPHILS NFR BLD AUTO: 0.1 %
BASOPHILS NFR BLD AUTO: 0.1 %
BASOPHILS NFR BLD AUTO: 0.8 %
BASOPHILS NFR BLD AUTO: 5 %
BILIRUB SERPL-MCNC: 0.3 MG/DL (ref 0.2–1.3)
BILIRUB SERPL-MCNC: 0.3 MG/DL (ref 0.2–1.3)
BILIRUB SERPL-MCNC: 0.4 MG/DL (ref 0.2–1.3)
BILIRUB SERPL-MCNC: 0.6 MG/DL (ref 0.2–1.3)
BILIRUB UR QL STRIP: NEGATIVE
BLD GP AB SCN SERPL QL: NORMAL
BLD PROD TYP BPU: NORMAL
BLD PROD TYP BPU: NORMAL
BLD UNIT ID BPU: 0
BLOOD BANK CMNT PATIENT-IMP: NORMAL
BLOOD PRODUCT CODE: NORMAL
BPU ID: NORMAL
BUN SERPL-MCNC: 13 MG/DL (ref 7–30)
BUN SERPL-MCNC: 14 MG/DL (ref 7–30)
BUN SERPL-MCNC: 17 MG/DL (ref 7–30)
BUN SERPL-MCNC: 18 MG/DL (ref 7–30)
BUN SERPL-MCNC: 19 MG/DL (ref 7–30)
BUN SERPL-MCNC: 24 MG/DL (ref 7–30)
BUN SERPL-MCNC: 25 MG/DL (ref 7–30)
BUN SERPL-MCNC: 25 MG/DL (ref 8–28)
C COLI+JEJUNI+LARI FUSA STL QL NAA+PROBE: NOT DETECTED
C COLI+JEJUNI+LARI FUSA STL QL NAA+PROBE: NOT DETECTED
C DIFF TOX B STL QL: NEGATIVE
C DIFF TOX B STL QL: NORMAL
C DIFF TOX B STL QL: POSITIVE
CALCIUM SERPL-MCNC: 7.8 MG/DL (ref 8.5–10.1)
CALCIUM SERPL-MCNC: 8 MG/DL (ref 8.5–10.1)
CALCIUM SERPL-MCNC: 8 MG/DL (ref 8.5–10.5)
CALCIUM SERPL-MCNC: 8.4 MG/DL (ref 8.5–10.1)
CALCIUM SERPL-MCNC: 8.5 MG/DL (ref 8.5–10.1)
CALCIUM SERPL-MCNC: 8.7 MG/DL (ref 8.5–10.1)
CALCIUM SERPL-MCNC: 8.9 MG/DL (ref 8.5–10.1)
CALCIUM SERPL-MCNC: 8.9 MG/DL (ref 8.5–10.1)
CALCIUM SERPL-MCNC: 9.4 MG/DL (ref 8.5–10.1)
CALCIUM SERPL-MCNC: 9.5 MG/DL (ref 8.5–10.1)
CHLORIDE BLD-SCNC: 101 MMOL/L (ref 98–107)
CHLORIDE SERPL-SCNC: 101 MMOL/L (ref 94–109)
CHLORIDE SERPL-SCNC: 102 MMOL/L (ref 94–109)
CHLORIDE SERPL-SCNC: 103 MMOL/L (ref 94–109)
CHLORIDE SERPL-SCNC: 104 MMOL/L (ref 94–109)
CHLORIDE SERPL-SCNC: 105 MMOL/L (ref 94–109)
CHLORIDE SERPL-SCNC: 105 MMOL/L (ref 94–109)
CHLORIDE SERPL-SCNC: 99 MMOL/L (ref 94–109)
CO2 SERPL-SCNC: 21 MMOL/L (ref 20–32)
CO2 SERPL-SCNC: 21 MMOL/L (ref 20–32)
CO2 SERPL-SCNC: 22 MMOL/L (ref 20–32)
CO2 SERPL-SCNC: 22 MMOL/L (ref 20–32)
CO2 SERPL-SCNC: 22 MMOL/L (ref 22–31)
CO2 SERPL-SCNC: 23 MMOL/L (ref 20–32)
CO2 SERPL-SCNC: 24 MMOL/L (ref 20–32)
CO2 SERPL-SCNC: 25 MMOL/L (ref 20–32)
CO2 SERPL-SCNC: 26 MMOL/L (ref 20–32)
CO2 SERPL-SCNC: 27 MMOL/L (ref 20–32)
COLOR UR AUTO: YELLOW
COPATH REPORT: NORMAL
CREAT SERPL-MCNC: 0.52 MG/DL (ref 0.66–1.25)
CREAT SERPL-MCNC: 0.56 MG/DL (ref 0.66–1.25)
CREAT SERPL-MCNC: 0.57 MG/DL (ref 0.66–1.25)
CREAT SERPL-MCNC: 0.58 MG/DL (ref 0.66–1.25)
CREAT SERPL-MCNC: 0.64 MG/DL (ref 0.66–1.25)
CREAT SERPL-MCNC: 0.65 MG/DL (ref 0.66–1.25)
CREAT SERPL-MCNC: 0.66 MG/DL (ref 0.66–1.25)
CREAT SERPL-MCNC: 0.7 MG/DL (ref 0.66–1.25)
CREAT SERPL-MCNC: 0.7 MG/DL (ref 0.7–1.3)
CREAT SERPL-MCNC: 1.02 MG/DL (ref 0.66–1.25)
CRP SERPL-MCNC: 33.1 MG/L (ref 0–8)
DIFFERENTIAL METHOD BLD: ABNORMAL
EC STX1 GENE STL QL NAA+PROBE: NOT DETECTED
EC STX1 GENE STL QL NAA+PROBE: NOT DETECTED
EC STX2 GENE STL QL NAA+PROBE: NOT DETECTED
EC STX2 GENE STL QL NAA+PROBE: NOT DETECTED
ENTERIC PATHOGEN COMMENT: NORMAL
ENTERIC PATHOGEN COMMENT: NORMAL
EOSINOPHIL # BLD AUTO: 0 10E9/L (ref 0–0.7)
EOSINOPHIL NFR BLD AUTO: 0 %
ERYTHROCYTE [DISTWIDTH] IN BLOOD BY AUTOMATED COUNT: 12.8 % (ref 10–15)
ERYTHROCYTE [DISTWIDTH] IN BLOOD BY AUTOMATED COUNT: 13 % (ref 10–15)
ERYTHROCYTE [DISTWIDTH] IN BLOOD BY AUTOMATED COUNT: 13 % (ref 10–15)
ERYTHROCYTE [DISTWIDTH] IN BLOOD BY AUTOMATED COUNT: 13.3 % (ref 10–15)
ERYTHROCYTE [DISTWIDTH] IN BLOOD BY AUTOMATED COUNT: 13.8 % (ref 10–15)
ERYTHROCYTE [DISTWIDTH] IN BLOOD BY AUTOMATED COUNT: 14.6 % (ref 10–15)
ERYTHROCYTE [DISTWIDTH] IN BLOOD BY AUTOMATED COUNT: 14.7 % (ref 10–15)
ERYTHROCYTE [DISTWIDTH] IN BLOOD BY AUTOMATED COUNT: 15.6 % (ref 10–15)
ERYTHROCYTE [DISTWIDTH] IN BLOOD BY AUTOMATED COUNT: 16 % (ref 10–15)
ERYTHROCYTE [DISTWIDTH] IN BLOOD BY AUTOMATED COUNT: 18.2 % (ref 10–15)
ERYTHROCYTE [SEDIMENTATION RATE] IN BLOOD BY WESTERGREN METHOD: 76 MM/H (ref 0–20)
ETHANOL SERPL-MCNC: <0.01 G/DL
GFR SERPL CREATININE-BSD FRML MDRD: 71 ML/MIN/{1.73_M2}
GFR SERPL CREATININE-BSD FRML MDRD: >60 ML/MIN/1.73M2
GFR SERPL CREATININE-BSD FRML MDRD: >90 ML/MIN/{1.73_M2}
GLUCOSE BLD-MCNC: 113 MG/DL (ref 70–125)
GLUCOSE BLDC GLUCOMTR-MCNC: 150 MG/DL (ref 70–99)
GLUCOSE BLDC GLUCOMTR-MCNC: 153 MG/DL (ref 70–99)
GLUCOSE SERPL-MCNC: 103 MG/DL (ref 70–99)
GLUCOSE SERPL-MCNC: 112 MG/DL (ref 70–99)
GLUCOSE SERPL-MCNC: 116 MG/DL (ref 70–99)
GLUCOSE SERPL-MCNC: 118 MG/DL (ref 70–99)
GLUCOSE SERPL-MCNC: 118 MG/DL (ref 70–99)
GLUCOSE SERPL-MCNC: 131 MG/DL (ref 70–99)
GLUCOSE SERPL-MCNC: 148 MG/DL (ref 70–99)
GLUCOSE SERPL-MCNC: 152 MG/DL (ref 70–99)
GLUCOSE SERPL-MCNC: 99 MG/DL (ref 70–99)
GLUCOSE UR STRIP-MCNC: NEGATIVE MG/DL
HCO3 BLDV-SCNC: 20 MMOL/L (ref 21–28)
HCO3 BLDV-SCNC: 22 MMOL/L (ref 21–28)
HCT VFR BLD AUTO: 21.8 % (ref 40–53)
HCT VFR BLD AUTO: 21.9 % (ref 40–53)
HCT VFR BLD AUTO: 22.8 % (ref 40–53)
HCT VFR BLD AUTO: 23 % (ref 40–53)
HCT VFR BLD AUTO: 24.7 % (ref 40–53)
HCT VFR BLD AUTO: 25.5 % (ref 40–53)
HCT VFR BLD AUTO: 25.8 % (ref 40–53)
HCT VFR BLD AUTO: 25.8 % (ref 40–53)
HCT VFR BLD AUTO: 27.3 % (ref 40–53)
HCT VFR BLD AUTO: 27.8 % (ref 40–53)
HGB BLD-MCNC: 7.1 G/DL (ref 13.3–17.7)
HGB BLD-MCNC: 7.2 G/DL (ref 13.3–17.7)
HGB BLD-MCNC: 7.4 G/DL (ref 13.3–17.7)
HGB BLD-MCNC: 7.6 G/DL (ref 13.3–17.7)
HGB BLD-MCNC: 7.7 G/DL (ref 13.3–17.7)
HGB BLD-MCNC: 8 G/DL (ref 13.3–17.7)
HGB BLD-MCNC: 8.2 G/DL (ref 13.3–17.7)
HGB BLD-MCNC: 8.5 G/DL (ref 13.3–17.7)
HGB BLD-MCNC: 8.5 G/DL (ref 13.3–17.7)
HGB BLD-MCNC: 8.6 G/DL (ref 13.3–17.7)
HGB BLD-MCNC: 8.7 G/DL (ref 13.3–17.7)
HGB BLD-MCNC: 8.8 G/DL (ref 13.3–17.7)
HGB UR QL STRIP: ABNORMAL
HYALINE CASTS #/AREA URNS LPF: 2 /LPF (ref 0–2)
IMM GRANULOCYTES # BLD: 0.1 10E9/L (ref 0–0.4)
IMM GRANULOCYTES # BLD: 0.3 10E9/L (ref 0–0.4)
IMM GRANULOCYTES # BLD: 0.4 10E9/L (ref 0–0.4)
IMM GRANULOCYTES NFR BLD: 2.3 %
IMM GRANULOCYTES NFR BLD: 3.6 %
IMM GRANULOCYTES NFR BLD: 4.1 %
INR PPP: 1.04 (ref 0.86–1.14)
INTERPRETATION ECG - MUSE: NORMAL
KETONES UR STRIP-MCNC: 10 MG/DL
LACTATE BLD-SCNC: 1 MMOL/L (ref 0.7–2)
LACTATE BLD-SCNC: 1 MMOL/L (ref 0.7–2)
LACTATE BLD-SCNC: 1.9 MMOL/L (ref 0.7–2)
LACTATE BLD-SCNC: 2.3 MMOL/L (ref 0.7–2)
LACTATE BLD-SCNC: 4.6 MMOL/L (ref 0.7–2)
LACTATE BLD-SCNC: 7.2 MMOL/L (ref 0.7–2)
LEUKOCYTE ESTERASE UR QL STRIP: ABNORMAL
LYMPHOCYTES # BLD AUTO: 0.1 10E9/L (ref 0.8–5.3)
LYMPHOCYTES # BLD AUTO: 0.3 10E9/L (ref 0.8–5.3)
LYMPHOCYTES # BLD AUTO: 0.4 10E9/L (ref 0.8–5.3)
LYMPHOCYTES # BLD AUTO: 0.4 10E9/L (ref 0.8–5.3)
LYMPHOCYTES # BLD AUTO: 0.6 10E9/L (ref 0.8–5.3)
LYMPHOCYTES # BLD AUTO: 0.6 10E9/L (ref 0.8–5.3)
LYMPHOCYTES # BLD AUTO: 0.9 10E9/L (ref 0.8–5.3)
LYMPHOCYTES NFR BLD AUTO: 1 %
LYMPHOCYTES NFR BLD AUTO: 15 %
LYMPHOCYTES NFR BLD AUTO: 15.1 %
LYMPHOCYTES NFR BLD AUTO: 23 %
LYMPHOCYTES NFR BLD AUTO: 30 %
LYMPHOCYTES NFR BLD AUTO: 5.4 %
LYMPHOCYTES NFR BLD AUTO: 5.6 %
Lab: ABNORMAL
MAGNESIUM SERPL-MCNC: 1.9 MG/DL (ref 1.6–2.3)
MAGNESIUM SERPL-MCNC: 2 MG/DL (ref 1.6–2.3)
MAGNESIUM SERPL-MCNC: 2 MG/DL (ref 1.6–2.3)
MCH RBC QN AUTO: 30.2 PG (ref 26.5–33)
MCH RBC QN AUTO: 30.4 PG (ref 26.5–33)
MCH RBC QN AUTO: 30.6 PG (ref 26.5–33)
MCH RBC QN AUTO: 30.7 PG (ref 26.5–33)
MCH RBC QN AUTO: 30.7 PG (ref 26.5–33)
MCH RBC QN AUTO: 30.8 PG (ref 26.5–33)
MCH RBC QN AUTO: 30.9 PG (ref 26.5–33)
MCH RBC QN AUTO: 31.6 PG (ref 26.5–33)
MCH RBC QN AUTO: 31.7 PG (ref 26.5–33)
MCH RBC QN AUTO: 31.7 PG (ref 26.5–33)
MCHC RBC AUTO-ENTMCNC: 31 G/DL (ref 31.5–36.5)
MCHC RBC AUTO-ENTMCNC: 31.5 G/DL (ref 31.5–36.5)
MCHC RBC AUTO-ENTMCNC: 31.7 G/DL (ref 31.5–36.5)
MCHC RBC AUTO-ENTMCNC: 32.6 G/DL (ref 31.5–36.5)
MCHC RBC AUTO-ENTMCNC: 32.9 G/DL (ref 31.5–36.5)
MCHC RBC AUTO-ENTMCNC: 33.2 G/DL (ref 31.5–36.5)
MCHC RBC AUTO-ENTMCNC: 33.3 G/DL (ref 31.5–36.5)
MCHC RBC AUTO-ENTMCNC: 33.3 G/DL (ref 31.5–36.5)
MCHC RBC AUTO-ENTMCNC: 33.5 G/DL (ref 31.5–36.5)
MCHC RBC AUTO-ENTMCNC: 33.8 G/DL (ref 31.5–36.5)
MCV RBC AUTO: 102 FL (ref 78–100)
MCV RBC AUTO: 91 FL (ref 78–100)
MCV RBC AUTO: 91 FL (ref 78–100)
MCV RBC AUTO: 92 FL (ref 78–100)
MCV RBC AUTO: 93 FL (ref 78–100)
MCV RBC AUTO: 94 FL (ref 78–100)
MCV RBC AUTO: 95 FL (ref 78–100)
MCV RBC AUTO: 96 FL (ref 78–100)
MCV RBC AUTO: 97 FL (ref 78–100)
MCV RBC AUTO: 98 FL (ref 78–100)
MONOCYTES # BLD AUTO: 0 10E9/L (ref 0–1.3)
MONOCYTES # BLD AUTO: 0.1 10E9/L (ref 0–1.3)
MONOCYTES # BLD AUTO: 0.4 10E9/L (ref 0–1.3)
MONOCYTES # BLD AUTO: 0.5 10E9/L (ref 0–1.3)
MONOCYTES # BLD AUTO: 0.5 10E9/L (ref 0–1.3)
MONOCYTES # BLD AUTO: 0.6 10E9/L (ref 0–1.3)
MONOCYTES # BLD AUTO: 0.8 10E9/L (ref 0–1.3)
MONOCYTES NFR BLD AUTO: 14.7 %
MONOCYTES NFR BLD AUTO: 19 %
MONOCYTES NFR BLD AUTO: 3 %
MONOCYTES NFR BLD AUTO: 3.7 %
MONOCYTES NFR BLD AUTO: 4 %
MONOCYTES NFR BLD AUTO: 6 %
MONOCYTES NFR BLD AUTO: 6.8 %
MUCOUS THREADS #/AREA URNS LPF: PRESENT /LPF
MYELOCYTES # BLD: 0.4 10E9/L
MYELOCYTES NFR BLD MANUAL: 3 %
NEUTROPHILS # BLD AUTO: 0.8 10E9/L (ref 1.6–8.3)
NEUTROPHILS # BLD AUTO: 1.3 10E9/L (ref 1.6–8.3)
NEUTROPHILS # BLD AUTO: 1.7 10E9/L (ref 1.6–8.3)
NEUTROPHILS # BLD AUTO: 10.8 10E9/L (ref 1.6–8.3)
NEUTROPHILS # BLD AUTO: 14.3 10E9/L (ref 1.6–8.3)
NEUTROPHILS # BLD AUTO: 2.6 10E9/L (ref 1.6–8.3)
NEUTROPHILS # BLD AUTO: 6.6 10E9/L (ref 1.6–8.3)
NEUTROPHILS NFR BLD AUTO: 64 %
NEUTROPHILS NFR BLD AUTO: 65.8 %
NEUTROPHILS NFR BLD AUTO: 66 %
NEUTROPHILS NFR BLD AUTO: 69 %
NEUTROPHILS NFR BLD AUTO: 83.4 %
NEUTROPHILS NFR BLD AUTO: 88.5 %
NEUTROPHILS NFR BLD AUTO: 92 %
NITRATE UR QL: NEGATIVE
NOROV GI+II ORF1-ORF2 JNC STL QL NAA+PR: NOT DETECTED
NOROV GI+II ORF1-ORF2 JNC STL QL NAA+PR: NOT DETECTED
NRBC # BLD AUTO: 0 10*3/UL
NRBC BLD AUTO-RTO: 0 /100
NRBC BLD AUTO-RTO: 1 /100
NT-PROBNP SERPL-MCNC: 906 PG/ML (ref 0–1800)
NUM BPU REQUESTED: 1
O2/TOTAL GAS SETTING VFR VENT: ABNORMAL %
OXYHGB MFR BLDV: 20 %
OXYHGB MFR BLDV: 44 %
PCO2 BLDV: 33 MM HG (ref 40–50)
PCO2 BLDV: 35 MM HG (ref 40–50)
PH BLDV: 7.37 PH (ref 7.32–7.43)
PH BLDV: 7.43 PH (ref 7.32–7.43)
PH UR STRIP: 6.5 PH (ref 5–7)
PHOSPHATE SERPL-MCNC: 3 MG/DL (ref 2.5–4.5)
PLATELET # BLD AUTO: 117 10E9/L (ref 150–450)
PLATELET # BLD AUTO: 133 10E9/L (ref 150–450)
PLATELET # BLD AUTO: 162 10E9/L (ref 150–450)
PLATELET # BLD AUTO: 170 10E9/L (ref 150–450)
PLATELET # BLD AUTO: 204 10E9/L (ref 150–450)
PLATELET # BLD AUTO: 214 10E9/L (ref 150–450)
PLATELET # BLD AUTO: 307 10E9/L (ref 150–450)
PLATELET # BLD AUTO: 358 10E9/L (ref 150–450)
PLATELET # BLD AUTO: 84 10E9/L (ref 150–450)
PLATELET # BLD AUTO: 99 10E9/L (ref 150–450)
PLATELET # BLD EST: ABNORMAL 10*3/UL
PO2 BLDV: 18 MM HG (ref 25–47)
PO2 BLDV: 26 MM HG (ref 25–47)
POLYCHROMASIA BLD QL SMEAR: SLIGHT
POTASSIUM BLD-SCNC: 3.7 MMOL/L (ref 3.5–5)
POTASSIUM SERPL-SCNC: 3.1 MMOL/L (ref 3.4–5.3)
POTASSIUM SERPL-SCNC: 3.2 MMOL/L (ref 3.4–5.3)
POTASSIUM SERPL-SCNC: 3.2 MMOL/L (ref 3.4–5.3)
POTASSIUM SERPL-SCNC: 3.3 MMOL/L (ref 3.4–5.3)
POTASSIUM SERPL-SCNC: 3.5 MMOL/L (ref 3.4–5.3)
POTASSIUM SERPL-SCNC: 3.6 MMOL/L (ref 3.4–5.3)
POTASSIUM SERPL-SCNC: 3.7 MMOL/L (ref 3.4–5.3)
POTASSIUM SERPL-SCNC: 4 MMOL/L (ref 3.4–5.3)
POTASSIUM SERPL-SCNC: 4.1 MMOL/L (ref 3.4–5.3)
POTASSIUM SERPL-SCNC: 4.2 MMOL/L (ref 3.4–5.3)
PR INTERVAL - MUSE: 0
PROCALCITONIN SERPL-MCNC: 0.19 NG/ML
PROT SERPL-MCNC: 5.6 G/DL (ref 6.8–8.8)
PROT SERPL-MCNC: 6.2 G/DL (ref 6.8–8.8)
PROT SERPL-MCNC: 6.3 G/DL (ref 6.8–8.8)
PROT SERPL-MCNC: 7.7 G/DL (ref 6.8–8.8)
RBC # BLD AUTO: 2.25 10E12/L (ref 4.4–5.9)
RBC # BLD AUTO: 2.41 10E12/L (ref 4.4–5.9)
RBC # BLD AUTO: 2.48 10E12/L (ref 4.4–5.9)
RBC # BLD AUTO: 2.52 10E12/L (ref 4.4–5.9)
RBC # BLD AUTO: 2.53 10E12/L (ref 4.4–5.9)
RBC # BLD AUTO: 2.65 10E12/L (ref 4.4–5.9)
RBC # BLD AUTO: 2.68 10E12/L (ref 4.4–5.9)
RBC # BLD AUTO: 2.76 10E12/L (ref 4.4–5.9)
RBC # BLD AUTO: 2.8 10E12/L (ref 4.4–5.9)
RBC # BLD AUTO: 2.91 10E12/L (ref 4.4–5.9)
RBC #/AREA URNS AUTO: 67 /HPF (ref 0–2)
RBC MORPH BLD: ABNORMAL
RVA NSP5 STL QL NAA+PROBE: NOT DETECTED
RVA NSP5 STL QL NAA+PROBE: NOT DETECTED
SALMONELLA SP RPOD STL QL NAA+PROBE: NOT DETECTED
SALMONELLA SP RPOD STL QL NAA+PROBE: NOT DETECTED
SARS-COV-2 PCR COMMENT: NORMAL
SARS-COV-2 RNA SPEC QL NAA+PROBE: NEGATIVE
SARS-COV-2 RNA SPEC QL NAA+PROBE: NORMAL
SHIGELLA SP+EIEC IPAH STL QL NAA+PROBE: NOT DETECTED
SHIGELLA SP+EIEC IPAH STL QL NAA+PROBE: NOT DETECTED
SODIUM SERPL-SCNC: 133 MMOL/L (ref 133–144)
SODIUM SERPL-SCNC: 133 MMOL/L (ref 133–144)
SODIUM SERPL-SCNC: 133 MMOL/L (ref 136–145)
SODIUM SERPL-SCNC: 134 MMOL/L (ref 133–144)
SODIUM SERPL-SCNC: 135 MMOL/L (ref 133–144)
SODIUM SERPL-SCNC: 135 MMOL/L (ref 133–144)
SODIUM SERPL-SCNC: 136 MMOL/L (ref 133–144)
SOURCE: ABNORMAL
SP GR UR STRIP: 1.02 (ref 1–1.03)
SPECIMEN EXP DATE BLD: NORMAL
SPECIMEN SOURCE: ABNORMAL
SPECIMEN SOURCE: ABNORMAL
SPECIMEN SOURCE: NORMAL
SQUAMOUS #/AREA URNS AUTO: <1 /HPF (ref 0–1)
TOXIC GRANULES BLD QL SMEAR: PRESENT
TRANS CELLS #/AREA URNS HPF: <1 /HPF (ref 0–1)
TRANSFUSION STATUS PATIENT QL: NORMAL
TRANSFUSION STATUS PATIENT QL: NORMAL
UROBILINOGEN UR STRIP-MCNC: NORMAL MG/DL (ref 0–2)
V CHOL+PARA RFBL+TRKH+TNAA STL QL NAA+PR: NOT DETECTED
V CHOL+PARA RFBL+TRKH+TNAA STL QL NAA+PR: NOT DETECTED
WBC # BLD AUTO: 0.9 10E9/L (ref 4–11)
WBC # BLD AUTO: 1.1 10E9/L (ref 4–11)
WBC # BLD AUTO: 1.1 10E9/L (ref 4–11)
WBC # BLD AUTO: 11.7 10E9/L (ref 4–11)
WBC # BLD AUTO: 16.2 10E9/L (ref 4–11)
WBC # BLD AUTO: 2 10E9/L (ref 4–11)
WBC # BLD AUTO: 2.5 10E9/L (ref 4–11)
WBC # BLD AUTO: 4 10E9/L (ref 4–11)
WBC # BLD AUTO: 7.9 10E9/L (ref 4–11)
WBC # BLD AUTO: 9.7 10E9/L (ref 4–11)
WBC #/AREA URNS AUTO: 166 /HPF (ref 0–5)
WBC CLUMPS #/AREA URNS HPF: PRESENT /HPF
Y ENTERO RECN STL QL NAA+PROBE: NOT DETECTED
Y ENTERO RECN STL QL NAA+PROBE: NOT DETECTED

## 2020-01-01 PROCEDURE — 85025 COMPLETE CBC W/AUTO DIFF WBC: CPT | Performed by: INTERNAL MEDICINE

## 2020-01-01 PROCEDURE — 97535 SELF CARE MNGMENT TRAINING: CPT | Mod: GO | Performed by: OCCUPATIONAL THERAPY ASSISTANT

## 2020-01-01 PROCEDURE — 99232 SBSQ HOSP IP/OBS MODERATE 35: CPT | Performed by: HOSPITALIST

## 2020-01-01 PROCEDURE — 71000012 ZZH RECOVERY PHASE 1 LEVEL 1 FIRST HR: Performed by: UROLOGY

## 2020-01-01 PROCEDURE — 97116 GAIT TRAINING THERAPY: CPT | Mod: GP

## 2020-01-01 PROCEDURE — 25000128 H RX IP 250 OP 636: Performed by: PHYSICIAN ASSISTANT

## 2020-01-01 PROCEDURE — 83735 ASSAY OF MAGNESIUM: CPT | Performed by: PHYSICIAN ASSISTANT

## 2020-01-01 PROCEDURE — 73590 X-RAY EXAM OF LOWER LEG: CPT | Mod: RT

## 2020-01-01 PROCEDURE — 97750 PHYSICAL PERFORMANCE TEST: CPT | Mod: GP

## 2020-01-01 PROCEDURE — 97161 PT EVAL LOW COMPLEX 20 MIN: CPT | Mod: GP

## 2020-01-01 PROCEDURE — 80053 COMPREHEN METABOLIC PANEL: CPT | Performed by: EMERGENCY MEDICINE

## 2020-01-01 PROCEDURE — 77334 RADIATION TREATMENT AID(S): CPT

## 2020-01-01 PROCEDURE — 82962 GLUCOSE BLOOD TEST: CPT

## 2020-01-01 PROCEDURE — P9016 RBC LEUKOCYTES REDUCED: HCPCS | Performed by: INTERNAL MEDICINE

## 2020-01-01 PROCEDURE — 25000128 H RX IP 250 OP 636

## 2020-01-01 PROCEDURE — 99207 ZZC CDG-CUT & PASTE-POTENTIAL IMPACT ON LEVEL: CPT | Performed by: INTERNAL MEDICINE

## 2020-01-01 PROCEDURE — 96361 HYDRATE IV INFUSION ADD-ON: CPT

## 2020-01-01 PROCEDURE — 25800030 ZZH RX IP 258 OP 636: Performed by: UROLOGY

## 2020-01-01 PROCEDURE — 80048 BASIC METABOLIC PNL TOTAL CA: CPT | Performed by: PHYSICIAN ASSISTANT

## 2020-01-01 PROCEDURE — 25000128 H RX IP 250 OP 636: Performed by: INTERNAL MEDICINE

## 2020-01-01 PROCEDURE — 84132 ASSAY OF SERUM POTASSIUM: CPT | Performed by: HOSPITALIST

## 2020-01-01 PROCEDURE — 36000056 ZZH SURGERY LEVEL 3 1ST 30 MIN: Performed by: UROLOGY

## 2020-01-01 PROCEDURE — 25800030 ZZH RX IP 258 OP 636: Performed by: PHYSICIAN ASSISTANT

## 2020-01-01 PROCEDURE — 70450 CT HEAD/BRAIN W/O DYE: CPT

## 2020-01-01 PROCEDURE — 77336 RADIATION PHYSICS CONSULT: CPT

## 2020-01-01 PROCEDURE — 25000132 ZZH RX MED GY IP 250 OP 250 PS 637: Performed by: HOSPITALIST

## 2020-01-01 PROCEDURE — A9585 GADOBUTROL INJECTION: HCPCS | Performed by: HOSPITALIST

## 2020-01-01 PROCEDURE — 97535 SELF CARE MNGMENT TRAINING: CPT | Mod: GO | Performed by: OCCUPATIONAL THERAPIST

## 2020-01-01 PROCEDURE — 51798 US URINE CAPACITY MEASURE: CPT | Performed by: UROLOGY

## 2020-01-01 PROCEDURE — 12000000 ZZH R&B MED SURG/OB

## 2020-01-01 PROCEDURE — 36415 COLL VENOUS BLD VENIPUNCTURE: CPT | Performed by: HOSPITALIST

## 2020-01-01 PROCEDURE — 97166 OT EVAL MOD COMPLEX 45 MIN: CPT | Mod: GO

## 2020-01-01 PROCEDURE — 88342 IMHCHEM/IMCYTCHM 1ST ANTB: CPT | Mod: 26 | Performed by: UROLOGY

## 2020-01-01 PROCEDURE — 88344 IMHCHEM/IMCYTCHM EA MLT ANTB: CPT | Mod: 26 | Performed by: UROLOGY

## 2020-01-01 PROCEDURE — 25000132 ZZH RX MED GY IP 250 OP 250 PS 637: Performed by: INTERNAL MEDICINE

## 2020-01-01 PROCEDURE — G0378 HOSPITAL OBSERVATION PER HR: HCPCS

## 2020-01-01 PROCEDURE — 25000128 H RX IP 250 OP 636: Performed by: NURSE ANESTHETIST, CERTIFIED REGISTERED

## 2020-01-01 PROCEDURE — 77412 RADIATION TX DELIVERY LVL 3: CPT

## 2020-01-01 PROCEDURE — 25000132 ZZH RX MED GY IP 250 OP 250 PS 637: Performed by: NURSE PRACTITIONER

## 2020-01-01 PROCEDURE — 36000058 ZZH SURGERY LEVEL 3 EA 15 ADDTL MIN: Performed by: UROLOGY

## 2020-01-01 PROCEDURE — 83605 ASSAY OF LACTIC ACID: CPT | Performed by: PHYSICIAN ASSISTANT

## 2020-01-01 PROCEDURE — 25000131 ZZH RX MED GY IP 250 OP 636 PS 637: Performed by: NURSE PRACTITIONER

## 2020-01-01 PROCEDURE — 83605 ASSAY OF LACTIC ACID: CPT | Performed by: NURSE PRACTITIONER

## 2020-01-01 PROCEDURE — 36415 COLL VENOUS BLD VENIPUNCTURE: CPT | Performed by: PHYSICIAN ASSISTANT

## 2020-01-01 PROCEDURE — 99207 ZZC MOONLIGHTING INDICATOR: CPT | Performed by: INTERNAL MEDICINE

## 2020-01-01 PROCEDURE — 83605 ASSAY OF LACTIC ACID: CPT | Performed by: HOSPITALIST

## 2020-01-01 PROCEDURE — 25000128 H RX IP 250 OP 636: Performed by: UROLOGY

## 2020-01-01 PROCEDURE — 99212 OFFICE O/P EST SF 10 MIN: CPT | Mod: 25 | Performed by: UROLOGY

## 2020-01-01 PROCEDURE — 99223 1ST HOSP IP/OBS HIGH 75: CPT | Performed by: NURSE PRACTITIONER

## 2020-01-01 PROCEDURE — 25000132 ZZH RX MED GY IP 250 OP 250 PS 637: Performed by: PHYSICIAN ASSISTANT

## 2020-01-01 PROCEDURE — 82805 BLOOD GASES W/O2 SATURATION: CPT | Performed by: PHYSICIAN ASSISTANT

## 2020-01-01 PROCEDURE — 99232 SBSQ HOSP IP/OBS MODERATE 35: CPT | Performed by: INTERNAL MEDICINE

## 2020-01-01 PROCEDURE — 25000128 H RX IP 250 OP 636: Performed by: NURSE PRACTITIONER

## 2020-01-01 PROCEDURE — 99233 SBSQ HOSP IP/OBS HIGH 50: CPT | Performed by: NURSE PRACTITIONER

## 2020-01-01 PROCEDURE — 96361 HYDRATE IV INFUSION ADD-ON: CPT | Mod: 59

## 2020-01-01 PROCEDURE — 86901 BLOOD TYPING SEROLOGIC RH(D): CPT | Performed by: INTERNAL MEDICINE

## 2020-01-01 PROCEDURE — 99207 ZZC NON-BILLABLE SERV PER CHARTING: CPT | Performed by: NURSE PRACTITIONER

## 2020-01-01 PROCEDURE — 97110 THERAPEUTIC EXERCISES: CPT | Mod: GP

## 2020-01-01 PROCEDURE — 77290 THER RAD SIMULAJ FIELD CPLX: CPT

## 2020-01-01 PROCEDURE — 85018 HEMOGLOBIN: CPT | Performed by: UROLOGY

## 2020-01-01 PROCEDURE — 97530 THERAPEUTIC ACTIVITIES: CPT | Mod: GP

## 2020-01-01 PROCEDURE — 36415 COLL VENOUS BLD VENIPUNCTURE: CPT | Performed by: NURSE PRACTITIONER

## 2020-01-01 PROCEDURE — 87186 SC STD MICRODIL/AGAR DIL: CPT | Performed by: INTERNAL MEDICINE

## 2020-01-01 PROCEDURE — 36415 COLL VENOUS BLD VENIPUNCTURE: CPT | Performed by: UROLOGY

## 2020-01-01 PROCEDURE — 82140 ASSAY OF AMMONIA: CPT | Performed by: PHYSICIAN ASSISTANT

## 2020-01-01 PROCEDURE — 83735 ASSAY OF MAGNESIUM: CPT | Performed by: EMERGENCY MEDICINE

## 2020-01-01 PROCEDURE — 36430 TRANSFUSION BLD/BLD COMPNT: CPT

## 2020-01-01 PROCEDURE — 97535 SELF CARE MNGMENT TRAINING: CPT | Mod: GO

## 2020-01-01 PROCEDURE — 37000009 ZZH ANESTHESIA TECHNICAL FEE, EACH ADDTL 15 MIN: Performed by: UROLOGY

## 2020-01-01 PROCEDURE — 83880 ASSAY OF NATRIURETIC PEPTIDE: CPT | Mod: GZ | Performed by: PHYSICIAN ASSISTANT

## 2020-01-01 PROCEDURE — 25000132 ZZH RX MED GY IP 250 OP 250 PS 637: Performed by: EMERGENCY MEDICINE

## 2020-01-01 PROCEDURE — 85027 COMPLETE CBC AUTOMATED: CPT | Performed by: NURSE PRACTITIONER

## 2020-01-01 PROCEDURE — 25800030 ZZH RX IP 258 OP 636: Performed by: ANESTHESIOLOGY

## 2020-01-01 PROCEDURE — 96360 HYDRATION IV INFUSION INIT: CPT

## 2020-01-01 PROCEDURE — 84100 ASSAY OF PHOSPHORUS: CPT | Performed by: PHYSICIAN ASSISTANT

## 2020-01-01 PROCEDURE — 87506 IADNA-DNA/RNA PROBE TQ 6-11: CPT | Performed by: EMERGENCY MEDICINE

## 2020-01-01 PROCEDURE — 40000306 ZZH STATISTIC PRE PROC ASSESS II: Performed by: UROLOGY

## 2020-01-01 PROCEDURE — 25000131 ZZH RX MED GY IP 250 OP 636 PS 637: Performed by: PHYSICIAN ASSISTANT

## 2020-01-01 PROCEDURE — 85652 RBC SED RATE AUTOMATED: CPT | Performed by: PHYSICIAN ASSISTANT

## 2020-01-01 PROCEDURE — 87040 BLOOD CULTURE FOR BACTERIA: CPT | Performed by: EMERGENCY MEDICINE

## 2020-01-01 PROCEDURE — 85027 COMPLETE CBC AUTOMATED: CPT | Performed by: HOSPITALIST

## 2020-01-01 PROCEDURE — 80048 BASIC METABOLIC PNL TOTAL CA: CPT | Performed by: EMERGENCY MEDICINE

## 2020-01-01 PROCEDURE — 36415 COLL VENOUS BLD VENIPUNCTURE: CPT | Performed by: INTERNAL MEDICINE

## 2020-01-01 PROCEDURE — 99217 ZZC OBSERVATION CARE DISCHARGE: CPT | Performed by: PHYSICIAN ASSISTANT

## 2020-01-01 PROCEDURE — 96375 TX/PRO/DX INJ NEW DRUG ADDON: CPT

## 2020-01-01 PROCEDURE — 27210794 ZZH OR GENERAL SUPPLY STERILE: Performed by: UROLOGY

## 2020-01-01 PROCEDURE — 88344 IMHCHEM/IMCYTCHM EA MLT ANTB: CPT | Performed by: UROLOGY

## 2020-01-01 PROCEDURE — 85610 PROTHROMBIN TIME: CPT | Performed by: EMERGENCY MEDICINE

## 2020-01-01 PROCEDURE — 88341 IMHCHEM/IMCYTCHM EA ADD ANTB: CPT | Mod: 26,59 | Performed by: UROLOGY

## 2020-01-01 PROCEDURE — 88305 TISSUE EXAM BY PATHOLOGIST: CPT | Performed by: UROLOGY

## 2020-01-01 PROCEDURE — 81001 URINALYSIS AUTO W/SCOPE: CPT | Performed by: EMERGENCY MEDICINE

## 2020-01-01 PROCEDURE — 96372 THER/PROPH/DIAG INJ SC/IM: CPT

## 2020-01-01 PROCEDURE — 87493 C DIFF AMPLIFIED PROBE: CPT | Performed by: PHYSICIAN ASSISTANT

## 2020-01-01 PROCEDURE — 85025 COMPLETE CBC W/AUTO DIFF WBC: CPT | Performed by: NURSE PRACTITIONER

## 2020-01-01 PROCEDURE — 99239 HOSP IP/OBS DSCHRG MGMT >30: CPT | Performed by: INTERNAL MEDICINE

## 2020-01-01 PROCEDURE — 77417 THER RADIOLOGY PORT IMAGE(S): CPT

## 2020-01-01 PROCEDURE — 83735 ASSAY OF MAGNESIUM: CPT | Performed by: NURSE PRACTITIONER

## 2020-01-01 PROCEDURE — 25000128 H RX IP 250 OP 636: Performed by: HOSPITALIST

## 2020-01-01 PROCEDURE — 99285 EMERGENCY DEPT VISIT HI MDM: CPT | Mod: 25

## 2020-01-01 PROCEDURE — 84132 ASSAY OF SERUM POTASSIUM: CPT | Performed by: INTERNAL MEDICINE

## 2020-01-01 PROCEDURE — C9113 INJ PANTOPRAZOLE SODIUM, VIA: HCPCS | Performed by: EMERGENCY MEDICINE

## 2020-01-01 PROCEDURE — 88342 IMHCHEM/IMCYTCHM 1ST ANTB: CPT | Mod: XU | Performed by: UROLOGY

## 2020-01-01 PROCEDURE — 25800030 ZZH RX IP 258 OP 636: Performed by: EMERGENCY MEDICINE

## 2020-01-01 PROCEDURE — 84132 ASSAY OF SERUM POTASSIUM: CPT | Performed by: NURSE PRACTITIONER

## 2020-01-01 PROCEDURE — 88305 TISSUE EXAM BY PATHOLOGIST: CPT | Mod: 26 | Performed by: UROLOGY

## 2020-01-01 PROCEDURE — 96365 THER/PROPH/DIAG IV INF INIT: CPT

## 2020-01-01 PROCEDURE — 87086 URINE CULTURE/COLONY COUNT: CPT | Performed by: INTERNAL MEDICINE

## 2020-01-01 PROCEDURE — G0463 HOSPITAL OUTPT CLINIC VISIT: HCPCS | Mod: 25

## 2020-01-01 PROCEDURE — 70553 MRI BRAIN STEM W/O & W/DYE: CPT

## 2020-01-01 PROCEDURE — 93005 ELECTROCARDIOGRAM TRACING: CPT

## 2020-01-01 PROCEDURE — 80048 BASIC METABOLIC PNL TOTAL CA: CPT | Performed by: HOSPITALIST

## 2020-01-01 PROCEDURE — 25000128 H RX IP 250 OP 636: Performed by: EMERGENCY MEDICINE

## 2020-01-01 PROCEDURE — 99225 ZZC SUBSEQUENT OBSERVATION CARE,LEVEL II: CPT | Performed by: PHYSICIAN ASSISTANT

## 2020-01-01 PROCEDURE — 25500064 ZZH RX 255 OP 636: Performed by: HOSPITALIST

## 2020-01-01 PROCEDURE — 80053 COMPREHEN METABOLIC PANEL: CPT | Performed by: PHYSICIAN ASSISTANT

## 2020-01-01 PROCEDURE — 80053 COMPREHEN METABOLIC PANEL: CPT | Performed by: INTERNAL MEDICINE

## 2020-01-01 PROCEDURE — 87493 C DIFF AMPLIFIED PROBE: CPT | Performed by: EMERGENCY MEDICINE

## 2020-01-01 PROCEDURE — 25800030 ZZH RX IP 258 OP 636: Performed by: NURSE PRACTITIONER

## 2020-01-01 PROCEDURE — 80320 DRUG SCREEN QUANTALCOHOLS: CPT | Performed by: EMERGENCY MEDICINE

## 2020-01-01 PROCEDURE — 85027 COMPLETE CBC AUTOMATED: CPT | Performed by: PHYSICIAN ASSISTANT

## 2020-01-01 PROCEDURE — 25000132 ZZH RX MED GY IP 250 OP 250 PS 637: Performed by: UROLOGY

## 2020-01-01 PROCEDURE — 85730 THROMBOPLASTIN TIME PARTIAL: CPT | Performed by: EMERGENCY MEDICINE

## 2020-01-01 PROCEDURE — G0463 HOSPITAL OUTPT CLINIC VISIT: HCPCS

## 2020-01-01 PROCEDURE — 99024 POSTOP FOLLOW-UP VISIT: CPT | Performed by: UROLOGY

## 2020-01-01 PROCEDURE — 86900 BLOOD TYPING SEROLOGIC ABO: CPT | Performed by: INTERNAL MEDICINE

## 2020-01-01 PROCEDURE — 87493 C DIFF AMPLIFIED PROBE: CPT | Performed by: INTERNAL MEDICINE

## 2020-01-01 PROCEDURE — 80048 BASIC METABOLIC PNL TOTAL CA: CPT | Performed by: NURSE PRACTITIONER

## 2020-01-01 PROCEDURE — 83605 ASSAY OF LACTIC ACID: CPT | Performed by: EMERGENCY MEDICINE

## 2020-01-01 PROCEDURE — 82805 BLOOD GASES W/O2 SATURATION: CPT | Performed by: EMERGENCY MEDICINE

## 2020-01-01 PROCEDURE — 87088 URINE BACTERIA CULTURE: CPT | Performed by: INTERNAL MEDICINE

## 2020-01-01 PROCEDURE — 00000146 ZZHCL STATISTIC GLUCOSE BY METER IP

## 2020-01-01 PROCEDURE — 86923 COMPATIBILITY TEST ELECTRIC: CPT | Performed by: INTERNAL MEDICINE

## 2020-01-01 PROCEDURE — 99222 1ST HOSP IP/OBS MODERATE 55: CPT | Performed by: NEUROLOGICAL SURGERY

## 2020-01-01 PROCEDURE — 99233 SBSQ HOSP IP/OBS HIGH 50: CPT | Performed by: INTERNAL MEDICINE

## 2020-01-01 PROCEDURE — 99207 ZZC CDG-MDM COMPONENT: MEETS LOW - DOWN CODED: CPT | Performed by: PHYSICIAN ASSISTANT

## 2020-01-01 PROCEDURE — 87635 SARS-COV-2 COVID-19 AMP PRB: CPT | Performed by: EMERGENCY MEDICINE

## 2020-01-01 PROCEDURE — 96402 CHEMO HORMON ANTINEOPL SQ/IM: CPT | Performed by: UROLOGY

## 2020-01-01 PROCEDURE — 86140 C-REACTIVE PROTEIN: CPT | Performed by: PHYSICIAN ASSISTANT

## 2020-01-01 PROCEDURE — 25000125 ZZHC RX 250: Performed by: NURSE ANESTHETIST, CERTIFIED REGISTERED

## 2020-01-01 PROCEDURE — 99220 ZZC INITIAL OBSERVATION CARE,LEVL III: CPT | Performed by: PHYSICIAN ASSISTANT

## 2020-01-01 PROCEDURE — 52601 PROSTATECTOMY (TURP): CPT | Performed by: UROLOGY

## 2020-01-01 PROCEDURE — 87506 IADNA-DNA/RNA PROBE TQ 6-11: CPT | Performed by: INTERNAL MEDICINE

## 2020-01-01 PROCEDURE — 93010 ELECTROCARDIOGRAM REPORT: CPT | Performed by: INTERNAL MEDICINE

## 2020-01-01 PROCEDURE — 71000013 ZZH RECOVERY PHASE 1 LEVEL 1 EA ADDTL HR: Performed by: UROLOGY

## 2020-01-01 PROCEDURE — 85025 COMPLETE CBC W/AUTO DIFF WBC: CPT | Performed by: EMERGENCY MEDICINE

## 2020-01-01 PROCEDURE — 84145 PROCALCITONIN (PCT): CPT | Performed by: EMERGENCY MEDICINE

## 2020-01-01 PROCEDURE — 88341 IMHCHEM/IMCYTCHM EA ADD ANTB: CPT | Performed by: UROLOGY

## 2020-01-01 PROCEDURE — 37000008 ZZH ANESTHESIA TECHNICAL FEE, 1ST 30 MIN: Performed by: UROLOGY

## 2020-01-01 PROCEDURE — 97530 THERAPEUTIC ACTIVITIES: CPT | Mod: GO | Performed by: OCCUPATIONAL THERAPY ASSISTANT

## 2020-01-01 PROCEDURE — 82140 ASSAY OF AMMONIA: CPT | Performed by: EMERGENCY MEDICINE

## 2020-01-01 PROCEDURE — 99223 1ST HOSP IP/OBS HIGH 75: CPT | Mod: AI | Performed by: PHYSICIAN ASSISTANT

## 2020-01-01 PROCEDURE — C9113 INJ PANTOPRAZOLE SODIUM, VIA: HCPCS | Performed by: PHYSICIAN ASSISTANT

## 2020-01-01 PROCEDURE — 86850 RBC ANTIBODY SCREEN: CPT | Performed by: INTERNAL MEDICINE

## 2020-01-01 PROCEDURE — 25000131 ZZH RX MED GY IP 250 OP 636 PS 637: Performed by: UROLOGY

## 2020-01-01 PROCEDURE — 25000128 H RX IP 250 OP 636: Performed by: ANESTHESIOLOGY

## 2020-01-01 PROCEDURE — 85025 COMPLETE CBC W/AUTO DIFF WBC: CPT | Performed by: PHYSICIAN ASSISTANT

## 2020-01-01 PROCEDURE — 71045 X-RAY EXAM CHEST 1 VIEW: CPT

## 2020-01-01 PROCEDURE — 99283 EMERGENCY DEPT VISIT LOW MDM: CPT | Mod: 25

## 2020-01-01 PROCEDURE — 12000011 ZZH R&B MS OVERFLOW

## 2020-01-01 PROCEDURE — 77307 TELETHX ISODOSE PLAN CPLX: CPT

## 2020-01-01 RX ORDER — VANCOMYCIN HYDROCHLORIDE 125 MG/1
125 CAPSULE ORAL 4 TIMES DAILY
Status: DISCONTINUED | OUTPATIENT
Start: 2020-01-01 | End: 2020-01-01 | Stop reason: HOSPADM

## 2020-01-01 RX ORDER — LORAZEPAM 2 MG/ML
.5-1 INJECTION INTRAMUSCULAR EVERY 4 HOURS PRN
Status: DISCONTINUED | OUTPATIENT
Start: 2020-01-01 | End: 2020-01-01 | Stop reason: HOSPADM

## 2020-01-01 RX ORDER — LORAZEPAM 2 MG/ML
0.3 INJECTION INTRAMUSCULAR ONCE
Status: COMPLETED | OUTPATIENT
Start: 2020-01-01 | End: 2020-01-01

## 2020-01-01 RX ORDER — IRON POLYSACCHARIDE COMPLEX 150 MG
150 CAPSULE ORAL
Status: DISCONTINUED | OUTPATIENT
Start: 2020-01-01 | End: 2020-01-01 | Stop reason: HOSPADM

## 2020-01-01 RX ORDER — DEXAMETHASONE SODIUM PHOSPHATE 4 MG/ML
INJECTION, SOLUTION INTRA-ARTICULAR; INTRALESIONAL; INTRAMUSCULAR; INTRAVENOUS; SOFT TISSUE PRN
Status: DISCONTINUED | OUTPATIENT
Start: 2020-01-01 | End: 2020-01-01

## 2020-01-01 RX ORDER — HYDROMORPHONE HYDROCHLORIDE 4 MG/1
4 TABLET ORAL 3 TIMES DAILY
Refills: 0 | Status: CANCELLED | DISCHARGE
Start: 2020-01-01

## 2020-01-01 RX ORDER — SODIUM CHLORIDE, SODIUM LACTATE, POTASSIUM CHLORIDE, CALCIUM CHLORIDE 600; 310; 30; 20 MG/100ML; MG/100ML; MG/100ML; MG/100ML
INJECTION, SOLUTION INTRAVENOUS CONTINUOUS
Status: DISCONTINUED | OUTPATIENT
Start: 2020-01-01 | End: 2020-01-01 | Stop reason: HOSPADM

## 2020-01-01 RX ORDER — NALOXONE HYDROCHLORIDE 0.4 MG/ML
.1-.4 INJECTION, SOLUTION INTRAMUSCULAR; INTRAVENOUS; SUBCUTANEOUS
Status: DISCONTINUED | OUTPATIENT
Start: 2020-01-01 | End: 2020-01-01

## 2020-01-01 RX ORDER — DEXAMETHASONE SODIUM PHOSPHATE 4 MG/ML
4 INJECTION, SOLUTION INTRA-ARTICULAR; INTRALESIONAL; INTRAMUSCULAR; INTRAVENOUS; SOFT TISSUE EVERY 6 HOURS
Status: DISCONTINUED | OUTPATIENT
Start: 2020-01-01 | End: 2020-01-01

## 2020-01-01 RX ORDER — LIDOCAINE 40 MG/G
CREAM TOPICAL
Status: DISCONTINUED | OUTPATIENT
Start: 2020-01-01 | End: 2020-01-01 | Stop reason: HOSPADM

## 2020-01-01 RX ORDER — DEXAMETHASONE 4 MG/1
4 TABLET ORAL EVERY 8 HOURS SCHEDULED
Status: DISCONTINUED | OUTPATIENT
Start: 2020-01-01 | End: 2020-01-01

## 2020-01-01 RX ORDER — POTASSIUM CHLORIDE 1500 MG/1
20 TABLET, EXTENDED RELEASE ORAL ONCE
Status: COMPLETED | OUTPATIENT
Start: 2020-01-01 | End: 2020-01-01

## 2020-01-01 RX ORDER — MAGNESIUM SULFATE HEPTAHYDRATE 40 MG/ML
4 INJECTION, SOLUTION INTRAVENOUS EVERY 4 HOURS PRN
Status: DISCONTINUED | OUTPATIENT
Start: 2020-01-01 | End: 2020-01-01 | Stop reason: HOSPADM

## 2020-01-01 RX ORDER — NALOXONE HYDROCHLORIDE 0.4 MG/ML
.1-.4 INJECTION, SOLUTION INTRAMUSCULAR; INTRAVENOUS; SUBCUTANEOUS
Status: DISCONTINUED | OUTPATIENT
Start: 2020-01-01 | End: 2020-01-01 | Stop reason: HOSPADM

## 2020-01-01 RX ORDER — ONDANSETRON 2 MG/ML
INJECTION INTRAMUSCULAR; INTRAVENOUS PRN
Status: DISCONTINUED | OUTPATIENT
Start: 2020-01-01 | End: 2020-01-01

## 2020-01-01 RX ORDER — ACETAMINOPHEN 650 MG/1
650 SUPPOSITORY RECTAL EVERY 4 HOURS PRN
Status: DISCONTINUED | OUTPATIENT
Start: 2020-01-01 | End: 2020-01-01

## 2020-01-01 RX ORDER — ONDANSETRON 4 MG/1
4 TABLET, ORALLY DISINTEGRATING ORAL EVERY 6 HOURS PRN
Status: DISCONTINUED | OUTPATIENT
Start: 2020-01-01 | End: 2020-01-01 | Stop reason: HOSPADM

## 2020-01-01 RX ORDER — LABETALOL 20 MG/4 ML (5 MG/ML) INTRAVENOUS SYRINGE
10
Status: DISCONTINUED | OUTPATIENT
Start: 2020-01-01 | End: 2020-01-01 | Stop reason: HOSPADM

## 2020-01-01 RX ORDER — LANOLIN ALCOHOL/MO/W.PET/CERES
3 CREAM (GRAM) TOPICAL
Status: DISCONTINUED | OUTPATIENT
Start: 2020-01-01 | End: 2020-01-01 | Stop reason: HOSPADM

## 2020-01-01 RX ORDER — HYDROMORPHONE HYDROCHLORIDE 1 MG/ML
0.3 INJECTION, SOLUTION INTRAMUSCULAR; INTRAVENOUS; SUBCUTANEOUS
Status: DISCONTINUED | OUTPATIENT
Start: 2020-01-01 | End: 2020-01-01 | Stop reason: HOSPADM

## 2020-01-01 RX ORDER — ACETAMINOPHEN 650 MG/1
650 SUPPOSITORY RECTAL EVERY 4 HOURS PRN
Status: DISCONTINUED | OUTPATIENT
Start: 2020-01-01 | End: 2020-01-01 | Stop reason: HOSPADM

## 2020-01-01 RX ORDER — HEPARIN SODIUM,PORCINE 10 UNIT/ML
5 VIAL (ML) INTRAVENOUS
Status: CANCELLED | OUTPATIENT
Start: 2020-01-01

## 2020-01-01 RX ORDER — FAMOTIDINE 20 MG/1
20 TABLET, FILM COATED ORAL 2 TIMES DAILY
Status: DISCONTINUED | OUTPATIENT
Start: 2020-01-01 | End: 2020-01-01 | Stop reason: HOSPADM

## 2020-01-01 RX ORDER — CIPROFLOXACIN 500 MG/1
500 TABLET, FILM COATED ORAL EVERY 12 HOURS
Qty: 4 TABLET | Refills: 0 | Status: SHIPPED | OUTPATIENT
Start: 2020-01-01 | End: 2020-01-01

## 2020-01-01 RX ORDER — SACCHAROMYCES BOULARDII 250 MG
500 CAPSULE ORAL 2 TIMES DAILY
Status: DISCONTINUED | OUTPATIENT
Start: 2020-01-01 | End: 2020-01-01 | Stop reason: HOSPADM

## 2020-01-01 RX ORDER — CIPROFLOXACIN 500 MG/1
500 TABLET, FILM COATED ORAL EVERY 12 HOURS SCHEDULED
Status: DISCONTINUED | OUTPATIENT
Start: 2020-01-01 | End: 2020-01-01 | Stop reason: HOSPADM

## 2020-01-01 RX ORDER — POTASSIUM CL/LIDO/0.9 % NACL 10MEQ/0.1L
10 INTRAVENOUS SOLUTION, PIGGYBACK (ML) INTRAVENOUS
Status: DISCONTINUED | OUTPATIENT
Start: 2020-01-01 | End: 2020-01-01

## 2020-01-01 RX ORDER — METHYLPHENIDATE HYDROCHLORIDE 5 MG/1
5 TABLET ORAL 2 TIMES DAILY
Status: ON HOLD | COMMUNITY
End: 2020-01-01

## 2020-01-01 RX ORDER — POTASSIUM CHLORIDE 7.45 MG/ML
10 INJECTION INTRAVENOUS
Status: DISCONTINUED | OUTPATIENT
Start: 2020-01-01 | End: 2020-01-01

## 2020-01-01 RX ORDER — BISACODYL 5 MG
10 TABLET, DELAYED RELEASE (ENTERIC COATED) ORAL DAILY PRN
Status: DISCONTINUED | OUTPATIENT
Start: 2020-01-01 | End: 2020-01-01 | Stop reason: HOSPADM

## 2020-01-01 RX ORDER — OLANZAPINE 5 MG/1
5 TABLET ORAL
Status: DISCONTINUED | OUTPATIENT
Start: 2020-01-01 | End: 2020-01-01

## 2020-01-01 RX ORDER — AMOXICILLIN 250 MG
2 CAPSULE ORAL 2 TIMES DAILY
Status: DISCONTINUED | OUTPATIENT
Start: 2020-01-01 | End: 2020-01-01 | Stop reason: HOSPADM

## 2020-01-01 RX ORDER — POTASSIUM CHLORIDE 1500 MG/1
20-40 TABLET, EXTENDED RELEASE ORAL
Status: DISCONTINUED | OUTPATIENT
Start: 2020-01-01 | End: 2020-01-01

## 2020-01-01 RX ORDER — DIPHENHYDRAMINE HYDROCHLORIDE 50 MG/ML
25 INJECTION INTRAMUSCULAR; INTRAVENOUS ONCE
Status: COMPLETED | OUTPATIENT
Start: 2020-01-01 | End: 2020-01-01

## 2020-01-01 RX ORDER — NORTRIPTYLINE HCL 25 MG
25 CAPSULE ORAL AT BEDTIME
Status: ON HOLD | COMMUNITY
End: 2020-01-01

## 2020-01-01 RX ORDER — PROPOFOL 10 MG/ML
INJECTION, EMULSION INTRAVENOUS PRN
Status: DISCONTINUED | OUTPATIENT
Start: 2020-01-01 | End: 2020-01-01

## 2020-01-01 RX ORDER — PREDNISONE 10 MG/1
10 TABLET ORAL DAILY
COMMUNITY

## 2020-01-01 RX ORDER — HYDROMORPHONE HYDROCHLORIDE 2 MG/1
4 TABLET ORAL 3 TIMES DAILY
Status: DISCONTINUED | OUTPATIENT
Start: 2020-01-01 | End: 2020-01-01 | Stop reason: HOSPADM

## 2020-01-01 RX ORDER — BISACODYL 5 MG
5 TABLET, DELAYED RELEASE (ENTERIC COATED) ORAL DAILY PRN
Status: DISCONTINUED | OUTPATIENT
Start: 2020-01-01 | End: 2020-01-01 | Stop reason: HOSPADM

## 2020-01-01 RX ORDER — GADOBUTROL 604.72 MG/ML
6 INJECTION INTRAVENOUS ONCE
Status: COMPLETED | OUTPATIENT
Start: 2020-01-01 | End: 2020-01-01

## 2020-01-01 RX ORDER — DEXAMETHASONE 4 MG/1
4 TABLET ORAL EVERY 8 HOURS SCHEDULED
Status: DISCONTINUED | OUTPATIENT
Start: 2020-01-01 | End: 2020-01-01 | Stop reason: HOSPADM

## 2020-01-01 RX ORDER — POLYETHYLENE GLYCOL 3350 17 G/17G
17 POWDER, FOR SOLUTION ORAL DAILY PRN
Status: DISCONTINUED | OUTPATIENT
Start: 2020-01-01 | End: 2020-01-01 | Stop reason: HOSPADM

## 2020-01-01 RX ORDER — HEPARIN SODIUM (PORCINE) LOCK FLUSH IV SOLN 100 UNIT/ML 100 UNIT/ML
5 SOLUTION INTRAVENOUS
Status: CANCELLED | OUTPATIENT
Start: 2020-01-01

## 2020-01-01 RX ORDER — HYDROMORPHONE HYDROCHLORIDE 2 MG/1
2 TABLET ORAL EVERY 6 HOURS PRN
Status: DISCONTINUED | OUTPATIENT
Start: 2020-01-01 | End: 2020-01-01 | Stop reason: HOSPADM

## 2020-01-01 RX ORDER — HYDROMORPHONE HYDROCHLORIDE 4 MG/1
4 TABLET ORAL EVERY 4 HOURS PRN
Qty: 30 TABLET | Refills: 0 | Status: SHIPPED | DISCHARGE
Start: 2020-01-01 | End: 2020-01-01

## 2020-01-01 RX ORDER — ONDANSETRON 4 MG/1
4 TABLET, ORALLY DISINTEGRATING ORAL EVERY 30 MIN PRN
Status: DISCONTINUED | OUTPATIENT
Start: 2020-01-01 | End: 2020-01-01 | Stop reason: HOSPADM

## 2020-01-01 RX ORDER — HYDROMORPHONE HYDROCHLORIDE 1 MG/ML
0.5 INJECTION, SOLUTION INTRAMUSCULAR; INTRAVENOUS; SUBCUTANEOUS EVERY 4 HOURS PRN
Status: DISCONTINUED | OUTPATIENT
Start: 2020-01-01 | End: 2020-01-01 | Stop reason: HOSPADM

## 2020-01-01 RX ORDER — AMOXICILLIN 250 MG
1 CAPSULE ORAL 2 TIMES DAILY
Status: DISCONTINUED | OUTPATIENT
Start: 2020-01-01 | End: 2020-01-01 | Stop reason: HOSPADM

## 2020-01-01 RX ORDER — CEFTRIAXONE 1 G/1
1 INJECTION, POWDER, FOR SOLUTION INTRAMUSCULAR; INTRAVENOUS EVERY 24 HOURS
Status: DISCONTINUED | OUTPATIENT
Start: 2020-01-01 | End: 2020-01-01

## 2020-01-01 RX ORDER — POTASSIUM CHLORIDE 1.5 G/1.58G
20-40 POWDER, FOR SOLUTION ORAL
Status: DISCONTINUED | OUTPATIENT
Start: 2020-01-01 | End: 2020-01-01

## 2020-01-01 RX ORDER — VANCOMYCIN HYDROCHLORIDE 125 MG/1
125 CAPSULE ORAL 4 TIMES DAILY
Qty: 56 CAPSULE | Refills: 0 | Status: SHIPPED | OUTPATIENT
Start: 2020-01-01 | End: 2020-01-01

## 2020-01-01 RX ORDER — ACETAMINOPHEN 325 MG/1
650 TABLET ORAL EVERY 4 HOURS PRN
Status: DISCONTINUED | OUTPATIENT
Start: 2020-01-01 | End: 2020-01-01 | Stop reason: HOSPADM

## 2020-01-01 RX ORDER — FENTANYL CITRATE 50 UG/ML
50 INJECTION, SOLUTION INTRAMUSCULAR; INTRAVENOUS ONCE
Status: COMPLETED | OUTPATIENT
Start: 2020-01-01 | End: 2020-01-01

## 2020-01-01 RX ORDER — FENTANYL CITRATE 50 UG/ML
25-50 INJECTION, SOLUTION INTRAMUSCULAR; INTRAVENOUS
Status: DISCONTINUED | OUTPATIENT
Start: 2020-01-01 | End: 2020-01-01 | Stop reason: HOSPADM

## 2020-01-01 RX ORDER — HALOPERIDOL 5 MG/ML
2 INJECTION INTRAMUSCULAR EVERY 6 HOURS PRN
Status: DISCONTINUED | OUTPATIENT
Start: 2020-01-01 | End: 2020-01-01 | Stop reason: HOSPADM

## 2020-01-01 RX ORDER — VANCOMYCIN HYDROCHLORIDE 125 MG/1
125 CAPSULE ORAL 4 TIMES DAILY
DISCHARGE
Start: 2020-01-01

## 2020-01-01 RX ORDER — AMOXICILLIN 250 MG
2 CAPSULE ORAL 2 TIMES DAILY
Status: ON HOLD | DISCHARGE
Start: 2020-01-01 | End: 2020-01-01

## 2020-01-01 RX ORDER — CYCLOPHOSPHAMIDE 50 MG/1
100 CAPSULE ORAL DAILY
Status: ON HOLD | COMMUNITY
Start: 2020-01-01 | End: 2020-01-01

## 2020-01-01 RX ORDER — MEPERIDINE HYDROCHLORIDE 50 MG/ML
12.5 INJECTION INTRAMUSCULAR; INTRAVENOUS; SUBCUTANEOUS EVERY 5 MIN PRN
Status: DISCONTINUED | OUTPATIENT
Start: 2020-01-01 | End: 2020-01-01 | Stop reason: HOSPADM

## 2020-01-01 RX ORDER — FENTANYL CITRATE 50 UG/ML
INJECTION, SOLUTION INTRAMUSCULAR; INTRAVENOUS PRN
Status: DISCONTINUED | OUTPATIENT
Start: 2020-01-01 | End: 2020-01-01

## 2020-01-01 RX ORDER — PROCHLORPERAZINE MALEATE 10 MG
10 TABLET ORAL EVERY 8 HOURS PRN
COMMUNITY

## 2020-01-01 RX ORDER — OLANZAPINE 10 MG/2ML
5 INJECTION, POWDER, FOR SOLUTION INTRAMUSCULAR DAILY PRN
Status: DISCONTINUED | OUTPATIENT
Start: 2020-01-01 | End: 2020-01-01

## 2020-01-01 RX ORDER — ONDANSETRON 2 MG/ML
4 INJECTION INTRAMUSCULAR; INTRAVENOUS EVERY 6 HOURS PRN
Status: DISCONTINUED | OUTPATIENT
Start: 2020-01-01 | End: 2020-01-01 | Stop reason: HOSPADM

## 2020-01-01 RX ORDER — HYDROMORPHONE HYDROCHLORIDE 4 MG/1
4 TABLET ORAL 3 TIMES DAILY
Status: ON HOLD | COMMUNITY
End: 2020-01-01

## 2020-01-01 RX ORDER — DEXTROSE MONOHYDRATE, SODIUM CHLORIDE, AND POTASSIUM CHLORIDE 50; 1.49; 4.5 G/1000ML; G/1000ML; G/1000ML
INJECTION, SOLUTION INTRAVENOUS CONTINUOUS
Status: DISCONTINUED | OUTPATIENT
Start: 2020-01-01 | End: 2020-01-01 | Stop reason: HOSPADM

## 2020-01-01 RX ORDER — BISACODYL 10 MG
10 SUPPOSITORY, RECTAL RECTAL DAILY PRN
Status: DISCONTINUED | OUTPATIENT
Start: 2020-01-01 | End: 2020-01-01 | Stop reason: HOSPADM

## 2020-01-01 RX ORDER — VITAMIN B COMPLEX
1000 TABLET ORAL DAILY
Status: DISCONTINUED | OUTPATIENT
Start: 2020-01-01 | End: 2020-01-01 | Stop reason: HOSPADM

## 2020-01-01 RX ORDER — CEFAZOLIN SODIUM 1 G/3ML
1 INJECTION, POWDER, FOR SOLUTION INTRAMUSCULAR; INTRAVENOUS SEE ADMIN INSTRUCTIONS
Status: DISCONTINUED | OUTPATIENT
Start: 2020-01-01 | End: 2020-01-01 | Stop reason: HOSPADM

## 2020-01-01 RX ORDER — IRON POLYSACCHARIDE COMPLEX 150 MG
300 CAPSULE ORAL DAILY
Status: DISCONTINUED | OUTPATIENT
Start: 2020-01-01 | End: 2020-01-01 | Stop reason: HOSPADM

## 2020-01-01 RX ORDER — HALOPERIDOL 5 MG/ML
3 INJECTION INTRAMUSCULAR ONCE
Status: COMPLETED | OUTPATIENT
Start: 2020-01-01 | End: 2020-01-01

## 2020-01-01 RX ORDER — LABETALOL HYDROCHLORIDE 5 MG/ML
10-40 INJECTION, SOLUTION INTRAVENOUS EVERY 10 MIN PRN
Status: DISCONTINUED | OUTPATIENT
Start: 2020-01-01 | End: 2020-01-01 | Stop reason: HOSPADM

## 2020-01-01 RX ORDER — SODIUM CHLORIDE 9 MG/ML
INJECTION, SOLUTION INTRAVENOUS CONTINUOUS
Status: DISCONTINUED | OUTPATIENT
Start: 2020-01-01 | End: 2020-01-01

## 2020-01-01 RX ORDER — OLANZAPINE 5 MG/1
5 TABLET, ORALLY DISINTEGRATING ORAL 2 TIMES DAILY PRN
Qty: 60 TABLET | Refills: 0 | Status: ON HOLD | DISCHARGE
Start: 2020-01-01 | End: 2020-01-01

## 2020-01-01 RX ORDER — HALOPERIDOL 5 MG/ML
2 INJECTION INTRAMUSCULAR EVERY 6 HOURS PRN
Status: DISCONTINUED | OUTPATIENT
Start: 2020-01-01 | End: 2020-01-01

## 2020-01-01 RX ORDER — HALOPERIDOL 5 MG/ML
5 INJECTION INTRAMUSCULAR ONCE
Status: COMPLETED | OUTPATIENT
Start: 2020-01-01 | End: 2020-01-01

## 2020-01-01 RX ORDER — OLANZAPINE 5 MG/1
5 TABLET, ORALLY DISINTEGRATING ORAL ONCE
Status: COMPLETED | OUTPATIENT
Start: 2020-01-01 | End: 2020-01-01

## 2020-01-01 RX ORDER — HYDROMORPHONE HYDROCHLORIDE 4 MG/1
4 TABLET ORAL 3 TIMES DAILY
Qty: 21 TABLET | Refills: 0 | Status: SHIPPED | OUTPATIENT
Start: 2020-01-01

## 2020-01-01 RX ORDER — PREDNISONE 10 MG/1
10 TABLET ORAL DAILY
Status: ON HOLD | COMMUNITY
End: 2020-01-01

## 2020-01-01 RX ORDER — TAMSULOSIN HYDROCHLORIDE 0.4 MG/1
0.4 CAPSULE ORAL DAILY
Status: DISCONTINUED | OUTPATIENT
Start: 2020-01-01 | End: 2020-01-01

## 2020-01-01 RX ORDER — OLANZAPINE 5 MG/1
5 TABLET, ORALLY DISINTEGRATING ORAL 2 TIMES DAILY
Status: DISCONTINUED | OUTPATIENT
Start: 2020-01-01 | End: 2020-01-01 | Stop reason: HOSPADM

## 2020-01-01 RX ORDER — PROCHLORPERAZINE 25 MG
12.5 SUPPOSITORY, RECTAL RECTAL EVERY 12 HOURS PRN
Status: DISCONTINUED | OUTPATIENT
Start: 2020-01-01 | End: 2020-01-01 | Stop reason: HOSPADM

## 2020-01-01 RX ORDER — OLANZAPINE 5 MG/1
5 TABLET, ORALLY DISINTEGRATING ORAL 2 TIMES DAILY PRN
Status: DISCONTINUED | OUTPATIENT
Start: 2020-01-01 | End: 2020-01-01 | Stop reason: HOSPADM

## 2020-01-01 RX ORDER — ETOPOSIDE 50 MG/1
100 CAPSULE ORAL DAILY
Status: ON HOLD | COMMUNITY
End: 2020-01-01

## 2020-01-01 RX ORDER — PROCHLORPERAZINE MALEATE 5 MG
5 TABLET ORAL EVERY 6 HOURS PRN
Status: DISCONTINUED | OUTPATIENT
Start: 2020-01-01 | End: 2020-01-01 | Stop reason: HOSPADM

## 2020-01-01 RX ORDER — PREDNISONE 5 MG/1
5 TABLET ORAL DAILY
Status: DISCONTINUED | OUTPATIENT
Start: 2020-01-01 | End: 2020-01-01 | Stop reason: HOSPADM

## 2020-01-01 RX ORDER — OLANZAPINE 5 MG/1
5 TABLET ORAL 2 TIMES DAILY
Qty: 60 TABLET | Refills: 0 | Status: ON HOLD | DISCHARGE
Start: 2020-01-01 | End: 2020-01-01

## 2020-01-01 RX ORDER — DEXAMETHASONE 4 MG/1
4 TABLET ORAL EVERY 8 HOURS
Qty: 90 TABLET | Refills: 0 | Status: ON HOLD | DISCHARGE
Start: 2020-01-01 | End: 2020-01-01

## 2020-01-01 RX ORDER — PANTOPRAZOLE SODIUM 40 MG/1
40 TABLET, DELAYED RELEASE ORAL
Qty: 30 TABLET | Refills: 0 | Status: ON HOLD | DISCHARGE
Start: 2020-01-01 | End: 2020-01-01

## 2020-01-01 RX ORDER — POTASSIUM CHLORIDE 29.8 MG/ML
20 INJECTION INTRAVENOUS
Status: DISCONTINUED | OUTPATIENT
Start: 2020-01-01 | End: 2020-01-01

## 2020-01-01 RX ORDER — PHENYLEPHRINE HYDROCHLORIDE 10 MG/ML
INJECTION INTRAVENOUS PRN
Status: DISCONTINUED | OUTPATIENT
Start: 2020-01-01 | End: 2020-01-01

## 2020-01-01 RX ORDER — ACETAMINOPHEN 500 MG
500-1000 TABLET ORAL EVERY 6 HOURS PRN
Status: DISCONTINUED | OUTPATIENT
Start: 2020-01-01 | End: 2020-01-01 | Stop reason: HOSPADM

## 2020-01-01 RX ORDER — VANCOMYCIN HYDROCHLORIDE 125 MG/1
125 CAPSULE ORAL 4 TIMES DAILY
DISCHARGE
Start: 2020-01-01 | End: 2020-01-01

## 2020-01-01 RX ORDER — DEXAMETHASONE SODIUM PHOSPHATE 4 MG/ML
4 INJECTION, SOLUTION INTRA-ARTICULAR; INTRALESIONAL; INTRAMUSCULAR; INTRAVENOUS; SOFT TISSUE EVERY 8 HOURS
Status: DISCONTINUED | OUTPATIENT
Start: 2020-01-01 | End: 2020-01-01 | Stop reason: ALTCHOICE

## 2020-01-01 RX ORDER — LIDOCAINE HYDROCHLORIDE 10 MG/ML
INJECTION, SOLUTION INFILTRATION; PERINEURAL PRN
Status: DISCONTINUED | OUTPATIENT
Start: 2020-01-01 | End: 2020-01-01

## 2020-01-01 RX ORDER — ASCORBIC ACID 500 MG
500 TABLET ORAL DAILY
COMMUNITY

## 2020-01-01 RX ORDER — CEFAZOLIN SODIUM 2 G/100ML
2 INJECTION, SOLUTION INTRAVENOUS
Status: COMPLETED | OUTPATIENT
Start: 2020-01-01 | End: 2020-01-01

## 2020-01-01 RX ORDER — HYDROMORPHONE HYDROCHLORIDE 4 MG/1
4 TABLET ORAL EVERY 4 HOURS PRN
Qty: 30 TABLET | Refills: 0 | Status: ON HOLD | DISCHARGE
Start: 2020-01-01 | End: 2020-01-01

## 2020-01-01 RX ORDER — HALOPERIDOL 5 MG/ML
INJECTION INTRAMUSCULAR
Status: COMPLETED
Start: 2020-01-01 | End: 2020-01-01

## 2020-01-01 RX ORDER — BISACODYL 5 MG
15 TABLET, DELAYED RELEASE (ENTERIC COATED) ORAL DAILY PRN
Status: DISCONTINUED | OUTPATIENT
Start: 2020-01-01 | End: 2020-01-01 | Stop reason: HOSPADM

## 2020-01-01 RX ORDER — DEXAMETHASONE 2 MG/1
4 TABLET ORAL 2 TIMES DAILY WITH MEALS
Status: DISCONTINUED | OUTPATIENT
Start: 2020-01-01 | End: 2020-01-01 | Stop reason: HOSPADM

## 2020-01-01 RX ORDER — HYDROMORPHONE HYDROCHLORIDE 1 MG/ML
.3-.5 INJECTION, SOLUTION INTRAMUSCULAR; INTRAVENOUS; SUBCUTANEOUS EVERY 5 MIN PRN
Status: DISCONTINUED | OUTPATIENT
Start: 2020-01-01 | End: 2020-01-01 | Stop reason: HOSPADM

## 2020-01-01 RX ORDER — HYDROMORPHONE HYDROCHLORIDE 2 MG/1
1-2 TABLET ORAL 2 TIMES DAILY PRN
Qty: 12 TABLET | Refills: 0 | Status: SHIPPED | OUTPATIENT
Start: 2020-01-01 | End: 2020-01-01

## 2020-01-01 RX ORDER — PANTOPRAZOLE SODIUM 40 MG/1
40 TABLET, DELAYED RELEASE ORAL
Status: DISCONTINUED | OUTPATIENT
Start: 2020-01-01 | End: 2020-01-01 | Stop reason: HOSPADM

## 2020-01-01 RX ORDER — HYDROMORPHONE HYDROCHLORIDE 4 MG/1
4 TABLET ORAL EVERY 4 HOURS PRN
Status: DISCONTINUED | OUTPATIENT
Start: 2020-01-01 | End: 2020-01-01 | Stop reason: HOSPADM

## 2020-01-01 RX ORDER — DEXAMETHASONE SODIUM PHOSPHATE 10 MG/ML
10 INJECTION, SOLUTION INTRAMUSCULAR; INTRAVENOUS ONCE
Status: COMPLETED | OUTPATIENT
Start: 2020-01-01 | End: 2020-01-01

## 2020-01-01 RX ORDER — ATROPA BELLADONNA AND OPIUM 16.2; 3 MG/1; MG/1
15 SUPPOSITORY RECTAL EVERY 8 HOURS PRN
Status: DISCONTINUED | OUTPATIENT
Start: 2020-01-01 | End: 2020-01-01 | Stop reason: HOSPADM

## 2020-01-01 RX ORDER — DEXAMETHASONE 4 MG/1
4 TABLET ORAL 2 TIMES DAILY WITH MEALS
COMMUNITY

## 2020-01-01 RX ORDER — HYDROMORPHONE HYDROCHLORIDE 2 MG/1
1-2 TABLET ORAL 2 TIMES DAILY PRN
Qty: 12 TABLET | Refills: 0 | Status: SHIPPED | OUTPATIENT
Start: 2020-01-01

## 2020-01-01 RX ORDER — ACETAMINOPHEN 325 MG/1
975 TABLET ORAL ONCE
Status: DISCONTINUED | OUTPATIENT
Start: 2020-01-01 | End: 2020-01-01 | Stop reason: HOSPADM

## 2020-01-01 RX ORDER — FAMOTIDINE 20 MG/1
20 TABLET, FILM COATED ORAL 2 TIMES DAILY PRN
COMMUNITY

## 2020-01-01 RX ORDER — ONDANSETRON 2 MG/ML
4 INJECTION INTRAMUSCULAR; INTRAVENOUS EVERY 30 MIN PRN
Status: DISCONTINUED | OUTPATIENT
Start: 2020-01-01 | End: 2020-01-01 | Stop reason: HOSPADM

## 2020-01-01 RX ADMIN — DEXAMETHASONE SODIUM PHOSPHATE 4 MG: 4 INJECTION, SOLUTION INTRAMUSCULAR; INTRAVENOUS at 15:33

## 2020-01-01 RX ADMIN — OLANZAPINE 5 MG: 5 TABLET, ORALLY DISINTEGRATING ORAL at 08:08

## 2020-01-01 RX ADMIN — FAMOTIDINE 20 MG: 20 TABLET ORAL at 08:54

## 2020-01-01 RX ADMIN — PANTOPRAZOLE SODIUM 40 MG: 40 TABLET, DELAYED RELEASE ORAL at 07:08

## 2020-01-01 RX ADMIN — Medication 500 MG: at 08:10

## 2020-01-01 RX ADMIN — DEXAMETHASONE SODIUM PHOSPHATE 4 MG: 4 INJECTION, SOLUTION INTRAMUSCULAR; INTRAVENOUS at 21:18

## 2020-01-01 RX ADMIN — VANCOMYCIN HYDROCHLORIDE 125 MG: 125 CAPSULE ORAL at 16:04

## 2020-01-01 RX ADMIN — AMOXICILLIN AND CLAVULANATE POTASSIUM 1 TABLET: 875; 125 TABLET, FILM COATED ORAL at 08:08

## 2020-01-01 RX ADMIN — HYDROMORPHONE HYDROCHLORIDE 4 MG: 4 TABLET ORAL at 23:15

## 2020-01-01 RX ADMIN — HYDROMORPHONE HYDROCHLORIDE 4 MG: 4 TABLET ORAL at 20:35

## 2020-01-01 RX ADMIN — Medication 500 MG: at 08:43

## 2020-01-01 RX ADMIN — ACETAMINOPHEN 650 MG: 325 TABLET, FILM COATED ORAL at 16:33

## 2020-01-01 RX ADMIN — POTASSIUM CHLORIDE, DEXTROSE MONOHYDRATE AND SODIUM CHLORIDE: 150; 5; 450 INJECTION, SOLUTION INTRAVENOUS at 05:04

## 2020-01-01 RX ADMIN — AMOXICILLIN AND CLAVULANATE POTASSIUM 1 TABLET: 875; 125 TABLET, FILM COATED ORAL at 20:01

## 2020-01-01 RX ADMIN — GADOBUTROL 6 ML: 604.72 INJECTION INTRAVENOUS at 19:36

## 2020-01-01 RX ADMIN — SODIUM CHLORIDE, POTASSIUM CHLORIDE, SODIUM LACTATE AND CALCIUM CHLORIDE: 600; 310; 30; 20 INJECTION, SOLUTION INTRAVENOUS at 14:50

## 2020-01-01 RX ADMIN — ACETAMINOPHEN 650 MG: 325 TABLET, FILM COATED ORAL at 03:06

## 2020-01-01 RX ADMIN — ACETAMINOPHEN 650 MG: 325 TABLET, FILM COATED ORAL at 23:15

## 2020-01-01 RX ADMIN — Medication 500 MG: at 11:49

## 2020-01-01 RX ADMIN — ONDANSETRON HYDROCHLORIDE 4 MG: 2 INJECTION, SOLUTION INTRAVENOUS at 15:19

## 2020-01-01 RX ADMIN — FENTANYL CITRATE 50 MCG: 50 INJECTION, SOLUTION INTRAMUSCULAR; INTRAVENOUS at 16:32

## 2020-01-01 RX ADMIN — HYDROMORPHONE HYDROCHLORIDE 2 MG: 2 TABLET ORAL at 05:01

## 2020-01-01 RX ADMIN — SENNOSIDES AND DOCUSATE SODIUM 1 TABLET: 8.6; 5 TABLET ORAL at 08:41

## 2020-01-01 RX ADMIN — PANTOPRAZOLE SODIUM 40 MG: 40 TABLET, DELAYED RELEASE ORAL at 05:58

## 2020-01-01 RX ADMIN — DEXAMETHASONE 4 MG: 2 TABLET ORAL at 08:09

## 2020-01-01 RX ADMIN — Medication 500 MG: at 20:35

## 2020-01-01 RX ADMIN — ACETAMINOPHEN 650 MG: 325 TABLET, FILM COATED ORAL at 06:33

## 2020-01-01 RX ADMIN — AMOXICILLIN AND CLAVULANATE POTASSIUM 1 TABLET: 875; 125 TABLET, FILM COATED ORAL at 08:50

## 2020-01-01 RX ADMIN — DEXAMETHASONE SODIUM PHOSPHATE 4 MG: 4 INJECTION, SOLUTION INTRAMUSCULAR; INTRAVENOUS at 06:33

## 2020-01-01 RX ADMIN — CIPROFLOXACIN HYDROCHLORIDE 500 MG: 500 TABLET, FILM COATED ORAL at 08:43

## 2020-01-01 RX ADMIN — PANTOPRAZOLE SODIUM 40 MG: 40 INJECTION, POWDER, FOR SOLUTION INTRAVENOUS at 09:48

## 2020-01-01 RX ADMIN — ACETAMINOPHEN 650 MG: 325 TABLET, FILM COATED ORAL at 09:48

## 2020-01-01 RX ADMIN — HALOPERIDOL LACTATE 3 MG: 5 INJECTION, SOLUTION INTRAMUSCULAR at 06:42

## 2020-01-01 RX ADMIN — DEXAMETHASONE SODIUM PHOSPHATE 4 MG: 4 INJECTION, SOLUTION INTRAMUSCULAR; INTRAVENOUS at 12:25

## 2020-01-01 RX ADMIN — HYDROMORPHONE HYDROCHLORIDE 4 MG: 4 TABLET ORAL at 21:38

## 2020-01-01 RX ADMIN — HALOPERIDOL LACTATE 5 MG: 5 INJECTION INTRAMUSCULAR at 15:27

## 2020-01-01 RX ADMIN — Medication 500 MG: at 20:11

## 2020-01-01 RX ADMIN — FENTANYL CITRATE 50 MCG: 50 INJECTION, SOLUTION INTRAMUSCULAR; INTRAVENOUS at 14:33

## 2020-01-01 RX ADMIN — CEFTRIAXONE 1 G: 1 INJECTION, POWDER, FOR SOLUTION INTRAMUSCULAR; INTRAVENOUS at 08:49

## 2020-01-01 RX ADMIN — ONDANSETRON 4 MG: 4 TABLET, ORALLY DISINTEGRATING ORAL at 18:14

## 2020-01-01 RX ADMIN — Medication 500 MG: at 08:09

## 2020-01-01 RX ADMIN — HYDROMORPHONE HYDROCHLORIDE 4 MG: 2 TABLET ORAL at 23:16

## 2020-01-01 RX ADMIN — HALOPERIDOL LACTATE 2 MG: 5 INJECTION, SOLUTION INTRAMUSCULAR at 10:07

## 2020-01-01 RX ADMIN — Medication 500 MG: at 20:05

## 2020-01-01 RX ADMIN — ACETAMINOPHEN 650 MG: 325 TABLET, FILM COATED ORAL at 17:07

## 2020-01-01 RX ADMIN — HYDROMORPHONE HYDROCHLORIDE 4 MG: 4 TABLET ORAL at 04:42

## 2020-01-01 RX ADMIN — ACETAMINOPHEN 650 MG: 325 TABLET, FILM COATED ORAL at 08:11

## 2020-01-01 RX ADMIN — ONDANSETRON 4 MG: 2 INJECTION INTRAMUSCULAR; INTRAVENOUS at 10:17

## 2020-01-01 RX ADMIN — SODIUM CHLORIDE 1000 ML: 9 INJECTION, SOLUTION INTRAVENOUS at 17:27

## 2020-01-01 RX ADMIN — DEXAMETHASONE SODIUM PHOSPHATE 4 MG: 4 INJECTION, SOLUTION INTRAMUSCULAR; INTRAVENOUS at 01:20

## 2020-01-01 RX ADMIN — ACETAMINOPHEN 650 MG: 325 TABLET, FILM COATED ORAL at 18:15

## 2020-01-01 RX ADMIN — Medication 1 MG: at 01:17

## 2020-01-01 RX ADMIN — FAMOTIDINE 20 MG: 20 TABLET ORAL at 21:14

## 2020-01-01 RX ADMIN — AMOXICILLIN AND CLAVULANATE POTASSIUM 1 TABLET: 875; 125 TABLET, FILM COATED ORAL at 20:35

## 2020-01-01 RX ADMIN — CEFEPIME 2 G: 2 INJECTION, POWDER, FOR SOLUTION INTRAVENOUS at 19:42

## 2020-01-01 RX ADMIN — Medication 500 MG: at 08:41

## 2020-01-01 RX ADMIN — CEFEPIME HYDROCHLORIDE 2 G: 2 INJECTION, POWDER, FOR SOLUTION INTRAVENOUS at 17:37

## 2020-01-01 RX ADMIN — DEXAMETHASONE 4 MG: 4 TABLET ORAL at 22:36

## 2020-01-01 RX ADMIN — FILGRASTIM 480 MCG: 480 INJECTION, SOLUTION INTRAVENOUS; SUBCUTANEOUS at 11:51

## 2020-01-01 RX ADMIN — DEXAMETHASONE 4 MG: 4 TABLET ORAL at 21:14

## 2020-01-01 RX ADMIN — PANTOPRAZOLE SODIUM 40 MG: 40 INJECTION, POWDER, FOR SOLUTION INTRAVENOUS at 16:09

## 2020-01-01 RX ADMIN — HYDROMORPHONE HYDROCHLORIDE 4 MG: 4 TABLET ORAL at 06:10

## 2020-01-01 RX ADMIN — Medication 300 MG: at 08:35

## 2020-01-01 RX ADMIN — Medication 500 MG: at 08:25

## 2020-01-01 RX ADMIN — PROPOFOL 130 MG: 10 INJECTION, EMULSION INTRAVENOUS at 14:55

## 2020-01-01 RX ADMIN — SODIUM CHLORIDE, PRESERVATIVE FREE: 5 INJECTION INTRAVENOUS at 11:04

## 2020-01-01 RX ADMIN — ACETAMINOPHEN 650 MG: 325 TABLET, FILM COATED ORAL at 06:00

## 2020-01-01 RX ADMIN — PANTOPRAZOLE SODIUM 40 MG: 40 INJECTION, POWDER, FOR SOLUTION INTRAVENOUS at 08:07

## 2020-01-01 RX ADMIN — Medication 10 MEQ: at 10:37

## 2020-01-01 RX ADMIN — PANTOPRAZOLE SODIUM 40 MG: 40 TABLET, DELAYED RELEASE ORAL at 06:36

## 2020-01-01 RX ADMIN — DEXAMETHASONE 4 MG: 2 TABLET ORAL at 17:41

## 2020-01-01 RX ADMIN — HYDROMORPHONE HYDROCHLORIDE 4 MG: 4 TABLET ORAL at 06:34

## 2020-01-01 RX ADMIN — HYDROMORPHONE HYDROCHLORIDE 4 MG: 2 TABLET ORAL at 08:29

## 2020-01-01 RX ADMIN — AMOXICILLIN AND CLAVULANATE POTASSIUM 1 TABLET: 875; 125 TABLET, FILM COATED ORAL at 19:36

## 2020-01-01 RX ADMIN — Medication 150 MG: at 16:33

## 2020-01-01 RX ADMIN — DIPHENHYDRAMINE HYDROCHLORIDE 25 MG: 50 INJECTION, SOLUTION INTRAMUSCULAR; INTRAVENOUS at 15:44

## 2020-01-01 RX ADMIN — ACETAMINOPHEN 650 MG: 325 TABLET, FILM COATED ORAL at 03:52

## 2020-01-01 RX ADMIN — SODIUM CHLORIDE, PRESERVATIVE FREE: 5 INJECTION INTRAVENOUS at 14:18

## 2020-01-01 RX ADMIN — HYDROMORPHONE HYDROCHLORIDE 4 MG: 4 TABLET ORAL at 18:15

## 2020-01-01 RX ADMIN — PANTOPRAZOLE SODIUM 40 MG: 40 TABLET, DELAYED RELEASE ORAL at 06:37

## 2020-01-01 RX ADMIN — SENNOSIDES AND DOCUSATE SODIUM 2 TABLET: 8.6; 5 TABLET ORAL at 08:49

## 2020-01-01 RX ADMIN — HYDROMORPHONE HYDROCHLORIDE 4 MG: 4 TABLET ORAL at 00:11

## 2020-01-01 RX ADMIN — FILGRASTIM 480 MCG: 480 INJECTION, SOLUTION INTRAVENOUS; SUBCUTANEOUS at 21:22

## 2020-01-01 RX ADMIN — PHENYLEPHRINE HYDROCHLORIDE 100 MCG: 10 INJECTION INTRAVENOUS at 15:08

## 2020-01-01 RX ADMIN — HYDROMORPHONE HYDROCHLORIDE 2 MG: 2 TABLET ORAL at 13:28

## 2020-01-01 RX ADMIN — PREDNISONE 5 MG: 5 TABLET ORAL at 18:58

## 2020-01-01 RX ADMIN — Medication 500 MG: at 20:25

## 2020-01-01 RX ADMIN — OLANZAPINE 5 MG: 5 TABLET, FILM COATED ORAL at 01:45

## 2020-01-01 RX ADMIN — ONDANSETRON 4 MG: 4 TABLET, ORALLY DISINTEGRATING ORAL at 19:36

## 2020-01-01 RX ADMIN — DEXAMETHASONE SODIUM PHOSPHATE 4 MG: 4 INJECTION, SOLUTION INTRAMUSCULAR; INTRAVENOUS at 21:23

## 2020-01-01 RX ADMIN — Medication 300 MG: at 08:08

## 2020-01-01 RX ADMIN — OLANZAPINE 5 MG: 5 TABLET, ORALLY DISINTEGRATING ORAL at 20:35

## 2020-01-01 RX ADMIN — POTASSIUM CHLORIDE 40 MEQ: 1500 TABLET, EXTENDED RELEASE ORAL at 09:31

## 2020-01-01 RX ADMIN — HYDROMORPHONE HYDROCHLORIDE 4 MG: 2 TABLET ORAL at 08:31

## 2020-01-01 RX ADMIN — OLANZAPINE 5 MG: 5 TABLET, ORALLY DISINTEGRATING ORAL at 08:54

## 2020-01-01 RX ADMIN — AMOXICILLIN AND CLAVULANATE POTASSIUM 1 TABLET: 875; 125 TABLET, FILM COATED ORAL at 08:10

## 2020-01-01 RX ADMIN — OLANZAPINE 5 MG: 5 TABLET, ORALLY DISINTEGRATING ORAL at 08:12

## 2020-01-01 RX ADMIN — HYDROMORPHONE HYDROCHLORIDE 2 MG: 2 TABLET ORAL at 18:17

## 2020-01-01 RX ADMIN — DEXAMETHASONE SODIUM PHOSPHATE 4 MG: 4 INJECTION, SOLUTION INTRAMUSCULAR; INTRAVENOUS at 14:13

## 2020-01-01 RX ADMIN — Medication 500 MG: at 08:07

## 2020-01-01 RX ADMIN — DEXAMETHASONE SODIUM PHOSPHATE 4 MG: 4 INJECTION, SOLUTION INTRAMUSCULAR; INTRAVENOUS at 07:05

## 2020-01-01 RX ADMIN — ACETAMINOPHEN 650 MG: 325 TABLET, FILM COATED ORAL at 16:40

## 2020-01-01 RX ADMIN — OLANZAPINE 5 MG: 5 TABLET, ORALLY DISINTEGRATING ORAL at 20:30

## 2020-01-01 RX ADMIN — FAMOTIDINE 20 MG: 20 TABLET ORAL at 20:36

## 2020-01-01 RX ADMIN — DEXAMETHASONE SODIUM PHOSPHATE 4 MG: 4 INJECTION, SOLUTION INTRAMUSCULAR; INTRAVENOUS at 14:20

## 2020-01-01 RX ADMIN — CEFEPIME 2 G: 2 INJECTION, POWDER, FOR SOLUTION INTRAVENOUS at 17:34

## 2020-01-01 RX ADMIN — Medication 500 MG: at 22:41

## 2020-01-01 RX ADMIN — OLANZAPINE 5 MG: 5 TABLET, ORALLY DISINTEGRATING ORAL at 08:35

## 2020-01-01 RX ADMIN — SODIUM CHLORIDE, POTASSIUM CHLORIDE, SODIUM LACTATE AND CALCIUM CHLORIDE 1000 ML: 600; 310; 30; 20 INJECTION, SOLUTION INTRAVENOUS at 04:57

## 2020-01-01 RX ADMIN — VANCOMYCIN HYDROCHLORIDE 125 MG: 125 CAPSULE ORAL at 08:28

## 2020-01-01 RX ADMIN — HYDROMORPHONE HYDROCHLORIDE 4 MG: 4 TABLET ORAL at 04:16

## 2020-01-01 RX ADMIN — SODIUM CHLORIDE, PRESERVATIVE FREE: 5 INJECTION INTRAVENOUS at 22:42

## 2020-01-01 RX ADMIN — SENNOSIDES AND DOCUSATE SODIUM 1 TABLET: 8.6; 5 TABLET ORAL at 20:11

## 2020-01-01 RX ADMIN — DEXAMETHASONE 4 MG: 4 TABLET ORAL at 06:37

## 2020-01-01 RX ADMIN — HYDROMORPHONE HYDROCHLORIDE 4 MG: 4 TABLET ORAL at 14:16

## 2020-01-01 RX ADMIN — HYDROMORPHONE HYDROCHLORIDE 4 MG: 4 TABLET ORAL at 18:53

## 2020-01-01 RX ADMIN — MELATONIN 1000 UNITS: at 08:43

## 2020-01-01 RX ADMIN — ACETAMINOPHEN 650 MG: 325 TABLET, FILM COATED ORAL at 17:46

## 2020-01-01 RX ADMIN — DEXAMETHASONE SODIUM PHOSPHATE 4 MG: 4 INJECTION, SOLUTION INTRAMUSCULAR; INTRAVENOUS at 00:47

## 2020-01-01 RX ADMIN — HALOPERIDOL LACTATE 2 MG: 5 INJECTION, SOLUTION INTRAMUSCULAR at 02:30

## 2020-01-01 RX ADMIN — SENNOSIDES AND DOCUSATE SODIUM 1 TABLET: 8.6; 5 TABLET ORAL at 09:48

## 2020-01-01 RX ADMIN — HALOPERIDOL LACTATE 2 MG: 5 INJECTION, SOLUTION INTRAMUSCULAR at 14:20

## 2020-01-01 RX ADMIN — DEXAMETHASONE 4 MG: 4 TABLET ORAL at 06:17

## 2020-01-01 RX ADMIN — Medication 500 MG: at 20:01

## 2020-01-01 RX ADMIN — POTASSIUM CHLORIDE, DEXTROSE MONOHYDRATE AND SODIUM CHLORIDE: 150; 5; 450 INJECTION, SOLUTION INTRAVENOUS at 19:00

## 2020-01-01 RX ADMIN — Medication 500 MG: at 08:54

## 2020-01-01 RX ADMIN — VANCOMYCIN HYDROCHLORIDE 125 MG: 125 CAPSULE ORAL at 19:47

## 2020-01-01 RX ADMIN — PANTOPRAZOLE SODIUM 40 MG: 40 TABLET, DELAYED RELEASE ORAL at 06:39

## 2020-01-01 RX ADMIN — DEXAMETHASONE SODIUM PHOSPHATE 4 MG: 4 INJECTION, SOLUTION INTRAMUSCULAR; INTRAVENOUS at 06:02

## 2020-01-01 RX ADMIN — DEXAMETHASONE 4 MG: 2 TABLET ORAL at 18:50

## 2020-01-01 RX ADMIN — DEXAMETHASONE 4 MG: 4 TABLET ORAL at 22:19

## 2020-01-01 RX ADMIN — SODIUM CHLORIDE, PRESERVATIVE FREE: 5 INJECTION INTRAVENOUS at 01:55

## 2020-01-01 RX ADMIN — MIDAZOLAM 1 MG: 1 INJECTION INTRAMUSCULAR; INTRAVENOUS at 14:50

## 2020-01-01 RX ADMIN — ONDANSETRON 4 MG: 2 INJECTION INTRAMUSCULAR; INTRAVENOUS at 11:32

## 2020-01-01 RX ADMIN — OLANZAPINE 5 MG: 5 TABLET, ORALLY DISINTEGRATING ORAL at 22:04

## 2020-01-01 RX ADMIN — VANCOMYCIN HYDROCHLORIDE 125 MG: 125 CAPSULE ORAL at 08:09

## 2020-01-01 RX ADMIN — HALOPERIDOL LACTATE 2 MG: 5 INJECTION, SOLUTION INTRAMUSCULAR at 15:47

## 2020-01-01 RX ADMIN — FAMOTIDINE 20 MG: 20 TABLET ORAL at 08:25

## 2020-01-01 RX ADMIN — HYDROMORPHONE HYDROCHLORIDE 4 MG: 4 TABLET ORAL at 21:14

## 2020-01-01 RX ADMIN — Medication 500 MG: at 21:36

## 2020-01-01 RX ADMIN — DEXAMETHASONE 4 MG: 4 TABLET ORAL at 06:39

## 2020-01-01 RX ADMIN — Medication 500 MG: at 08:35

## 2020-01-01 RX ADMIN — CEFEPIME 2 G: 2 INJECTION, POWDER, FOR SOLUTION INTRAVENOUS at 09:47

## 2020-01-01 RX ADMIN — FENTANYL CITRATE 50 MCG: 50 INJECTION, SOLUTION INTRAMUSCULAR; INTRAVENOUS at 16:23

## 2020-01-01 RX ADMIN — CEFEPIME 2 G: 2 INJECTION, POWDER, FOR SOLUTION INTRAVENOUS at 00:43

## 2020-01-01 RX ADMIN — OLANZAPINE 5 MG: 5 TABLET, ORALLY DISINTEGRATING ORAL at 20:25

## 2020-01-01 RX ADMIN — OLANZAPINE 5 MG: 5 TABLET, ORALLY DISINTEGRATING ORAL at 08:41

## 2020-01-01 RX ADMIN — DEXAMETHASONE 4 MG: 2 TABLET ORAL at 22:41

## 2020-01-01 RX ADMIN — CIPROFLOXACIN HYDROCHLORIDE 500 MG: 500 TABLET, FILM COATED ORAL at 21:58

## 2020-01-01 RX ADMIN — DEXAMETHASONE SODIUM PHOSPHATE 4 MG: 4 INJECTION, SOLUTION INTRAMUSCULAR; INTRAVENOUS at 00:13

## 2020-01-01 RX ADMIN — Medication 500 MG: at 20:57

## 2020-01-01 RX ADMIN — AMOXICILLIN AND CLAVULANATE POTASSIUM 1 TABLET: 875; 125 TABLET, FILM COATED ORAL at 20:25

## 2020-01-01 RX ADMIN — POTASSIUM CHLORIDE 20 MEQ: 1500 TABLET, EXTENDED RELEASE ORAL at 22:11

## 2020-01-01 RX ADMIN — LORAZEPAM 0.5 MG: 2 INJECTION INTRAMUSCULAR; INTRAVENOUS at 12:42

## 2020-01-01 RX ADMIN — Medication 150 MG: at 16:40

## 2020-01-01 RX ADMIN — OLANZAPINE 5 MG: 5 TABLET, ORALLY DISINTEGRATING ORAL at 21:21

## 2020-01-01 RX ADMIN — SODIUM CHLORIDE, PRESERVATIVE FREE: 5 INJECTION INTRAVENOUS at 00:50

## 2020-01-01 RX ADMIN — FAMOTIDINE 20 MG: 20 TABLET ORAL at 08:14

## 2020-01-01 RX ADMIN — HYDROMORPHONE HYDROCHLORIDE 4 MG: 2 TABLET ORAL at 21:45

## 2020-01-01 RX ADMIN — Medication 500 MG: at 19:36

## 2020-01-01 RX ADMIN — Medication 10 MEQ: at 06:48

## 2020-01-01 RX ADMIN — OLANZAPINE 5 MG: 5 TABLET, ORALLY DISINTEGRATING ORAL at 21:19

## 2020-01-01 RX ADMIN — VANCOMYCIN HYDROCHLORIDE 125 MG: 125 CAPSULE ORAL at 21:44

## 2020-01-01 RX ADMIN — HYDROMORPHONE HYDROCHLORIDE 2 MG: 2 TABLET ORAL at 10:05

## 2020-01-01 RX ADMIN — ACETAMINOPHEN 650 MG: 325 TABLET, FILM COATED ORAL at 20:35

## 2020-01-01 RX ADMIN — DEXAMETHASONE 4 MG: 4 TABLET ORAL at 05:58

## 2020-01-01 RX ADMIN — CEFEPIME 2 G: 2 INJECTION, POWDER, FOR SOLUTION INTRAVENOUS at 11:41

## 2020-01-01 RX ADMIN — SENNOSIDES AND DOCUSATE SODIUM 1 TABLET: 8.6; 5 TABLET ORAL at 08:03

## 2020-01-01 RX ADMIN — OLANZAPINE 5 MG: 5 TABLET, ORALLY DISINTEGRATING ORAL at 21:14

## 2020-01-01 RX ADMIN — Medication 10 MEQ: at 08:12

## 2020-01-01 RX ADMIN — OLANZAPINE 5 MG: 5 TABLET, ORALLY DISINTEGRATING ORAL at 20:05

## 2020-01-01 RX ADMIN — PANTOPRAZOLE SODIUM 40 MG: 40 TABLET, DELAYED RELEASE ORAL at 06:34

## 2020-01-01 RX ADMIN — OLANZAPINE 5 MG: 5 TABLET, ORALLY DISINTEGRATING ORAL at 08:27

## 2020-01-01 RX ADMIN — CEFEPIME 2 G: 2 INJECTION, POWDER, FOR SOLUTION INTRAVENOUS at 04:41

## 2020-01-01 RX ADMIN — DEXAMETHASONE 4 MG: 2 TABLET ORAL at 08:32

## 2020-01-01 RX ADMIN — SODIUM CHLORIDE 1000 ML: 9 INJECTION, SOLUTION INTRAVENOUS at 16:23

## 2020-01-01 RX ADMIN — OLANZAPINE 5 MG: 5 TABLET, ORALLY DISINTEGRATING ORAL at 21:37

## 2020-01-01 RX ADMIN — HALOPERIDOL LACTATE 2 MG: 5 INJECTION, SOLUTION INTRAMUSCULAR at 14:05

## 2020-01-01 RX ADMIN — VANCOMYCIN HYDROCHLORIDE 125 MG: 125 CAPSULE ORAL at 13:25

## 2020-01-01 RX ADMIN — DEXAMETHASONE SODIUM PHOSPHATE 4 MG: 4 INJECTION, SOLUTION INTRA-ARTICULAR; INTRALESIONAL; INTRAMUSCULAR; INTRAVENOUS; SOFT TISSUE at 14:55

## 2020-01-01 RX ADMIN — Medication 500 MG: at 22:04

## 2020-01-01 RX ADMIN — PREDNISONE 5 MG: 5 TABLET ORAL at 08:43

## 2020-01-01 RX ADMIN — ACETAMINOPHEN 650 MG: 325 TABLET, FILM COATED ORAL at 22:21

## 2020-01-01 RX ADMIN — HALOPERIDOL LACTATE 5 MG: 5 INJECTION INTRAMUSCULAR at 15:43

## 2020-01-01 RX ADMIN — SODIUM CHLORIDE, PRESERVATIVE FREE: 5 INJECTION INTRAVENOUS at 09:47

## 2020-01-01 RX ADMIN — HYDROMORPHONE HYDROCHLORIDE 4 MG: 2 TABLET ORAL at 16:04

## 2020-01-01 RX ADMIN — ACETAMINOPHEN 650 MG: 325 TABLET, FILM COATED ORAL at 08:52

## 2020-01-01 RX ADMIN — DEXAMETHASONE 4 MG: 4 TABLET ORAL at 22:00

## 2020-01-01 RX ADMIN — HYDROMORPHONE HYDROCHLORIDE 4 MG: 2 TABLET ORAL at 15:50

## 2020-01-01 RX ADMIN — DEXAMETHASONE 4 MG: 4 TABLET ORAL at 06:02

## 2020-01-01 RX ADMIN — AMOXICILLIN AND CLAVULANATE POTASSIUM 1 TABLET: 875; 125 TABLET, FILM COATED ORAL at 20:05

## 2020-01-01 RX ADMIN — Medication 150 MG: at 20:26

## 2020-01-01 RX ADMIN — FENTANYL CITRATE 50 MCG: 50 INJECTION, SOLUTION INTRAMUSCULAR; INTRAVENOUS at 14:55

## 2020-01-01 RX ADMIN — ACETAMINOPHEN 1000 MG: 500 TABLET, FILM COATED ORAL at 18:58

## 2020-01-01 RX ADMIN — DEXAMETHASONE 4 MG: 4 TABLET ORAL at 14:44

## 2020-01-01 RX ADMIN — Medication 500 MG: at 19:47

## 2020-01-01 RX ADMIN — DEXAMETHASONE SODIUM PHOSPHATE 4 MG: 4 INJECTION, SOLUTION INTRAMUSCULAR; INTRAVENOUS at 06:12

## 2020-01-01 RX ADMIN — POTASSIUM CHLORIDE 20 MEQ: 1500 TABLET, EXTENDED RELEASE ORAL at 17:52

## 2020-01-01 RX ADMIN — MELATONIN 1000 UNITS: at 18:58

## 2020-01-01 RX ADMIN — Medication 300 MG: at 08:54

## 2020-01-01 RX ADMIN — ACETAMINOPHEN 650 MG: 325 TABLET, FILM COATED ORAL at 08:35

## 2020-01-01 RX ADMIN — HALOPERIDOL LACTATE 2 MG: 5 INJECTION, SOLUTION INTRAMUSCULAR at 10:01

## 2020-01-01 RX ADMIN — DEXAMETHASONE SODIUM PHOSPHATE 4 MG: 4 INJECTION, SOLUTION INTRAMUSCULAR; INTRAVENOUS at 17:34

## 2020-01-01 RX ADMIN — ACETAMINOPHEN 650 MG: 325 TABLET, FILM COATED ORAL at 13:25

## 2020-01-01 RX ADMIN — Medication 500 MG: at 20:40

## 2020-01-01 RX ADMIN — HALOPERIDOL LACTATE 2 MG: 5 INJECTION, SOLUTION INTRAMUSCULAR at 20:09

## 2020-01-01 RX ADMIN — HYDROMORPHONE HYDROCHLORIDE 4 MG: 2 TABLET ORAL at 08:09

## 2020-01-01 RX ADMIN — OLANZAPINE 5 MG: 5 TABLET, ORALLY DISINTEGRATING ORAL at 08:51

## 2020-01-01 RX ADMIN — LIDOCAINE HYDROCHLORIDE 20 MG: 10 INJECTION, SOLUTION INFILTRATION; PERINEURAL at 14:55

## 2020-01-01 RX ADMIN — CEFEPIME 2 G: 2 INJECTION, POWDER, FOR SOLUTION INTRAVENOUS at 03:20

## 2020-01-01 RX ADMIN — POTASSIUM CHLORIDE 20 MEQ: 1500 TABLET, EXTENDED RELEASE ORAL at 11:55

## 2020-01-01 RX ADMIN — FAMOTIDINE 20 MG: 20 TABLET ORAL at 20:25

## 2020-01-01 RX ADMIN — CEFEPIME 2 G: 2 INJECTION, POWDER, FOR SOLUTION INTRAVENOUS at 11:04

## 2020-01-01 RX ADMIN — PANTOPRAZOLE SODIUM 40 MG: 40 INJECTION, POWDER, FOR SOLUTION INTRAVENOUS at 16:24

## 2020-01-01 RX ADMIN — ACETAMINOPHEN 650 MG: 325 TABLET, FILM COATED ORAL at 20:16

## 2020-01-01 RX ADMIN — HYDROMORPHONE HYDROCHLORIDE 4 MG: 4 TABLET ORAL at 17:07

## 2020-01-01 RX ADMIN — HYDROMORPHONE HYDROCHLORIDE 4 MG: 2 TABLET ORAL at 22:41

## 2020-01-01 RX ADMIN — HALOPERIDOL LACTATE 2 MG: 5 INJECTION, SOLUTION INTRAMUSCULAR at 04:17

## 2020-01-01 RX ADMIN — SODIUM CHLORIDE 1000 ML: 9 INJECTION, SOLUTION INTRAVENOUS at 17:52

## 2020-01-01 RX ADMIN — DEXAMETHASONE 4 MG: 4 TABLET ORAL at 14:05

## 2020-01-01 RX ADMIN — AMOXICILLIN AND CLAVULANATE POTASSIUM 1 TABLET: 875; 125 TABLET, FILM COATED ORAL at 08:54

## 2020-01-01 RX ADMIN — ACETAMINOPHEN 650 MG: 325 TABLET, FILM COATED ORAL at 00:11

## 2020-01-01 RX ADMIN — PANTOPRAZOLE SODIUM 40 MG: 40 TABLET, DELAYED RELEASE ORAL at 08:41

## 2020-01-01 RX ADMIN — HYDROMORPHONE HYDROCHLORIDE 4 MG: 4 TABLET ORAL at 22:36

## 2020-01-01 RX ADMIN — HYDROMORPHONE HYDROCHLORIDE 4 MG: 4 TABLET ORAL at 08:14

## 2020-01-01 RX ADMIN — ACETAMINOPHEN 650 MG: 325 TABLET, FILM COATED ORAL at 01:16

## 2020-01-01 RX ADMIN — DEXAMETHASONE SODIUM PHOSPHATE 4 MG: 4 INJECTION, SOLUTION INTRAMUSCULAR; INTRAVENOUS at 21:36

## 2020-01-01 RX ADMIN — CEFAZOLIN SODIUM 2 G: 2 INJECTION, SOLUTION INTRAVENOUS at 15:00

## 2020-01-01 RX ADMIN — Medication 300 MG: at 16:22

## 2020-01-01 RX ADMIN — ACETAMINOPHEN 650 MG: 325 TABLET, FILM COATED ORAL at 02:44

## 2020-01-01 RX ADMIN — PANTOPRAZOLE SODIUM 40 MG: 40 TABLET, DELAYED RELEASE ORAL at 06:17

## 2020-01-01 RX ADMIN — Medication 1 MG: at 01:45

## 2020-01-01 RX ADMIN — DEXAMETHASONE SODIUM PHOSPHATE 4 MG: 4 INJECTION, SOLUTION INTRAMUSCULAR; INTRAVENOUS at 06:42

## 2020-01-01 RX ADMIN — Medication 10 MEQ: at 09:14

## 2020-01-01 RX ADMIN — Medication 500 MG: at 08:50

## 2020-01-01 RX ADMIN — LORAZEPAM 0.3 MG: 2 INJECTION INTRAMUSCULAR; INTRAVENOUS at 16:21

## 2020-01-01 RX ADMIN — DEXAMETHASONE 4 MG: 4 TABLET ORAL at 14:26

## 2020-01-01 RX ADMIN — FILGRASTIM 480 MCG: 480 INJECTION, SOLUTION INTRAVENOUS; SUBCUTANEOUS at 22:07

## 2020-01-01 RX ADMIN — DEXAMETHASONE 4 MG: 4 TABLET ORAL at 14:43

## 2020-01-01 RX ADMIN — AMOXICILLIN AND CLAVULANATE POTASSIUM 1 TABLET: 875; 125 TABLET, FILM COATED ORAL at 08:03

## 2020-01-01 RX ADMIN — MEPERIDINE HYDROCHLORIDE 12.5 MG: 50 INJECTION INTRAMUSCULAR; INTRAVENOUS; SUBCUTANEOUS at 15:59

## 2020-01-01 RX ADMIN — HALOPERIDOL LACTATE 2 MG: 5 INJECTION, SOLUTION INTRAMUSCULAR at 14:46

## 2020-01-01 RX ADMIN — AMOXICILLIN AND CLAVULANATE POTASSIUM 1 TABLET: 875; 125 TABLET, FILM COATED ORAL at 08:25

## 2020-01-01 RX ADMIN — SODIUM CHLORIDE, PRESERVATIVE FREE: 5 INJECTION INTRAVENOUS at 00:14

## 2020-01-01 RX ADMIN — ACETAMINOPHEN 1000 MG: 500 TABLET, FILM COATED ORAL at 05:01

## 2020-01-01 RX ADMIN — POTASSIUM CHLORIDE 40 MEQ: 1500 TABLET, EXTENDED RELEASE ORAL at 20:02

## 2020-01-01 RX ADMIN — OLANZAPINE 5 MG: 5 TABLET, ORALLY DISINTEGRATING ORAL at 08:03

## 2020-01-01 RX ADMIN — FENTANYL CITRATE 50 MCG: 50 INJECTION, SOLUTION INTRAMUSCULAR; INTRAVENOUS at 16:07

## 2020-01-01 RX ADMIN — Medication 500 MG: at 08:03

## 2020-01-01 RX ADMIN — ACETAMINOPHEN 650 MG: 325 TABLET, FILM COATED ORAL at 04:16

## 2020-01-01 RX ADMIN — DEXAMETHASONE 4 MG: 2 TABLET ORAL at 08:28

## 2020-01-01 RX ADMIN — DEXAMETHASONE SODIUM PHOSPHATE 4 MG: 4 INJECTION, SOLUTION INTRAMUSCULAR; INTRAVENOUS at 17:44

## 2020-01-01 RX ADMIN — ACETAMINOPHEN 650 MG: 325 TABLET, FILM COATED ORAL at 22:35

## 2020-01-01 RX ADMIN — CEFEPIME 2 G: 2 INJECTION, POWDER, FOR SOLUTION INTRAVENOUS at 04:00

## 2020-01-01 RX ADMIN — DEXAMETHASONE SODIUM PHOSPHATE 10 MG: 10 INJECTION, SOLUTION INTRAMUSCULAR; INTRAVENOUS at 17:21

## 2020-01-01 RX ADMIN — Medication 500 MG: at 08:28

## 2020-01-01 RX ADMIN — Medication 500 MG: at 08:32

## 2020-01-01 RX ADMIN — OLANZAPINE 5 MG: 5 TABLET, ORALLY DISINTEGRATING ORAL at 11:13

## 2020-01-01 RX ADMIN — ACETAMINOPHEN 650 MG: 325 TABLET, FILM COATED ORAL at 16:06

## 2020-01-01 RX ADMIN — DEXAMETHASONE SODIUM PHOSPHATE 4 MG: 4 INJECTION, SOLUTION INTRAMUSCULAR; INTRAVENOUS at 11:49

## 2020-01-01 RX ADMIN — CEFEPIME 2 G: 2 INJECTION, POWDER, FOR SOLUTION INTRAVENOUS at 19:28

## 2020-01-01 ASSESSMENT — ACTIVITIES OF DAILY LIVING (ADL)
ADLS_ACUITY_SCORE: 21
ADLS_ACUITY_SCORE: 20
ADLS_ACUITY_SCORE: 21
FALL_HISTORY_WITHIN_LAST_SIX_MONTHS: NO
ADLS_ACUITY_SCORE: 12
ADLS_ACUITY_SCORE: 23
ADLS_ACUITY_SCORE: 21
ADLS_ACUITY_SCORE: 12
ADLS_ACUITY_SCORE: 12.5
ADLS_ACUITY_SCORE: 23
RETIRED_EATING: 0-->INDEPENDENT
ADLS_ACUITY_SCORE: 21
ADLS_ACUITY_SCORE: 18
RETIRED_COMMUNICATION: 0-->UNDERSTANDS/COMMUNICATES WITHOUT DIFFICULTY
ADLS_ACUITY_SCORE: 21
ADLS_ACUITY_SCORE: 25
ADLS_ACUITY_SCORE: 20
ADLS_ACUITY_SCORE: 21
COGNITION: 0 - NO COGNITION ISSUES REPORTED
ADLS_ACUITY_SCORE: 22
ADLS_ACUITY_SCORE: 23
ADLS_ACUITY_SCORE: 18
ADLS_ACUITY_SCORE: 19
ADLS_ACUITY_SCORE: 20
ADLS_ACUITY_SCORE: 21
ADLS_ACUITY_SCORE: 22
ADLS_ACUITY_SCORE: 11.5
ADLS_ACUITY_SCORE: 22
PREVIOUS_RESPONSIBILITIES: YARDWORK;MEDICATION MANAGEMENT;FINANCES;DRIVING
ADLS_ACUITY_SCORE: 23
ADLS_ACUITY_SCORE: 23
ADLS_ACUITY_SCORE: 22
ADLS_ACUITY_SCORE: 23
ADLS_ACUITY_SCORE: 23
AMBULATION: 0-->INDEPENDENT
ADLS_ACUITY_SCORE: 19
ADLS_ACUITY_SCORE: 22
ADLS_ACUITY_SCORE: 21
SWALLOWING: 0-->SWALLOWS FOODS/LIQUIDS WITHOUT DIFFICULTY
TOILETING: 0-->INDEPENDENT
ADLS_ACUITY_SCORE: 23
ADLS_ACUITY_SCORE: 22
ADLS_ACUITY_SCORE: 21
BATHING: 0-->INDEPENDENT
ADLS_ACUITY_SCORE: 22
ADLS_ACUITY_SCORE: 20
ADLS_ACUITY_SCORE: 25
ADLS_ACUITY_SCORE: 22
TRANSFERRING: 0-->INDEPENDENT
ADLS_ACUITY_SCORE: 12
ADLS_ACUITY_SCORE: 18
ADLS_ACUITY_SCORE: 21
ADLS_ACUITY_SCORE: 19
ADLS_ACUITY_SCORE: 22
ADLS_ACUITY_SCORE: 23
ADLS_ACUITY_SCORE: 20
ADLS_ACUITY_SCORE: 25
ADLS_ACUITY_SCORE: 20
ADLS_ACUITY_SCORE: 19
ADLS_ACUITY_SCORE: 22
ADLS_ACUITY_SCORE: 22
DRESS: 0-->INDEPENDENT

## 2020-01-01 ASSESSMENT — ENCOUNTER SYMPTOMS
VOMITING: 0
FREQUENCY: 0
HEMATURIA: 0
NAUSEA: 0
FEVER: 0
LIGHT-HEADEDNESS: 0
BLOOD IN STOOL: 0
AGITATION: 1
ABDOMINAL PAIN: 1
DIARRHEA: 1
APPETITE CHANGE: 1
DYSURIA: 0
SHORTNESS OF BREATH: 1
DIFFICULTY URINATING: 0

## 2020-01-01 ASSESSMENT — MIFFLIN-ST. JEOR
SCORE: 1489.22
SCORE: 1489.22
SCORE: 1425.72
SCORE: 1457.6
SCORE: 1430.25

## 2020-01-01 ASSESSMENT — PAIN SCALES - GENERAL
PAINLEVEL: NO PAIN (0)
PAINLEVEL: SEVERE PAIN (6)

## 2020-01-01 NOTE — PROGRESS NOTES
Subjective     John Batista is a 74 year old male who presents to clinic today for the following health issues:    Is due to have his hemoglobin checked tomorrow.    HPI   ED/UC Followup:    Facility:  [facility] Emergency Department  Date of visit: 11/21/19  Reason for visit: diarrhea, he has had C.Diff  Current Status: states is no longer having loose stools.  In that aspect feels much better continues to be very weak and just does not feel well.      Completed Vancomycin oral solution.    Joint Pain    Onset: ongoing for years but is having more trouble in the recent past    Description:   Location: right knee, right hip and outer aspect of right calf  Character: persistent muscle pain  Dull ache    Intensity: moderate    Progression of Symptoms: worse    Accompanying Signs & Symptoms:  Other symptoms: numbness and tingling    History:   Previous similar pain: YES      Precipitating factors:   Trauma or overuse: no     Alleviating factors:  Improved by: massage helps for a short period of time     Therapies Tried and outcome: has tried several options in the past.           Patient Active Problem List   Diagnosis     Benign neoplasm of colon     CARDIOVASCULAR SCREENING; LDL GOAL LESS THAN 160     Advance Care Planning     Iron deficiency anemia     Rectal ulcer     Prostate cancer metastatic to bone (H)     Acute lower gastrointestinal hemorrhage     GI bleeding     Melena     Urinary retention     Flank pain     Gross hematuria     Past Surgical History:   Procedure Laterality Date     COLONOSCOPY N/A 11/29/2016    Procedure: COLONOSCOPY;  Surgeon: Alon Lo MD;  Location:  OR     COLONOSCOPY N/A 12/3/2016    Procedure: COLONOSCOPY;  Surgeon: Carmenza Akbar MD;  Location:  GI     COLONOSCOPY N/A 10/30/2018    Procedure: COMBINED COLONOSCOPY, SINGLE OR MULTIPLE BIOPSY/POLYPECTOMY BY BIOPSY;  Surgeon: Anatoly Tam MD;  Location:  GI     CYSTOSCOPY        ESOPHAGOSCOPY, GASTROSCOPY, DUODENOSCOPY (EGD), COMBINED N/A 10/28/2018    Procedure: COMBINED ESOPHAGOSCOPY, GASTROSCOPY, DUODENOSCOPY (EGD);  Surgeon: Codey De La Torre MD;  Location: [facility] GI     ESOPHAGOSCOPY, GASTROSCOPY, DUODENOSCOPY (EGD), COMBINED Left 10/31/2018    Procedure: ESOPHAGOSCOPY, GASTROSCOPY, DUODENOSCOPY (EGD) with Pill Cam  Rm 334;  Surgeon: Anatoly Tam MD;  Location: [facility] GI     EXAM UNDER ANESTHESIA ANUS N/A 2016    Procedure: EXAM UNDER ANESTHESIA ANUS;  Surgeon: Alon Lo MD;  Location: [facility] OR     HC COLONOSCOPY THRU STOMA, DIAGNOSTIC      normal,  had polyps     HERNIA REPAIR, INGUINAL RT/LT       SIGMOIDOSCOPY FLEXIBLE N/A 9/10/2014    Procedure: SIGMOIDOSCOPY FLEXIBLE;  Surgeon: Madhuri Drake MD;  Location: [facility] GI     VASECTOMY         Social History     Tobacco Use     Smoking status: Former Smoker     Types: Cigarettes     Last attempt to quit: 1980     Years since quittin.0     Smokeless tobacco: Former User   Substance Use Topics     Alcohol use: No     Family History   Problem Relation Age of Onset     C.A.D. No family hx of      Diabetes No family hx of      Hypertension No family hx of      Breast Cancer No family hx of      Cancer - colorectal No family hx of          Current Outpatient Medications   Medication Sig Dispense Refill     Cholecalciferol (VITAMIN D) 1000 UNITS capsule Take 2,000 Units by mouth 2 times daily        doxepin (ZONALON) 5 % external cream Apply topically daily as needed for itching        leuprolide (ELIGARD) 45 MG injection Inject 45 mg Subcutaneous every 6 months.       melatonin 3 MG tablet Take 1-3 tablets (3-9 mg) by mouth nightly as needed for sleep 90 tablet 1     predniSONE (DELTASONE) 5 MG tablet Take 1 tablet (5 mg) by mouth daily Per oncology       saccharomyces boulardii (FLORASTOR) 250 MG capsule Take 500 mg by mouth 2 times daily        acetaminophen (TYLENOL) 500 MG tablet Take 500-1,000 mg by mouth  every 6 hours as needed for mild pain       Calcium-Magnesium-Zinc 333-133-5 MG TABS per tablet Take 1 tablet by mouth daily       HYDROmorphone (DILAUDID) 4 MG tablet Take 4 mg by mouth every 6 hours as needed for severe pain       Multiple Vitamin (ONE-A-DAY 55 PLUS OR) Take by mouth daily       Polysaccharide Iron Complex (FERREX 150 PO) Take 1 capsule by mouth three times daily       No Known Allergies  Recent Labs   Lab Test 12/31/19  1412 12/23/19  0558 12/21/19  2248 12/18/19  1639  12/08/19  1803  11/21/19  1723  12/22/17 11/07/12  0918   A1C  --   --   --  4.9  --   --   --   --   --   --   --   --    LDL  --   --   --   --   --   --   --   --   --   --   --  159*   HDL  --   --   --   --   --   --   --   --   --   --   --  49   TRIG  --   --   --   --   --   --   --   --   --   --   --  185*   ALT  --   --  32  --   --  32  --  47   < >  --    < > 43   CR 0.65* 0.73 0.96 0.94   < > 0.71  --  0.60*   < > 0.88   < > 0.69   GFRESTIMATED >90 90 76 78   < > >90   < > >90   < > 67.2   < > >90   GFRESTBLACK >90 >90 89 90   < > >90   < > >90   < >  --    < > >90   POTASSIUM 3.8 3.5 4.0 4.9   < > 3.9  --  3.4   < >  --    < > 4.6   TSH  --   --   --   --   --   --   --   --   --  1.83  --  1.39    < > = values in this interval not displayed.      BP Readings from Last 3 Encounters:   12/24/19 112/54   12/18/19 134/60   12/09/19 107/69    Wt Readings from Last 3 Encounters:   12/24/19 68.7 kg (151 lb 6.4 oz)   12/18/19 72.4 kg (159 lb 11.2 oz)   12/09/19 69.8 kg (153 lb 12.8 oz)            Reviewed and updated as needed this visit by Provider  Tobacco  Allergies  Meds  Problems  Med Hx  Surg Hx  Fam Hx         Review of Systems   ROS COMP: CONSTITUTIONAL:denies fever, weakness following chemotherpay  INTEGUMENTARY/SKIN: no skin rashes  ENT/MOUTH: NEGATIVE for ear, mouth and throat problems  RESP: NEGATIVE for significant cough or SOB  CV: NEGATIVE for chest pain, palpitations or peripheral edema  GI: recent  C. Diff colitis, completed Vancomycin, improved.  : metastatic prostate cancer; chemo per oncology  MUSCULOSKELETAL: achiness of several joints- Right knee and Right hip. Not using cane or walker.  ENDOCRINE: NEGATIVE for temperature intolerance, skin/hair changes  PSYCHIATRIC: coping with multiple stressors.       Objective    /72   Pulse 84   Temp 98.1  F (36.7  C) (Oral)   Resp 17   Wt 73.7 kg (162 lb 8 oz)   SpO2 97%   BMI 22.04 kg/m    Body mass index is 22.04 kg/m .  Physical Exam   GENERAL: healthy, alert and no distress  EYES: sclera is anicteric  NECK: no adenopathy, no asymmetry, masses, or scars and thyroid normal to palpation  RESP: lungs clear to auscultation - no rales, rhonchi or wheezes  CV: regular rates and rhythm, normal S1 S2, no S3 or S4 and no peripheral edema  ABDOMEN: soft, nontender, without hepatosplenomegaly or masses and bowel sounds normal  MS: no gross musculoskeletal defects noted, no edema  SKIN: no suspicious lesions or rashes  NEURO: Normal strength and tone, mentation intact and speech normal  PSYCH: mentation appears normal, affect normal/bright    Diagnostic Test Results:  Labs reviewed in Epic        Assessment & Plan     (A04.72) C. difficile colitis  (primary encounter diagnosis)  Comment: completed therapy ( Vancomycin)  Plan: he is at risk for recurrence in the future.    (R53.83) Fatigue, unspecified type  Comment: multifactorial  Plan: rest, hydration; may need to hold chemo if symptom persist and associated with anemia, etc.     (C61,  C79.51) Prostate cancer metastatic to bone (H)  Comment: metastatic cancer, followed closely by Oncology in [place] and [facility] oncology  Plan:      Arthralgias- right knee and hip  Keep active; stretching encouraged; Tylenol use PRN  Reviewed exercises; modeled exercises. Print outs provided.     Return in about 2 weeks (around 12/16/2019) for per oncology.    Rosario Martinez MD  Internal Medicine   [place]  [facility]

## 2020-01-02 NOTE — PROGRESS NOTES
Per Dr. Rosado verbal order at bedside upon patient's arrival to PACU, 20 ml removed from medeiros ballon and traction removed at 1700.

## 2020-01-02 NOTE — ANESTHESIA POSTPROCEDURE EVALUATION
Patient: John Batista    Procedure(s):  CYSTOSCOPY, WITH TRANSURETHRAL RESECTION PROSTATE    Diagnosis:Gross hematuria [R31.0]  Urinary retention [R33.9]  Diagnosis Additional Information: No value filed.    Anesthesia Type:  No value filed.    Note:  Anesthesia Post Evaluation    Patient location during evaluation: PACU  Patient participation: Able to fully participate in evaluation  Level of consciousness: awake and alert  Pain management: adequate  multimodal analgesia used between 6 hours prior to anesthesia start to PACU dischargeAirway patency: patent  Cardiovascular status: acceptable  Respiratory status: acceptable  two or more mitigation strategies used for obstructive sleep apneaHydration status: acceptable  PONV: none     Anesthetic complications: None          Last vitals:  Vitals:    01/02/20 1632 01/02/20 1640 01/02/20 1645   BP:      Pulse:      Resp:  14 14   Temp:   97.6  F (36.4  C)   SpO2: 99% 99%          Electronically Signed By: Mendoza Sales MD  January 2, 2020  4:44 PM

## 2020-01-02 NOTE — ANESTHESIA CARE TRANSFER NOTE
Patient: John Batista    Procedure(s):  CYSTOSCOPY, WITH TRANSURETHRAL RESECTION PROSTATE    Diagnosis: Gross hematuria [R31.0]  Urinary retention [R33.9]  Diagnosis Additional Information: No value filed.    Anesthesia Type:   No value filed.     Note:  Airway :Face Mask  Patient transferred to:PACU  Comments: Pt to PACU, VSS, report to RNHandoff Report: Identifed the Patient, Identified the Reponsible Provider, Reviewed the pertinent medical history, Discussed the surgical course, Reviewed Intra-OP anesthesia mangement and issues during anesthesia, Set expectations for post-procedure period and Allowed opportunity for questions and acknowledgement of understanding      Vitals: (Last set prior to Anesthesia Care Transfer)    CRNA VITALS  1/2/2020 1510 - 1/2/2020 1550      1/2/2020             Pulse:  117    SpO2:  100 %                Electronically Signed By: EDDIE Sarmiento CRNA  January 2, 2020  3:50 PM

## 2020-01-02 NOTE — ANESTHESIA PREPROCEDURE EVALUATION
Anesthesia Pre-Procedure Evaluation    Patient: John Batista   MRN: [id number] : 1945          Preoperative Diagnosis: Gross hematuria [R31.0]  Urinary retention [R33.9]    Procedure(s):  CYSTOSCOPY, WITH TRANSURETHRAL RESECTION PROSTATE    Past Medical History:   Diagnosis Date     Chronic infection     cdiff     Diverticulitis      History of blood transfusion 2018    GI bleed     Prostate cancer (H) 2011    Dr. Galvan; now seeing Dr. Ruben Reese at [facility]     Past Surgical History:   Procedure Laterality Date     COLONOSCOPY N/A 2016    Procedure: COLONOSCOPY;  Surgeon: Alon Lo MD;  Location: [facility] OR     COLONOSCOPY N/A 12/3/2016    Procedure: COLONOSCOPY;  Surgeon: Carmenza Akbar MD;  Location:  GI     COLONOSCOPY N/A 10/30/2018    Procedure: COMBINED COLONOSCOPY, SINGLE OR MULTIPLE BIOPSY/POLYPECTOMY BY BIOPSY;  Surgeon: Anatoly Tam MD;  Location:  GI     CYSTOSCOPY       ESOPHAGOSCOPY, GASTROSCOPY, DUODENOSCOPY (EGD), COMBINED N/A 10/28/2018    Procedure: COMBINED ESOPHAGOSCOPY, GASTROSCOPY, DUODENOSCOPY (EGD);  Surgeon: Codey De La Torre MD;  Location:  GI     ESOPHAGOSCOPY, GASTROSCOPY, DUODENOSCOPY (EGD), COMBINED Left 10/31/2018    Procedure: ESOPHAGOSCOPY, GASTROSCOPY, DUODENOSCOPY (EGD) with Pill Cam  Rm 334;  Surgeon: Anatoly Tam MD;  Location:  GI     EXAM UNDER ANESTHESIA ANUS N/A 2016    Procedure: EXAM UNDER ANESTHESIA ANUS;  Surgeon: Alon Lo MD;  Location: [facility] OR     HC COLONOSCOPY THRU STOMA, DIAGNOSTIC      normal,  had polyps     HERNIA REPAIR, INGUINAL RT/LT       SIGMOIDOSCOPY FLEXIBLE N/A 9/10/2014    Procedure: SIGMOIDOSCOPY FLEXIBLE;  Surgeon: Madhuri Drake MD;  Location:  GI     VASECTOMY       Anesthesia Evaluation     . Pt has had prior anesthetic. Type: General    No history of anesthetic complications          ROS/MED HX    ENT/Pulmonary:  - neg pulmonary ROS     Neurologic:  - neg  "neurologic ROS     Cardiovascular:  - neg cardiovascular ROS       METS/Exercise Tolerance:     Hematologic:     (+) Anemia, -      Musculoskeletal:   (+) arthritis,  -       GI/Hepatic:  - neg GI/hepatic ROS       Renal/Genitourinary:  - ROS Renal section negative       Endo:  - neg endo ROS       Psychiatric:  - neg psychiatric ROS       Infectious Disease:  - neg infectious disease ROS       Malignancy:   (+) Malignancy History of Prostate          Other:    - neg other ROS                      Physical Exam  Normal systems: cardiovascular, pulmonary and dental    Airway   Mallampati: II  TM distance: >3 FB  Neck ROM: full    Dental     Cardiovascular       Pulmonary             Lab Results   Component Value Date    WBC 3.6 (L) 12/31/2019    HGB 7.9 (L) 12/31/2019    HCT 24.9 (L) 12/31/2019     12/31/2019     12/31/2019    POTASSIUM 3.8 12/31/2019    CHLORIDE 102 12/31/2019    CO2 27 12/31/2019    BUN 15 12/31/2019    CR 0.65 (L) 12/31/2019     (H) 12/31/2019    TIMBO 8.9 12/31/2019    PHOS 3.6 12/02/2016    MAG 2.0 11/21/2019    ALBUMIN 3.5 12/21/2019    PROTTOTAL 7.7 12/21/2019    ALT 32 12/21/2019    AST 33 12/21/2019    ALKPHOS 142 12/21/2019    BILITOTAL 0.3 12/21/2019    LIPASE 119 12/21/2019    INR 0.91 10/27/2018    TSH 1.83 12/22/2017       Preop Vitals  BP Readings from Last 3 Encounters:   01/02/20 (!) 177/89   12/24/19 112/54   12/18/19 134/60    Pulse Readings from Last 3 Encounters:   01/02/20 111   12/24/19 83   12/18/19 76      Resp Readings from Last 3 Encounters:   01/02/20 18   12/24/19 16   12/18/19 17    SpO2 Readings from Last 3 Encounters:   01/02/20 100%   12/24/19 97%   12/18/19 96%      Temp Readings from Last 1 Encounters:   01/02/20 99.8  F (37.7  C) (Temporal)    Ht Readings from Last 1 Encounters:   01/02/20 1.829 m (6' 0.01\")      Wt Readings from Last 1 Encounters:   01/02/20 68.9 kg (152 lb)    Estimated body mass index is 20.61 kg/m  as calculated from the " "following:    Height as of this encounter: 1.829 m (6' 0.01\").    Weight as of this encounter: 68.9 kg (152 lb).       Anesthesia Plan      History & Physical Review  History and physical reviewed and following examination; no interval change.    ASA Status:  3 .    NPO Status:  > 8 hours    Plan for General and LMA with Intravenous induction. Maintenance will be Balanced.    PONV prophylaxis:  Ondansetron (or other 5HT-3) and Dexamethasone or Solumedrol       Postoperative Care  Postoperative pain management:  IV analgesics, Oral pain medications and Multi-modal analgesia.      Consents  Anesthetic plan, risks, benefits and alternatives discussed with:  Patient..                 Mendoza Sales MD                    .  "

## 2020-01-02 NOTE — TELEPHONE ENCOUNTER
Reason for call:  Other   Patient called regarding (reason for call): information for PCP  Additional comments: Patient's wife called to let Dr Martinez know that Nicolas is having a procedure done today at [facility] and will be overnighted at hospital. She wanted to make the provider aware. No call back needed. Thanks.      Phone number to reach patient:  Home number on file [phone] (home)    Best Time:  Any    Can we leave a detailed message on this number?  YES

## 2020-01-02 NOTE — OP NOTE
Diagnosis: Gross hematuria, urinary retention, metastatic prostate cancer  Procedure: Video cystoscopy, channel TURP  Surgeon: Mandy  Anesthesia: General laryngeal mask  EBL: 10 ml  Findings: Lateral lobes of prostate obstruct bladder neck, no bleeding from prostatic fossa or bladder.  Normal ureteral orifices, no bladder tumors, stones, fistula or diverticulum

## 2020-01-03 NOTE — PROGRESS NOTES
S: Pt. Having continuous leakage of urine after catheter removed.  Pt. Has brief on and using paper towels to soak up urine  B: Catheter pulled out at 1010  A: Bladder scanned for 66ccs.  BRYAN Kolb, notified.  R: Kennedi called back and per Dr. Galvan, pt. Can precede with discharge and should complete kegel exercises.  Pt. Given instruction sheet on kegel exercises

## 2020-01-03 NOTE — PROGRESS NOTES
[facility] Progress Note          Assessment and Plan:   Active Problems:    Urinary retention,   Gross hematuria    Assessment: POD 1 channel TURP    Plan: Rondon removal  Post op restrictions reviewed.   Home on 2 days of Cipro for peter op prophy.  See Dr. Galvan in 6 weeks.     Check 1st PVR. If >200cc, will need to check the next one.     Dispo: hopeful for later today    Kennedi Cristina PA-C  [facility]  [phone]               Interval History:   doing well; no cp, sob, n/v/d, or abd pain. Rondon clear, clamped              Review of Systems:   The 5 point Review of Systems is negative other than noted in the HPI             Medications:     Current Facility-Administered Medications Ordered in Epic   Medication Dose Route Frequency Last Rate Last Dose     acetaminophen (TYLENOL) tablet 500-1,000 mg  500-1,000 mg Oral Q6H PRN   1,000 mg at 01/03/20 0501     ciprofloxacin (CIPRO) tablet 500 mg  500 mg Oral Q12H STEVEN   500 mg at 01/02/20 2158     dextrose 5% and 0.45% NaCl + KCl 20 mEq/L infusion   Intravenous Continuous 100 mL/hr at 01/03/20 0504       HYDROmorphone (DILAUDID) tablet 2 mg  2 mg Oral Q6H PRN   2 mg at 01/03/20 0501     HYDROmorphone (PF) (DILAUDID) injection 0.3 mg  0.3 mg Intravenous Q2H PRN         lidocaine (LMX4) cream   Topical Q1H PRN         lidocaine 1 % 0.1-1 mL  0.1-1 mL Other Q1H PRN         melatonin tablet 3 mg  3 mg Oral At Bedtime PRN         naloxone (NARCAN) injection 0.1-0.4 mg  0.1-0.4 mg Intravenous Q2 Min PRN         ondansetron (ZOFRAN-ODT) ODT tab 4 mg  4 mg Oral Q6H PRN        Or     ondansetron (ZOFRAN) injection 4 mg  4 mg Intravenous Q6H PRN         opium-belladonna (B&O SUPPRETTES) 30-16.2 MG per suppository 0.5 suppository  15 mg Rectal Q8H PRN         predniSONE (DELTASONE) tablet 5 mg  5 mg Oral Daily   5 mg at 01/02/20 9928     saccharomyces boulardii (FLORASTOR) capsule 500 mg  500 mg Oral BID   500 mg at 01/02/20 2057     sodium  chloride (PF) 0.9% PF flush 3 mL  3 mL Intracatheter q1 min prn         sodium chloride (PF) 0.9% PF flush 3 mL  3 mL Intracatheter Q8H         Vitamin D3 (CHOLECALCIFEROL) 25 mcg (1000 units) tablet 1,000 Units  1,000 Units Oral Daily   1,000 Units at 01/02/20 1858     Current Outpatient Medications Ordered in Epic   Medication     ciprofloxacin (CIPRO) 500 MG tablet                  Physical Exam:   Vitals were reviewed  Patient Vitals for the past 8 hrs:   BP Temp Temp src Heart Rate Resp SpO2   01/03/20 0729 122/63 97.2  F (36.2  C) Oral 72 16 97 %   01/03/20 0445 117/64 97  F (36.1  C) Oral 77 18 96 %     GEN: NAD, lying in bed  EYES: EOMI  MOUTH: MMM  NECK: Supple  RESP: Unlabored breathing  CARDIAC: No LE edema  SKIN: Warm  ABD: soft  NEURO: AAO   : clear       Data:   No results found for: NTBNPI, NTBNP  Lab Results   Component Value Date    WBC 3.6 (L) 12/31/2019    WBC 7.6 12/24/2019    WBC 9.6 12/23/2019    HGB 7.2 (L) 01/03/2020    HGB 7.9 (L) 12/31/2019    HGB 7.6 (L) 12/24/2019    HCT 24.9 (L) 12/31/2019    HCT 24.2 (L) 12/24/2019    HCT 25.1 (L) 12/23/2019     (H) 12/31/2019     12/24/2019     12/23/2019     12/31/2019     12/24/2019     12/23/2019     Lab Results   Component Value Date    INR 0.91 10/27/2018

## 2020-01-03 NOTE — PROGRESS NOTES
ROOM # 232    Living Situation (if not independent, order SW consult): Home w/ wife   Facility name: N/A  : Valerie (Wife): [phone]    Activity level at baseline: Ind  Activity level on admit: SBA      Patient registered to observation; given Patient Bill of Rights; given the opportunity to ask questions about observation status and their plan of care.  Patient has been oriented to the observation room, bathroom and call light is in place.    Discussed discharge goals and expectations with patient/family.

## 2020-01-03 NOTE — PLAN OF CARE
PRIMARY DIAGNOSIS: TURP  OUTPATIENT/OBSERVATION GOALS TO BE MET BEFORE DISCHARGE:  1. Stable vital signs Yes  2. Tolerating diet:Yes  3. Pain controlled with oral pain medications:  Yes  4. Positive bowel sounds:  Yes  5. Voiding without difficulty:  No, 3 way medeiros in draining pink, clear urine  6. Able to ambulate:  Yes  7. Provider specific discharge goals met:  No    Pt. Reports pain tolerable at 3/10, CBI running slow, draining clear, light pink urine.  Pt. Has slight tenderness with lower abdominal palpation.  Pt. Reports he's been able to pas gas, denies nausea    Discharge Planner Nurse   Safe discharge environment identified: Yes  Barriers to discharge: No       Entered by: Kt Haji 01/03/2020      Please review provider order for any additional goals.   Nurse to notify provider when observation goals have been met and patient is ready for discharge.

## 2020-01-03 NOTE — PLAN OF CARE
VSS w/ ex of intermittent hypotension, systolic BP staying above 100. Pt alert and oriented x 4. When pt arrived to unit was able to dangle and pivot transfer into bed. Pt had one clot bypass catheter x 1. Pt had episode of clot/ heavy abdominal discomfort- this writer hand irrigated medeiros and got clots x 3 during irrigation. After irrigation catheter was patent remainder of shift. MD notified, order placed for hand irrigation and PRN IV dilaudid. Pt received oral dilaudid PRN x 1 which was effective. Tolerating diet, fair appetite. Color of urine in catheter varied from light pink to clear. CMS intact. Pt refused Capno at HS, still hooked up to monitor O2 saturation. IS encouraged. PCDs in use. Possible discharge tomorrow.

## 2020-01-03 NOTE — PLAN OF CARE
"/68 (BP Location: Left arm)   Pulse 81   Temp 98  F (36.7  C) (Oral)   Resp 16   Ht 1.829 m (6' 0.01\")   Wt 68.9 kg (152 lb)   SpO2 97%   BMI 20.61 kg/m     vss, A&Ox4, pt is tolerating CBI, urine is light pink to clear. CMS intact. Meatus has scant drainage. Pt has refused CAPNO, but will put it on to check sats. IS encouraged. Reevaluate w/ urology in AM, possible discharge today.  "

## 2020-01-03 NOTE — PROGRESS NOTES
Patient's After Visit Summary was reviewed with patient.   Patient verbalized understanding of After Visit Summary, recommended follow up and was given an opportunity to ask questions.   Discharge medications sent home with patient/family: YES   Discharged with spouse    OBSERVATION patient END time: 1200

## 2020-01-03 NOTE — OP NOTE
Procedure Date: 01/02/2020      PREOPERATIVE DIAGNOSIS:  Metastatic adenocarcinoma of the prostate, urinary retention, gross hematuria.      POSTOPERATIVE DIAGNOSES:  Metastatic adenocarcinoma of the prostate, urinary retention, gross hematuria.       PROCEDURE:  Video cystoscopy, channel TURP.      SURGEON:  David Galvan MD      ANESTHESIA:  General laryngeal mask.      ESTIMATED BLOOD LOSS:  10 mL.      INDICATIONS:  The patient is a 74-year-old male with metastatic adenocarcinoma of the prostate, originally diagnosed in 2011.  He is followed by Oncology in [place], [place], as well as Ruben Reese MD at the [facility].  He is on 6-month Lupron injections and has had adjuvant treatments including oral antiandrogens, chemotherapy and radium 223 treatments.  He has progressive disease on followup scans.  Because of ongoing hematuria and urinary retention, the patient now presents for cystoscopy and possibility of channel TURP.  The procedure, the alternatives, risks and follow-up were carefully discussed with the patient and his wife.  Preoperative hemoglobin is 7.9 g/dL.      DESCRIPTION OF THE PROCEDURE:  The patient received 2 grams of IV Ancef, was taken to cystoscopy and placed supine on the operating table.  After adequate general laryngeal mask anesthesia, the patient was placed in lithotomy, and his genitalia were prepped and draped in a sterile fashion.  A #26 Turkmen Storz resectoscope sheath with Andre obturator was introduced in the bladder easily.  Residual urine was clear.  Irrigation was 3% sorbitol solution.  Using the 30 degree lens and video, the prostatic fossa and bladder were carefully examined, revealing no bladder tumors or stones and obstructing lateral lobes of the prostate, no middle lobe.  The position of the ureteral orifices and verumontanum were noted.  I then resected the prostatic fossa down to circular muscle fibers at the bladder neck that were level with the bladder  floor and down to the surgical capsule laterally, apically, anteriorly and inferiorly.  No perforations were made and only 10 mL of blood loss estimated.  All tissue was irrigated out of the bladder and sent in formalin to pathology.  Reinspection of the bladder revealed no residual chips and intact ureteral orifices.  Reinspection of the prostatic fossa revealed good hemostasis.  The sphincter was intact.  The resectoscope was removed, and a 20-Romansh three-way Rondon was inserted in the bladder.  Thirty mL of saline was placed in the Rondon balloon.  The Rondon was held on light traction, irrigated with clear returns, and then placed to closed gravity drainage, continuous normal saline irrigation and secured to the patient's thigh with slight traction using a Velcro leg strap.  The patient went to the recovery room in stable condition.         GOLDIE WALLACE JR, MD             D: 2020   T: 2020   MT:       Name:     SHARON BANEGAS   MRN:      [id number]        Account:        [id number]   :      1945           Procedure Date: 2020      Document: [id number]       cc: Copy for Provider        Rosario Reese MD       [facility] [facility]

## 2020-01-03 NOTE — PHARMACY-ADMISSION MEDICATION HISTORY
PTA meds completed by pre-admitting nurse ( Denise Cho RN ). No further clarifications required by pharmacy.    Prior to Admission medications    Medication Sig Last Dose Taking? Auth Provider   acetaminophen (TYLENOL) 500 MG tablet Take 500-1,000 mg by mouth every 6 hours as needed for mild pain 1/1/2020 at 0800 Yes Reported, Patient   Calcium-Magnesium-Zinc 333-133-5 MG TABS per tablet Take 1 tablet by mouth daily 1/1/2020 at 0800 Yes Reported, Patient   Cholecalciferol (VITAMIN D) 1000 UNITS capsule Take 2,000 Units by mouth 2 times daily  1/1/2020 at 0800 Yes Reported, Patient   HYDROmorphone (DILAUDID) 4 MG tablet Take 4 mg by mouth every 6 hours as needed for severe pain 1/1/2020 at 0800 Yes Unknown, Entered By History   melatonin 3 MG tablet Take 1-3 tablets (3-9 mg) by mouth nightly as needed for sleep Past Week at Unknown time Yes Rosario Martinez MD   Multiple Vitamin (ONE-A-DAY 55 PLUS OR) Take 1 tablet by mouth daily  1/1/2020 at 0800 Yes Reported, Patient   Polysaccharide Iron Complex (FERREX 150 PO) Take 1 capsule by mouth three times daily 1/1/2020 at 0800 Yes Unknown, Entered By History   predniSONE (DELTASONE) 5 MG tablet Take 1 tablet (5 mg) by mouth daily Per oncology 1/1/2020 at 0800 Yes Rosario Martinez MD   saccharomyces boulardii (FLORASTOR) 250 MG capsule Take 500 mg by mouth 2 times daily  1/1/2020 at 0800 Yes Reported, Patient   doxepin (ZONALON) 5 % external cream Apply topically daily as needed for itching  Unknown at Unknown time  Reported, Patient   leuprolide (ELIGARD) 45 MG injection Inject 45 mg Subcutaneous every 6 months. More than a month at Unknown time  Reported, Patient

## 2020-01-03 NOTE — TELEPHONE ENCOUNTER
"FYI.    Patient's wife called stating all went well with cystoscopy.\" no cancer\", prostate tissue was removed as was restricting flow, area of bleeding cauterized.    Requesting handicapped parking for patient. Discussed need for some type of visit. Could be done at hospital follow up after patient discharged.    Janna Barba RN    "

## 2020-01-03 NOTE — DISCHARGE SUMMARY
[facility] Discharge Summary: Urology    John Batisat MRN# [id number]   Age: 74 year old YOB: 1945     Date of Admission:  1/2/2020  Date of Discharge::  1/3/2020  Admitting Physician:  David Galvan MD  Discharge Physician:  Kennedi Cristina PA-C, BRYAN           Admission Diagnoses:      Gross hematuria  Urinary retention  Gross hematuria          Discharge Diagnosis:   Same          Procedures:   Cysto, channel TURP          Medications Prior to Admission:     Medications Prior to Admission   Medication Sig Dispense Refill Last Dose     acetaminophen (TYLENOL) 500 MG tablet Take 500-1,000 mg by mouth every 6 hours as needed for mild pain   1/1/2020 at 0800     Calcium-Magnesium-Zinc 333-133-5 MG TABS per tablet Take 1 tablet by mouth daily   1/1/2020 at 0800     Cholecalciferol (VITAMIN D) 1000 UNITS capsule Take 2,000 Units by mouth 2 times daily    1/1/2020 at 0800     HYDROmorphone (DILAUDID) 4 MG tablet Take 4 mg by mouth every 6 hours as needed for severe pain   1/1/2020 at 0800     melatonin 3 MG tablet Take 1-3 tablets (3-9 mg) by mouth nightly as needed for sleep 90 tablet 1 Past Week at Unknown time     Multiple Vitamin (ONE-A-DAY 55 PLUS OR) Take 1 tablet by mouth daily    1/1/2020 at 0800     Polysaccharide Iron Complex (FERREX 150 PO) Take 1 capsule by mouth three times daily   1/1/2020 at 0800     predniSONE (DELTASONE) 5 MG tablet Take 1 tablet (5 mg) by mouth daily Per oncology   1/1/2020 at 0800     saccharomyces boulardii (FLORASTOR) 250 MG capsule Take 500 mg by mouth 2 times daily    1/1/2020 at 0800     doxepin (ZONALON) 5 % external cream Apply topically daily as needed for itching    Unknown at Unknown time     leuprolide (ELIGARD) 45 MG injection Inject 45 mg Subcutaneous every 6 months.   More than a month at Unknown time     [DISCONTINUED] vancomycin (FIRVANQ) 50 MG/ML oral solution Take 2.5 mLs (125 mg) by mouth 2 times daily for 20 days 100 mL 0 12/21/2019  at am (7 days left)             Discharge Medications:     Current Discharge Medication List      START taking these medications    Details   ciprofloxacin (CIPRO) 500 MG tablet Take 1 tablet (500 mg) by mouth every 12 hours for 2 days  Qty: 4 tablet, Refills: 0    Associated Diagnoses: Urinary retention         CONTINUE these medications which have NOT CHANGED    Details   acetaminophen (TYLENOL) 500 MG tablet Take 500-1,000 mg by mouth every 6 hours as needed for mild pain      Calcium-Magnesium-Zinc 333-133-5 MG TABS per tablet Take 1 tablet by mouth daily      Cholecalciferol (VITAMIN D) 1000 UNITS capsule Take 2,000 Units by mouth 2 times daily       HYDROmorphone (DILAUDID) 4 MG tablet Take 4 mg by mouth every 6 hours as needed for severe pain      melatonin 3 MG tablet Take 1-3 tablets (3-9 mg) by mouth nightly as needed for sleep  Qty: 90 tablet, Refills: 1    Associated Diagnoses: Sleep disturbance      Multiple Vitamin (ONE-A-DAY 55 PLUS OR) Take 1 tablet by mouth daily       Polysaccharide Iron Complex (FERREX 150 PO) Take 1 capsule by mouth three times daily      predniSONE (DELTASONE) 5 MG tablet Take 1 tablet (5 mg) by mouth daily Per oncology      saccharomyces boulardii (FLORASTOR) 250 MG capsule Take 500 mg by mouth 2 times daily       doxepin (ZONALON) 5 % external cream Apply topically daily as needed for itching       leuprolide (ELIGARD) 45 MG injection Inject 45 mg Subcutaneous every 6 months.         STOP taking these medications       vancomycin (FIRVANQ) 50 MG/ML oral solution Comments:   Reason for Stopping:                     Consultations:     None          Hospital Course:     The patient underwent the above procedure and tolerated this well. Uncomplicated post operative course. Had adequate pain control, voiding, ambulating and tolerating regular diet at the time of discharge.            Discharge Instructions and Follow-Up:   Discharge diet: Regular   Discharge activity: No lifting  >5lbs or strenuous exercise for 4 week(s)   Discharge follow-up: Dr. Galvan 6 wks                Discharge Disposition:   Discharged to home        Kennedi Cristina PA-C  [facility]

## 2020-01-06 NOTE — PATIENT INSTRUCTIONS
[facility]  Foot Care Services  All appts are in the comfort of you own home  [phone]     Xenograft Text: The defect edges were debeveled with a #15 scalpel blade.  Given the location of the defect, shape of the defect and the proximity to free margins a xenograft was deemed most appropriate.  The graft was then trimmed to fit the size of the defect.  The graft was then placed in the primary defect and oriented appropriately.

## 2020-01-24 NOTE — ED PROVIDER NOTES
History     Chief Complaint:  Diarrhea      The history is provided by the patient and the spouse.      John Batista is a 74 year old male with a history of C-diff and metastatic prostate cancer who presents with his wife for evaluation of diarrhea. The patient had surgery three weeks ago and was on antibiotics at that time. He reports softer stool than normal yesterday and then about four hours ago, he had diarrhea that was very liquidy and foul-smelling, similar to how it smelled when he had clostridium dificile in the recent past for which he was on vancomycin. He reports some mild abdominal cramping yesterday, none today, and a lack of appetite. He denies a fever, lightheadedness, syncope, black/bloody stool, nausea, vomiting, or changes in urination. Of note, the patient underwent chemotherapy about a week and a half ago for metastatic prostate cancer.  His hemoglobin is typically low since therapy. He denies any bleeding or abnormal bruising. He notes he is typically short of breath with exertion and denies any changes.    Allergies:  No Known Allergies     Medications:    Calcium-Magnesium-Zinc  Cholecalciferol  Dilaudid  Eligaurd  Florastor  Polysaccharide Iron Complex  Prednisone    Past Medical History:    C-diff  Chronic infection  Diverticulitis  History of blood transfusion  Prostate cancer    Past Surgical History:    Colonoscopy  Cystoscopy  Combined esophagoscopy, gastroscopy, duodenoscopy  Hernia repair, inguinal RT/LT  Vasectomy    Family History:    No past pertinent family history.    Social History:  Marital Status:   [2]  Presents with his wife.  Negative for tobacco use. Former.  Negative for alcohol use.  Negative for drug use.       Review of Systems   Constitutional: Positive for appetite change. Negative for fever.   Respiratory: Positive for shortness of breath (exertional, chronic).    Gastrointestinal: Positive for abdominal pain and diarrhea. Negative for blood in stool,  nausea and vomiting.   Genitourinary: Negative for difficulty urinating, dysuria, frequency and hematuria.   Neurological: Negative for syncope and light-headedness.   All other systems reviewed and are negative.    Physical Exam     Patient Vitals for the past 24 hrs:   BP Temp Temp src Pulse Heart Rate Resp SpO2 Height Weight   01/24/20 1745 131/63 -- -- -- -- -- 97 % -- --   01/24/20 1730 -- -- -- -- -- -- 99 % -- --   01/24/20 1715 131/70 -- -- -- -- -- 97 % -- --   01/24/20 1700 132/66 -- -- 86 -- -- 98 % -- --   01/24/20 1645 130/60 -- -- -- -- -- 98 % -- --   01/24/20 1630 -- -- -- -- -- -- 98 % -- --   01/24/20 1615 117/84 -- -- -- -- -- -- -- --   01/24/20 1553 110/64 -- -- -- -- -- -- -- --   01/24/20 1552 -- 97.8  F (36.6  C) Oral -- 100 22 100 % 1.829 m (6') 72.1 kg (159 lb)       Physical Exam  General: Thin elderly male lying on stretcher  Eyes: PERRL, Conjunctive pale  ENT: Moist mucous membranes, oropharynx clear.   CV: Normal S1S2, no murmur, rub or gallop. Regular rate and rhythm  Resp: Clear to auscultation bilaterally, no wheezes, rales or rhonchi. Normal respiratory effort.  GI: Abdomen is soft, nontender and nondistended. No palpable masses. No rebound or guarding.  MSK: No edema. Nontender. Normal active range of motion.  Skin: Warm and dry. No rashes or lesions or ecchymoses on visible skin. Generalized pallor.  Neuro: Alert and oriented. Responds appropriately to all questions and commands. No focal findings appreciated. Normal muscle tone.  Psych: Normal mood and affect. Pleasant.    Emergency Department Course     Laboratory:  Laboratory findings were communicated with the patient and family who voiced understanding of the findings.    Clostridium difficile toxin B PCR: In process    Enteric bacteria and virus panel by [facility] stool: In process    CBC: WBC: 2.5(L), HGB: 7.1(L), PLT: 162    BMP: Glucose: 103(H), K: 3.3(L), o/w WNL (Creatinine: 1.02)    Magnesium:  2.0    Procedures:  None    Interventions:  1752 0.9% sodium chloride bolus, 1,000 ml, IV   1752 Potassium chloride ER, 20 mEq, PO    Emergency Department Course:  Past medical records, nursing notes, and vitals reviewed.    1619 I performed an exam of the patient as documented above.      IV was inserted and blood was drawn for laboratory testing, results above.    Findings and plan explained to the Patient and spouse. He is feeling comfortable with going home. No new concerns. Patient discharged home with instructions regarding supportive care, medications, and reasons to return. The importance of close follow-up was reviewed. The patient was prescribed Vancomycin.    I personally reviewed the laboratory results with the patient and spouse and answered all related questions prior to discharge.     Impression & Plan     Medical Decision Making:  John Batista is a 74 year old male with a history of metastatic prostate cancer and recent C-diff infection with vancomycin therapy who presents to the emergency department with soft stools for a couple of days followed by watery stools today that reminds him of the C-diff he had in the past. He has no abdominal pain or tenderness. He is not dizzy or lightheaded. He is chronically anemic and today's hemoglobin is 7.1. There is no emergent need for transfusion but he may benefit from one in the near future if his hemoglobin drops further. He had no significant diarrhea here until near the end of his stay when he had a small amount that was sent for C-diff evaluation. No other red flags for other bacterial causes. He's afebrile and otherwise denies any concerning symptoms. Given his history of C-diff recently he will be presumptively treated with vancomycin possibly requiring pulse therapy to be further discussed with his primary care doctor and/or infectious disease. I discussed this plan with him and he verbalized understanding. All question were answered prior to  discharge. He should return immediately with fever, abdominal pain, syncope, bleeding of any sort or any other concern.      Diagnosis:    ICD-10-CM    1. Diarrhea of presumed infectious origin R19.7    2. Anemia, unspecified type D64.9    3. H/O Clostridium difficile infection Z86.19        Disposition:  Discharged to home.    Discharge Medications:  Discharge Medication List as of 1/24/2020  7:27 PM      START taking these medications    Details   vancomycin (VANCOCIN) 125 MG capsule Take 1 capsule (125 mg) by mouth 4 times daily for 14 days, Disp-56 capsule, R-0, Local Print             Scribe Disclosure:  Padmini BILLS, am serving as a scribe at 4:19 PM on 1/24/2020 to document services personally performed by Neida Gandhi MD based on my observations and the provider's statements to me.     [facility] EMERGENCY DEPARTMENT       Neida Gandhi MD  01/25/20 1264

## 2020-01-24 NOTE — ED TRIAGE NOTES
Pt had surgery 3 weeks ago and was on antibiotic.  He also had chemo.  He has diarrhea off and on and developed a foul smell which was the same as when he previously had c-diff.  Hgb is also reportedly low.

## 2020-01-24 NOTE — ED AVS SNAPSHOT
[facility] Emergency Department  [address]  [facility] [address]  Phone:  [phone]  Fax:  [phone]                                    John Batista   MRN: [id number]    Department:  [facility] Emergency Department   Date of Visit:  1/24/2020           After Visit Summary Signature Page    I have received my discharge instructions, and my questions have been answered. I have discussed any challenges I see with this plan with the nurse or doctor.    ..........................................................................................................................................  Patient/Patient Representative Signature      ..........................................................................................................................................  Patient Representative Print Name and Relationship to Patient    ..................................................               ................................................  Date                                   Time    ..........................................................................................................................................  Reviewed by Signature/Title    ...................................................              ..............................................  Date                                               Time          22EPIC Rev 08/18

## 2020-01-27 NOTE — TELEPHONE ENCOUNTER
Pt went into ED for possible C-diff. Test came back normal. Per DN- finish Vanco with hx of C-diff. Ok for immodium x 1 time. If loose stools persist, increase in number 5+ then should be seen again.     Wife concerned about Hgb as well- 7.1 at the hosp they said to call PCP. Adv to call the oncologist looks like his baseline is a little lower around 9 maybe not sure from chemo, +gross hematuria, recent surgery. Adv to call ONC. He is symptomatic winded easily, fatigued.     Call w/ any questions/concerns. If symptoms worsen need to go into ER.     Bertha MALDONADO RN

## 2020-01-28 NOTE — PROGRESS NOTES
Infusion Nursing Note:  John Batista presents today for 1 unit PRBC.    Patient seen by provider today: No   present during visit today: Not Applicable.    Note: N/A.    Intravenous Access:  Peripheral IV placed.    Treatment Conditions:  Blood transfusion consent signed 1/28/2020.      Post Infusion Assessment:  Patient tolerated infusion without incident.  Blood return noted pre and post infusion.  Site patent and intact, free from redness, edema or discomfort.  No evidence of extravasations.  Access discontinued per protocol.       Discharge Plan:   Discharge instructions reviewed with: Patient.  Patient and/or family verbalized understanding of discharge instructions and all questions answered.  Copy of AVS reviewed with patient and/or family.    Patient discharged in stable condition accompanied by: self and wife.  Departure Mode: Wheelchair.    Bertha Jacobo RN

## 2020-02-13 NOTE — LETTER
2/13/2020      RE: John Batista  [address] [facility] [address]       John is a 74-year-old male with widely metastatic prostate cancer, new bilateral hydronephrosis, normal creatinine and urinary incontinence after channel TURP.  He has been evaluated for immunotherapy.  He has chronic anemia and hemoglobin will be checked this morning to see if he needs another blood transfusion as he is short of breath.  Other past medical history: Lower GI bleeding from radiation, rectal ulcer  Medications: Tylenol, calcium, vitamin D, doxepin cream, Dilaudid, melatonin, multivitamin, iron, prednisone, probiotic, Eligard  Allergies: None  Primary CARE physician: Radha Martinez MD  Exam: Alert and oriented, looks chronically ill, with spouse.  Normal vital signs.  Respirations seem normal, neuro grossly intact but has numb soles of his feet and some sciatic type pain down his right thigh.  Assessment: Anemia, asymptomatic bilateral hydronephrosis, metastatic prostate cancer, incontinence- probably has poor sphincter strength due to sacral lesion  Plan: Hemoglobin this morning- transfuse packed red blood cells as needed.  Due for Conner in April    David Galvan MD

## 2020-02-13 NOTE — NURSING NOTE
Chief Complaint   Patient presents with     Clinic Care Coordination - Post Hospital     Post Transurethral Resection of Prostate      Roslyn Doty LPN

## 2020-02-13 NOTE — LETTER
2/13/2020       RE: John Batista  [address] [facility] [address]     Dear Colleague,    Thank you for referring your patient, John Batista, to the [facility] [place] [facility]. Please see a copy of my visit note below.    John is a 74-year-old male with widely metastatic prostate cancer, new bilateral hydronephrosis, normal creatinine and urinary incontinence after channel TURP.  He has been evaluated for immunotherapy.  He has chronic anemia and hemoglobin will be checked this morning to see if he needs another blood transfusion as he is short of breath.  Other past medical history: Lower GI bleeding from radiation, rectal ulcer  Medications: Tylenol, calcium, vitamin D, doxepin cream, Dilaudid, melatonin, multivitamin, iron, prednisone, probiotic, Eligard  Allergies: None  Primary CARE physician: Radha Martinez MD  Exam: Alert and oriented, looks chronically ill, with spouse.  Normal vital signs.  Respirations seem normal, neuro grossly intact but has numb soles of his feet and some sciatic type pain down his right thigh.  Assessment: Anemia, asymptomatic bilateral hydronephrosis, metastatic prostate cancer, incontinence- probably has poor sphincter strength due to sacral lesion  Plan: Hemoglobin this morning- transfuse packed red blood cells as needed.  Due for Eligard in April    Again, thank you for allowing me to participate in the care of your patient.      Sincerely,    David Galvan MD

## 2020-02-13 NOTE — PROGRESS NOTES
John is a 74-year-old male with widely metastatic prostate cancer, new bilateral hydronephrosis, normal creatinine and urinary incontinence after channel TURP.  He has been evaluated for immunotherapy.  He has chronic anemia and hemoglobin will be checked this morning to see if he needs another blood transfusion as he is short of breath.  Other past medical history: Lower GI bleeding from radiation, rectal ulcer  Medications: Tylenol, calcium, vitamin D, doxepin cream, Dilaudid, melatonin, multivitamin, iron, prednisone, probiotic, Eligard  Allergies: None  Primary CARE physician: Radha Martinez MD  Exam: Alert and oriented, looks chronically ill, with spouse.  Normal vital signs.  Respirations seem normal, neuro grossly intact but has numb soles of his feet and some sciatic type pain down his right thigh.  Assessment: Anemia, asymptomatic bilateral hydronephrosis, metastatic prostate cancer, incontinence- probably has poor sphincter strength due to sacral lesion  Plan: Hemoglobin this morning- transfuse packed red blood cells as needed.  Due for Eligard in April

## 2020-03-23 NOTE — TELEPHONE ENCOUNTER
Reason for call:  Symptom   Symptom or request: Possible c-diff, foul smelling diarrhea     Duration (how long have symptoms been present): 2-3 days   Have you been treated for this before? Yes    Additional comments: can meds be called in to  pharmacy     Phone number to reach patient:  Cell number on file:    Telephone Information:   Mobile [phone]       Best Time:  any    Can we leave a detailed message on this number?  YES    Travel screening: Not Applicable    Marychuy Walker on 3/23/2020 at 9:58 AM

## 2020-03-23 NOTE — TELEPHONE ENCOUNTER
Wife concerned of pt's - she thinks he has c-diff again. Symptoms started yesterday- tarry stool, lighter in color.     No recent antibiotics.   [place]- not treatment April 4th.     Just wanting medicatiion    No eating for 3 days- oatmeal. Water, tea, and ensure    Extremely tired.     Huddled w/ DN- order labs have wife come in and get supplies. Will do a phone visit Wednesday/thurs with results and plan.     Encourage pt eat/drink.     Bertha MALDONADO RN

## 2020-03-23 NOTE — TELEPHONE ENCOUNTER
Wife calling wanting to know why no one has called her back. She is positive that he has Ciff-d again. The smell is horrible. She would like a call back.

## 2020-03-24 NOTE — TELEPHONE ENCOUNTER
Notified pt and wife of message below. Adv pt should be seen if weak, not eating/drinking, etc.    They report normal stool today, eating better last night and today.       Bertha MALDONADO RN

## 2020-03-24 NOTE — NURSING NOTE
Chief Complaint   Patient presents with     Prostate Cancer     Here for 6 month Lupron     The following medication was given:     MEDICATION:  Lupron [place] 45 mg  ROUTE: IM  SITE: LUQ - Gluteus  DOSE: 45mg  LOT #: [id number]  : Sonam  EXPIRATION DATE: 1JUN2022  NDC#: 7561-2755-39  Was there drug waste? No  Multi-dose vial: TRISH Hartman  March 24, 2020

## 2020-03-24 NOTE — LETTER
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  He is here today for his 6-month Lupron injection.  He has continuous urinary incontinence but recently had a good urine stream and has had no dysuria or gross hematuria.  We discussed Figueroa muscle exercises  Other past medical history: Diverticulitis, surgeries reviewed  Medications: Tylenol, calcium/magnesium/zinc, vitamin D, Dilaudid 4 mg, melatonin, multivitamin, iron complex, prednisone, probiotic  Allergies: None  Review of systems: He had blood transfusion last month of 1 unit packed red blood cells.  No respiratory symptoms other than coughing up some blood after lung biopsy  Exam: Looks chronically ill and thin.  With spouse.  Alert and oriented, normal vital signs  Assessment: Metastatic adenocarcinoma of the prostate, now to lung.  Plan: Continue Lupron every 6 months           He has follow-up with both of his medical oncologist    David Galvan MD

## 2020-03-24 NOTE — PROGRESS NOTES
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  He is here today for his 6-month Lupron injection.  He has continuous urinary incontinence but recently had a good urine stream and has had no dysuria or gross hematuria.  We discussed Figueroa muscle exercises  Other past medical history: Diverticulitis, surgeries reviewed  Medications: Tylenol, calcium/magnesium/zinc, vitamin D, Dilaudid 4 mg, melatonin, multivitamin, iron complex, prednisone, probiotic  Allergies: None  Review of systems: He had blood transfusion last month of 1 unit packed red blood cells.  No respiratory symptoms other than coughing up some blood after lung biopsy  Exam: Looks chronically ill and thin.  With spouse.  Alert and oriented, normal vital signs  Assessment: Metastatic adenocarcinoma of the prostate, now to lung.  Plan: Continue Lupron every 6 months           He has follow-up with both of his medical oncologist

## 2020-03-24 NOTE — TELEPHONE ENCOUNTER
LMTCB    Stool results-   C-diff- test cancelled and credited- unable to process as formed stools are not acceptable.  Enteric - Negative    Encourage probiotic. If not eating maybe do 2 ensures a day. If still feeling worse needs to be seen by DN.    Bertha MALDONADO RN

## 2020-03-24 NOTE — LETTER
3/24/2020       RE: John Batista  [address] [facility] [address]     Dear Colleague,    Thank you for referring your patient, John Batista, to the [facility] [place] at [facility]. Please see a copy of my visit note below.    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  He is here today for his 6-month Lupron injection.  He has continuous urinary incontinence but recently had a good urine stream and has had no dysuria or gross hematuria.  We discussed Figueroa muscle exercises  Other past medical history: Diverticulitis, surgeries reviewed  Medications: Tylenol, calcium/magnesium/zinc, vitamin D, Dilaudid 4 mg, melatonin, multivitamin, iron complex, prednisone, probiotic  Allergies: None  Review of systems: He had blood transfusion last month of 1 unit packed red blood cells.  No respiratory symptoms other than coughing up some blood after lung biopsy  Exam: Looks chronically ill and thin.  With spouse.  Alert and oriented, normal vital signs  Assessment: Metastatic adenocarcinoma of the prostate, now to lung.  Plan: Continue Lupron every 6 months           He has follow-up with both of his medical oncologist    Again, thank you for allowing me to participate in the care of your patient.      Sincerely,    David Galvan MD

## 2020-04-18 PROBLEM — R10.9 FLANK PAIN: Status: RESOLVED | Noted: 2019-01-01 | Resolved: 2020-01-01

## 2020-04-18 PROBLEM — R41.82 ALTERED MENTAL STATUS: Status: ACTIVE | Noted: 2020-01-01

## 2020-04-18 NOTE — ED TRIAGE NOTES
Pt arrives via EMS with altered mental status. Pt was left this morning around 1100 oriented and acting normally. Neighbors found pt [facility] in his car acting disoriented and acting erratically at 1300 today. Pt was started on pepsid 3 days ago for coughing up blood. Pt currently being treated for cancer

## 2020-04-18 NOTE — ED NOTES
Attempted to give report to [facility] OBS unit. [facility] reports charge RN is busy and will call back

## 2020-04-18 NOTE — ED PROVIDER NOTES
History     Chief Complaint:  Altered Mental Status      The history is provided by the EMS personnel and the patient. The history is limited by the condition of the patient.      John Batista is a 74 year old male who presents by EMS with altered mental status. Patient was left this morning around 1100 oriented and acting normally, however neighbors found pateint long term in his car acting disoriented and erratically at 1300 today.  No report of trauma.    In discussion with patient's wife, he has been more irritable and anxious over the last few days she thought this was may be due to the Compazine and Pepcid as he had been started on this was discontinued.  He does get oral chemotherapy through [facility] 12 days on 12-day off cycle with last dose 5 days prior.  He does have known metastatic adenocarcinoma of the prostate with mets to lung and bone.  He has chronic myalgias for which he takes Dilaudid.    Allergies:  No Known Allergies     Medications:    Calcium-Magnesium-Zinc  Cholecalciferol  hydromorphone  leuprolide  melatonin 3 MG tablet  Polysaccharide Iron Complex  prednisone  saccharomyces boulardii    Past Medical History:    Metastatic prostate cancer  Malignant neoplasm of prostate and bone      Past Surgical History:    Colonoscopy x4  Cystoscopy x2  Esophagoscopy x2  Hernia repair, Inguinal RT/LT  Biopsy of right lung  Sigmoidoscopy  vasectomy    Family History:    No past pertinent family history.    Social History:  Tobacco use: Former smoker  Alcohol use: No  Drug use: no  Marital Status:   [2]     Review of Systems   Unable to perform ROS: Mental status change   Psychiatric/Behavioral: Positive for agitation.       Physical Exam     Patient Vitals for the past 24 hrs:   BP Temp Temp src Pulse Heart Rate Resp SpO2   04/18/20 1900 (!) 156/91 -- -- 105 -- -- 98 %   04/18/20 1850 (!) 185/93 -- -- -- -- -- 98 %   04/18/20 1845 (!) 185/93 -- -- 52 -- -- 99 %   04/18/20 1830 (!)  "154/98 -- -- 111 -- -- 99 %   04/18/20 1815 (!) 159/88 -- -- 107 -- -- 99 %   04/18/20 1800 (!) 160/71 -- -- 107 -- -- 99 %   04/18/20 1745 (!) 161/79 98.4  F (36.9  C) Temporal 83 -- -- 93 %   04/18/20 1715 (!) 170/99 -- -- 137 -- -- 100 %   04/18/20 1700 (!) 163/79 98.1  F (36.7  C) Temporal 104 -- -- 99 %   04/18/20 1630 -- -- -- 128 -- -- 99 %   04/18/20 1615 (!) 145/77 -- -- 120 -- -- 100 %   04/18/20 1600 (!) 141/75 -- -- 147 -- -- 91 %   04/18/20 1545 (!) 140/84 -- -- 109 -- -- 100 %   04/18/20 1530 130/83 -- -- 98 -- -- 98 %   04/18/20 1522 (!) 147/68 -- -- 71 71 18 100 %       Physical Exam  Constitutional: Confused, only shouting out and attempting to stay covered with blankets but moving all extremities.  HENT:    Nose: Nose normal.    Mouth/Throat: Oropharynx is clear, mucous membranes are dry  Eyes: Pupils midposition, reactive with conjugate gaze.  CV: Tachycardic, regular rhythm.  Chest: Effort normal and breath sounds clear without wheezing or rales, symmetric bilaterally   GI:  non tender. No distension. No guarding or rebound.    MSK: No LE edema, no tenderness to palpation of BLE.  Neurological: Able to follow simple commands, moving extremities x4 but keeps stating \"now\" and otherwise not answering questions.  Skin: Skin is warm and dry.      Emergency Department Course   Imaging:  Radiology findings were communicated with the patient and Primary MD who voiced understanding of the findings.    XR Chest Port 1 View    Impression: Allowing for the differences in technique, there appears  to be no significant changes involving the 2 pulmonary masses in the  right lung laterally. There is a opacity involving the left lung also  remains stable. The multiple other smaller pulmonary nodules  visualized on the chest CT of 8/27/2019 are not well visualized. No  evidence for acute focal infiltrate or consolidation. Normal heart  size. Normal pulmonary vascularity. Tortuous and calcified thoracic  aorta. " Degenerative changes in the spine and both shoulders.    Head CT w/o contrast    IMPRESSION:  1.  Abnormal mildly hyperintense lesions in the cerebellum in the right cerebral hemisphere. There are also areas of what appear to be vasogenic edema in the right cerebral hemisphere. Pattern is most likely due to metastatic disease. If clinically   indicated, MRI could be helpful in further evaluation.  2.  Cerebral atrophy.    Readings per Radiology      Laboratory:  Laboratory findings were communicated with the patient who voiced understanding of the findings.    CBC: WBC: 1.1, HGB: 8.6, PLT: 117  CMP: Glucose 112 (H), o/w WNL (Creatinine: 0.70)  INR: 1.04  PTT: 24  Lactic acid (Resulted at 1632): 7.2 (Crtical, H)  Blood Gas Venous: PCO2 : 35 (L), PO2: 18 (L), Bicarbonate: 20 (L)  Ammonia: <10 (L)  Alcohol Ethyl: <0.01    Preliminary Blood Culture result: No growth after 2 hours  Blood culture: Pending  Procalcitonin: Pending  ABO/RH Type and Screen: Pending  COVID-19 Virus: Pending    UA with Microscopic: Ketones: 10 (A), Blood: Moderate (A), Protein Albumin: 70 (A), Leukocyte Esterase: Large (A), WBC: 166 (H), RBC: 67 (H), WBC clumps: Present (A), Bacteria: Few (A), Mucous: Present (A),o/w WNL    Interventions:  1527 Haldol 5 mg IM  1543 Haldol 5 mg IM  1544 Benadryl 25 mg IM  1609 Protonix 40 mg IV  1621 Ativan 0.3 mg IV  1623 NS 1L IV  1624 Protonix 40 mg IV  1721 Decadron 10 mg IV    Medications   0.9% sodium chloride BOLUS (0 mLs Intravenous Stopped 4/18/20 1727)   haloperidol lactate (HALDOL) 5 MG/ML injection (5 mg  Given 4/18/20 1527)   diphenhydrAMINE (BENADRYL) injection 25 mg (25 mg Intramuscular Given 4/18/20 1544)   haloperidol lactate (HALDOL) injection 5 mg (5 mg Intramuscular Given 4/18/20 1543)   LORazepam (ATIVAN) injection 0.3 mg (0.3 mg Intravenous Given 4/18/20 1621)   pantoprazole (PROTONIX) 40 mg IV push injection (40 mg Intravenous Given 4/18/20 1609)   pantoprazole (PROTONIX) 40 mg IV push  injection (40 mg Intravenous Given 20 1624)   0.9% sodium chloride BOLUS (0 mLs Intravenous Stopped 20 1851)   dexamethasone PF (DECADRON) injection 10 mg (10 mg Intravenous Given 20 1721)   ceFEPIme (MAXIPIME) 2 g vial to attach to  ml bag for ADULTS or 50 ml bag for PEDS (0 g Intravenous Stopped 20 1817)       Emergency Department Course:  Past medical records, nursing notes, and vitals reviewed.    1540 I performed an exam of the patient as documented above.     IV was inserted and blood was drawn for laboratory testing, results above.  The patient provided a urine sample here in the emergency department. This was sent for laboratory testing, findings above.  The patient was sent for a CT Head W/O Contrast and XR Chest Port 1 View while in the emergency department, results above.     1611 I rechecked the patient and discussed the results of his workup thus far. Patient remains agitated    I consulted with ANA COYNE, regarding the patient's history and presentation here in the emergency department and possible transfer for non-emergent MRI at [facility].    Findings and plan explained to the Patient. Patient will be transferred to [facility] via EMS. Discussed the case with Dr. Canas, Hospitalist, who will admit the patient to a monitored bed for further monitoring, evaluation, and treatment.    I personally reviewed the laboratory and imaging results with the Patient and answered all related questions prior to transfer.     Impression & Plan   Medical Decision Makin-year-old male with unfortunate history of metastatic adenocarcinoma of the prostate for which he follows at [place] [facility] presenting for evaluation of increased confusion acutely.  Patient was last seen normal earlier in the day when his wife left.  [place], she returned home 2 hours later to find him part way down the driveway confused, 1 shoe on/1 shoe off unable to communicate.  On arrival, patient  was moving all extremities but not able to answer direct questioning but able to follow simple commands but was significantly agitated requiring 2 doses of IM Haldol/single dose of IM Benadryl ankle dose of IV Ativan to facilitate cares.  Concern for stroke was low that he would not be a TPA candidate given unclear onset timing.  Given his ongoing chemo, certainly sepsis was also considered a broad metabolic work-up was obtained.  His lactate did return significantly elevated however he was not hypotensive at any point and I suspect this is likely a type B lactic elevation secondary   To likely cancer.  No indication for pressors.  Appropriate cultures were obtained.  I did give a dose of cefepime pending his ANC which was low at 0.8.  Unfortunately CT imaging showed likely metastatic disease with several areas of vasogenic edema posteriorly and possible larger posterior infarct versus more likely edema as well.  I did discuss the CT findings with neurosurgery, they agree with plan for dexamethasone, I gave him 10 mg IV here in the emergency department plan going forward is 4 mg every 6 hours.  No plan for emergent decompression or intervention at this time however the agree with transfer to [facility] for continued close monitoring and nonemergent MRI.  I discussed the findings with patient's wife, Valerie, who is understanding of the need for discussion with neurosurgery, oncology and goals of care.  Course lumbar puncture was deferred given the MRI findings though I do feel the likely explain his gradually worsening mental status over the last few days.  Patient was subsequently transferred via ALS to [facility] under the care of Dr. Diaz, hospitalist with plan for neurosurgery/oncology consultation.    Critical Care time was 45 minutes for this patient excluding procedures.      Diagnosis:    ICD-10-CM    1. Brain metastasis (H)  C79.31 COVID-19 Virus (Coronavirus) by PCR Nasopharyngeal swab     UA with  Microscopic     Blood culture     Blood culture     Procalcitonin   2. Adenocarcinoma of prostate (H)  C61        Disposition:  Transferred to [facility].    Sourav Lowe MD  [employer]  8:05 PM 04/18/20     Scribe Disclosure:  I, Clifton Negron, am serving as a scribe at 4:11 PM on 4/18/2020 to document services personally performed by Sourav Lowe MD based on my observations and the provider's statements to me.   4/18/2020   [facility] EMERGENCY DEPARTMENT       Sourav Lowe MD  04/18/20 2005

## 2020-04-19 NOTE — H&P
[facility]    History and Physical - Hospitalist Service       Date of Admission:  4/18/2020    Assessment & Plan   John Batista is a 74 year old male with PMH significant for metastatic prostate cancer with mets to the bone and lungs on chemotherapy who was directly admitted from [facility] ER due to altered mental status and agitation with newly found brain metastases, lactic acidosis, and urinary tract infection.    Altered mental status with agitation  COVID-rule out pending- Low suspicion  Presented with AMS after found by neighbors and wife to be disoriented and acting erratically around 1300.  No obvious trauma.  Normal this morning the more irritable and anxious over the last few days which she thought was related to Compazine and Pepcid.  Known metastatic cancer on oral chemo as below. BCx drawn, UA grossly abnormal as below, started on abx. EtOH neg. procalcitonin consistent with low risk for systemic infection.  LA 7.2.  Ammonia less than 10.  Liver test within normal limits. CXR without acute pathology, stable known mets. Moving all extremities.  Given agitation, required Haldol x2, Benadryl x1, Ativan x1 in the emergency department.  - Inpatient status  - Repeat labs in AM  - Treat UTI as below which is likely etiology, though brain mets contributing  - PRN IM Zyprexa if increasing agitation, last resort please, reorient as able  - IV Protonix  - COVID19 test pending given AMS, low suspicion, ok to remove precautions if results negative    Urinary tract infection  Lactic acidosis   Lactate 7.2 in ED. Suspect sepsis is contributing. Received 2 L normal saline in the ED. AMS. Urine grossly abnormal with , Large LE, Neg nitrite. Received 1 dose cefepime in the ER.  - Continue Cefepime 2 g every 8 hours  - Continue IVF  - Follow up BCx and UCx from ED  - Trend lactate, IVF/Abx/Blood cultures  - Cancer could also be contributing to elevated lactate    ADDENDUM   Lactate returned 1 which  is reassuring.     Newly discovered brain metastases  Metastatic prostate adenocarcinoma on chemotherapy - mets to the lung and bone.   Follows with [facility], 2 days on and 12-day off cycle, last dose 5 days PTA.  Chronic myalgias on PTA Dilaudid. Also follows with Urology Dr. Galvan, is on every 6-month Lupron injection.   Head CT in ER revealed lesions in cerebellum and Rt cerebral hemisphere with vasogenic edema in Rt cerebral hemisphere, most likely 2/2 metastatic disease.  Neurosurgery notified from ED who recommended transfer to [facility] for formal evaluation.   - Neurosurgery consultation  - PTA Dilaudid PRN, please hold doses of opioids given AMS  - [facility] consultation  - Brain MRI  - Loaded with Decadron IV 10mg in ED, continue with 4mg Q 6 hours (of note takes prednisone 10mg/d PTA)    Chronic leukopenia  Baseline WBC 2.5-3.6 range most recently. 1.1 on admit. Sepsis could be contributing.   - Monitor CBC    Chronic anemia  At baseline 7-8 range.  - Monitor CBC    Diet: NPO due to Altered mental status  DVT Prophylaxis: Pneumatic Compression Devices  Rondon Catheter: not present  Code Status: Full code per prior status    Disposition Plan   Expected discharge: 2 - 3 days+, recommended to prior living arrangement once mental status at baseline and workup complete, clinically improved, and cleared by specialists.  Entered: Denise Jauregui PA-C 04/18/2020, 10:19 PM     The patient's care was discussed with the Attending Physician, Dr. Bacon, Bedside Nurse and Patient.    Denise PerezCarranzaBRYAN jacobs  Hospitalist LIYAH  [facility]    ______________________________________________________________________    Chief Complaint   AMS    History is obtained from the electronic health record and emergency department physician.    History of Present Illness   John Batista is a 74 year old male with PMH significant for metastatic prostate cancer with mets to the bone and  lungs on chemotherapy who was directly admitted from [facility] ER due to altered mental status with newly found brain metastases, lactic acidosis, and urinary tract infection. Per report, patient was left this morning ~1100 oriented and acting normally, however neighbors found pateint residential in his car acting disoriented and erratically at 1300.  No report of trauma.  Wife reported the patient has been more irritable and anxious over the last few days and she thought this was may be due to the Compazine and Pepcid as he had been started on, this was discontinued.  Patient currently receives oral chemotherapy through [facility] 12 days on 12-day off cycle with last dose 5 days prior.  He does have known metastatic adenocarcinoma of the prostate with mets to lung and bone.  He has chronic myalgias for which he takes Dilaudid.    In the ER the patient was hemodynamically stable, afebrile, and hypertensive.  Extremities were not able to answer direct questions.  He could follow some simple commands however significantly agitated requiring 2 doses of IM Haldol, single dose of IM Benadryl, and 1 dose of IV Ativan.  Code stroke was not called given patient's on a TPA candidate, unclear timing, and no obvious focal deficits.  Basic labs revealed worsening of his leukopenia. Lactate elevated at 7.2 though not hypertensive, initially thought to be type B lactic acidosis. However UA came back grossly normal prompting dose of cefepime after blood cultures drawn. EtOH neg. CT at [facility] shows mildly hyperintense lesions in the cerebellum in the right cerebral hemisphere. There is what appear to be vasogenic edema in the right cerebral hemisphere. Pattern is most likely due to metastatic disease.  Neurosurgery was contacted who recommended transfer to [facility] for formal evaluation and brain MRI.  Patient is given 10 mg of IV Decadron in the ER and plan was for 4 mg every 6 hours.  No emergent plan for decompression or intervention  at this time.  Will need be discussion between neurosurgery, oncology, goals of care for this patient.    Upon arrival to [facility], patient is significantly drowsy but agitated.  Is not answering my questions but states that he is cold and would like me to leave him alone.  Only partially complies with exam.  Unable to complete review of systems as patient not participating.    Review of Systems    Review of systems not obtained due to patient factors - mental status    Past Medical History    I have reviewed this patient's medical history and updated it with pertinent information if needed.   Past Medical History:   Diagnosis Date     Chronic infection     cdiff     Diverticulitis      History of blood transfusion 2018    GI bleed     Prostate cancer (H) 12/2011    Dr. Galvan; now seeing Dr. Ruben Reese at [place]       Past Surgical History   I have reviewed this patient's surgical history and updated it with pertinent information if needed.  Past Surgical History:   Procedure Laterality Date     COLONOSCOPY N/A 11/29/2016    Procedure: COLONOSCOPY;  Surgeon: Alon Lo MD;  Location:  OR     COLONOSCOPY N/A 12/3/2016    Procedure: COLONOSCOPY;  Surgeon: Carmenza Akbar MD;  Location:  GI     COLONOSCOPY N/A 10/30/2018    Procedure: COMBINED COLONOSCOPY, SINGLE OR MULTIPLE BIOPSY/POLYPECTOMY BY BIOPSY;  Surgeon: Anatoly Tam MD;  Location:  GI     CYSTOSCOPY       CYSTOSCOPY, TRANSURETHRAL RESECTION (TUR) PROSTATE, COMBINED N/A 1/2/2020    Procedure: Video cystoscopy, channel tranurethral resection of prostate;  Surgeon: David Galvan MD;  Location:  OR     ESOPHAGOSCOPY, GASTROSCOPY, DUODENOSCOPY (EGD), COMBINED N/A 10/28/2018    Procedure: COMBINED ESOPHAGOSCOPY, GASTROSCOPY, DUODENOSCOPY (EGD);  Surgeon: Codey De La Torre MD;  Location:  GI     ESOPHAGOSCOPY, GASTROSCOPY, DUODENOSCOPY (EGD), COMBINED Left 10/31/2018    Procedure: ESOPHAGOSCOPY, GASTROSCOPY, DUODENOSCOPY (EGD)  with Pill Cam  Rm 334;  Surgeon: Anatoly Tam MD;  Location: [facility] GI     EXAM UNDER ANESTHESIA ANUS N/A 2016    Procedure: EXAM UNDER ANESTHESIA ANUS;  Surgeon: Alon Lo MD;  Location: [facility] OR     HC COLONOSCOPY THRU STOMA, DIAGNOSTIC      normal,  had polyps     HERNIA REPAIR, INGUINAL RT/LT       OTHER SURGICAL HISTORY Right 2020    Biopsy of Right Lung     SIGMOIDOSCOPY FLEXIBLE N/A 9/10/2014    Procedure: SIGMOIDOSCOPY FLEXIBLE;  Surgeon: Madhuri Drake MD;  Location: [facility] GI     VASECTOMY         Social History   I have reviewed this patient's social history and updated it with pertinent information if needed.  Social History     Tobacco Use     Smoking status: Former Smoker     Types: Cigarettes     Last attempt to quit: 1980     Years since quittin.3     Smokeless tobacco: Former User   Substance Use Topics     Alcohol use: No     Drug use: No       Family History   I have reviewed this patient's family history and updated it with pertinent information if needed.   Family History   Problem Relation Age of Onset     C.A.D. No family hx of      Diabetes No family hx of      Hypertension No family hx of      Breast Cancer No family hx of      Cancer - colorectal No family hx of      Prior to Admission Medications   Prior to Admission Medications   Prescriptions Last Dose Informant Patient Reported? Taking?   Calcium-Magnesium-Zinc 333-133-5 MG TABS per tablet Unknown at Unknown time Spouse/Significant Other Yes No   Sig: Take 1 tablet by mouth 2 times daily (with meals)    Cholecalciferol (VITAMIN D) 1000 UNITS capsule Unknown at Unknown time Spouse/Significant Other Yes No   Sig: Take 2,000 Units by mouth 2 times daily    HYDROmorphone (DILAUDID) 4 MG tablet 2020 at Unknown time Spouse/Significant Other Yes Yes   Sig: Take 4 mg by mouth every 4 hours as needed for severe pain    Multiple Vitamin (ONE-A-DAY 55 PLUS OR) 2020 at Unknown time Spouse/Significant  Other Yes Yes   Sig: Take 1 tablet by mouth daily    NONFORMULARY Past Week at Unknown time Spouse/Significant Other Yes Yes   Sig: Oral Chemotherapy thru [facility]. Takes 12 days then off 12 days     4/15 was first day of off day (4/15-4/26) start again 4/27/20   Polysaccharide Iron Complex (FERREX 150 PO) 4/17/2020 at Unknown time Spouse/Significant Other Yes Yes   Sig: 3 times daily (with meals) Take 1 capsule by mouth three times daily   acetaminophen (TYLENOL) 500 MG tablet Unknown at Unknown time Spouse/Significant Other Yes No   Sig: Take 500 mg by mouth every 4 hours as needed for mild pain    famotidine (PEPCID) 20 MG tablet 4/17/2020 at Unknown time Spouse/Significant Other Yes Yes   Sig: Take 20 mg by mouth 2 times daily   leuprolide (ELIGARD) 45 MG injection Past Month at Unknown time Spouse/Significant Other Yes Yes   Sig: Inject 45 mg Subcutaneous every 6 months.   medical cannabis (Patient's own supply) Unknown at Unknown time Spouse/Significant Other Yes No   Sig: See Admin Instructions (The purpose of this order is to document that the patient reports taking medical cannabis.  This is not a prescription, and is not used to certify that the patient has a qualifying medical condition.)     Takes at HS   predniSONE (DELTASONE) 10 MG tablet 4/17/2020 at Unknown time Spouse/Significant Other Yes Yes   Sig: Take 10 mg by mouth daily   prochlorperazine (COMPAZINE) 10 MG tablet 4/17/2020 at Unknown time Spouse/Significant Other Yes Yes   Sig: Take 10 mg by mouth every 8 hours as needed for nausea or vomiting   saccharomyces boulardii (FLORASTOR) 250 MG capsule 4/18/2020 at Unknown time Spouse/Significant Other Yes Yes   Sig: Take 500 mg by mouth 2 times daily    vitamin C (ASCORBIC ACID) 500 MG tablet 4/18/2020 at Unknown time Spouse/Significant Other Yes Yes   Sig: Take 250 mg by mouth 3 times daily (with meals)      Facility-Administered Medications: None     Allergies   No Known  Allergies    Physical Exam   Vital Signs: Temp: 96.3  F (35.7  C) Temp src: Axillary BP: (!) 165/88 Pulse: 109 Heart Rate: 109 Resp: 18 SpO2: 99 % O2 Device: None (Room air)    Weight: 0 lbs 0 oz    General: Awake, alert, thin older gentleman who appears stated age. Looks comfortable laying in bed. No acute distress.  HEENT: Normocephalic, atraumatic. Extraocular movements intact.   Respiratory: Clear to auscultation bilaterally, no rales, wheezing, or rhonchi to anterior fields. Poor effort by patient.  Cardiovascular: Regular rate and rhythm, +S1 and S2, no murmur auscultated. No peripheral edema.   Gastrointestinal: Soft, non-tender, non-distended. Bowel sounds present.  Skin: Warm, dry. No obvious rashes or lesions on exposed skin. Dorsalis pedis pulses palpable bilaterally.  Musculoskeletal: No joint swelling, erythema or tenderness. Moves all extremities equally.  Neurologic: No obvious focal deficits. Unable to complete orientation questions  Psychiatric: Drowsy but agitated.    Data   Data reviewed today: I reviewed all medications, new labs and imaging results over the last 24 hours. I personally reviewed the EKG tracing showing see HPI.    Recent Labs   Lab 04/18/20  1600   WBC 1.1*   HGB 8.6*   MCV 98   *   INR 1.04      POTASSIUM 3.6   CHLORIDE 102   CO2 22   BUN 14   CR 0.70   ANIONGAP 10   TIMBO 9.4   *   ALBUMIN 3.4   PROTTOTAL 7.7   BILITOTAL 0.6   ALKPHOS 140   ALT 21   AST 30     Recent Results (from the past 24 hour(s))   Head CT w/o contrast    Narrative    EXAM: CT HEAD W/O CONTRAST  LOCATION: [facility]  DATE/TIME: 4/18/2020 4:43 PM    INDICATION: Altered mental status. Agitation. History of prostate cancer.  COMPARISON: None.  TECHNIQUE: Routine without IV contrast. Multiplanar reformats. Dose reduction techniques were used.    FINDINGS:  INTRACRANIAL CONTENTS: There are hyperdense lesions in the superior aspects of both cerebellar hemispheres, as well as a  subtle hyperdense lesion in the right posteroinferior temporal lobe. Temporal lobe lesion is associated with some surrounding low   density which is probably edema. There is also low density in the right frontal lobe most likely due to edema and there is also low density in the right parietal lobe, again most likely due to edema, although is difficult to exclude ischemic change.   There is no definite evidence for any hemorrhage. No definite evidence of CT infarct. Brain parenchyma is otherwise normal. Mild generalized volume loss. No hydrocephalus.     VISUALIZED ORBITS/SINUSES/MASTOIDS: No intraorbital abnormality. No paranasal sinus mucosal disease. No middle ear or mastoid effusion.    BONES/SOFT TISSUES: No acute abnormality.      Impression    IMPRESSION:  1.  Abnormal mildly hyperintense lesions in the cerebellum in the right cerebral hemisphere. There are also areas of what appear to be vasogenic edema in the right cerebral hemisphere. Pattern is most likely due to metastatic disease. If clinically   indicated, MRI could be helpful in further evaluation.  2.  Cerebral atrophy.     XR Chest Port 1 View    Narrative    XR CHEST PORT 1 VW 4/18/2020 5:31 PM    HISTORY: Confusion with hemoptysis.    COMPARISON: CT chest 8/27/2019, chest radiograph at 10/28/2018      Impression    Impression: Allowing for the differences in technique, there appears  to be no significant changes involving the 2 pulmonary masses in the  right lung laterally. There is a opacity involving the left lung also  remains stable. The multiple other smaller pulmonary nodules  visualized on the chest CT of 8/27/2019 are not well visualized. No  evidence for acute focal infiltrate or consolidation. Normal heart  size. Normal pulmonary vascularity. Tortuous and calcified thoracic  aorta. Degenerative changes in the spine and both shoulders.    CURT L BEHRNS, MD

## 2020-04-19 NOTE — PROGRESS NOTES
Pt Alert to self only. VSS on Ra ex htn, pain controlled with tylenol. Incontinent at times. Sitter at bedside. Coughing up small amounts of blood, Hospitalist notified with no new orders. PIV infusing 100ml/hr NS with intermittent abx. Pt tested negative for Covid-19 and transferred up to [facility]. Discharge pending. Continue to monitor.

## 2020-04-19 NOTE — PROGRESS NOTES
MD Notification    Notified Person: MD    Notified Person Name:  Jordi    Notification Date/Time: 4/19/20 at 0050    Notification Interaction:Phone/page      Purpose of Notification: COVID negative     Orders Received: Provider will discontinue iso precautions. Pt will transfer to station [id number]    Comments:

## 2020-04-19 NOTE — PLAN OF CARE
Transfer from Obs at 0400. Pt is lethargic and disoriented to situation. Pt is hypertensive and tachy, not enough for any PRN's. NPO ex meds. C/o of sacral pain, PRN dilaudid given x1. Incontinent. Repos q2hrs. Up A2/lift, has not gotten out of bed. PIV infusing. Neuro WDL. UA pos, on abx. Prior to transfer pt having scant amount of hemoptysis, had small clot upon transfer as well. MD notified downstairs, monitor, labs to be drawn this AM. MRI done- see results. WBC 1.1. Plan for hem/onc and neurosurgery to see pt today for plan. Continue IV decadron. Slept between cares.

## 2020-04-19 NOTE — PROGRESS NOTES
[facility]    Hospitalist Progress Note    Assessment & Plan   John Batista is a 74 year old male with PMH significant for metastatic prostate cancer with mets to the bone and lungs on chemotherapy who was directly admitted from [facility] ER due to altered mental status and agitation with newly found brain metastases, lactic acidosis, and urinary tract infection.     Altered mental status with agitation  COVID-rule out pending- Low suspicion  --- Confusion improved, possibly secondary to receiving treatment for UTI or IV steroids, COVID negative.     Urinary tract infection  Lactic acidosis   Lactate 7.2 in ED. Suspect sepsis is contributing. Received 2 L normal saline in the ED. AMS. Urine grossly abnormal with , Large LE, Neg nitrite. Received 1 dose cefepime in the ER.  - Continue Cefepime 2 g every 8 hours  -Continue IV fluids, monitor blood and urine cultures.  -Urine cultures were not obtained in the emergency department.  Ordered 1 now, possibly will be a negative due to receiving 1 day worth of antibiotics.       Newly discovered brain metastases  Metastatic prostate adenocarcinoma on chemotherapy - mets to the lung and bone.   Follows with [facility], 2 days on and 12-day off cycle, last dose 5 days PTA.  Chronic myalgias on PTA Dilaudid. Also follows with Urology Dr. Galvan, is on every 6-month Lupron injection. Head CT in ER revealed lesions in cerebellum and Rt cerebral hemisphere with vasogenic edema in Rt cerebral hemisphere, most likely 2/2 metastatic disease.   - Loaded with Decadron IV 10mg in ED, continue with 4mg Q 6 hours (of note takes prednisone 10mg/d PTA)   -Appreciate input from oncology and neurosurgery, MRI pending, confusion improved significantly.  -Discussed with the nursing and oncology, will start him on regular diet.    Chronic leukopenia  Baseline WBC 2.5-3.6 range most recently.   -neutropenia noted following   Recent chemo   - Monitor CBC, plan for  giving a dose of Neupogen today     Chronic anemia  At baseline 7-8 range.  - Monitor CBC, stable      DVT Prophylaxis: sequential compression device   Code Status: Full Code     Disposition: Expected discharge in 1-2 days depending on plan by neurosurgery     Irene Gabriel MD  Text Page   (7am to 6pm)  Total time spend 35 min >50% spend on coordination of care including discussion with nursing, oncology.  Contacted family and updated them.    Interval History   Patient  Is resting comfortably , he is alert oriented x 3 , visited with him when oncology was rounding, plan for Neupogen today, mri is planned for today but pending, seen by neurosurgery, on steroids iv .    -Data reviewed today: I reviewed all new labs and imaging results over the last 24 hours. I personally reviewed labs  today .    Physical Exam   Temp: 97.8  F (36.6  C) Temp src: Oral BP: 137/76 Pulse: 106 Heart Rate: 106 Resp: 17 SpO2: 97 % O2 Device: None (Room air)    There were no vitals filed for this visit.  Vital Signs with Ranges  Temp:  [95.8  F (35.4  C)-98.4  F (36.9  C)] 97.8  F (36.6  C)  Pulse:  [] 106  Heart Rate:  [] 106  Resp:  [16-18] 17  BP: (130-185)/(68-99) 137/76  SpO2:  [91 %-100 %] 97 %  I/O last 3 completed shifts:  In: 760 [I.V.:760]  Out: 25 [Urine:25]    Constitutional: Awake, alert, cooperative, no apparent distress, emaciated   Respiratory: Clear to auscultation bilaterally, no crackles or wheezing  Cardiovascular: Regular rate and rhythm, normal S1 and S2, and no murmur noted  GI: Normal bowel sounds, soft, non-distended, non-tender  Skin/Integumen: No rashes, no cyanosis, no edema  Neuro : moving all 4 extremities, no focal deficit noted     Medications     - MEDICATION INSTRUCTIONS -       sodium chloride 100 mL/hr at 04/19/20 0947       ceFEPIme (MAXIPIME) IV  2 g Intravenous Q8H     dexamethasone  4 mg Intravenous Q6H     pantoprazole (PROTONIX) IV  40 mg Intravenous Daily     saccharomyces boulardii   500 mg Oral BID     senna-docusate  1 tablet Oral BID    Or     senna-docusate  2 tablet Oral BID     sodium chloride (PF)  3 mL Intracatheter Q8H       Data   Recent Labs   Lab 04/19/20  0721 04/18/20  1600   WBC 0.9* 1.1*   HGB 7.7* 8.6*   MCV 91 98   PLT 99* 117*   INR  --  1.04    134   POTASSIUM 3.6 3.6   CHLORIDE 103 102   CO2 21 22   BUN 13 14   CR 0.56* 0.70   ANIONGAP 9 10   TIMBO 8.9 9.4   * 112*   ALBUMIN  --  3.4   PROTTOTAL  --  7.7   BILITOTAL  --  0.6   ALKPHOS  --  140   ALT  --  21   AST  --  30     Recent Labs   Lab 04/19/20  0721 04/18/20  1600   * 112*       Imaging:   Recent Results (from the past 24 hour(s))   Head CT w/o contrast    Narrative    EXAM: CT HEAD W/O CONTRAST  LOCATION: [facility]  DATE/TIME: 4/18/2020 4:43 PM    INDICATION: Altered mental status. Agitation. History of prostate cancer.  COMPARISON: None.  TECHNIQUE: Routine without IV contrast. Multiplanar reformats. Dose reduction techniques were used.    FINDINGS:  INTRACRANIAL CONTENTS: There are hyperdense lesions in the superior aspects of both cerebellar hemispheres, as well as a subtle hyperdense lesion in the right posteroinferior temporal lobe. Temporal lobe lesion is associated with some surrounding low   density which is probably edema. There is also low density in the right frontal lobe most likely due to edema and there is also low density in the right parietal lobe, again most likely due to edema, although is difficult to exclude ischemic change.   There is no definite evidence for any hemorrhage. No definite evidence of CT infarct. Brain parenchyma is otherwise normal. Mild generalized volume loss. No hydrocephalus.     VISUALIZED ORBITS/SINUSES/MASTOIDS: No intraorbital abnormality. No paranasal sinus mucosal disease. No middle ear or mastoid effusion.    BONES/SOFT TISSUES: No acute abnormality.      Impression    IMPRESSION:  1.  Abnormal mildly hyperintense lesions in the  cerebellum in the right cerebral hemisphere. There are also areas of what appear to be vasogenic edema in the right cerebral hemisphere. Pattern is most likely due to metastatic disease. If clinically   indicated, MRI could be helpful in further evaluation.  2.  Cerebral atrophy.     XR Chest Port 1 View    Narrative    XR CHEST PORT 1 VW 4/18/2020 5:31 PM    HISTORY: Confusion with hemoptysis.    COMPARISON: CT chest 8/27/2019, chest radiograph at 10/28/2018      Impression    Impression: Allowing for the differences in technique, there appears  to be no significant changes involving the 2 pulmonary masses in the  right lung laterally. There is a opacity involving the left lung also  remains stable. The multiple other smaller pulmonary nodules  visualized on the chest CT of 8/27/2019 are not well visualized. No  evidence for acute focal infiltrate or consolidation. Normal heart  size. Normal pulmonary vascularity. Tortuous and calcified thoracic  aorta. Degenerative changes in the spine and both shoulders.    CURT L BEHRNS, MD

## 2020-04-19 NOTE — PLAN OF CARE
Summary: Covid-rule out/altered mental status  Care Plan Summary Note: PT arrived on unit from [facility].  Orientation: REJI- too lethargic  Activity Level: Assist 1- IND at home. Not out of bed during this shift  Fall Risk: yes  Behavior & Aggression Tool Color: Green  Pain Management: no nonverbal indicators of pain, pt not responsive to questioning  ABNL VS/O2: VSS ex slight /88 on RA  ABNL Lab/BG: Lactic acid @ 1800, 7.2. WBC 1.1 (neutropenic r/t prostate cancer)  Diet: None yet  Bowel/Bladder: incontinent at times of bladder, no BM this shift  Drains/Devices: PIV SL  Tests/Procedures for next shift: MRI to be done  Anticipated DC date: pending  Other Important Info: Pt has hx of prostate cancer (has mets to the brain), and GI hemorrhage in 1/2020. UTI present. Sitter at bedside.

## 2020-04-19 NOTE — PROGRESS NOTES
Cross Cover:    Notified that patient coughing up scant amounts of blood. He has known lung mets and had been noted to cough up blood previously (particularly after lung biopsy in March).  At this point, continue to monitor for larger amounts of blood. Hgb check in AM.    D Jordi, DO

## 2020-04-19 NOTE — PLAN OF CARE
Pt A/o, but forgetful and illogical thinking at times(UTI vs brain mets?). Neuros intact. C/o HA pain-Tylenol given w/ some relief (has dilaudid available as well). LS clear, Hemoptysis present (MD aware) baseline. Pt is fragile appearing. Neupogen injections started. Pt 0.9 WBC-Neutropenic precautions initiated. Up a1+w+GB to BR. BM this shift. MRI this evening to verify suspicion of metastatic lesions to brain. R PIV infusing w/ int ABX. IV Decodron. Occ Incontinent of urine. Plan: Monitor lab counts, review MRI results

## 2020-04-19 NOTE — PROGRESS NOTES
Admission    Patient arrives to room OU19 via cart from [facility].  Care plan note: [facility] from [facility]. Pt sleeping on arrival, arouses to voice. Sitter at bedside. Pt soaked in urine upon arrival, changed with help from transportation and nurse.     Inpatient nursing criteria listed below were met:    PCD's Documented: No  Skin issues/needs documented :Yes  Isolation education started/completed Yes  Patient allergies verified with patient: No  Verified completion of [place] Risk Assessment Tool:  No  Verified completion of Guardianship screening tool: No  Fall Prevention: Care plan updated, Education given and documented Yes  Care Plan initiated: Yes  Home medications documented in belongings flowsheet: NA  Patient belongings documented in belongings flowsheet: NA  Reminder note (belongings/ medications) placed in discharge instructions:NA  Admission profile/ required documentation complete: No  Bedside Report Letter given and explained to patient Yes

## 2020-04-19 NOTE — PLAN OF CARE
Transfer from Obs at 0400. Pt is lethargic and disoriented to situation- mentation improving. Pt is hypertensive and tachy, not enough for any PRN's. NPO ex meds. C/o of sacral pain, PRN dilaudid given x1. Incontinent. Repos q2hrs. Up A2/lift, has not gotten out of bed. PIV infusing. Neuro WDL. UA pos, on abx. Prior to transfer pt having scant amount of hemoptysis, had small clot upon transfer as well. MD notified downstairs, monitor, labs to be drawn this AM. MRI done- see results. WBC 1.1. Plan for hem/onc and neurosurgery to see pt today for plan. Continue IV decadron. Slept between cares.

## 2020-04-19 NOTE — CONSULTS
Consultation    John Batista MRN# [id number]   YOB: 1945 Age: 74 year old   Date of Admission: 4/18/2020  Requesting physician:   Reason for consult:            Assessment and Plan:     John Batista is a 74 year old male with PMH significant for metastatic prostate cancer with mets to the bone and lungs on chemotherapy who was directly admitted from [facility] ER due to altered mental status and agitation with newly found brain metastases, lactic acidosis, and urinary tract infection.    Newly discovered brain metastases  Metastatic prostate adenocarcinoma on chemotherapy - mets to the lung and bone.   Follows with Dr Borrero at [facility]. Also follows with Urology Dr. Galvan, is on every 6-month Lupron injection.   -Prostate cancer is quite refractory.   -Most recently started on palliative chemo with oral Cytoxan and etoposide, for 12 days on, 12 days off, last dose taken 4/14  -CT head concerning for brain mets  -MRI brain pending.   -Neurosurgery involved.  Appreciate the consult.   -Continue Decadron 4 mg every 6 hours along with PPI  -Explained to patient and wife, that further plan regarding his brain lesions will be determined after his MRI is completed.  They do understand that he has very refractory systemic disease as well, which will play a role in making decision regarding dealing with his CNS disease.   -Patient and wife would like to involve Dr. Borrero before making final decisions  -  Pancytopenia secondary to chemotherapy  WBC quite low post cycle 1 of Cytoxan and etoposide, as patient very heavily treated for his prostate cancer.  -We will give him 1 dose of Neupogen today, even though not febrile, came in with sepsis and severe UTI.  Will be assessed daily for the need for Neupogen.  Goal would be to get his ANC at least up to 1.5  -Transfuse for hemoglobin less than 7 and platelets less than 10,000    UTI  Altered mental status most likely secondary to UTI and sepsis versus  the brain mets.  Continue current antibiotics.  Cultures pending    Chronic pain  Continue Dilaudid as needed for pain.    Could consider palliative care consult in the hospital.  He was followed by her palliative care team as an outpatient.  Was also on medical marijuana as an outpatient for pain    Appreciate help from neurosurgery team as well as the hospitalist.  We will continue to follow  him daily during this hospitalization.    The above plan discussed directly with his hospitalist, Dr. Gabriel as well as his RN.    Dorothy Thapa MD  [facility]  [phone] (office); [phone] (cell)              Chief Complaint:     Altered mental status       History of Present Illness:   This patient is a 74 year old male with h/o metastatic prostate cancer, followed by Dr Borrero at [facility]. Oncology history as follows:    . Stage IIB prostate cancer, diagnosed in December 2011, with treatment course as summarized below:   Presenting PSA of 39.9, followed by biopsy of the prostate. ,  . Biopsy on 12/17/2011 showed [place] grade 4+4 prostate cancer involving both sides of the prostate. Imaging studies showed no evidence of metastatic disease. ,  . Consultation with Dr. Burton Swift for radiation therapy. He also received induction antiandrogen therapy with leuprolide. ,  . Marked drop in PSA values, with subsequent rise in PSA to 15.2 in April 2015.  . Reevaluation at the [facility] by Dr. Reese and colleagues, at which time a choline PET scan was performed, which showed skeletal metastases in sacrum and left ilium as well as two metastatic nodules in the right lung. ,  . Initiation of first line chemotherapy with docetaxel on 6/2/2015.  Patient has completed 6 cycles of 9/24/2015 with complete response.,  . Subsequent the patient was treated with Zytiga for 12 months.,   Subsequently patient has tried Xtandi however rapidly progressed.,  .  started Taxotere on February 15, 2017, received 6 cycles of  Taxotere.  He achieved very good partial response.,   On October 3, 2018  started Taxotere plus carboplatin. He completed his 8th and final cycle on  3/1/19   Four cycles of radium 223 (Xofigo) 4/18/19 - 7/12/19.  . Due to rising PSA he initiated Cabazitaxel on 8/20/19.   .  February 7, 2020: completed 8 cycles of Cabazitaxel. PSA was 137 ng/ml.    C-11 PET scan demonstrated progression of his right paratracheal adenopathy as well as skeletal lung metastasis with new bilateral hydronephrosis.   MRI abdomen pelvis note extensive osseous metastasis with a pathological fracture of the left sacrum.  interval increase in soft tissue protruding into the urinary bladder and diffuse enhancement along the seminal vesicles bilaterally with bladder wall thickening and narrowing of the bilateral ureters causing hydroureteronephrosis and a 2.9 cm right lung mass.    Patient was noted to go into acute urinary retention in December 2019. He had a indwelling medeiros catheter in for a month.    January 2, 2020, he underwent a channel TURP locally. Negative pathology.   2/7/20: PET choline scan- Progressive choline avid right paratracheal adenopathy.   Stable choline avid lymph node, skeletal and lung presumed metastases.   New bilateral hydronephrosis.   2/12/20: Lung Bx, c/w metastatic prostate cancer  3/16/20: RML lung bx- c/w metastatic prostate cancer, LAURA, tissue inadeqaute for further NGS  4/3/20: Started on Cytoxan 50 mg/m2  daily plus etoposide 50 mg/m2  daily for 14 days on and 14 days off.. However because of the dosing issue , was treated with Cytoxan 100mg daily for 12 days and etoposide 100 mg daily for 12 days also and the rest of the month  off    4/18/20; Brought to [facility] ER with AMS.  Labs in the ER -WBC 1.1, ANC 0.8, hemoglobin 8.6, platelets 117, MCV 98.  CMP was unremarkable.  Ammonia less than 10, pro calcitonin 0.19 initial lactate 7.2, decreased to 1 after hydration.  UA positive for moderate blood, large  leukoesterase, 166 WBC.    CT head- Abnormal mildly hyperintense lesions in the cerebellum in the right cerebral hemisphere. There are also areas of what appear to be vasogenic edema in the right cerebral hemisphere. Pattern is most likely due to metastatic disease. If clinically indicated, MRI could be helpful in further evaluation.  Cerebral atrophy.    Patient was admitted with UTI, possible new brain mets.  Needed Haldol, Benadryl, Ativan for his altered mental status/agitation.  COVID-19 testing sent and negative.  He was also pancultured.  Started on cefepime 2 g every 8 hours  Loading dose of Decadron 10 mg IV given in the ER and continued on 4 mg every 6 hours.    This morning, patient feels significantly better.  He is lucid, alert and oriented.  He does not member a whole lot how he ended up in the hospital yesterday, but he was aware that he was transferred from [facility] to [facility].  As mentioned above, he was found with altered mental status by his wife and neighbors.  Per his wife, he was doing well yesterday morning.  He had not been drinking well for last few days.  He was started on oral Cytoxan and etoposide for his metastatic prostate cancer by Dr. Borrero around April 3, he took the last dose April 14.  Patient has had ongoing hemoptysis for last few months, had slightly increased in the last few weeks.  He was started on Pepcid recently by Dr. Borrero for that.  Complains of some headaches, just recently started otherwise having ongoing chronic pain from his metastatic prostate cancer, mostly involving his sacrum.  Also for his pain from metastatic cancer, he is on 4 mg Dilaudid as needed as well as on medical marijuana at home.  He does have numbness down bilateral lower extremities.  Also has slight  gross hematuria ongoing for some time now.             Physical Exam:   Vitals were reviewed  Blood pressure 137/76, pulse 106, temperature 97.8  F (36.6  C), temperature source Oral, resp. rate  17, SpO2 97 %.  Temperatures:  Current - Temp: 97.8  F (36.6  C); Max - Temp  Av.5  F (36.4  C)  Min: 95.8  F (35.4  C)  Max: 98.4  F (36.9  C)  Respiration range: Resp  Av.6  Min: 16  Max: 18  Pulse range: Pulse  Av.2  Min: 52  Max: 147  Blood pressure range: Systolic (24hrs), Av , Min:130 , Max:185   ; Diastolic (24hrs), Av, Min:68, Max:99    Pulse oximetry range: SpO2  Av.1 %  Min: 91 %  Max: 100 %    Intake/Output Summary (Last 24 hours) at 2020 0867  Last data filed at 2020 0741  Gross per 24 hour   Intake 760 ml   Output 125 ml   Net 635 ml       GENERAL: No acute distress.  Appears chronically ill  SKIN: No rashes or jaundice.  HEENT: Normocephalic, atraumatic. Eyes anicteric. Oropharynx is clear.  LYMPH: No palpable lymphadenopathy in the cervical, supraclavicular, axillary, or inguinal regions.  HEART: Regular rate and rhythm with no murmurs.  LUNGS: Clear bilaterally.  ABDOMEN: Soft, nontender, nondistended with no palpable hepatosplenomegaly.  EXTREMITIES: No clubbing, cyanosis, or edema.  MUSCULOSKELETAL: No pain to percussion over entire spine.  MENTAL: Alert and oriented to person, place, and time.  NEURO: Cranial nerves II through XII grossly intact with no focal motor or sensory deficits.              Past Medical History:   I have reviewed this patient's past medical history  Past Medical History:   Diagnosis Date     Chronic infection     cdiff     Diverticulitis      History of blood transfusion 2018    GI bleed     Prostate cancer (H) 2011    Dr. Galvan; now seeing Dr. Ruben Reese at [facility]             Past Surgical History:   I have reviewed this patient's past surgical history  Past Surgical History:   Procedure Laterality Date     COLONOSCOPY N/A 2016    Procedure: COLONOSCOPY;  Surgeon: Alon Lo MD;  Location:  OR     COLONOSCOPY N/A 12/3/2016    Procedure: COLONOSCOPY;  Surgeon: Carmenza Akbar MD;  Location:  GI      COLONOSCOPY N/A 10/30/2018    Procedure: COMBINED COLONOSCOPY, SINGLE OR MULTIPLE BIOPSY/POLYPECTOMY BY BIOPSY;  Surgeon: Anatoly Tam MD;  Location: [facility] GI     CYSTOSCOPY       CYSTOSCOPY, TRANSURETHRAL RESECTION (TUR) PROSTATE, COMBINED N/A 2020    Procedure: Video cystoscopy, channel tranurethral resection of prostate;  Surgeon: David Galvan MD;  Location: [facility] OR     ESOPHAGOSCOPY, GASTROSCOPY, DUODENOSCOPY (EGD), COMBINED N/A 10/28/2018    Procedure: COMBINED ESOPHAGOSCOPY, GASTROSCOPY, DUODENOSCOPY (EGD);  Surgeon: Codey De La Torre MD;  Location: [facility] GI     ESOPHAGOSCOPY, GASTROSCOPY, DUODENOSCOPY (EGD), COMBINED Left 10/31/2018    Procedure: ESOPHAGOSCOPY, GASTROSCOPY, DUODENOSCOPY (EGD) with Pill Cam  Rm 334;  Surgeon: Anatoly Tam MD;  Location: [facility] GI     EXAM UNDER ANESTHESIA ANUS N/A 2016    Procedure: EXAM UNDER ANESTHESIA ANUS;  Surgeon: Alon Lo MD;  Location: [facility] OR     HC COLONOSCOPY THRU STOMA, DIAGNOSTIC      normal,  had polyps     HERNIA REPAIR, INGUINAL RT/LT       OTHER SURGICAL HISTORY Right 2020    Biopsy of Right Lung     SIGMOIDOSCOPY FLEXIBLE N/A 9/10/2014    Procedure: SIGMOIDOSCOPY FLEXIBLE;  Surgeon: Madhuri Drake MD;  Location: [facility] GI     VASECTOMY                 Social History:   I have reviewed this patient's social history  Social History     Tobacco Use     Smoking status: Former Smoker     Types: Cigarettes     Last attempt to quit: 1980     Years since quittin.3     Smokeless tobacco: Former User   Substance Use Topics     Alcohol use: No             Family History:   I have reviewed this patient's family history  Family History   Problem Relation Age of Onset     C.A.D. No family hx of      Diabetes No family hx of      Hypertension No family hx of      Breast Cancer No family hx of      Cancer - colorectal No family hx of              Allergies:   No Known Allergies          Medications:   I have reviewed this  patient's current medications  Medications Prior to Admission   Medication Sig Dispense Refill Last Dose     famotidine (PEPCID) 20 MG tablet Take 20 mg by mouth 2 times daily   4/17/2020 at Unknown time     HYDROmorphone (DILAUDID) 4 MG tablet Take 4 mg by mouth every 4 hours as needed for severe pain    4/18/2020 at Unknown time     leuprolide (ELIGARD) 45 MG injection Inject 45 mg Subcutaneous every 6 months.   Past Month at Unknown time     Multiple Vitamin (ONE-A-DAY 55 PLUS OR) Take 1 tablet by mouth daily    4/17/2020 at Unknown time     NONFORMULARY Oral Chemotherapy thru [facility]. Takes 12 days then off 12 days     4/15 was first day of off day (4/15-4/26) start again 4/27/20   Past Week at Unknown time     Polysaccharide Iron Complex (FERREX 150 PO) 3 times daily (with meals) Take 1 capsule by mouth three times daily   4/17/2020 at Unknown time     predniSONE (DELTASONE) 10 MG tablet Take 10 mg by mouth daily   4/17/2020 at Unknown time     prochlorperazine (COMPAZINE) 10 MG tablet Take 10 mg by mouth every 8 hours as needed for nausea or vomiting   4/17/2020 at Unknown time     saccharomyces boulardii (FLORASTOR) 250 MG capsule Take 500 mg by mouth 2 times daily    4/18/2020 at Unknown time     vitamin C (ASCORBIC ACID) 500 MG tablet Take 250 mg by mouth 3 times daily (with meals)   4/18/2020 at Unknown time     acetaminophen (TYLENOL) 500 MG tablet Take 500 mg by mouth every 4 hours as needed for mild pain    Unknown at Unknown time     Calcium-Magnesium-Zinc 333-133-5 MG TABS per tablet Take 1 tablet by mouth 2 times daily (with meals)    Unknown at Unknown time     Cholecalciferol (VITAMIN D) 1000 UNITS capsule Take 2,000 Units by mouth 2 times daily    Unknown at Unknown time     medical cannabis (Patient's own supply) See Admin Instructions (The purpose of this order is to document that the patient reports taking medical cannabis.  This is not a prescription, and is not used to certify that  the patient has a qualifying medical condition.)     Takes at HS   Unknown at Unknown time     Current Facility-Administered Medications Ordered in Epic   Medication Dose Route Frequency Last Rate Last Dose     acetaminophen (TYLENOL) tablet 650 mg  650 mg Oral Q4H PRN   650 mg at 04/19/20 0306    Or     acetaminophen (TYLENOL) Suppository 650 mg  650 mg Rectal Q4H PRN         bisacodyl (DULCOLAX) EC tablet 5 mg  5 mg Oral Daily PRN        Or     bisacodyl (DULCOLAX) EC tablet 10 mg  10 mg Oral Daily PRN        Or     bisacodyl (DULCOLAX) EC tablet 15 mg  15 mg Oral Daily PRN         bisacodyl (DULCOLAX) Suppository 10 mg  10 mg Rectal Daily PRN         ceFEPIme (MAXIPIME) 2 g vial to attach to  ml bag for ADULTS or 50 ml bag for PEDS  2 g Intravenous Q8H 200 mL/hr at 04/19/20 0043 2 g at 04/19/20 0043     dexamethasone (DECADRON) injection 4 mg  4 mg Intravenous Q6H   4 mg at 04/19/20 0602     HYDROmorphone (DILAUDID) tablet 4 mg  4 mg Oral Q4H PRN   4 mg at 04/19/20 0610     labetalol (NORMODYNE/TRANDATE) injection 10-40 mg  10-40 mg Intravenous Q10 Min PRN         lidocaine (LMX4) cream   Topical Q1H PRN         lidocaine 1 % 0.1-1 mL  0.1-1 mL Other Q1H PRN         Medication Instruction   Does not apply Continuous PRN         naloxone (NARCAN) injection 0.1-0.4 mg  0.1-0.4 mg Intravenous Q2 Min PRN         OLANZapine (zyPREXA) injection 5 mg  5 mg Intramuscular Daily PRN         ondansetron (ZOFRAN-ODT) ODT tab 4 mg  4 mg Oral Q6H PRN        Or     ondansetron (ZOFRAN) injection 4 mg  4 mg Intravenous Q6H PRN         pantoprazole (PROTONIX) 40 mg IV push injection  40 mg Intravenous Daily         polyethylene glycol (MIRALAX) Packet 17 g  17 g Oral Daily PRN         prochlorperazine (COMPAZINE) injection 5 mg  5 mg Intravenous Q6H PRN        Or     prochlorperazine (COMPAZINE) tablet 5 mg  5 mg Oral Q6H PRN        Or     prochlorperazine (COMPAZINE) Suppository 12.5 mg  12.5 mg Rectal Q12H PRN          saccharomyces boulardii (FLORASTOR) capsule 500 mg  500 mg Oral BID         senna-docusate (SENOKOT-S/PERICOLACE) 8.6-50 MG per tablet 1 tablet  1 tablet Oral BID        Or     senna-docusate (SENOKOT-S/PERICOLACE) 8.6-50 MG per tablet 2 tablet  2 tablet Oral BID         sodium chloride (PF) 0.9% PF flush 3 mL  3 mL Intracatheter q1 min prn         sodium chloride (PF) 0.9% PF flush 3 mL  3 mL Intracatheter Q8H         sodium chloride 0.9% infusion   Intravenous Continuous 100 mL/hr at 04/19/20 0043       No current [facility]-ordered outpatient medications on file.             Review of Systems:     The 14 point Review of Systems is negative other than noted in the HPI.            Data:   Data   Results for orders placed or performed during the hospital encounter of 04/18/20 (from the past 24 hour(s))   Lactic acid whole blood   Result Value Ref Range    Lactic Acid 1.0 0.7 - 2.0 mmol/L   Lactic acid whole blood   Result Value Ref Range    Lactic Acid 1.0 0.7 - 2.0 mmol/L   Phosphorus   Result Value Ref Range    Phosphorus 3.0 2.5 - 4.5 mg/dL   Magnesium   Result Value Ref Range    Magnesium 1.9 1.6 - 2.3 mg/dL   Ammonia   Result Value Ref Range    Ammonia <10 (L) 10 - 50 umol/L   CRP inflammation   Result Value Ref Range    CRP Inflammation 33.1 (H) 0.0 - 8.0 mg/L   Erythrocyte sedimentation rate auto   Result Value Ref Range    Sed Rate 76 (H) 0 - 20 mm/h   Blood gas venous with oxyhemoglobin   Result Value Ref Range    Ph Venous 7.43 7.32 - 7.43 pH    PCO2 Venous 33 (L) 40 - 50 mm Hg    PO2 Venous 26 25 - 47 mm Hg    Bicarbonate Venous 22 21 - 28 mmol/L    FIO2 Room air      Oxyhemoglobin Venous 44 %    Base Deficit Venous 2.1 mmol/L   Basic metabolic panel   Result Value Ref Range    Sodium 133 133 - 144 mmol/L    Potassium 3.6 3.4 - 5.3 mmol/L    Chloride 103 94 - 109 mmol/L    Carbon Dioxide 21 20 - 32 mmol/L    Anion Gap 9 3 - 14 mmol/L    Glucose 131 (H) 70 - 99 mg/dL    Urea Nitrogen 13 7 - 30 mg/dL     Creatinine 0.56 (L) 0.66 - 1.25 mg/dL    GFR Estimate >90 >60 mL/min/[1.73_m2]    GFR Estimate If Black >90 >60 mL/min/[1.73_m2]    Calcium 8.9 8.5 - 10.1 mg/dL   CBC with platelets   Result Value Ref Range    WBC 0.9 (LL) 4.0 - 11.0 10e9/L    RBC Count 2.53 (L) 4.4 - 5.9 10e12/L    Hemoglobin 7.7 (L) 13.3 - 17.7 g/dL    Hematocrit 23.0 (L) 40.0 - 53.0 %    MCV 91 78 - 100 fl    MCH 30.4 26.5 - 33.0 pg    MCHC 33.5 31.5 - 36.5 g/dL    RDW 13.0 10.0 - 15.0 %    Platelet Count 99 (L) 150 - 450 10e9/L

## 2020-04-19 NOTE — PROVIDER NOTIFICATION
MD Notification    Notified Person: MD    Notified Person Name: Jordi    Notification Date/Time: 0300 04/19/20    Notification Interaction: Text Page    Purpose of Notification: Pt coughing up small amounts of blood    Orders Received: No new orders    Comments:

## 2020-04-19 NOTE — CONSULTS
NEUROSURGERY CONSULT    Neurosurgery was asked by Dr. Jauregui to consult for a intracranial lesions likely do to metastatic disease.    ASSESSMENT:    Intracranial lesions likely do to metastatic disease    PLAN:    We do feel that it would be in his best interest to obtain a brain MRI W/WO to further assess our concern for suspected metastasis from his known stage IIB prostate cancer (adenocarcinoma). He does have known metastasis to include skeletal and lung. Once we are able to thoroughly study the images we will be able to further discuss possible surgical interventions or other therapies. This has been ordered. Of course decisions will need to be made in conjunction with his family as well as [facility] who he does typically receive oral chemotherapy. He also obtains treatment from [facility]. He does think that if surgical intervention is an option, he would most likely like to return to [facility], however, he would like to obtain the brain MRI here at [facility] first and weigh out his options. He is currently on four mg's of dexamethasone being administered every six hours, this is certainly an appropriate dosage. Again, once we are able to review imaging we will make further recommendations. We will follow closely and return Monday.     It has been a pleasure meeting John Batista. Thank you for having us be involved in his  care.  ______________________________________________________________________    HPI:    John Batista is a pleasant 74 year old male who presented to the [facility] Emergency Department yesterday via EMS and subsequently transferred here to [facility] for altered mental status with known stage IIB prostate cancer (adenocarcinoma) and known skeletal and lung metastasis. Apparently Mr. Batista, per wife, has been more confused and agitated over the last week. Yesterday morning he was found by his neighbors [facility] in his car disoriented and acting erratically. There were no  obvious signs of trauma. While at [facility] ED is was discovered that he has a UTI, elevated lactic acid level and a likely metastasis to the brain per head CT.     There are no bowel or bladder changes. No other concerns are voiced.    Past Medical History:   Diagnosis Date     Chronic infection     cdiff     Diverticulitis      History of blood transfusion 2018    GI bleed     Prostate cancer (H) 12/2011    Dr. Galvan; now seeing Dr. Ruben Reese at [place]       Past Surgical History:   Procedure Laterality Date     COLONOSCOPY N/A 11/29/2016    Procedure: COLONOSCOPY;  Surgeon: Alon Lo MD;  Location: [facility] OR     COLONOSCOPY N/A 12/3/2016    Procedure: COLONOSCOPY;  Surgeon: Carmenza Akbar MD;  Location:  GI     COLONOSCOPY N/A 10/30/2018    Procedure: COMBINED COLONOSCOPY, SINGLE OR MULTIPLE BIOPSY/POLYPECTOMY BY BIOPSY;  Surgeon: Anatoly Tam MD;  Location:  GI     CYSTOSCOPY       CYSTOSCOPY, TRANSURETHRAL RESECTION (TUR) PROSTATE, COMBINED N/A 1/2/2020    Procedure: Video cystoscopy, channel tranurethral resection of prostate;  Surgeon: David Galvan MD;  Location: [facility] OR     ESOPHAGOSCOPY, GASTROSCOPY, DUODENOSCOPY (EGD), COMBINED N/A 10/28/2018    Procedure: COMBINED ESOPHAGOSCOPY, GASTROSCOPY, DUODENOSCOPY (EGD);  Surgeon: Codey De La Torre MD;  Location: [facility] GI     ESOPHAGOSCOPY, GASTROSCOPY, DUODENOSCOPY (EGD), COMBINED Left 10/31/2018    Procedure: ESOPHAGOSCOPY, GASTROSCOPY, DUODENOSCOPY (EGD) with Pill Cam  Rm 334;  Surgeon: Anatoly Tam MD;  Location:  GI     EXAM UNDER ANESTHESIA ANUS N/A 11/29/2016    Procedure: EXAM UNDER ANESTHESIA ANUS;  Surgeon: Alon Lo MD;  Location: [facility] OR     HC COLONOSCOPY THRU STOMA, DIAGNOSTIC  2003    normal, 2000 had polyps     HERNIA REPAIR, INGUINAL RT/LT       OTHER SURGICAL HISTORY Right 03/16/2020    Biopsy of Right Lung     SIGMOIDOSCOPY FLEXIBLE N/A 9/10/2014    Procedure: SIGMOIDOSCOPY FLEXIBLE;  Surgeon: Vidal  Madhuri Soni MD;  Location: [facility] GI     VASECTOMY         No Known Allergies    Social History     Tobacco Use     Smoking status: Former Smoker     Types: Cigarettes     Last attempt to quit: 1980     Years since quittin.3     Smokeless tobacco: Former User   Substance Use Topics     Alcohol use: No       Family History   Problem Relation Age of Onset     C.A.D. No family hx of      Diabetes No family hx of      Hypertension No family hx of      Breast Cancer No family hx of      Cancer - colorectal No family hx of        Scheduled Medications      ceFEPIme (MAXIPIME) IV  2 g Intravenous Q8H     dexamethasone  4 mg Intravenous Q6H     pantoprazole (PROTONIX) IV  40 mg Intravenous Daily     saccharomyces boulardii  500 mg Oral BID     senna-docusate  1 tablet Oral BID    Or     senna-docusate  2 tablet Oral BID     sodium chloride (PF)  3 mL Intracatheter Q8H       Home Medications  Calcium-Magnesium-Zinc  Cholecalciferol  hydromorphone  leuprolide  melatonin 3 MG tablet  Polysaccharide Iron Complex  prednisone  saccharomyces boulardii    PRN Medications    acetaminophen **OR** [DISCONTINUED] acetaminophen **OR** acetaminophen, bisacodyl **OR** bisacodyl **OR** bisacodyl, bisacodyl, HYDROmorphone, labetalol, lidocaine 4%, lidocaine (buffered or not buffered), - MEDICATION INSTRUCTIONS -, naloxone, OLANZapine, ondansetron **OR** ondansetron, polyethylene glycol, prochlorperazine **OR** prochlorperazine **OR** prochlorperazine, sodium chloride (PF)     ROS: 10 point ROS neg other than the symptoms noted above in the HPI.    Vitals:    /76 (BP Location: Left arm)   Pulse 106   Temp 97.8  F (36.6  C) (Oral)   Resp 17   SpO2 97%   There is no height or weight on file to calculate BMI.  I/O last 3 completed shifts:  In: 760 [I.V.:760]  Out: 25 [Urine:25]    Exam:    Pt examined in 824 Pt appears comfortable and in no apparent distress, moving all extremities.     Head: Normocephalic, without obvious  abnormality, atraumatic, no facial asymmetry.   Eyes: conjunctivae/corneas clear. PERRL, EOM's intact.   Throat: lips, mucosa, and tongue normal; teeth and gums normal.   Neck: supple, symmetrical, trachea midline, no adenopathy and thyroid: not enlarged, symmetric, no tenderness/mass/nodules.   Lungs: clear to auscultation bilaterally.   Heart: regular rate and rhythm.   Abdomen: soft, non-tender; bowel sounds normal; no masses, no organomegaly.   Pulses: 2+ and symmetric.   Skin: Skin color, texture, turgor normal. No rashes or lesions.     Alert and oriented to date and time. Unable to recall the current president of [employer] [place].   CN II: Able to read nametag, VF full with gross confrontation.   CN III,IV,VI: ANKIT, Extraocular movements full, absent for nystagmus, absent for ptosis.   CN V: Full sensation in V1,V2,V3, jaw clench symmetrical.   CN VII: Face symmetrical and with equal strength, able to puff cheeks out.   CN VIII: Able to hear conversation.   CN IX: Able to push tongue against bilateral cheeks.   CN X: Palate elevates symmetrically, uvula midline.   CN XI: Elevate shoulders symmetrical, full strength when turning head from side to side.   CNXII: Tongue protrudes midline, absent for fasciculation.   Able to name 3/3 objects.   Finger to nose slow and accurate.   Rapid hand movements mostly intact.   Absent for upper and lower extremity drift.     GCS:   Eye: eyes open spontaneously (4)   Motor: obeys commands (6)   Verbal: oriented (5)   Composite score: 15     Bilateral upper extremities 5/5. Bilateral bicep and triceps reflexes 2/4. Sensation intact throughout. Normal ROM.   Bilateral lower extremities 5/5. Normal sensation t/o bilaterally. Bilateral patellar 2/4 and achilles reflex 1/4.   Calves soft and non-tender.     Imaging:   Impression per radiology read - I have personally reviewed the images with the patient.    CT HEAD W/O CONTRAST - 4/18/2020 4:43 PM  1.  Abnormal mildly hyperintense  lesions in the cerebellum in the right cerebral hemisphere. There are also areas of what appear to be vasogenic edema in the right cerebral hemisphere. Pattern is most likely due to metastatic disease. If clinically   indicated, MRI could be helpful in further evaluation.  2.  Cerebral atrophy.    XR Chest Port 1 View  Allowing for the differences in technique, there appears  to be no significant changes involving the 2 pulmonary masses in the  right lung laterally. There is a opacity involving the left lung also  remains stable. The multiple other smaller pulmonary nodules  visualized on the chest CT of 8/27/2019 are not well visualized. No  evidence for acute focal infiltrate or consolidation. Normal heart  size. Normal pulmonary vascularity. Tortuous and calcified thoracic  aorta. Degenerative changes in the spine and both shoulders.    Available labs at time of consult:       Recent Labs   Lab 04/19/20  0721 04/18/20  1600   WBC 0.9* 1.1*   HGB 7.7* 8.6*   HCT 23.0* 27.3*   MCV 91 98   PLT 99* 117*     Recent Labs   Lab 04/19/20  0721 04/18/20  1600   WBC 0.9* 1.1*   HGB 7.7* 8.6*   HCT 23.0* 27.3*   MCV 91 98   PLT 99* 117*     Recent Labs   Lab 04/19/20  0721   SED 76*   CRP 33.1*     Recent Labs   Lab 04/19/20  0721 04/18/20  1600   HGB 7.7* 8.6*     Recent Labs   Lab 04/18/20  1600   INR 1.04     Recent Labs   Lab 04/19/20  0721 04/18/20  1600   PLT 99* 117*     Recent Labs   Lab 04/19/20  0721 04/18/20  1600   WBC 0.9* 1.1*         Respectfully,  Kaushal Sharp, HOWARD, PAJORDAN    All imaging, physical findings, and the above plan have been reviewed with Dr. Luque.

## 2020-04-19 NOTE — PROGRESS NOTES
"SPIRITUAL [facility] SERVICES Progress Note  [facility]    Initiated visit due to pt request.  Pt was in room w/ sitter.  Pt shared that doctors/staff are trying to \"figure everything out\" right now, but pt fears that he may have cancer.  Pt displayed signs of emotional distress, stating that he \"is 75, when the life expectancy is 76\" and verbalized fears about leaving his children and grandchildren.  Pt stated that he would be more ready to visit at a later time, but was accepting of prayer.  SH provided emotional support, reflective conversation, and prayer.  SH will f/u in 1-2 days per pt request.      Gilbert Frederick  Chaplain Resident    "

## 2020-04-19 NOTE — PROGRESS NOTES
MD Notification    Notified Person: MD    Notified Person Name: Shania COYNE    Notification Date/Time: 4/18/2020 2154    Notification Interaction: page    Purpose of Notification: pt becoming restless, is there anything we can give him?    Orders Received: pending    Comments:

## 2020-04-19 NOTE — PHARMACY-ADMISSION MEDICATION HISTORY
Pharmacy Medication History  Admission medication history interview status for the 4/18/2020  admission is complete. See EPIC admission navigator for prior to admission medications     Medication history sources: sure isidro and wife Valerie [phone]  Medication history source reliability: Moderate  Adherence assessment: Moderate    Significant changes made to the medication list:  Patient not taking melatonin  Anymore for sleep he is on medical cannabis at . Not going to be taking famotidine or prochlorperazine anymore but left on list       Additional medication history information:   Patient takes an oral chemo medication for prostate cancer. Wife unsure what name is. Will follow-up     Medication reconciliation completed by provider prior to medication history? No    Time spent in this activity: 30min       Prior to Admission medications    Medication Sig Last Dose Taking? Auth Provider   famotidine (PEPCID) 20 MG tablet Take 20 mg by mouth 2 times daily 4/17/2020 at Unknown time Yes Unknown, Entered By History   HYDROmorphone (DILAUDID) 4 MG tablet Take 4 mg by mouth every 4 hours as needed for severe pain  4/18/2020 at Unknown time Yes Unknown, Entered By History   leuprolide (ELIGARD) 45 MG injection Inject 45 mg Subcutaneous every 6 months. Past Month at Unknown time Yes Reported, Patient   Multiple Vitamin (ONE-A-DAY 55 PLUS OR) Take 1 tablet by mouth daily  4/17/2020 at Unknown time Yes Reported, Patient   NONFORMULARY Oral Chemotherapy thru [facility]. Takes 12 days then off 12 days     4/15 was first day of off day (4/15-4/26) start again 4/27/20 Past Week at Unknown time Yes Unknown, Entered By History   Polysaccharide Iron Complex (FERREX 150 PO) 3 times daily (with meals) Take 1 capsule by mouth three times daily 4/17/2020 at Unknown time Yes Unknown, Entered By History   predniSONE (DELTASONE) 10 MG tablet Take 10 mg by mouth daily 4/17/2020 at Unknown time Yes Unknown, Entered By History    prochlorperazine (COMPAZINE) 10 MG tablet Take 10 mg by mouth every 8 hours as needed for nausea or vomiting 4/17/2020 at Unknown time Yes Unknown, Entered By History   saccharomyces boulardii (FLORASTOR) 250 MG capsule Take 500 mg by mouth 2 times daily  4/18/2020 at Unknown time Yes Reported, Patient   vitamin C (ASCORBIC ACID) 500 MG tablet Take 250 mg by mouth 3 times daily (with meals) 4/18/2020 at Unknown time Yes Unknown, Entered By History   acetaminophen (TYLENOL) 500 MG tablet Take 500 mg by mouth every 4 hours as needed for mild pain  Unknown at Unknown time  Reported, Patient   Calcium-Magnesium-Zinc 333-133-5 MG TABS per tablet Take 1 tablet by mouth 2 times daily (with meals)  Unknown at Unknown time  Reported, Patient   Cholecalciferol (VITAMIN D) 1000 UNITS capsule Take 2,000 Units by mouth 2 times daily  Unknown at Unknown time  Reported, Patient   medical cannabis (Patient's own supply) See Admin Instructions (The purpose of this order is to document that the patient reports taking medical cannabis.  This is not a prescription, and is not used to certify that the patient has a qualifying medical condition.)     Takes at HS Unknown at Unknown time  Unknown, Entered By History     4/20/20- Called [facility] in [place]. Clarified patient's new oral chemo regimen with Ragini WALKER:   Cytoxan & Etoposide 50 mg/m2 PO daily x 14 days, off 14 days, then resume on day 29, which is 4/29/20.      Nakia Shirley (Kelly), [facility]

## 2020-04-19 NOTE — PROVIDER NOTIFICATION
MD Notification    Notified Person: MD    Notified Person Name: Fr. Gabriel    Notification Date/Time:4/19/20 0800    Notification Interaction: text page    Purpose of Notification: critical lab value for WBC of 0.9.    Orders Received:    Comments:

## 2020-04-20 NOTE — PLAN OF CARE
Most of shift- pt alert but disoriented to situation. Very restless, but re-directable. Neuros intact. Up with A1 GB walker, but refusing GB and walker. C/o of sacral pain, PRN tylenol and dilaudid given x1. Hemoptysis present, MD aware. Neutropenic precautions maintained. PIV infusing w/ intermittent abx. IV decadron q6hrs. Incontinent. MD notified of restlessness, one time dose of zyprexa given. Pt continues to be agitated, zyprexa and melatonin given and effective. At 3:45 pt woke up extremely agitated and started to become combative. Started attempting to hit staff and grabbing equipment, code 21 called. At same time pt became very SOB sat on toilet and fell to floor, laid on floor for about 1 min, RRT called. Got back up and became combative again. 4 point soft restraints initiated and haldol given. Pt continues to be agitated and confused. See RRT note for more detail. MRI of brain completed yesterday- neurosurgery following. Hem/onc following. Plan pending. Sitter at bedside.

## 2020-04-20 NOTE — PLAN OF CARE
OT and PT: spoke with nursing, pt agitated and in 4 point restraints and not appropriate for therapy today.

## 2020-04-20 NOTE — PROGRESS NOTES
Cross Cover:  Notified of patient restless, not sleeping.  Added 1mg melatonin PRN   Also added PRN PO zyprexa in case of insomnia/agitation for evenings going forward as he had some improvement earlier in the evening 4/19 with this.    Addendum 0600: Added protocols for potassium and magnesium repletion as patient noted to have K 3.2 this AM.    MILADYS Bacon, DO   Post-Care Instructions: I reviewed with the patient in detail post-care instructions. Patient is not to engage in any heavy lifting, exercise, or swimming for the next 14 days. Should the patient develop any fevers, chills, bleeding, severe pain patient will contact the office immediately.

## 2020-04-20 NOTE — PROVIDER NOTIFICATION
MD Notification    Notified Person: MD bob    Notified Person Name:    Notification Date/Time: 4/20/2020 8:27 AM      Notification Interaction:    Purpose of Notification:pt agitated, yanking on restraints. demanding to get them off, but when loosened to eat, pt violent with fork. please advise re more PRN med options.    Orders Received:    Comments:

## 2020-04-20 NOTE — PROVIDER NOTIFICATION
MD Notification    Notified Person: MD    Notified Person Name: Dr. Bacon     Notification Date/Time: 4/20/20 4740     Notification Interaction: phone call     Purpose of Notification: pt continues to be restless and agitated      Orders Received: melatonin and bedtime zyprexa     Comments:

## 2020-04-20 NOTE — PROGRESS NOTES
[facility]    Neurosurgery  Daily Note    Assessment & Plan   74 yr old male with newly found metastatic brain lesions. History of prostate cancer with bone and lung mets, on chemo. Was admitted with AMS and agitation.   Brain MRI confirms new lesions, multiple areas.   No surgical recommendation at present, patient will likely need whole brain radiation. Appreciate Oncology and Rad/Onc.   Will remain available as needed.    Patient also states he is not interested in surgery even if indeed that was the recommendation.       Ramírez Lopez    Interval History    Agitation overnight.    Physical Exam   Temp: 95.8  F (35.4  C) Temp src: Oral BP: (!) 162/75 Pulse: 93 Heart Rate: 88 Resp: 20 SpO2: 99 % O2 Device: None (Room air)    There were no vitals filed for this visit.  Vital Signs with Ranges  Temp:  [95.8  F (35.4  C)-98.7  F (37.1  C)] 95.8  F (35.4  C)  Pulse:  [93] 93  Heart Rate:  [] 88  Resp:  [18-26] 20  BP: (131-165)/(63-93) 162/75  SpO2:  [98 %-99 %] 99 %  I/O last 3 completed shifts:  In: 2583 [P.O.:320; I.V.:2263]  Out: 125 [Urine:125]    Alert and following commands. SAMEER.       Medications     - MEDICATION INSTRUCTIONS -       sodium chloride 100 mL/hr at 04/20/20 0014        ceFEPIme (MAXIPIME) IV  2 g Intravenous Q8H     dexamethasone  4 mg Intravenous Q6H     OLANZapine zydis  5 mg Oral BID     [START ON 4/21/2020] pantoprazole  40 mg Oral QAM AC     saccharomyces boulardii  500 mg Oral BID     senna-docusate  1 tablet Oral BID    Or     senna-docusate  2 tablet Oral BID     sodium chloride (PF)  3 mL Intracatheter Q8H       Data         Ramírez Lopez PA-C  [facility] Neurosurgery  [address]  [address]  [place] [address]    Tel [phone]  Pager [phone]

## 2020-04-20 NOTE — PROVIDER NOTIFICATION
MD Notification    Notified Person: MD    Notified Person Name: Dr. Brar     Notification Date/Time: 4/19/20 2100    Notification Interaction: phone call     Purpose of Notification: pt agitated and restless,  Has IM zyprexa but able to take PO    Orders Received: one time dose of PO zyprexa     Comments:

## 2020-04-20 NOTE — CONSULTS
[facility]  Palliative Care Consultation Note    Patient: John Batista  Date of Admission:  4/18/2020    Requesting Clinician / Team: Dr. Gabriel/Hospitalist  Reason for consult: Pain management, Symptom management, Goals of care    Recommendations:  Hyperactive delirium likely multifactorial from new brain mets/swelling, steroids, underlying UTI/sepsis  For hyperactive delirium, follow delirium precautions: avoid benzos and deliriogenic medications, try to keep him awake, stimulated, reoriented during the day. Lights on, blinds open, try to let him sleep at night. Lights & TV off. Don't wake from 10p-6a unless absolutely necessary  Schedule zyprexa 5mg ODT BID with 5mg ODT PRN for breakthrough agitation  Haldol 2mg IV Q6hrs PRN breakthrough agitation  If agitation is still intractable, low threshold to consult Psych   Spoke with pt's wife Valerie via phone. Goals remain restorative at present. Wife elects DNR/DNI  Luis Alberto Pizarro NP, Oncology, further explored pt and family's understanding of disease status. Wife understands disease is not curable and life expectancy is limited. Will explore treatment of brain mets with XRT, Dr. Mcnair will see pt tomorrow     These recommendations have been discussed with bedside RN Alyssa, Luis Alberto Bourgeois NP, and Dr. Gabriel.    Osiris MORGAN, [facility]  Palliative Medicine   Pager [phone]      Thank you for the opportunity to participate in the care of this patient and family. Our team: will continue to follow.     During regular M-F work hours -- if you are not sure who specifically to contact -- please contact us at [phone].    After regular work hours and on weekends/holidays, you can call our answering service at [phone]. Also, who's on call for us is available in [facility] [employer].     Attestation:  Total time on the floor involved in the patient's care: 70 minutes  Total time spent in counseling/care coordination: >50%    Assessments:  John Batista  is a 74 year old male with PMH significant for distant hx alcoholism who has been sober for decades, hx GIB, and metastatic prostate CA with mets to the lungs and bone on current therapy with oral cytoxan and etoposide who presents with AMS and agitation. He is found to have new mets to the cerebellum and right cerebral hemisphere with vasogenic edema. He was started on dexamethasone and evaluated by NSG and Oncology. Whole brain XRT is being considered. He is also found to have UTI on cefepime. The patient remains agitated and delirious. He is pancytopenic from recent chemotherapy.     Today, the patient was seen for:  Symptom management, goals of care, pt and family support    Spoke with Nicolas's wife Valerie via phone. We acknowledge that Nicolas has been very agitated and disoriented since admission. We believe this could be due to his new brain mets and swelling. We talk about the treatment for brain mets, including steroids to help with swelling, and possibly radiation. Surgery is being explored, but a less likely option.     Valerie is able to acknowledge that Nicolas is a gentle soul at baseline. She cannot care for him at home if he continues like this. We hope the agitation and delirium are reversible, but unfortunately, this can become a terminal symptom. We may need to explore facility placement, either short or long term pending his pathway. We note that we will stay in touch as the days progress to see how he is improving.    We discuss code status. I educated regarding the pros and cons of attempting cardiac resuscitation. Discussed probability of survival as well as quality of life implications. I educated regarding the pros and cons of intubation and mechanical ventilation. Discussed probability of survival as well as quality of life implications. Recommended DNR/DNI, to which Valerie was agreeable.     Visited Nicolas. He is more alert and calm later this AM. He had a difficult night with agitation and aggression toward  "staff. After several doses of IV haldol and into later morning hours, he improved.    Nicolas is currently alert, pleasant, and conversant. He knows he is in the hospital. He knows something has not been right for weeks. We talk about his new brain mets being the likely source of feeling \"off.\" He is anxious to try XRT and we note that it could start in the coming days. We talk about the importance of being calm and safe during treatments.     He spent additional time exploring his time in the [employer] and working for the [employer]. He is a  and talked about building a 26 foot boat and sailing with his sons. He has pleasant, yet slightly tangential speech.     Prognosis, Goals, & Planning:      Functional Status just prior to hospitalization: 1 (Restricted in physically strenuous activity but ambulatory and able to carry out work of a light or sedentary nature)      Prognosis, Goals, and/or Advance Care Planning were addressed today: Yes        Summary/Comments:       Patient's decision making preferences: with strong input from medical clinicians          Patient has decision-making capacity today for complex decisions: No            I have concerns about the patient/family's health literacy today: No           Patient has a completed Health Care Directive: No.       Code status: DNR/DNI    Coping, Meaning, & Spirituality:   Mood, coping, and/or meaning in the context of serious illness were addressed today: Yes  Summary/Comments: Both pt and wife are very spiritual, pt reflects on his years of catholicism. They welcome  support.     Social:     Living situation: With wife Valerie    Baer family / caregivers: Wife Valerie, 2 adult sons, 1 [place]     Occupational history: Was in the [employer] followed by years in the [employer] industry    Substance use history: Distant history of alcoholism     History of Present Illness:  History gathered today from: patient, family/loved ones, " medical chart, medical team members, unit team members    John Batista is a 74 year old male with PMH significant for distant hx alcoholism who has been sober for decades, hx GIB, and metastatic prostate CA with mets to the lungs and bone on current therapy with oral cytoxan and etoposide who presents with AMS and agitation. He is found to have new mets to the cerebellum and right cerebral hemisphere with vasogenic edema. He was started on dexamethasone and evaluated by NSG and Oncology. Whole brain XRT is being considered. He is also found to have UTI on cefepime. The patient remains agitated and delirious.     Key Palliative Symptom Data:  # Pain severity the last 12 hours: none  # Dyspnea severity the last 12 hours: none  # Nausea severity the last 12 hours: none  # Anxiety severity the last 12 hours: severe    Patient is on opioids: bowels not assessed today.    ROS:  Comprehensive ROS is reviewed and is negative except as here & per HPI: N/A     Past Medical History:  Past Medical History:   Diagnosis Date     Chronic infection     cdiff     Diverticulitis      History of blood transfusion 2018    GI bleed     Prostate cancer (H) 12/2011    Dr. Galvan; now seeing Dr. Ruben Reese at [facility]        Past Surgical History:  Past Surgical History:   Procedure Laterality Date     COLONOSCOPY N/A 11/29/2016    Procedure: COLONOSCOPY;  Surgeon: Alon Lo MD;  Location:  OR     COLONOSCOPY N/A 12/3/2016    Procedure: COLONOSCOPY;  Surgeon: Carmenza Akbar MD;  Location:  GI     COLONOSCOPY N/A 10/30/2018    Procedure: COMBINED COLONOSCOPY, SINGLE OR MULTIPLE BIOPSY/POLYPECTOMY BY BIOPSY;  Surgeon: Anatoly Tam MD;  Location:  GI     CYSTOSCOPY       CYSTOSCOPY, TRANSURETHRAL RESECTION (TUR) PROSTATE, COMBINED N/A 1/2/2020    Procedure: Video cystoscopy, channel tranurethral resection of prostate;  Surgeon: David Galvan MD;  Location:  OR     ESOPHAGOSCOPY, GASTROSCOPY, DUODENOSCOPY  (EGD), COMBINED N/A 10/28/2018    Procedure: COMBINED ESOPHAGOSCOPY, GASTROSCOPY, DUODENOSCOPY (EGD);  Surgeon: Codey De La Torre MD;  Location: [facility] GI     ESOPHAGOSCOPY, GASTROSCOPY, DUODENOSCOPY (EGD), COMBINED Left 10/31/2018    Procedure: ESOPHAGOSCOPY, GASTROSCOPY, DUODENOSCOPY (EGD) with Pill Cam  Rm 334;  Surgeon: Anatoly Tam MD;  Location: [facility] GI     EXAM UNDER ANESTHESIA ANUS N/A 11/29/2016    Procedure: EXAM UNDER ANESTHESIA ANUS;  Surgeon: Alon Lo MD;  Location: [facility] OR     HC COLONOSCOPY THRU STOMA, DIAGNOSTIC  2003    normal, 2000 had polyps     HERNIA REPAIR, INGUINAL RT/LT       OTHER SURGICAL HISTORY Right 03/16/2020    Biopsy of Right Lung     SIGMOIDOSCOPY FLEXIBLE N/A 9/10/2014    Procedure: SIGMOIDOSCOPY FLEXIBLE;  Surgeon: Madhuri Drake MD;  Location: [facility] GI     VASECTOMY           Family History:  Family History   Problem Relation Age of Onset     C.A.D. No family hx of      Diabetes No family hx of      Hypertension No family hx of      Breast Cancer No family hx of      Cancer - colorectal No family hx of          Allergies:  No Known Allergies     Medications:  I have reviewed this patient's medication profile and medications from this hospitalization.   Noted scheduled meds are:  Cefepime   Dexamethasone 4mg IV Q6hrs   Zyprexa 5mg ODT BID   Senna 1-2 tabs PO BID    Noted PRN meds are:  Haldol 2mg IV Q6hrs PRN agitation   Dilaudid 4mg PO Q4hrs PRN pain   Dilaudid 0.5mg IV Q4hrs PRN pain   Zyprexa 5mg PO BID PRN agitation     Physical Exam:  Vital Signs: Temp: 95.8  F (35.4  C) Temp src: Oral BP: (!) 162/75 Pulse: 93 Heart Rate: 88 Resp: 20 SpO2: 99 % O2 Device: None (Room air)    Weight: 0 lbs 0 oz  CONSTITUTIONAL: Chronically ill cachectic man seen sitting up in bed in NAD, A&O. Calm and cooperative. Sitter at bedside   RESPIRATORY: NL respiratory effort on RA  MUSCULOSKELETAL: Wrist restraints   NEUROLOGIC: Appropriately responsive during interview, slightly  tangential speech   PSYCH: Affect engaged, calm     Data reviewed:  Recent imaging reviewed, my comments on pertinents:   4/19 MRI Brain Multiple intra-axial metastatic deposits. Osseous metastases also noted bilaterally in the convexity regions.     Recent lab data reviewed, my comments on pertinents:   Na 135  K 3.2  Creat 0.65  WBC 1.1  Hgb 7.4  Plt 84

## 2020-04-20 NOTE — PROGRESS NOTES
Progress Note     Primary Oncologist/Hematologist:  Dr. Borrero ([place])          Assessment and Plan:New actions/orders today (04/20/2020) are underlined.     Nicolas is a 74 year old admitted 4/18 with altered mental status    Newly discovered brain metastases  Metastatic prostate adenocarcinoma on chemotherapy - mets to the lung and bone.   Follows with Dr Borrero at [place] office. Also follows with Urology Dr. Galvan, is on every 6-month Lupron injection.   -Prostate cancer is quite refractory.   -Most recently started on palliative chemo with oral Cytoxan and etoposide, for 12 days on, 12 days off, last dose taken 4/14  -CT head 4/18 concerning for brain mets  -MRI brain 4/19 shows 10-15 lesions with vasogenic edema  -Neurosurgery involved.  Appreciate the consult.   -Continue Decadron 4 mg every 6 hours along with PPI  -Will consult with Radiation Oncology. Dr. Mcnair will see him 4/21. Nicolas will likely need whole brain radiation. This could start while in the hospital, but will delay discharge to TCU (if that is the plan)  -I spoke with his wife Valerie about the need for radiation. She understands that Nicolas does not have much for options and that all treatments are palliative in nature. She would like to give him the opportunity to have radiation.  -I did review prognosis with Dr. Borrero. He feels like Nicolas might have 3-6 months. Therefore discussions about hospice care would be reasonable.   -Chemotherapy will be on hold for now. There is a chance that Nicolas might not improve his performance status enough to tolerate additional chemotherapy.    Pancytopenia secondary to chemotherapy  -WBC quite low post cycle 1 of cyclophosphamide and etoposide, as patient very heavily treated for his prostate cancer.  -Filgrastim given 4/19. Plan another dose today.   - CBC with differential 4/21. Goal ANC 1500.  -Transfuse for hemoglobin less than 6 (given blood shortage) and platelets less than 10,000     UTI  -Altered mental  status most likely secondary to UTI and sepsis versus the brain mets.  -Continue current antibiotics.  Cultures >100,000 colonies proteus mirabilis    Encephalopathy  -Likely from infection, brain mets and steroids  -He has required pharmacologic intervention due to issue with personal safety. RRT was called earlier.  -Has needed physical restraints  -This is all going to make initiation of radiation challenging  -Hopefully he will settle further today     Chronic pain  Continue Dilaudid as needed for pain.    Luis Alberto MORGAN, CNP  [facility]  [phone] (office), [phone] (cell)        Interval History:     Nicolas is upset by the need for restraints. He was stabbing his pancake, bed and self with his fork earlier.               Review of Systems:     The 5 point Review of Systems is negative other than noted in the HPI             Medications:   Scheduled Medications    ceFEPIme (MAXIPIME) IV  2 g Intravenous Q8H     dexamethasone  4 mg Intravenous Q6H     OLANZapine zydis  5 mg Oral BID     [START ON 4/21/2020] pantoprazole  40 mg Oral QAM AC     saccharomyces boulardii  500 mg Oral BID     senna-docusate  1 tablet Oral BID    Or     senna-docusate  2 tablet Oral BID     sodium chloride (PF)  3 mL Intracatheter Q8H     PRN Medications  acetaminophen **OR** [DISCONTINUED] acetaminophen **OR** acetaminophen, bisacodyl **OR** bisacodyl **OR** bisacodyl, bisacodyl, haloperidol lactate, HYDROmorphone, HYDROmorphone, labetalol, lidocaine 4%, lidocaine (buffered or not buffered), magnesium sulfate, - MEDICATION INSTRUCTIONS -, melatonin, naloxone, OLANZapine zydis, ondansetron **OR** ondansetron, polyethylene glycol, potassium chloride, potassium chloride with lidocaine, potassium chloride, potassium chloride, potassium chloride, prochlorperazine **OR** prochlorperazine **OR** prochlorperazine, sodium chloride (PF)               Physical Exam:   Vitals were reviewed  Blood pressure (!) 162/75, pulse 93,  temperature 95.8  F (35.4  C), temperature source Oral, resp. rate 20, SpO2 99 %.  Wt Readings from Last 4 Encounters:   03/24/20 65.8 kg (145 lb)   02/13/20 72.1 kg (159 lb)   01/24/20 72.1 kg (159 lb)   01/02/20 68.9 kg (152 lb)       I/O last 3 completed shifts:  In: 3123 [P.O.:860; I.V.:2263]  Out: 25 [Urine:25]      Constitutional: Awake, agitated. Restrained.            Data:   All laboratory data and imaging studies reviewed.    CMP  Recent Labs   Lab 04/20/20  1400 04/20/20  0456 04/19/20  0721 04/18/20  1600   NA  --  135 133 134   POTASSIUM 3.5 3.2* 3.6 3.6   CHLORIDE  --  105 103 102   CO2  --  22 21 22   ANIONGAP  --  8 9 10   GLC  --  148* 131* 112*   BUN  --  19 13 14   CR  --  0.65* 0.56* 0.70   GFRESTIMATED  --  >90 >90 >90   GFRESTBLACK  --  >90 >90 >90   TIMBO  --  8.9 8.9 9.4   MAG  --  2.0 1.9  --    PHOS  --   --  3.0  --    PROTTOTAL  --   --   --  7.7   ALBUMIN  --   --   --  3.4   BILITOTAL  --   --   --  0.6   ALKPHOS  --   --   --  140   AST  --   --   --  30   ALT  --   --   --  21     CBC  Recent Labs   Lab 04/20/20  0456 04/19/20  0721 04/18/20  1600   WBC 1.1* 0.9* 1.1*   RBC 2.41* 2.53* 2.80*   HGB 7.4* 7.7* 8.6*   HCT 21.9* 23.0* 27.3*   MCV 91 91 98   MCH 30.7 30.4 30.7   MCHC 33.8 33.5 31.5   RDW 13.0 13.0 12.8   PLT 84* 99* 117*     INR  Recent Labs   Lab 04/18/20  1600   INR 1.04     Data   Results for orders placed or performed during the hospital encounter of 04/18/20 (from the past 24 hour(s))   MR Brain w/o & w Contrast    Narrative    MRI BRAIN WITHOUT AND WITH CONTRAST  4/19/2020 7:36 PM    HISTORY:  AMS, newly found brain mets on head CT, known metastatic  prostate cancer.     TECHNIQUE:  Multiplanar, multisequence MRI of the brain without and  with 6mL Gadavist.    COMPARISON: Head CT on 4/18/2020.    FINDINGS: Multiple intra-axial enhancing lesions are noted in both  cerebral hemispheres and in both cerebellar hemispheres. The number of  lesions is approximately 10-15. The  largest lesion is in the left  posterior paramedian parietal region abutting the superior sagittal  sinus. This lesion measures 1.4 x 1.4 x 2.0 cm in size. There is also  a 1.8 x 1.6 x 1.4 cm enhancing mass in the left cerebellum. There are  two separate enhancing lesions in the inferior right temporal  occipital region measuring 1.3 and 1.5 cm. There are also a few  smaller lesions in a similar location. Lesions are also present in the  right and left frontal lobes, the left parietal lobe, and in the right  cerebellum. These lesions show evidence of calcium or hemosiderin on  the gradient echo sequences. There is significant surrounding  vasogenic edema with regards to the temporal lesions as well as the  right frontal and both parietal lesions. Smaller amounts of vasogenic  edema are also seen in the left frontal lobe. There is localized mass  effect but no midline shift. Brain parenchyma is otherwise normal.  There are also several lesions in the high convexity region of the  calvarium bilaterally consistent with osseous deposits.      Impression    IMPRESSION:  1. Multiple intra-axial metastatic deposits.  2. Osseous metastases also noted bilaterally in the convexity regions.    DIEGO JACOBSON MD

## 2020-04-20 NOTE — PROVIDER NOTIFICATION
MD Notification    Notified Person: MD    Notified Person Name: Dr. Bacon     Notification Date/Time: 4/20/20 0600    Notification Interaction: phone call     Purpose of Notification: K 3.2    Orders Received: replacement protocol entered     Comments:

## 2020-04-20 NOTE — PROGRESS NOTES
[facility]    Hospitalist Progress Note    Assessment & Plan   John Batista is a 74 year old male with PMH significant for metastatic prostate cancer with mets to the bone and lungs on chemotherapy who was directly admitted from [facility] ER due to altered mental status and agitation with newly found brain metastases, lactic acidosis, and urinary tract infection.     Altered mental status with agitation  COVID-rule out pending- Low suspicion  --- Confusion improved transiently, possibly secondary to receiving treatment for UTI or IV steroids, COVID negative.   --- Patient became more confused overnight on 4/19  Urinary tract infection  Lactic acidosis   Lactate 7.2 in ED. Suspect sepsis is contributing. Received 2 L normal saline in the ED. AMS. Urine grossly abnormal with , Large LE, Neg nitrite. Received 1 dose cefepime in the ER.  - Continue Cefepime 2 g every 8 hours  -Continue IV fluids, monitor blood and urine cultures.  -Urine cultures growing  Proteus mirabilis, pending sensitivities       Newly discovered brain metastases  Metastatic prostate adenocarcinoma on chemotherapy - mets to the lung and bone.   Follows with [place] [facility], 2 days on and 12-day off cycle, last dose 5 days PTA.  Chronic myalgias on PTA Dilaudid. Also follows with Urology Dr. Galvan, is on every 6-month Lupron injection. Head CT in ER revealed lesions in cerebellum and Rt cerebral hemisphere with vasogenic edema in Rt cerebral hemisphere, most likely 2/2 metastatic disease.   - Loaded with Decadron IV 10mg in ED, continue with 4mg Q 6 hours (of note takes prednisone 10mg/d PTA)   -Appreciate input from oncology and neurosurgery, MRI noted, multiple metastases noted with the involvement of skull, currently plan is to obtain rad Onc input, no plan for any surgery as per neurosurgery.  --- Cussed with the palliative care, for now patient is DNR/DNI, restorative care is what family is thinking, might need  further discussion when seen by radiation oncology.    Chronic leukopenia  Baseline WBC 2.5-3.6 range most recently.   -neutropenia noted following   Recent chemo   -Received Neupogen on 4/19, white count is improved, still neutropenic.     Chronic anemia  At baseline 7-8 range.  - Monitor CBC, stable      DVT Prophylaxis: sequential compression device   Code Status: DNR/DNI     Disposition: Expected discharge in 1-2 days depending on plan by neurosurgery     Irene Gabriel MD  Text Page   (7am to 6pm)      Interval History   He became agitated overnight, he tried to pull the lines out and was threatening, currently he is resting comfortably, he is polite but trying to take all the restraints out, he is on four-point restraints now.  When the restraints was removed he took the fork prior to arrival and started stabbing himself and others around him.  No injuries.    -Data reviewed today: I reviewed all new labs and imaging results over the last 24 hours. I personally reviewed labs  today .    Physical Exam   Temp: 95.8  F (35.4  C) Temp src: Oral BP: (!) 162/75 Pulse: 93 Heart Rate: 88 Resp: 20 SpO2: 99 % O2 Device: None (Room air)    There were no vitals filed for this visit.  Vital Signs with Ranges  Temp:  [95.8  F (35.4  C)-98.7  F (37.1  C)] 95.8  F (35.4  C)  Pulse:  [93] 93  Heart Rate:  [] 88  Resp:  [18-26] 20  BP: (131-165)/(63-93) 162/75  SpO2:  [98 %-99 %] 99 %  I/O last 3 completed shifts:  In: 2583 [P.O.:320; I.V.:2263]  Out: 125 [Urine:125]    Constitutional: And is confused, agitated.  Respiratory: Clear to auscultation bilaterally, no crackles or wheezing  Cardiovascular: Regular rate and rhythm, normal S1 and S2, and no murmur noted  GI: Normal bowel sounds, soft, non-distended, non-tender  Skin/Integumen: No rashes, no cyanosis, no edema  Neuro : moving all 4 extremities, confused    Medications     - MEDICATION INSTRUCTIONS -       sodium chloride 100 mL/hr at 04/20/20 0014       ceFEPIme  (MAXIPIME) IV  2 g Intravenous Q8H     dexamethasone  4 mg Intravenous Q6H     OLANZapine zydis  5 mg Oral BID     [START ON 4/21/2020] pantoprazole  40 mg Oral QAM AC     saccharomyces boulardii  500 mg Oral BID     senna-docusate  1 tablet Oral BID    Or     senna-docusate  2 tablet Oral BID     sodium chloride (PF)  3 mL Intracatheter Q8H       Data   Recent Labs   Lab 04/20/20  0456 04/19/20  0721 04/18/20  1600   WBC 1.1* 0.9* 1.1*   HGB 7.4* 7.7* 8.6*   MCV 91 91 98   PLT 84* 99* 117*   INR  --   --  1.04    133 134   POTASSIUM 3.2* 3.6 3.6   CHLORIDE 105 103 102   CO2 22 21 22   BUN 19 13 14   CR 0.65* 0.56* 0.70   ANIONGAP 8 9 10   TIMBO 8.9 8.9 9.4   * 131* 112*   ALBUMIN  --   --  3.4   PROTTOTAL  --   --  7.7   BILITOTAL  --   --  0.6   ALKPHOS  --   --  140   ALT  --   --  21   AST  --   --  30     Recent Labs   Lab 04/20/20  0456 04/20/20  0405 04/19/20  0721 04/18/20  1600   *  --  131* 112*   BGM  --  153*  --   --        Imaging:   Recent Results (from the past 24 hour(s))   MR Brain w/o & w Contrast    Narrative    MRI BRAIN WITHOUT AND WITH CONTRAST  4/19/2020 7:36 PM    HISTORY:  AMS, newly found brain mets on head CT, known metastatic  prostate cancer.     TECHNIQUE:  Multiplanar, multisequence MRI of the brain without and  with 6mL Gadavist.    COMPARISON: Head CT on 4/18/2020.    FINDINGS: Multiple intra-axial enhancing lesions are noted in both  cerebral hemispheres and in both cerebellar hemispheres. The number of  lesions is approximately 10-15. The largest lesion is in the left  posterior paramedian parietal region abutting the superior sagittal  sinus. This lesion measures 1.4 x 1.4 x 2.0 cm in size. There is also  a 1.8 x 1.6 x 1.4 cm enhancing mass in the left cerebellum. There are  two separate enhancing lesions in the inferior right temporal  occipital region measuring 1.3 and 1.5 cm. There are also a few  smaller lesions in a similar location. Lesions are also  present in the  right and left frontal lobes, the left parietal lobe, and in the right  cerebellum. These lesions show evidence of calcium or hemosiderin on  the gradient echo sequences. There is significant surrounding  vasogenic edema with regards to the temporal lesions as well as the  right frontal and both parietal lesions. Smaller amounts of vasogenic  edema are also seen in the left frontal lobe. There is localized mass  effect but no midline shift. Brain parenchyma is otherwise normal.  There are also several lesions in the high convexity region of the  calvarium bilaterally consistent with osseous deposits.      Impression    IMPRESSION:  1. Multiple intra-axial metastatic deposits.  2. Osseous metastases also noted bilaterally in the convexity regions.    DIEGO JACOBSON MD

## 2020-04-20 NOTE — PROGRESS NOTES
[facility] Progress Note  [facility]    SH attempted f/u visit per pt request - determined that pt was not appropriate to visit at this time.  SH will f/u in 1-3 days, per pt request.      Gilbert Frederick  Chaplain Resident

## 2020-04-20 NOTE — CODE/RAPID RESPONSE
[facility]    House [facility] RRT/Code 21 Note  4/20/2020   Time Called: 0347    RRT called for: SOB, syncope    Assessment & Plan     Acute agitation, aggression 2/2 acute encephalopathy in setting of newly diagnosed brain metastases with PMH metastatic prostate adenocarcinoma.  Possible syncope with associated witnessed mechanical fall.  - Upon arrival, pt sitting in bed, awake, alert, verbally agitated, attempting to get out of bed.  Per nursing, pt had been sleeping, but when he woke up needing to use the restroom, he got out of bed sounding the bed alarm.  Staff immediately entered pt's room; however, at that time, pt very physically aggressive toward staff, grabbing, pushing and shoving staff.  Pt then attempting to dislodge his PIV.  He subsequently grabbed the wall mounted computer, shaking it aggressively.  Pt then stated he needed the restroom; however, would not allow staff to assist him.  When up to restroom, pt sat on toilet; however, did not void nor have a BM.  Pt subsequently  fell off the toilet from seated position striking his L knee and then L hip.  Pt's head very lightly landed on nurse's shoe.  Nursing confirmed pt did not strike L side of head hard; stated it was a very gradual, soft landing on shoe. Nursing noted pt laid on the floor for ~ 1 min with his eyes closed; unsure if due to syncope vs behavioral.  Pt insisted on getting off the floor independently; was aggressive toward staff at that time not allowing them to help him.  Nursing was able to redirect pt back to bed; however, pt remained physically and verbally aggressive with staff.  Of note, while present at pt's bedside for ~ 10 min, pt would become acutely tachyneic and would then lay his head down on the pillow with his eyes closed, occurred twice (this was similar appearance of event in restroom as nursing noted pt became acutely SOB just prior to fall to floor).  VSS, telemetry sinus tachycardia, HR 130s-140s, SBP  140s, O2 sats 100% on RA, RR 10s.       INTERVENTIONS:  - Will place pt in 4-point soft restraints at this time to ensure pt and staff remain safe  - Stat EKG to evaluate QTc and for arrhythmia is setting of possible syncope  - Stat BMP, CBC, Mg  - Haldol 2 mg IV now; will be available Q6H PRN  - Considered CT head without; however, no obvious focal neuro deficits other than confusion (unchanged from previous).  Nursing confirmed pt did not strike head hard and was from seated position.  No obvious ecchymosis, hematoma or laceration noted head.      At the end of the RRT pt remains hemodynamically stable, resting in bed with bedside attendant at bedside.    Addendum: 0617: Nursing notes pt remains very agitated, biting his restraints, has removed the restraints, remains aggressive.  Will order a 1 time dose IV haldol 3 mg for a total of 5 mg this AM.      Discussed with and defer further cares to nursing and hospitalist.    Interval History     John Batista is a 74 year old male who was admitted on 4/18/2020 for AMS and agitation found to have newly diagnosed brain metastases.    Medical history significant for: Metastatic prostate cancer with mets to bone and lungs on chemotherapy, anemia    Code Status: Full Code    Allergies   No Known Allergies    Physical Exam   Vital Signs with Ranges:  Temp:  [96.5  F (35.8  C)-98.7  F (37.1  C)] 96.5  F (35.8  C)  Pulse:  [] 93  Heart Rate:  [] 138  Resp:  [16-26] 26  BP: (131-165)/(63-93) 137/78  SpO2:  [97 %-99 %] 98 %  I/O last 3 completed shifts:  In: 2253 [P.O.:320; I.V.:1933]  Out: 125 [Urine:125]    Constitutional: Pt lying in bed, awake, alert, in no apparent distress, verbally aggressive toward staff, restless, attempting to get out of bed  Pulmonary: In no apparent respiratory distress, clear to auscultation bilaterally, no crackles or wheezes noted, noted hemoptysis (nursing reports baseline)  Cardiovascular: Tachycardic, regular rate and rhythm,  normal S1S2, no murmur, rub or gallop noted  GI: Not assessed  Skin/Integumen: Scratch noted on L lateral chest/upper abdomen ~ 2 in in length, small laceration noted on L elbow with small sanguinous bleeding  Neuro: Awake, alert, confused, no obvious focal neuro deficits noted, moving all extremities equally, strong  Psych:  Agitated, restless, verbally aggressive,   Extremities: Moves all extremities equally    Data       CBC with Diff:  Recent Labs   Lab Test 04/20/20  0456 04/18/20  1600   WBC 1.1*   < > 1.1*   HGB 7.4*   < > 8.6*   MCV 91   < > 98   PLT 84*   < > 117*   INR  --   --  1.04    < > = values in this interval not displayed.            Comprehensive Metabolic Panel:  Recent Labs   Lab 04/20/20 0456 04/19/20  0721  04/18/20  1600    133  --  134   POTASSIUM 3.2* 3.6  --  3.6   CHLORIDE 105 103  --  102   CO2 22 21  --  22   ANIONGAP 8 9  --  10   * 131*  --  112*   BUN 19 13  --  14   CR 0.65* 0.56*  --  0.70   GFRESTIMATED >90 >90  --  >90   GFRESTBLACK >90 >90  --  >90   TIMBO 8.9 8.9  --  9.4   MAG 2.0 1.9   < >  --    PHOS  --  3.0  --   --    PROTTOTAL  --   --   --  7.7   ALBUMIN  --   --   --  3.4   BILITOTAL  --   --   --  0.6   ALKPHOS  --   --   --  140   AST  --   --   --  30   ALT  --   --   --  21    < > = values in this interval not displayed.       Time Spent on this Encounter   I spent 20 minutes on the unit/floor managing the care of John Rezafedericaashkan. Over 50% of my time was spent counseling the patient and/or coordinating care regarding services listed in this note.      EDDIE [facility]

## 2020-04-20 NOTE — PLAN OF CARE
Most of shift- pt alert but disoriented to situation. In 4 point soft restraints for half of shift. Pulling constantly, demanding restraints removed. Paranoid conversation. Labile, very pleasant and conversant at times.  Incontinent, threw urinal. C/o of sacral pain, PRN dilaudid given x1. Hemoptysis present, MD aware. Neutropenic precautions maintained. PIV infusing w/ intermittent abx. IV decadron q6hrs. Incontinent. MD notified of restlessness, dose of zyprexa, haldol, and dilaudid given. Pt continues to be agitated. grabbing equipment, MRI of brain completed yesterday- sig mets/swelling. Hem/onc and palliative following. Plan pending. Sitter at bedside.

## 2020-04-21 NOTE — PROGRESS NOTES
[facility]    Hospitalist Progress Note    Assessment & Plan   John Batista is a 74 year old male with PMH significant for metastatic prostate cancer with mets to the bone and lungs on chemotherapy who was directly admitted from [facility] ER due to altered mental status and agitation with newly found brain metastases, lactic acidosis, and urinary tract infection.     Altered mental status with agitation  COVID-rule out pending- Low suspicion  --- Confusion improved transiently, possibly secondary to receiving treatment for UTI or IV steroids, COVID negative.   --- Patient became more confused overnight on 4/19  --- His confusion is improved mostly during daytime, plan to start on radiation treatment possibly tomorrow  Urinary tract infection  Lactic acidosis   Lactate 7.2 in ED. Suspect sepsis is contributing. Received 2 L normal saline in the ED. AMS. Urine grossly abnormal with , Large LE, Neg nitrite. Received 1 dose cefepime in the ER.  - Continue Cefepime 2 g every 8 hours  -Continue IV fluids, monitor blood and urine cultures.  -Urine cultures growing  Proteus mirabilis, pending sensitivities  Hypokalemia   --replace per protocol      Newly discovered brain metastases  Metastatic prostate adenocarcinoma on chemotherapy - mets to the lung and bone.   Follows with [place] [facility], 2 days on and 12-day off cycle, last dose 5 days PTA.  Chronic myalgias on PTA Dilaudid. Also follows with Urology Dr. Galvan, is on every 6-month Lupron injection. Head CT in ER revealed lesions in cerebellum and Rt cerebral hemisphere with vasogenic edema in Rt cerebral hemisphere, most likely 2/2 metastatic disease.   - Loaded with Decadron IV 10mg in ED, continue with 4mg Q 6 hours (of note takes prednisone 10mg/d PTA)   -Appreciate input from oncology and neurosurgery, MRI noted, multiple metastases noted with the involvement of skull, currently plan is to obtain rad Onc input, no plan for any surgery as  per neurosurgery.  --- disCussed with the palliative care, for now patient is DNR/DNI, restorative care is what family is thinking.  --- Patient was seen by radiation oncology today 4/21  ---steroid dose reduced with concern of worsening confusion     Chronic leukopenia  Baseline WBC 2.5-3.6 range most recently.   -neutropenia noted following   Recent chemo   -Received Neupogen on 4/19, white count is improved, still neutropenic.     Chronic anemia  At baseline 7-8 range.  - Monitor CBC, stable      DVT Prophylaxis: sequential compression device   Code Status: DNR/DNI     Disposition: Expected discharge in 4-5  days depending on end of radiation onc treatment .    Irene Gabriel MD  Text Page   (7am to 6pm)      Interval History   ongoing off and on agitation, he was doing better in am, more receptive to suggestions, plan is to go ahead with whole brain radiation for multiple mets, I discussed with oncology, they are planning to reduce dose of steroids and monitor, his treatments are palliative not curative.    -Data reviewed today: I reviewed all new labs and imaging results over the last 24 hours. I personally reviewed labs  today .    Physical Exam   Temp: 95.9  F (35.5  C) Temp src: Oral BP: (!) 153/83 Pulse: 85 Heart Rate: 89 Resp: 18 SpO2: 99 % O2 Device: None (Room air)    There were no vitals filed for this visit.  Vital Signs with Ranges  Temp:  [95.9  F (35.5  C)-98.5  F (36.9  C)] 95.9  F (35.5  C)  Pulse:  [85] 85  Heart Rate:  [] 89  Resp:  [18-20] 18  BP: (147-153)/(74-83) 153/83  SpO2:  [98 %-99 %] 99 %  I/O last 3 completed shifts:  In: 1480 [P.O.:780; I.V.:700]  Out: 1725 [Urine:1725]    Constitutional: Is alert and oriented x 2   Respiratory: Clear to auscultation bilaterally, no crackles or wheezing  Cardiovascular: Regular rate and rhythm, normal S1 and S2, and no murmur noted  GI: Normal bowel sounds, soft, non-distended, non-tender  Skin/Integumen: No rashes, no cyanosis, no edema  Neuro  : moving all 4 extremities, occasionally confused.    Medications     - MEDICATION INSTRUCTIONS -       sodium chloride 100 mL/hr at 04/21/20 1104       ceFEPIme (MAXIPIME) IV  2 g Intravenous Q8H     dexamethasone  4 mg Intravenous Q8H     filgrastim (NEUPOGEN/GRANIX) subcutaneous  480 mcg Subcutaneous Daily at 8 pm     OLANZapine zydis  5 mg Oral BID     pantoprazole  40 mg Oral QAM AC     saccharomyces boulardii  500 mg Oral BID     senna-docusate  1 tablet Oral BID    Or     senna-docusate  2 tablet Oral BID     sodium chloride (PF)  3 mL Intracatheter Q8H       Data   Recent Labs   Lab 04/21/20  0821 04/20/20  1400 04/20/20  0456 04/19/20  0721 04/18/20  1600   WBC 2.0*  --  1.1* 0.9* 1.1*   HGB 7.6*  --  7.4* 7.7* 8.6*   MCV 92  --  91 91 98   *  --  84* 99* 117*   INR  --   --   --   --  1.04   NA  --   --  135 133 134   POTASSIUM 3.1* 3.5 3.2* 3.6 3.6   CHLORIDE  --   --  105 103 102   CO2  --   --  22 21 22   BUN  --   --  19 13 14   CR  --   --  0.65* 0.56* 0.70   ANIONGAP  --   --  8 9 10   TIMBO  --   --  8.9 8.9 9.4   GLC  --   --  148* 131* 112*   ALBUMIN  --   --   --   --  3.4   PROTTOTAL  --   --   --   --  7.7   BILITOTAL  --   --   --   --  0.6   ALKPHOS  --   --   --   --  140   ALT  --   --   --   --  21   AST  --   --   --   --  30     Recent Labs   Lab 04/20/20  0456 04/20/20  0405 04/19/20  0721 04/18/20  1600   *  --  131* 112*   BGM  --  153*  --   --        Imaging:   No results found for this or any previous visit (from the past 24 hour(s)).

## 2020-04-21 NOTE — PROGRESS NOTES
Progress Note     Primary Oncologist/Hematologist:  Dr. Borrero ([facility])          Assessment and Plan:New actions/orders today (04/21/2020) are underlined.     Nicolas is a 74 year old admitted 4/18 with altered mental status     Newly discovered brain metastases  Metastatic prostate adenocarcinoma on chemotherapy - mets to the lung and bone.   Follows with Dr Borrero at [facility]. Also follows with Urology Dr. Galvan, is on every 6-month Lupron injection.   -Prostate cancer is quite refractory.   -Most recently started on palliative chemo with oral Cytoxan and etoposide, for 12 days on, 12 days off, last dose taken 4/14  -CT head 4/18 concerning for brain mets  -MRI brain 4/19 shows 10-15 lesions with vasogenic edema  -Neurosurgery involved.  Appreciate the consult.   -Continue Decadron 4 mg every 6 hours along with PPI. Will cut to 4mg every 8 hours to see if that helps with encephalopathy.  -Will consult with Radiation Oncology. Dr. Mcnair saw him in consultation today. We spoke on the phone. Plan 5 fractions, starting as soon as today if Nicolas is cooperative with planning.  -I spoke with his wife Valerie about the need for radiation. She understands that Nicolas does not have much for options and that all treatments are palliative in nature. She would like to give him the opportunity to have radiation.  -I did review prognosis with Dr. Borrero. He feels like Nicolas might have 3-6 months. Therefore discussions about hospice care would be reasonable.   -Chemotherapy will be on hold for now. There is a chance that Nicolas might not improve his performance status enough to tolerate additional chemotherapy.  -We did talk a little about hospice today     Pancytopenia secondary to chemotherapy  -WBC quite low post cycle 1 of cyclophosphamide and etoposide, as patient very heavily treated for his prostate cancer.  -Filgrastim given 4/19. Plan another dose today.   - CBC with differential 4/22. Goal ANC 1500.  -Transfuse for  hemoglobin less than 6 (given blood shortage) and platelets less than 10,000     UTI  -Altered mental status most likely secondary to UTI and sepsis versus the brain mets.  -Continue current antibiotics.  Cultures >100,000 colonies proteus mirabilis     Encephalopathy  -Likely from infection, brain mets and steroids  -He has required pharmacologic intervention due to issue with personal safety.    -Has needed physical restraints  -This is all going to make initiation of radiation challenging  -Hopefully he will settle further       Chronic pain  Continue Dilaudid as needed for pain.    Luis Alberto Bourgeois APRN, CNP  [facility]  [phone] (office), [phone] (cell)        Interval History:     Nicolas was calmer. He seemed to grasp the gravity of the situation.              Review of Systems:     The 5 point Review of Systems is negative other than noted in the HPI             Medications:   Scheduled Medications    ceFEPIme (MAXIPIME) IV  2 g Intravenous Q8H     dexamethasone  4 mg Intravenous Q8H     filgrastim (NEUPOGEN/GRANIX) subcutaneous  480 mcg Subcutaneous Daily at 8 pm     OLANZapine zydis  5 mg Oral BID     pantoprazole  40 mg Oral QAM AC     saccharomyces boulardii  500 mg Oral BID     senna-docusate  1 tablet Oral BID    Or     senna-docusate  2 tablet Oral BID     sodium chloride (PF)  3 mL Intracatheter Q8H     PRN Medications  acetaminophen **OR** [DISCONTINUED] acetaminophen **OR** acetaminophen, bisacodyl **OR** bisacodyl **OR** bisacodyl, bisacodyl, haloperidol lactate, HYDROmorphone, HYDROmorphone, labetalol, lidocaine 4%, lidocaine (buffered or not buffered), LORazepam, magnesium sulfate, - MEDICATION INSTRUCTIONS -, melatonin, naloxone, OLANZapine zydis, ondansetron **OR** ondansetron, polyethylene glycol, potassium chloride, potassium chloride with lidocaine, potassium chloride, potassium chloride, potassium chloride, prochlorperazine **OR** prochlorperazine **OR** prochlorperazine, sodium  chloride (PF)               Physical Exam:   Vitals were reviewed  Blood pressure (!) 165/83, pulse 85, temperature 97.3  F (36.3  C), temperature source Oral, resp. rate 18, SpO2 100 %.  Wt Readings from Last 4 Encounters:   03/24/20 65.8 kg (145 lb)   02/13/20 72.1 kg (159 lb)   01/24/20 72.1 kg (159 lb)   01/02/20 68.9 kg (152 lb)       I/O last 3 completed shifts:  In: 1480 [P.O.:780; I.V.:700]  Out: 1725 [Urine:1725]      Constitutional: Awake, more cooperative            Data:   All laboratory data and imaging studies reviewed.    CMP  Recent Labs   Lab 04/21/20 0821 04/20/20  1400 04/20/20 0456 04/19/20 0721 04/18/20  1600   NA  --   --  135 133 134   POTASSIUM 3.1* 3.5 3.2* 3.6 3.6   CHLORIDE  --   --  105 103 102   CO2  --   --  22 21 22   ANIONGAP  --   --  8 9 10   GLC  --   --  148* 131* 112*   BUN  --   --  19 13 14   CR  --   --  0.65* 0.56* 0.70   GFRESTIMATED  --   --  >90 >90 >90   GFRESTBLACK  --   --  >90 >90 >90   TIMBO  --   --  8.9 8.9 9.4   MAG  --   --  2.0 1.9  --    PHOS  --   --   --  3.0  --    PROTTOTAL  --   --   --   --  7.7   ALBUMIN  --   --   --   --  3.4   BILITOTAL  --   --   --   --  0.6   ALKPHOS  --   --   --   --  140   AST  --   --   --   --  30   ALT  --   --   --   --  21     CBC  Recent Labs   Lab 04/21/20 0821 04/20/20  0456 04/19/20 0721 04/18/20  1600   WBC 2.0* 1.1* 0.9* 1.1*   RBC 2.48* 2.41* 2.53* 2.80*   HGB 7.6* 7.4* 7.7* 8.6*   HCT 22.8* 21.9* 23.0* 27.3*   MCV 92 91 91 98   MCH 30.6 30.7 30.4 30.7   MCHC 33.3 33.8 33.5 31.5   RDW 13.3 13.0 13.0 12.8   * 84* 99* 117*     INR  Recent Labs   Lab 04/18/20  1600   INR 1.04

## 2020-04-21 NOTE — CONSULTS
73 y/o male with Dx. Of metastatic adenoca of prostate receiving chemotherapy treatment admitted with confusion & headache.  MRI brain 4/19/20 shows multiple 10-15 lesions C/W metastases. Pt treated with decadron.  Pt continues to have confusion & agitation.  Pt currently more compliant after Haldol medication.  Discussed consideration of brain XRT treatment 5 fractions to whole brain to dose of 2000 cGy.  Pt states he would attempt to cooperate.  I Discussed XRT rx. with pt & wife & outlined potential side-effects & complications of 2000 cGy treatment to brain. The pt & wife consents to XRT treatment.  Will plan to attempt simulation & treatment this afternoon.  I additionally discussed with Dr.Rohan Luque from neurosurgery & no surgical intervention planned at this time.  I also discussed with SHAY MORGAN CNP with [facility].  Consult dictated.  JAYDEN Mcnair MD.

## 2020-04-21 NOTE — PLAN OF CARE
OT: Spoke with RN, reports pt is doing better, but did need to pull staff to A pt and PA in to see pt at this time, RN recommends to check back later if time.

## 2020-04-21 NOTE — PLAN OF CARE
Oriented to self, place.  Can fluctuate between calm and lucid, and paranoid confusion.  Sitter at bedside.  In 2 point wrist restraints to prevent him for pulling on lines.  IV fluids and IV antibiotics.  Condom catheter placed.  Passing flatus.  PRN haldol given x1 for restlessness/agitation.  Scheduled decadron and Zyprexa given.  Placed in wheelchair and wheeled around unit.  Denied pain.  Neupogen injection given in abdomen.  Wife and son, Miguel updated this shift.  Wife would like to discuss code status with palliative MD tomorrow, sticky note left.  Nursing will continue to monitor.

## 2020-04-21 NOTE — CONSULTS
"CLINICAL NUTRITION SERVICES  -  ASSESSMENT NOTE      Recommendations Ordered by Registered Dietitian (RD): Boost BID between meals    Malnutrition:   % Weight Loss:  Up to 7.5% in 3 months (non-severe malnutrition)  % Intake:  No decreased intake noted since admit   Subcutaneous Fat Loss:  Unable to determine as above   Muscle Loss:  Unable to determine as above   Fluid Retention:  None noted    Malnutrition Diagnosis: Unable to determine due to inability to perform a Nutrition Focused Physical Exam            REASON FOR ASSESSMENT  John Batista is a 74 year old male seen by Registered Dietitian for Admission Nutrition Risk Screen for unintentional loss of 10# or more in the past two months and reduced oral intake over the last month (received 4/19/20)      NUTRITION HISTORY  Unable to obtain as patient agitated and confused, in restraints, and requiring a sitter (therefore not appropriate to visit with via phone).  Unable to see patient in person due to distancing precautions during COVID-19 pandemic.       CURRENT NUTRITION ORDERS  Diet Order:     Diet:  Regular               Current Intake/Tolerance:  Patient is consuming 100% of meals per flowsheets.  Breakfast this morning was an omelet, sausage x 3, and OJ.  Dinner last night consisted of a burger, fries, and cola x 2.       NUTRITION FOCUSED PHYSICAL ASSESSMENT FOR DIAGNOSING MALNUTRITION)  No:  Unable to visit patient in person due to distancing precautions during COVID-19 pandemic                Observed:    Unable    Obtained from Chart/Interdisciplinary Team:  None     ANTHROPOMETRICS  Height: 6'0\"  Weight:  No weight recorded this admit  Most recent weight:  65.8 kg (3/24)  BMI:  19.7 kg/m^2   Weight Status: Normal BMI  IBW:  80.9 kg   %IBW: 81%  Weight History:   Wt Readings from Last 10 Encounters:   03/24/20 65.8 kg (145 lb)   02/13/20 72.1 kg (159 lb)   01/24/20 72.1 kg (159 lb)   01/02/20 68.9 kg (152 lb)   12/24/19 68.7 kg (151 lb 6.4 oz) "   12/18/19 72.4 kg (159 lb 11.2 oz)   12/09/19 69.8 kg (153 lb 12.8 oz)   12/02/19 73.7 kg (162 lb 8 oz)   10/01/19 79.4 kg (175 lb)   09/05/19 75.9 kg (167 lb 6.4 oz)     Down 6# or 4% in 3 months     LABS  Labs reviewed    MEDICATIONS  Medications reviewed      ASSESSED NUTRITION NEEDS PER APPROVED PRACTICE GUIDELINES:    Dosing Weight 65.8 kg   Estimated Energy Needs: 9204-5838 kcals (30-35 Kcal/Kg)  Justification: underweight  Estimated Protein Needs: 100-120 grams protein (1.5-1.8 g pro/Kg)  Justification: repletion  Estimated Fluid Needs: 2794-5375 mL (1 mL/Kcal)  Justification: maintenance    MALNUTRITION:  % Weight Loss:  Up to 7.5% in 3 months (non-severe malnutrition)  % Intake:  No decreased intake noted since admit   Subcutaneous Fat Loss:  Unable to determine as above   Muscle Loss:  Unable to determine as above   Fluid Retention:  None noted    Malnutrition Diagnosis: Unable to determine due to inability to perform a Nutrition Focused Physical Exam     NUTRITION DIAGNOSIS:  Unintended weight loss related to presumed poor appetite prior to admit as evidenced by 4% weight loss over the last 6 months       NUTRITION INTERVENTIONS  Recommendations / Nutrition Prescription  Continue regular diet as tolerated  Boost BID between meals     Implementation  Nutrition education: Not appropriate at this time due to patient condition  Medical Food Supplement:  Ordered as above     Nutrition Goals  Patient will continue to consume >/= 75% of meals + consume at least % of supplements    MONITORING AND EVALUATION:  Progress towards goals will be monitored and evaluated per protocol and Practice Guidelines    Betahny Dooley RD, LD, CNSC   Clinical Dietitian - [facility]

## 2020-04-21 NOTE — PLAN OF CARE
Orientation fluctuates. Neuro checks WNL; no abnormalities seen. Can be calm and clear answering orientation questions appropriately, but then gets paranoid and confused. Did get agitated overnight pulling @ lines and trying to get oob. PRN IV Haldol given; effective. Sitter at bedside. Did not get oob overnight. Turning frequently in bed by self. Continues with 2 point restraints; reordered overnight. Condom cath placed; good UOP overnight. Incontinent of stool; smears of BM overnight. Some hemoptysis overnight, dark red blood. Excoriation in coccyx area; barrier cream applied. C/o cocxyx and leg pain; PRN Tylenon given, effective. PIV infusing NS @ 100 mL/hr; intermittent antibiotics. Discharge pending.

## 2020-04-21 NOTE — PROVIDER NOTIFICATION
MD Notification    Notified Person: MD    Notified Person Name: Rey RUDOLPH    Notification Date/Time: 0300 4/21/20    Notification Interaction: Page    Purpose of Notification: Needing restraints to be re-ordered again    Orders Received: Awaiting response     Comments: NP reordered for another 24 hours

## 2020-04-21 NOTE — PROGRESS NOTES
Patient was brought down to Radiation therapy this AM to be simulated for Whole Brain treatment.  He returned this PM to have the 1st of 5 total treatments.  He received a total dose of 400 cGy delivered with 10 MV Photons.  Patient has 4 treatments remaining.  SE  RTT

## 2020-04-21 NOTE — CONSULTS
Consult Date:  04/21/2020      RADIATION THERAPY CONSULTATION      REFERRING PHYSICIAN:   Hospitalist, Dr. Irene Gabriel.  Also [facility] Luis Alberto Bourgeois, Clinical nurse practitioner.  Also Dr. Dorothy Thapa, [facility] and Dr. Borrero, Oncologist with [facility] in [place], [place].  Dr. Ti Luque MD.      Inpatient room number is room 8829 bed 2.      HISTORY OF PRESENT ILLNESS:  Mr. John Batista is a 74-year-old gentleman with a diagnosis of metastatic adenocarcinoma of the prostate with metastatic involvement of multiple bony sites, lung and recent admission with confusion with evidence of CNS brain metastasis.      Mr. John Batista is a 74-year-old gentleman who initially was diagnosed in 12/2011 as having a [place] 4+4 adenocarcinoma of the prostate.  The patient ultimately received treatment with androgen deprivation and external beam radiation to the prostate pelvis region which was completed in 03/2012 to a dose of 7920 cGy to the prostate reduced region.  Subsequent metastatic disease was identified.  The patient has received systemic chemotherapy and additionally received some fecal radioisotope treatment completed in 07/2019.  The patient has had progressive disease and recent chemotherapy with Cytoxan and etoposide chemotherapy carried out on 04/03/2020.  The patient was brought to the emergency room on 04/18/2020 with confusion in addition to headache symptoms.  CT scan of the brain on 04/18/2020 without contrast showed evidence of abnormal lesions consistent with metastatic tumor.  The patient was subsequently transferred to [facility].  MR study of the brain on 04/19/2020 shows evidence of multiple CNS brain lesions which number up to approximately 15 total lesions involving the supratentorial and posterior fossa brain with moderate edema identified.  The patient has been started on Decadron medication at 4 mg q.i.d.  He is seen for consideration of  external beam radiation treatment to the CNS brain region.  He additionally has consulted with Dr. Luque of Neurosurgery.  No neurosurgical intervention at this time is recommended.      As noted, the patient is seen for consideration of radiation treatment of the CNS brain region.      MEDICATIONS:  Include Decadron 4 mg q.6 hours, Pantoprazole medication.        ALLERGIES:  NO KNOWN DRUG ALLERGIES.        Additional medications include hydromorphone for pain, leuprolide injection every 6 months.      PAST MEDICAL HISTORY:  Significant for previous hernia repair surgery in the past.      HABITS:  Tobacco:  The patient is a former smoker, discontinued in 1980.  Alcohol:  None.      PHYSICAL EXAMINATION:   GENERAL:  Reveals a confused gentleman in no acute distress.   VITAL SIGNS:  His weight is 145 pounds, blood pressure 153/83, afebrile at 95.9 degrees Fahrenheit, pulse is regular at 85.  ECOG performance score is 3.  The patient is frankly confused and agitated.  He has a urinary catheter in place and is not cooperative initially for examination.  Subsequent Haldol medication resulted in the patient being more sedated.   LUNGS:  Clear to auscultation.   CARDIAC:  Regular rhythm.   NEUROLOGIC:  The patient is moving all 4 extremities and his extraocular eye movements are intact.      IMPRESSION AND RECOMMENDATION:  Mr. John Batista is a 74-year-old gentleman with a diagnosis of metastatic adenocarcinoma of the prostate with evidence of CNS brain metastatic involvement with multiple central nervous system brain lesions, numbering up to approximately 12-15 lesions.      Based on the extent of the CNS brain involvement, I have reviewed and discussed with the patient and his wife consideration of radiation treatment to the CNS brain region.  I have outlined treatment to the whole brain region to a dose of 2000 cGy in 5 treatment fractions of 400 cGy.  I have explained the risks for scalp irritative reaction and  alopecia and the risk for CNS injury and brain necrosis.  The patient is not able to consent, but his wife has consented for him based on his overall goals and objectives for treatment of his cancer.  We will be carrying out simulation and treatment planning CT and initiating treatment later today if this is feasible, depending upon the patient's cooperation and confusion level.         REAL ACHARYA MD             D: 2020   T: 2020   MT: [id number]      Name:     SHARON BANEGAS   MRN:      [id number]        Account:       [id number]   :      1945           Consult Date:  2020      Document: [id number]

## 2020-04-21 NOTE — PROGRESS NOTES
Antimicrobial Stewardship Team Note    Antimicrobial Stewardship Program - A joint venture between [facility] and [facility] Consult[facility]icians to optimize antibiotic management.     Patient: John Batista  MRN: [id number]  Allergies: Patient has no known allergies.    Brief Summary: 74 year old male with PMH significant for metastatic prostate cancer with mets to the bone and lungs on chemotherapy who was directly admitted from [facility] ER due to altered mental status and agitation with newly found brain metastases, lactic acidosis, and urinary tract infection.     Altered mental status with agitation  COVID-rule out pending- Low suspicion  --- Confusion improved transiently, possibly secondary to receiving treatment for UTI or IV steroids, COVID negative.   --- Patient became more confused overnight on 4/19  Urinary tract infection  Lactic acidosis   Lactate 7.2 in ED. Suspect sepsis is contributing. Received 2 L normal saline in the ED. AMS. Urine grossly abnormal with , Large LE, Neg nitrite. Received 1 dose cefepime in the ER.  - On Cefepime 2 g every 8 hours  -Continue IV fluids, monitor blood and urine cultures.  -Urine cultures growing  Proteus mirabilis,       Active Anti-infective Medications   (From admission, onward)                Start     Stop    04/19/20 0100  ceFEPIme  2 g,   Intravenous,   EVERY 8 HOURS     Bacteremia, Urinary Tract Infection        --          Assessment: Proteus isolate in urine is fairly pan-sensitive (R only to nitrofurantoin).  Recommend narrowing cefepime to ceftriaxone as Pseudomonal coverage not needed.  ANC today noted to be 1.3.    Recommendations:  Medication Change: de-escalate antibiotic regimen -  .cefepime to ceftriaxone  Consult Recommendation:     Recommended labs:     Other Recommendation(s):   -      Pharmacy took the following actions: Sticky note reminder created, Electronic note created.    Discussed with ID Staff Dr. Francisco Oneil  Antwon Beverly, PharmD, [facility]    Vital Signs/Clinical Features:  Vitals         04/19 0700  -  04/20 0659 04/20 0700  -  04/21 0659 04/21 0700  -  04/21 1340   Most Recent    Temp ( F) 96.5 -  98.7    95.8 -  98.5    95.9 -  97.3     97.3 (36.3)    Pulse 93 -  106        85     85    Heart Rate 88 -  138    88 -  106      81     81    Resp 17 -  26    18 -  20      18     18    /72 -  165/93    147/79 -  162/75    153/83 -  165/83     165/83    SpO2 (%) 97 -  99    98 -  99    99 -  100     100            Labs  Estimated Creatinine Clearance: 90 mL/min (A) (based on SCr of 0.65 mg/dL (L)).  Recent Labs   Lab Test 12/23/19  0558 12/31/19  1412 01/24/20  1618 04/18/20  1600 04/19/20  0721 04/20/20  0456   CR 0.73 0.65* 1.02 0.70 0.56* 0.65*       Recent Labs   Lab Test 12/23/19  0558 12/24/19  0515 12/31/19  1412  01/24/20  1618 02/13/20  0911 04/18/20  1600 04/19/20  0721 04/20/20  0456 04/21/20  0821   WBC 9.6 7.6 3.6*  --  2.5*  --  1.1* 0.9* 1.1* 2.0*   ANEU 7.8 6.0 3.3  --  1.7  --  0.8*  --   --  1.3*   ALYM 0.7* 0.8 0.2*  --  0.4*  --  0.3*  --   --  0.6*   KISHOR 1.0 0.8 0.1  --  0.4  --  0.0  --   --  0.1   AEOS 0.0 0.0 0.0  --  0.0  --  0.0  --   --  0.0   HGB 8.0* 7.6* 7.9*   < > 7.1* 8.7* 8.6* 7.7* 7.4* 7.6*   HCT 25.1* 24.2* 24.9*  --  21.8*  --  27.3* 23.0* 21.9* 22.8*    100 101*  --  97  --  98 91 91 92    222 155  --  162  --  117* 99* 84* 133*    < > = values in this interval not displayed.       Recent Labs   Lab Test 10/31/18  0529 08/27/19  1311 11/21/19  1723 12/08/19  1803 12/21/19  2248 04/18/20  1600   BILITOTAL 0.5 1.1 0.7 0.6 0.3 0.6   ALKPHOS 114 79 174* 176* 142 140   ALBUMIN 2.9* 3.7 3.8 3.4 3.5 3.4   AST 28 27 34 40 33 30   ALT 19 33 47 32 32 21       Recent Labs   Lab Test 11/21/19  1925 12/21/19  2248 12/22/19  0241 12/22/19  0550 04/18/20  1600 04/18/20  2245 04/19/20  0721   PCAL  --   --  0.11  --  0.19  --   --    LACT 0.9 2.1*  --  0.9 7.2* 1.0 1.0   CRP  --   --    --   --   --   --  33.1*   SED  --   --   --   --   --   --  76*             Culture Results:  7-Day Micro Results       Procedure Component Value Units Date/Time    Blood culture [[id number]] Collected:  04/18/20 1752    Order Status:  Completed Lab Status:  Preliminary result Updated:  04/21/20 0309    Specimen:  Blood      Specimen Description Blood Left Arm     Culture Micro No growth after 3 days    Urine Culture Aerobic Bacterial [[id number]]  (Abnormal)  (Susceptibility) Collected:  04/18/20 1752    Order Status:  Completed Lab Status:  Final result Updated:  04/20/20 2147    Specimen:  Catheterized Urine      Specimen Description Catheterized Urine     Special Requests Specimen received in preservative     Culture Micro >100,000 colonies/mL  Proteus mirabilis      Susceptibility       Proteus mirabilis (1)       Antibiotic Interpretation Sensitivity Method Status    AMPICILLIN Sensitive <=2 ug/mL COCO Final    CEFAZOLIN Sensitive <=4 ug/mL COCO Final     Cefazolin COCO breakpoints are for the treatment of uncomplicated urinary tract   infections.  For the treatment of systemic infections, please contact the   laboratory for additional testing.    CEFOXITIN Sensitive <=4 ug/mL COCO Final    CEFTAZIDIME Sensitive <=1 ug/mL COCO Final    CEFTRIAXONE Sensitive <=1 ug/mL COCO Final    CIPROFLOXACIN Sensitive <=0.25 ug/mL COCO Final    GENTAMICIN Sensitive <=1 ug/mL COCO Final    LEVOFLOXACIN Sensitive <=0.12 ug/mL COCO Final    NITROFURANTOIN Resistant 128 ug/mL COCO Final     Intrinsically Resistant    TOBRAMYCIN Sensitive <=1 ug/mL COCO Final    Trimethoprim/Sulfa Sensitive <=1/19 ug/mL COCO Final    AMPICILLIN/SULBACTAM Sensitive <=2 ug/mL COCO Final    Piperacillin/Tazo Sensitive <=4 ug/mL COCO Final    AMIKACIN [*]  Sensitive <=2 ug/mL COCO Final    CEFEPIME Sensitive <=1 ug/mL COCO Final    MEROPENEM [*]  Sensitive <=0.25 ug/mL COCO Final               [*]   Suppressed Antibiotic                   Blood culture [[id number]] Collected:   04/18/20 1600    Order Status:  Completed Lab Status:  Preliminary result Updated:  04/21/20 0309    Specimen:  Blood      Specimen Description Blood Right Arm     Culture Micro No growth after 3 days            Recent Labs   Lab Test 08/27/19  1518 11/21/19  1835 12/08/19  1942 12/22/19  0018 04/18/20  1752   URINEPH 8.5* 8.0* 7.0 7.5* 6.5   NITRITE Negative Negative Negative Negative Negative   LEUKEST Negative Negative Moderate* Moderate* Large*   WBCU <1 12* 102* 28* 166*                   Recent Labs   Lab Test 03/23/20  1437   CDBPCT Canceled, Test credited       Imaging: Xr Chest Port 1 View    Result Date: 4/18/2020  XR CHEST PORT 1 VW 4/18/2020 5:31 PM HISTORY: Confusion with hemoptysis. COMPARISON: CT chest 8/27/2019, chest radiograph at 10/28/2018     Impression: Allowing for the differences in technique, there appears to be no significant changes involving the 2 pulmonary masses in the right lung laterally. There is a opacity involving the left lung also remains stable. The multiple other smaller pulmonary nodules visualized on the chest CT of 8/27/2019 are not well visualized. No evidence for acute focal infiltrate or consolidation. Normal heart size. Normal pulmonary vascularity. Tortuous and calcified thoracic aorta. Degenerative changes in the spine and both shoulders. CURT L BEHRNS, MD    Mr Brain W/o & W Contrast    Result Date: 4/19/2020  MRI BRAIN WITHOUT AND WITH CONTRAST  4/19/2020 7:36 PM HISTORY:  AMS, newly found brain mets on head CT, known metastatic prostate cancer. TECHNIQUE:  Multiplanar, multisequence MRI of the brain without and with 6mL Gadavist. COMPARISON: Head CT on 4/18/2020. FINDINGS: Multiple intra-axial enhancing lesions are noted in both cerebral hemispheres and in both cerebellar hemispheres. The number of lesions is approximately 10-15. The largest lesion is in the left posterior paramedian parietal region abutting the superior sagittal sinus. This lesion measures 1.4 x 1.4  x 2.0 cm in size. There is also a 1.8 x 1.6 x 1.4 cm enhancing mass in the left cerebellum. There are two separate enhancing lesions in the inferior right temporal occipital region measuring 1.3 and 1.5 cm. There are also a few smaller lesions in a similar location. Lesions are also present in the right and left frontal lobes, the left parietal lobe, and in the right cerebellum. These lesions show evidence of calcium or hemosiderin on the gradient echo sequences. There is significant surrounding vasogenic edema with regards to the temporal lesions as well as the right frontal and both parietal lesions. Smaller amounts of vasogenic edema are also seen in the left frontal lobe. There is localized mass effect but no midline shift. Brain parenchyma is otherwise normal. There are also several lesions in the high convexity region of the calvarium bilaterally consistent with osseous deposits.     IMPRESSION: 1. Multiple intra-axial metastatic deposits. 2. Osseous metastases also noted bilaterally in the convexity regions. DIEGO JACOBSON MD    Head Ct W/o Contrast    Result Date: 4/18/2020  EXAM: CT HEAD W/O CONTRAST LOCATION: [facility] DATE/TIME: 4/18/2020 4:43 PM INDICATION: Altered mental status. Agitation. History of prostate cancer. COMPARISON: None. TECHNIQUE: Routine without IV contrast. Multiplanar reformats. Dose reduction techniques were used. FINDINGS: INTRACRANIAL CONTENTS: There are hyperdense lesions in the superior aspects of both cerebellar hemispheres, as well as a subtle hyperdense lesion in the right posteroinferior temporal lobe. Temporal lobe lesion is associated with some surrounding low density which is probably edema. There is also low density in the right frontal lobe most likely due to edema and there is also low density in the right parietal lobe, again most likely due to edema, although is difficult to exclude ischemic change. There is no definite evidence for any hemorrhage. No  definite evidence of CT infarct. Brain parenchyma is otherwise normal. Mild generalized volume loss. No hydrocephalus. VISUALIZED ORBITS/SINUSES/MASTOIDS: No intraorbital abnormality. No paranasal sinus mucosal disease. No middle ear or mastoid effusion. BONES/SOFT TISSUES: No acute abnormality.     IMPRESSION: 1.  Abnormal mildly hyperintense lesions in the cerebellum in the right cerebral hemisphere. There are also areas of what appear to be vasogenic edema in the right cerebral hemisphere. Pattern is most likely due to metastatic disease. If clinically indicated, MRI could be helpful in further evaluation. 2.  Cerebral atrophy.

## 2020-04-21 NOTE — PLAN OF CARE
PT: Attempted PT evaluation, pt with episode of agitation earlier, received ativan, now resting and too sleepy to participate.

## 2020-04-21 NOTE — PROGRESS NOTES
SPIRITUAL HEALTH SERVICES Progress Note  [facility]    Attempted to visit for follow-up, per pt and previous  request.  Pt was sleeping, and per aide at bedside has had a difficult couple of days.  SH team is available and will continue to check in regarding need for SH support.                                                                                                                                                 Mayra Stokes M.A.  Staff   Pager [phone]  Phone [phone]

## 2020-04-21 NOTE — PROGRESS NOTES
[facility]  Palliative Care Daily Progress Note       Recommendations & Counseling       Hyperactive delirium likely multifactorial from new brain mets/swelling, steroids, underlying UTI/sepsis    For hyperactive delirium, follow delirium precautions: avoid benzos and deliriogenic medications, try to keep him awake, stimulated, reoriented during the day. Lights on, blinds open, try to let him sleep at night. Lights & TV off. Don't wake from 10p-6a unless absolutely necessary    Schedule zyprexa 5mg ODT BID with 5mg ODT PRN for breakthrough agitation    Haldol 2mg IV Q6hrs PRN breakthrough agitation    If agitation is still intractable, low threshold to consult Psych     Spoke with pt's wife Valerie via phone again today, wife continues to elect DNR/DNI after further discussion     Exploring option of WBRT over 5 fractions once pt is calm and can tolerate treatment     For management of pain, continue dilaudid 4mg PO Q4hrs PRN pain, 0.5mg IV dilaudid Q4hrs PRN severe pain not relieved by oral      Osiris MORGAN, [facility]  Palliative Medicine   Pager [phone]      Thank you for the opportunity to participate in the care of this patient and family. Our team: will continue to follow.     During regular M-F work hours -- if you are not sure who specifically to contact -- please contact us at [phone].    After regular work hours and on weekends/holidays, you can call our answering service at [phone]. Also, who's on call for us is available in [facility] [employer].     Attestation:  Total time on the floor involved in the patient's care: 35 minutes  Total time spent in counseling/care coordination: >50%     Assessments          John Batista is a 74 year old male with PMH significant for distant hx alcoholism who has been sober for decades, hx GIB, and metastatic prostate CA with mets to the lungs and bone on current therapy with oral cytoxan and etoposide who presents with AMS and agitation. He  is found to have new mets to the cerebellum and right cerebral hemisphere with vasogenic edema. He was started on dexamethasone and evaluated by NSG and Oncology. Whole brain XRT is being considered once he is calm and directable. He is also found to have UTI on cefepime. The patient remains agitated and delirious. He is pancytopenic from recent chemotherapy.     Today, the patient was seen for:  Pt and family support, goals of care    Called pt's wife Valerie by phone, per her request. Valerie wanted to further discuss code status within detail.     I educated regarding the pros and cons of attempting cardiac resuscitation. Discussed probability of survival as well as quality of life implications. I educated regarding the pros and cons of intubation and mechanical ventilation. Discussed probability of survival as well as quality of life implications. Recommended DNR/DNI and Valerie continues to agree.     We note that Nicolas did sign a HCD at one point in 2017, but it was incorrectly witnessed. We note that we cannot do anything with the document, or a new document now.     Visited Nicolas. Sitter at bedside. Pt is awake, yet restless in bed. He knows something isn't right. He doesn't feel good.     Prognosis, Goals, or Advance Care Planning was addressed today with: Yes.  Mood, coping, and/or meaning in the context of serious illness were addressed today: No.             Interval History:     Chart review/discussion with unit or clinical team members:   Intermittent agitation persists. Taking zyprexa ODT BID, no zyprexa PRN. Receiving haldol IV PRN. Did receive a dose of ativan IV today. Still with bedside sitter. Dexamethasone dosed down to TID from QID. Will start WBRT once pt is more clinically stable.     Per patient or family/caregivers today:  Pt reports not feeling right or good.     Key Palliative Symptoms:  # Pain severity the last 12 hours: low  # Dyspnea severity the last 12 hours: none  # Nausea severity the last 12  hours: none  # Anxiety severity the last 12 hours: severe    Patient is on opioids: bowels not assessed today.           Review of Systems:     Besides above, an additional N/A system ROS was reviewed and is unremarkable          Medications:     I have reviewed this patient's medication profile and medications during this hospitalization.    Noted meds:    Cefepime   Dexamethasone 4mg IV Q8hrs reduced from every 6hrs  Zyprexa 5mg ODT BID   Senna 1-2 tabs PO BID     Noted PRN meds are:  Haldol 2mg IV Q6hrs PRN agitation   Dilaudid 4mg PO Q4hrs PRN pain   Dilaudid 0.5mg IV Q4hrs PRN pain   Zyprexa 5mg PO BID PRN agitation            Physical Exam:   Temp: 97.3  F (36.3  C) Temp src: Oral BP: (!) 165/83 Pulse: 85 Heart Rate: 81 Resp: 18 SpO2: 100 % O2 Device: None (Room air)    CONSTITUTIONAL: Chronically ill cachectic man seen lying in bed in moderate distress, restless, rolling around in bed. Able to verbalize that he doesn't feel right. Sitter at bedside   RESPIRATORY: NL respiratory effort on RA  MUSCULOSKELETAL: Wrist restraints            Data Reviewed:     Reviewed recent pertinent imaging, comments:   4/19 MRI Brain Multiple intra-axial metastatic deposits. Osseous metastases also noted bilaterally in the convexity regions.     Reviewed recent labs, comments:   K 3.1  WBC 2  ANC 1300  Hgb 7.6  Plt 133

## 2020-04-21 NOTE — PROVIDER NOTIFICATION
MD Notification    Notified Person: MD    Notified Person Name: Harvey     Notification Date/Time: 04/21/20 04/21/20     Notification Interaction: text page     Purpose of Notification: Per radiologist, can pt have any anti-psychotics/anxiety med prior to radiation? Pt is also nauseous so IV would be preferable. Thanks.     Orders Received:    Comments:

## 2020-04-21 NOTE — PROGRESS NOTES
[id number] shift     Pt's orientation fluctuates, mostly A/O to self. Restless and agitated. Sitter at bedside. Restraints removed during AM however pt became increasingly agitated-prompting 2 point restraints to be placed back on. IV Haldol given x1-effective. Lorazepam given prior to radiation appointment. Neuro checks WNL. VSS, on RA. C/o generalized pain, tylenol given x1. Regular diet, fair appetite, needs assistance w/ eating. Emesis x1-zofran given w/ good effect. Incontinent, condom cath replaced. Excoriated coccyx, barrier cream applied, pt repositions self frequently in bed. Up w/ assist x1-2 + GB/walker, pt refuses assistance at times. PIV w/ NS @ 100/hr + intermittent abx. K 3.1>replaced>1600 recheck. Wife (Valerie) updated. Hem/onc & palliative following. Plans on having radiation 1/10 this evening. Discharge pending.

## 2020-04-22 NOTE — PROVIDER NOTIFICATION
MD Notification    Notified Person: MD    Notified Person Name: Dr. Angel    Notification Date/Time: 4/22/20 @ 1149    Notification Interaction: Text page    Purpose of Notification: Can neuros be changed to Q shift? Pt becomes agitated at times with neuro assessments.    Orders Received:    Comments:

## 2020-04-22 NOTE — PROGRESS NOTES
Today patient received radiation treatment number 2 of 5 to the whole brain.  He has received a total to date dose of 800 cGy delivered with 10 MV Photons.

## 2020-04-22 NOTE — PLAN OF CARE
Pt reports at baseline he lives with his spouse in a 4 level home and is independent in self-cares without a gait aid, driving, yardwork, med management, financial management.     Discharge Planner OT   Patient plan for discharge: Pt did not state  Current status: Pt with 1:1 and in bed. Initially very cooperative, responding appropriately to questions and initiating discussion of his family, appeared to be a reliable historian of PLOF information. Completed supine to sit with SBA. Donned socks with CGA for balance safety. Pt perseverated on untangling equipment cords and needed increased time to be redirected. BP high: 164/86. Nursing notified. Pt requesting to use toilet. Ambulated quickly and impulsively to toilet with CGA for balance safety; moving around walker with great force. Requested total assistance for clothing management; was incontinent of BM. Pt mildly agitated and requested that door be shut. Pt reported dizziness and then was agreeable to door being open. Pt requested increased time to sit on toilet. Pt left with 1:1.   Barriers to return to prior living situation: impulsivity, willingness to follow safety instructions, incontinence, dynamic balance impairment, cognitive impairment  Recommendations for discharge: TCU  Rationale for recommendations: Pt functioning below baseline and will benefit from continued skilled OT to maximize safety and independence.          Entered by: Bertha Akers 04/22/2020 11:40 AM

## 2020-04-22 NOTE — PLAN OF CARE
A&Ox4 but slow to respond while thinking of answers, intermittent confusion and disorientation. [place], one HA found in pt bed, other noted by prior RN to have been lost at [place] along with glasses. VSS on RA. Up A1-2 GB/W, unsteady. Agitated one episode overnight, calmed down after walking to BR, and getting cleaned up. Pt condom cath had slipped off, pt was very incont in bed which likely led to agitation. Voiding adequately. Needs condom cath replaced, supplies in room. No BM this shift. Pain managed with tylenol and dilaudid. IVF infusing. LA 4.6 overnight, bolus given, pt noted SOB and feeling not well. Slept after RRT and bolus, otherwise fitful sleep/wake most of night. Discharge plan pending.

## 2020-04-22 NOTE — PROGRESS NOTES
04/22/20 1605   Quick Adds   Type of Visit Initial PT Evaluation   Living Environment   Lives With spouse   Living Arrangements house   Home Accessibility stairs within home   Number of Stairs, Within Home, Primary 7   Stair Railings, Within Home, Primary railing on right side (ascending)   Transportation Anticipated car, drives self;family or friend will provide   Living Environment Comment Patient living with his spouse in a 4 level split house with 7 steps in between each level with a single railing.   Self-Care   Usual Activity Tolerance good   Current Activity Tolerance fair   Equipment Currently Used at Home grab bar, tub/shower   Activity/Exercise/Self-Care Comment Reports IND with functional mobility & ADLs at baseline.   Functional Level Prior   Ambulation 0-->independent   Transferring 0-->independent   Fall history within last six months yes   Number of times patient has fallen within last six months 1   Which of the above functional risks had a recent onset or change? ambulation;transferring   General Information   Onset of Illness/Injury or Date of Surgery - Date 04/19/20   Referring Physician Irene Gabriel MD   Patient/Family Goals Statement To return to home   Pertinent History of Current Problem (include personal factors and/or comorbidities that impact the POC) 74 year old male with PMH significant for metastatic prostate cancer with mets to the bone and lungs on chemotherapy who was directly admitted from [facility] ER due to altered mental status and agitation with newly found brain metastases, lactic acidosis, and urinary tract infection.  (per Irene Gabriel MD note)   Precautions/Limitations fall precautions   General Observations Greeted patient supine in bed with sitter at bedside.    General Info Comments Activity: up ad catracho   Cognitive Status Examination   Orientation orientation to person, place and time   Level of Consciousness alert   Follows Commands and Answers Questions able to  "follow single-step instructions;75% of the time   Personal Safety and Judgment impulsive   Cognitive Comment Quick and impulsive with movements, occasionally requiring repetition of cues.   Pain Assessment   Patient Currently in Pain   (8/10 - bilateral legs & sacrum)   Integumentary/Edema   Integumentary/Edema no deficits were identifed   Posture    Posture Forward head position;Protracted shoulders   Range of Motion (ROM)   ROM Comment B LE WFL   Strength   Strength Comments B LE functionally weak/deconditioned   Bed Mobility   Bed Mobility Comments supine <> EOB at SBA   Transfer Skills   Transfer Comments sit <> stand with FWW at [facility]   Gait   Gait Comments 10ft with FWW at CGA - quick steps, mildly impulsive with movements   Balance   Balance Comments normal seated balance; dynamic standing balance requiring UE support on walker   Sensory Examination   Sensory Perception Comments denies numbness/tingling in B LE   General Therapy Interventions   Planned Therapy Interventions balance training;bed mobility training;gait training;strengthening;transfer training;home program guidelines;progressive activity/exercise   Clinical Impression   Criteria for Skilled Therapeutic Intervention yes, treatment indicated   PT Diagnosis impaired gait   Influenced by the following impairments pain, generalized LE weakness, decreased activity tolerance, SOB with activity   Functional limitations due to impairments ambulation, stairs   Clinical Presentation Stable/Uncomplicated   Clinical Presentation Rationale PMH, current presentation, Clinical judgement   Clinical Decision Making (Complexity) Low complexity   Therapy Frequency Daily   Predicted Duration of Therapy Intervention (days/wks) 4 days   Anticipated Discharge Disposition Transitional Care Facility   Risk & Benefits of therapy have been explained Yes   Patient, Family & other staff in agreement with plan of care Yes   [facility] AM-PAC TM \"6 Clicks\"   2016, " "Trustees of [facility], under license to [employer].  All rights reserved.   6 Clicks Short Forms Basic Mobility Inpatient Short Form   [facility] AM-PAC  \"6 Clicks\" V.2 Basic Mobility Inpatient Short Form   1. Turning from your back to your side while in a flat bed without using bedrails? 4 - None   2. Moving from lying on your back to sitting on the side of a flat bed without using bedrails? 4 - None   3. Moving to and from a bed to a chair (including a wheelchair)? 3 - A Little   4. Standing up from a chair using your arms (e.g., wheelchair, or bedside chair)? 3 - A Little   5. To walk in hospital room? 3 - A Little   6. Climbing 3-5 steps with a railing? 3 - A Little   Basic Mobility Raw Score (Score out of 24.Lower scores equate to lower levels of function) 20   Total Evaluation Time   Total Evaluation Time (Minutes) 10     "

## 2020-04-22 NOTE — PROGRESS NOTES
MD Notification    Notified Person: MD    Notified Person Name:Dr. Angel    Notification Date/Time: 4/22/20 @ 1034    Notification Interaction: Text page    Purpose of Notification: LA 2.3, joaquin    Orders Received:    Comments:

## 2020-04-22 NOTE — PLAN OF CARE
Alert to self, place, time; situation. Very forgetful. Speech rambling and incoherent at times. Up with A1 with GB/WW. Denies pain. Restless intermittently, but redirectable with sitter. Cooperative this AM/afternoon. Up in chair for lunch, otherwise T&R maintained in bed. Frequently incontinent B/B. IVF's stopped, tolerating diet. IV Haldol given, pt about to tx for 2nd round radiation. Continue to monitor.

## 2020-04-22 NOTE — PROGRESS NOTES
Progress Note     Primary Oncologist/Hematologist:  Dr. Borrero ([place])          Assessment and Plan:New actions/orders today (04/22/2020) are underlined.     Nicolas is a 74 year old admitted 4/18 with altered mental status     Newly discovered brain metastases  Metastatic prostate adenocarcinoma on chemotherapy - mets to the lung and bone.   Follows with Dr Borrero at [place] office. Also follows with Urology Dr. Galvan, is on every 6-month Lupron injection.   -Prostate cancer is quite refractory.   -Most recently started on palliative chemo with oral Cytoxan and etoposide, for 12 days on, 12 days off, last dose taken 4/14  -CT head 4/18 concerning for brain mets  -MRI brain 4/19 shows 10-15 lesions with vasogenic edema  -Neurosurgery involved.  Appreciate the consult.   -Continue Decadron 4 mg every 6 hours along with PPI. Cut to 4mg every 8 hours 4/21.  -Will consult with Radiation Oncology. Dr. Mcnair saw him in consultation 4/21. Plan 5 fractions, started 4/21.  -I spoke with his wife Valerie about the need for radiation. She understands that Nicolas does not have much for options and that all treatments are palliative in nature. She would like to give him the opportunity to have radiation.  -I did review prognosis with Dr. Borrero. He feels like Nicolas might have 3-6 months. Therefore discussions about hospice care would be reasonable.   -Chemotherapy will be on hold for now. There is a chance that Nicolas might not improve his performance status enough to tolerate additional chemotherapy.  -We did talk a little about hospice      Pancytopenia secondary to chemotherapy  -WBC quite low post cycle 1 of cyclophosphamide and etoposide, as patient very heavily treated for his prostate cancer.  -Filgrastim given 4/19, 4/20 and 4/21  -CBC with differential 4/22. Goal ANC 1500; now up to 2600.  -Transfuse for hemoglobin less than 6 (given blood shortage) and platelets less than 10,000     UTI  -Altered mental status most likely  secondary to UTI and sepsis versus the brain mets.  -Continue current antibiotics.  Cultures >100,000 colonies proteus mirabilis     Encephalopathy  -Likely from infection, brain mets and steroids  -He has required pharmacologic intervention due to issue with personal safety.    -Has needed physical restraints  -This is all going to make initiation of radiation challenging  -He seemed calmer today     Chronic pain  Continue Dilaudid as needed for pain.    Disposition: Valerie is worried about having him come home if he is unable to care for himself. Hopefully he will work with OT and PT to assess needs. He might need a TCU stay.    Luis Alberto Bourgeois APRN, CNP  [facility]  [phone] (office), [phone] (cell)        Interval History:     Nicolas was up in chair. He does not recall much of radiation.               Review of Systems:     The 5 point Review of Systems is negative other than noted in the HPI             Medications:   Scheduled Medications    [START ON 4/23/2020] amoxicillin-clavulanate  1 tablet Oral Q12H STEVEN     dexamethasone  4 mg Intravenous Q8H     OLANZapine zydis  5 mg Oral BID     pantoprazole  40 mg Oral QAM AC     saccharomyces boulardii  500 mg Oral BID     senna-docusate  1 tablet Oral BID    Or     senna-docusate  2 tablet Oral BID     sodium chloride (PF)  3 mL Intracatheter Q8H     PRN Medications  acetaminophen **OR** [DISCONTINUED] acetaminophen **OR** acetaminophen, bisacodyl **OR** bisacodyl **OR** bisacodyl, bisacodyl, haloperidol lactate, HYDROmorphone, HYDROmorphone, labetalol, lidocaine 4%, lidocaine (buffered or not buffered), LORazepam, magnesium sulfate, - MEDICATION INSTRUCTIONS -, melatonin, naloxone, OLANZapine zydis, ondansetron **OR** ondansetron, polyethylene glycol, potassium chloride, potassium chloride with lidocaine, potassium chloride, potassium chloride, potassium chloride, prochlorperazine **OR** prochlorperazine **OR** prochlorperazine, sodium chloride (PF)                Physical Exam:   Vitals were reviewed  Blood pressure 137/72, pulse 85, temperature 96.8  F (36  C), temperature source Oral, resp. rate 18, SpO2 98 %.  Wt Readings from Last 4 Encounters:   03/24/20 65.8 kg (145 lb)   02/13/20 72.1 kg (159 lb)   01/24/20 72.1 kg (159 lb)   01/02/20 68.9 kg (152 lb)       I/O last 3 completed shifts:  In: 3854 [P.O.:380; I.V.:2470; IV Piggyback:1004]  Out: 2200 [Urine:2200]      Constitutional: Awake, alert, cooperative, no apparent distress            Data:   All laboratory data and imaging studies reviewed.    CMP  Recent Labs   Lab 04/21/20  1813 04/21/20  0821 04/20/20  1400 04/20/20  0456 04/19/20  0721 04/18/20  1600   NA  --   --   --  135 133 134   POTASSIUM 3.7 3.1* 3.5 3.2* 3.6 3.6   CHLORIDE  --   --   --  105 103 102   CO2  --   --   --  22 21 22   ANIONGAP  --   --   --  8 9 10   GLC  --   --   --  148* 131* 112*   BUN  --   --   --  19 13 14   CR  --   --   --  0.65* 0.56* 0.70   GFRESTIMATED  --   --   --  >90 >90 >90   GFRESTBLACK  --   --   --  >90 >90 >90   TIMBO  --   --   --  8.9 8.9 9.4   MAG  --   --   --  2.0 1.9  --    PHOS  --   --   --   --  3.0  --    PROTTOTAL  --   --   --   --   --  7.7   ALBUMIN  --   --   --   --   --  3.4   BILITOTAL  --   --   --   --   --  0.6   ALKPHOS  --   --   --   --   --  140   AST  --   --   --   --   --  30   ALT  --   --   --   --   --  21     CBC  Recent Labs   Lab 04/22/20  0434 04/21/20  0821 04/20/20  0456 04/19/20  0721   WBC 4.0 2.0* 1.1* 0.9*   RBC 2.76* 2.48* 2.41* 2.53*   HGB 8.5* 7.6* 7.4* 7.7*   HCT 25.8* 22.8* 21.9* 23.0*   MCV 94 92 91 91   MCH 30.8 30.6 30.7 30.4   MCHC 32.9 33.3 33.8 33.5   RDW 13.8 13.3 13.0 13.0    133* 84* 99*     INR  Recent Labs   Lab 04/18/20  1600   INR 1.04

## 2020-04-22 NOTE — PLAN OF CARE
PT orders received & acknowledged. Chart reviewed. Eval completed & treatment initiated.     Patient living with his spouse in a 4 level split house with 7 steps in between each level with a single railing. Reports IND with functional mobility & ADLs at baseline.    Discharge Planner PT   Patient plan for discharge: Agreeable to rehab if needed  Current status: Greeted patient supine in bed with 1:1 sitter at bedside and nurse providing patient with meds. Patient agreeable to therapy despite feeling nauseous & fatigued. VSS on RA. Engaged patient in supine <> EOB at SBA. Engaged patient in sit <> stand initially with FWW progressing to no assistive device at CGA. Per request engaged patient in bathroom transfer with patient requiring cues for safety & walker management. Engaged patient in ambulation with FWW at [facility] for a distance of 250ft + 30ft without assist device at CGA - seated rest break in between bouts due to fatigue & SOB. Patient declines further activity and requesting to rest back in bed. Overall patient not always understanding/following commands appropriately, mildly impulsive with mobility and occasionally demonstrates decreased safety awareness. Discussed discharge recommendation. Concluded session with patient supine in bed, all needs in reach, sitter at bedside and bed alarm on.   Barriers to return to prior living situation: impulsivity at times, decreased safety awareness, decreased activity tolerance, impaired balance, stairs   Recommendations for discharge: TCU  Rationale for recommendations: Patient is below baseline for functional mobility & would benefit from continued physical therapy in the setting of a TCU for improving functional mobility, LE strength and tolerance for functional activities in order to return to baseline of functioning.     Pending current length if hospital stay and medical management, patient is anticipated to improve functional mobility with IP PT and may progress  discharge recommendations to home with assist from spouse & HH-PT services in order to return to baseline mobility.        Entered by: Elizabet Qureshi 04/22/2020 4:40 PM

## 2020-04-22 NOTE — PROGRESS NOTES
Sepsis Evaluation Progress Note    I was called to see John Batista due to abnormal vital signs triggering the Sepsis SIRS screening alert. He is known to have an infection.     Physical Exam   Vital Signs:  Temp: 97.8  F (36.6  C) Temp src: Axillary BP: (!) 144/77 Pulse: 85 Heart Rate: 86 Resp: 22 SpO2: 99 % O2 Device: None (Room air)      Lab:  Lactic Acid   Date Value Ref Range Status   04/19/2020 1.0 0.7 - 2.0 mmol/L Final     Lactate for Sepsis Protocol   Date Value Ref Range Status   04/22/2020 4.6 (HH) 0.7 - 2.0 mmol/L Final     Comment:     Critical Value called to and read back by  ANA AMEZCUA IN 88 AT 0439 BY AF         The patient is at baseline mental status.     The rest of their physical exam is significant for     Physical Exam  Constitutional:       Appearance: He is ill-appearing.   HENT:      Head: Normocephalic and atraumatic.      Mouth/Throat:      Mouth: Mucous membranes are dry.   Eyes:      Pupils: Pupils are equal, round, and reactive to light.   Neck:      Musculoskeletal: Normal range of motion.   Cardiovascular:      Rate and Rhythm: Normal rate and regular rhythm.   Pulmonary:      Effort: Pulmonary effort is normal. No respiratory distress.   Abdominal:      General: Abdomen is flat. There is no distension.   Musculoskeletal: Normal range of motion.      Right lower leg: No edema.      Left lower leg: No edema.   Skin:     General: Skin is warm and dry.      Capillary Refill: Capillary refill takes 2 to 3 seconds.      Coloration: Skin is pale.   Neurological:      Mental Status: He is alert. He is disoriented.   Psychiatric:         Mood and Affect: Mood normal.         Behavior: Behavior normal.       Assessment & Plan   NO EVIDENCE OF SEPSIS at this time.  Vital sign, physical exam, and lab findings are likely due to metastatic CA, high-dose steroid use.    Disposition: The patient will remain on the current unit. We will continue to monitor this patient closely..    -- give 1L  LR  -- repeat LA at 0800    Time Spent on this Encounter   I spent 30 minutes of critical care time on the unit/floor managing the care of John Batista. Upon evaluation, this patient had a high probability of imminent or life-threatening deterioration due to septic shock, which required my direct attention, intervention, and personal management. 100% of my time was spent at the bedside counseling the patient and/or coordinating care regarding services listed in this note.    EDDIE Brown, CNP  Hospitalist - [facility]  Text Page  [phone])

## 2020-04-22 NOTE — PROGRESS NOTES
SPIRITUAL HEALTH SERVICES Progress Note  [facility]    Brief visit with pt, per palliative consult.  Pt was alert, in bed, when I arrived.  He seemed not interested in conversation, but accepted my offer of a prayer.   team available for on-going visits and support per request.                                                                                                                                                 Mayra Stokes M.A.  Staff   Pager [phone]  Phone [phone]

## 2020-04-22 NOTE — PLAN OF CARE
[id number]  VSS but hypertensive.  Neuros intact.  Sitter at bedside.  Oriented to self, place and place, not situation.  Fluctuating mood between calm and agitated.  Restraints off, and pt is less agitated without restraints on.  Oriented but forgetful.  IV in left arm fell out, new IV placed for antibiotics and fluids.  Up to bathroom with A2 and gaitbelt, slightly unsteady.  Haldol given before first radiation session today (first of 10).  Resting between cares.  K recheck was 3.7.  Refusing PCD.  Condom catheter in place.  Good appetite on regular diet, feeding self independently.  Repositioning independently.  Nursing will continue to monitor.

## 2020-04-22 NOTE — PROGRESS NOTES
[facility]    Hospitalist Progress Note    Assessment & Plan   John Batista is a 74 year old male with PMH significant for metastatic prostate cancer with mets to the bone and lungs on chemotherapy who was directly admitted from [facility] ER due to altered mental status and agitation with newly found brain metastases, lactic acidosis, and urinary tract infection.     Altered mental status with agitation  Newly discovered brain metastases  Metastatic prostate adenocarcinoma on chemotherapy - mets to the lung and bone  DNR/DNI  Follows with [facility], 2 days on and 12-day off cycle, last dose 5 days PTA.  Chronic myalgias on PTA Dilaudid. Also follows with Urology Dr. Galvan, is on every 6-month Lupron injection. Head CT in ER revealed lesions in cerebellum and Rt cerebral hemisphere with vasogenic edema in Rt cerebral hemisphere, most likely 2/2 metastatic disease.   -- Continue solumedrol 4mg IV Q8h per neurosurgery recs  -- Appreciate input from oncology and neurosurgery; no plan for any surgery as per neurosurgery.  -- Appreciate palliative care input and assistance  -- Continue olanzapine BID  -- Rad Onc following and pt undergoing inpatient XRT  -- neuro checks changed to q shift. More frequent checks were contributing to agitation.  -- continue 1:1 sitter as needed    Proteus UTI, complicated  Lactic acidosis   -- change abx - gave Ceftriaxone today. Change to po Augmentin tomorrow. Anticipate 7-10 day course.  -- Blood cxs NGTD     Chronic leukopenia  Baseline WBC 2.5-3.6 range most recently. neutropenia noted following recent chemo   -- Received Neupogen on 4/19; ANC currently wnl     Chronic anemia  At baseline 7-8 range.  -- Monitor CBC, stable    DVT Prophylaxis: sequential compression device   Code Status: DNR/DNI     Disposition: Expected discharge in 4-5  days depending on end of radiation onc treatment .    Kylee Angel MD  Text Page   (7am to 6pm)      Interval History    Patient triggered sepsis protocol for unclear reasons early this morning. Lactate elevated. Not septic per evaluation. Lactate did improve after fluid bolus. Patient still with intermittent agitation and nursing requested to decrease frequency of neuro checks. Impulsive behaviors. Requiring sitter.    -Data reviewed today: I reviewed all new labs and imaging results over the last 24 hours. I personally reviewed labs  today .    Physical Exam   Temp: 98.6  F (37  C) Temp src: Axillary BP: (!) 170/83   Heart Rate: 88 Resp: 18 SpO2: 98 % O2 Device: None (Room air)    There were no vitals filed for this visit.  Vital Signs with Ranges  Temp:  [97.3  F (36.3  C)-98.6  F (37  C)] 98.6  F (37  C)  Heart Rate:  [76-95] 88  Resp:  [16-22] 18  BP: (120-172)/(59-83) 170/83  SpO2:  [97 %-100 %] 98 %  I/O last 3 completed shifts:  In: 3854 [P.O.:380; I.V.:2470; IV Piggyback:1004]  Out: 2200 [Urine:2200]    Constitutional: Is alert and oriented x 2   Respiratory: Clear to auscultation bilaterally, no crackles or wheezing  Cardiovascular: Regular rate and rhythm, normal S1 and S2, and no murmur noted  GI: Normal bowel sounds, soft, non-distended, non-tender  Skin/Integumen: No rashes, no cyanosis, no edema  Neuro : moving all 4 extremities, occasionally confused.    Medications     - MEDICATION INSTRUCTIONS -         cefTRIAXone  1 g Intravenous Q24H     dexamethasone  4 mg Intravenous Q8H     OLANZapine zydis  5 mg Oral BID     pantoprazole  40 mg Oral QAM AC     saccharomyces boulardii  500 mg Oral BID     senna-docusate  1 tablet Oral BID    Or     senna-docusate  2 tablet Oral BID     sodium chloride (PF)  3 mL Intracatheter Q8H     tamsulosin  0.4 mg Oral Daily       Data   Recent Labs   Lab 04/22/20  0434 04/21/20  1813 04/21/20  0821 04/20/20  1400 04/20/20  0456 04/19/20  0721 04/18/20  1600   WBC 4.0  --  2.0*  --  1.1* 0.9* 1.1*   HGB 8.5*  --  7.6*  --  7.4* 7.7* 8.6*   MCV 94  --  92  --  91 91 98     --  133*   --  84* 99* 117*   INR  --   --   --   --   --   --  1.04   NA  --   --   --   --  135 133 134   POTASSIUM  --  3.7 3.1* 3.5 3.2* 3.6 3.6   CHLORIDE  --   --   --   --  105 103 102   CO2  --   --   --   --  22 21 22   BUN  --   --   --   --  19 13 14   CR  --   --   --   --  0.65* 0.56* 0.70   ANIONGAP  --   --   --   --  8 9 10   TIMBO  --   --   --   --  8.9 8.9 9.4   GLC  --   --   --   --  148* 131* 112*   ALBUMIN  --   --   --   --   --   --  3.4   PROTTOTAL  --   --   --   --   --   --  7.7   BILITOTAL  --   --   --   --   --   --  0.6   ALKPHOS  --   --   --   --   --   --  140   ALT  --   --   --   --   --   --  21   AST  --   --   --   --   --   --  30     Recent [place] Jean   Lab 04/20/20  0456 04/20/20  0405 04/19/20  0721 04/18/20  1600   *  --  131* 112*   BGM  --  153*  --   --        Imaging:   No results found for this or any previous visit (from the past 24 hour(s)).

## 2020-04-22 NOTE — PROGRESS NOTES
04/22/20 1046   Quick Adds   Type of Visit Initial Occupational Therapy Evaluation   Living Environment   Lives With spouse   Living Arrangements house   Home Accessibility stairs within home  (4 levels. 7 steps on each level)   Transportation Anticipated car, drives self;family or friend will provide   Living Environment Comment step in shower   Self-Care   Usual Activity Tolerance good   Current Activity Tolerance fair   Equipment Currently Used at Home grab bar, tub/shower  (regular height toilet)   Functional Level   Ambulation 0-->independent   Transferring 0-->independent   Toileting 0-->independent   Bathing 0-->independent   Dressing 0-->independent   Eating 0-->independent   Communication 0-->understands/communicates without difficulty   Fall history within last six months no   General Information   Onset of Illness/Injury or Date of Surgery - Date 04/18/20   Referring Physician Irene Gabriel MD   Patient/Family Goals Statement None stated   Additional Occupational Profile Info/Pertinent History of Current Problem PMH significant for metastatic prostate cancer with mets to the bone and lungs on chemotherapy who was [facility] due to altered mental status and agitation with newly found brain metastases, lactic acidosis, and urinary tract infection.   Precautions/Limitations fall precautions;immunosuppressed   Cognitive Status Examination   Orientation person   Level of Consciousness alert   Follows Commands (Cognition) follows one step commands   Attention Quiet environment required   Visual Perception   Visual Perception No deficits were identified;Wears glasses  (for reading)   Sensory Examination   Sensory Comments Pt denies BUE numbness/tingling   Pain Assessment   Patient Currently in Pain No   Strength   Strength Comments 5/5 MMT in BUEs   Coordination   Coordination Comments slowed finger to nose touching with eyes closed bilaterally   Mobility   Bed Mobility Bed mobility  skill: Supine to sit   Bed Mobility Skill: Supine to Sit   Level of [place]: Supine/Sit stand-by assist   Transfer Skill: Bed to Chair/Chair to Bed   Level of [place]: Bed to Chair contact guard   Physical Assist/Nonphysical Assist: Bed to Chair supervision;verbal cues;1 person assist   Assistive Device - Transfer Skill Bed to Chair Chair to Bed Rehab Eval standard walker   Transfer Skill: Toilet Transfer   Level of [place]: Toilet contact guard   Assistive Device grab bars;rolling walker   Lower Body Dressing   Level of [place]: Dress Lower Body contact guard   Toileting   Level of [place]: Toilet maximum assist (25% patients effort)   Physical Assist/Nonphysical Assist: Toilet set-up required;supervision;verbal cues;1 person assist   Assistive Device grab bars;rolling walker   Eating/Self Feeding   Level of [place]: Eating stand-by assist   Instrumental Activities of Daily Living (IADL)   Previous Responsibilities yardwork;medication management;finances;driving   IADL Comments shares cooking, cleaning, laundry with spouse   Activities of Daily Living Analysis   Impairments Contributing to Impaired Activities of Daily Living balance impaired;cognition impaired;coordination impaired  (dizziness)   General Therapy Interventions   Planned Therapy Interventions IADL retraining;progressive activity/exercise;cognition;balance training;ADL retraining   Clinical Impression   Criteria for Skilled Therapeutic Interventions Met yes, treatment indicated   OT Diagnosis Decline function   Influenced by the following impairments balance impaired;cognition impaired;coordination impaired; dizziness   Assessment of Occupational Performance 5 or more Performance Deficits   Identified Performance Deficits dressing, grooming, toileting, bathing, IADLs   Clinical Decision Making (Complexity) Moderate complexity   Therapy Frequency Daily   Predicted Duration of Therapy Intervention (days/wks) 5 days  "  Anticipated Discharge Disposition Transitional Care Facility   Risks and Benefits of Treatment have been explained. Yes   Patient, Family & other staff in agreement with plan of care Yes   Clinical Impression Comments Pt functioning below baseline and will benefit from continued skilled OT to maximize safety and independence   [facility] [employer] TM \"6 Clicks\"   2016, Trustees of [facility], under license to [employer].  All rights reserved.   6 Clicks Short Forms Daily Activity Inpatient Short Form   [facility]  \"6 Clicks\" Daily Activity Inpatient Short Form   1. Putting on and taking off regular lower body clothing? 2 - A Lot   2. Bathing (including washing, rinsing, drying)? 2 - A Lot   3. Toileting, which includes using toilet, bedpan or urinal? 2 - A Lot   4. Putting on and taking off regular upper body clothing? 3 - A Little   5. Taking care of personal grooming such as brushing teeth? 3 - A Little   6. Eating meals? 3 - A Little   Daily Activity Raw Score (Score out of 24.Lower scores equate to lower levels of function) 15   Total Evaluation Time   Total Evaluation Time (Minutes) 10     "

## 2020-04-23 NOTE — PLAN OF CARE
Discharge Planner PT   Patient plan for discharge: open to TCU  Current status: Pt supine upon arrival, agreeable to PT. Performed supine>sit with SBA with HOB elevated. Pt donned mask in prep for exiting room as pt on protective precautions Sit>stand with FWW with CGA, pt getting short of breath/anxious upon standing with mask on & returning to sitting, removed mask. After seated rest, ambulated 30' in room without AD with CGA with some unsteadiness. Pt agreeable to ambulate in aranda with mask, needs extra time donning mask as fixated on donning but not wanting assist. Ambulated 120' in aranda with CGA and FWW, with 1 prolonged standing rest break, pt leaning forward on elbows. Upon return to room, returned to supine in bed with SBA. After rest, bed>chair with CGA and walker, engaged in standing marching briefly then after seated rest, pt requesting to use bathroom. Needs CGA-close SBA for toilet transfer & bathroom mobility. Pt with decreased safety awareness and some mild impulsivity during session. Pt supine in bed upon departure, all needs in reach, warm blanket applied per pt request.     Barriers to return to prior living situation: impulsivity at times, decreased safety awareness, decreased activity tolerance, impaired balance, stairs   Recommendations for discharge: TCU  Rationale for recommendations: Patient is below baseline for functional mobility & would benefit from continued physical therapy in the setting of a TCU for improving functional mobility, LE strength and tolerance for functional activities in order to return to baseline of functioning.        Entered by: Matilde Martell 04/23/2020 9:44 AM

## 2020-04-23 NOTE — PROGRESS NOTES
Chart check Heme/Onc:    Notes reviewed. Nicolas was evaluated by PT and TCU has been recommended. Steroids remain at 4 mg every 8 hours. This can probably start to taper at discharge.    Nicolas is followed by Dr. Borrero in [place] and has been on oral chemotherapy with cyclophosphamide and etoposide (12 days on and 12 days off). This can be held during his TCU stay to allow for him to try to regain strength. I would have Nicolas follow up with Dr. Borrero after discharge from TCU to discuss next steps in his care (resumption of chemotherapy vs best supportive care with eventual hospice). I have talked with Nicolas about hospice given his aggressive malignancy and poor performance status. He will complete whole brain radiation therapy on 4/27/2020.    Luis Alberto MORGAN, CNP  [facility]  [phone] (office) or [phone] (cell)

## 2020-04-23 NOTE — PROGRESS NOTES
Radiation therapy treatment #3 of 5. Pt received 400 cGy with 10 MV photon radiation to the whole brain for a total dose of 1,200 cGy to date.  Plan for 2 more radiation treatments with a total end dose of 2,000 cGy.

## 2020-04-23 NOTE — PLAN OF CARE
Pt Aox3, calm and pleasant, can get restless at times. Sitter at bedside. C/o headache and pain to back, PRN tylenol and dilaudid given. PIV SL. Up A1 GB/W to BR. Tolerating regular diet. Pt received 2 of 5 radiation treatment today, Haldol given before. Resting between cares, T/R Q2h when in bed. Had loose stool today, scheduled senna held. Incontinent of urine, condom catheter applied, voiding adequately, noted slight pink tinged urine. K+ 3.7. Continue to monitor.

## 2020-04-23 NOTE — CONSULTS
SW Consult      Care Transition Initial Assessment - SW     Met with: chart review  Active Problems:    Altered mental status       DATA  Lives With: spouse   Living Arrangements: house  Quality of Family Relationships: supportive, involved  Description of Support System: Involved, Supportive  Who is your support system?: Wife  Support Assessment: Adequate family and caregiver support, Adequate social supports.   Identified issues/concerns regarding health management:   Quality of Family Relationships: supportive, involved  Transportation Anticipated: car, drives self, family or friend will provide    ASSESSMENT  Cognitive Status:  awake  Concerns to be addressed: discharge planning .    Per Care Transitions consult for discharge planning. Patient is a 74 year old male who was admitted on 4/18/20 for altered mental status with a tentative discharge date on 4/27/20. Reviewed chart and spoke to physician regarding discharge plans. Per chart review, patient lives at home with his wife Valerie in a 4 level home with 7 stairs between each level. It was reported that at baseline, patient is independent with mobility and ADL's. Reviewed therapy recommendations of TCU placement on discharge. Physician spoke to wife who requested that referrals be sent to [facility] and [facility]. Referrals sent to requested facilities through [facility].  Family would like to start with TCU and may transition to hospice at the facility.     PLAN  Financial costs for the patient includes NA .  Patient given options and choices for discharge yes, TCU .  Patient/family is agreeable to the plan?  Yes:   Transportation/person available to transport on day of discharge  is TBD.  Patient Goals and Preferences: TCU .  Patient anticipates discharging to:  TCU .    Will continue to follow for safe discharge.    ARELIS Ricks    [facility]

## 2020-04-23 NOTE — PLAN OF CARE
[id number]: A/O x 4, but intermittently confused, impulsive.  Neuros intact.  VSS on RA.  C/O 8/10 pain in sacrum, tylenol and dilaudid given x 1.  Incontinent of bladder, condom catheter in place, good output. Mepilex on coccyx, red, blanchable.  Plan on radiation 4/5 tomorrow.

## 2020-04-23 NOTE — PLAN OF CARE
Pt Aox4, calm and pleasant. Long arm sitter present. Neuro's intact. Denied pain. PIV SL. Up A1 GB/W to BR. Tolerating regular diet. Pt received 2 of 5 radiation treatment yesterday, Haldol given before. T/R Q2h when in bed. Hgb 8.5, LA 2.3.  Incontinent of urine, voiding adequately.  Mepilex placed on bottom to prevent skin breakdown as it it pink and nonblanchable. Bruise present on R hip. Discharge pending progress. Will continue to monitor.

## 2020-04-23 NOTE — PROGRESS NOTES
[facility]    Hospitalist Progress Note    Assessment & Plan   John Batista is a 74 year old male with PMH significant for metastatic prostate cancer with mets to the bone and lungs on chemotherapy who was directly admitted from [facility] ER due to altered mental status and agitation with newly found brain metastases, lactic acidosis, and urinary tract infection.     Altered mental status with agitation  Newly discovered brain metastases  Metastatic prostate adenocarcinoma on chemotherapy - mets to the lung and bone  DNR/DNI  Follows with [facility], 2 days on and 12-day off cycle, last dose 5 days PTA.  Chronic myalgias on PTA Dilaudid. Also follows with Urology Dr. Galvan, is on every 6-month Lupron injection. Head CT in ER revealed lesions in cerebellum and Rt cerebral hemisphere with vasogenic edema in Rt cerebral hemisphere, most likely 2/2 metastatic disease.   -- Continue solumedrol 4mg IV Q8h per neurosurgery recs  -- Appreciate input from oncology and neurosurgery; no plan for any surgery as per neurosurgery.  -- Appreciate palliative care input and assistance  -- Continue olanzapine BID  -- Rad Onc following and pt undergoing inpatient XRT  -- neuro checks changed to q shift. More frequent checks were contributing to agitation.  -- not currently requiring 1:1 sitter    Proteus UTI, complicated  Lactic acidosis   -- On po Augmentin. Anticipate 7-10 day course. Today is day 5 of abx.  -- Blood cxs NGTD     Chronic leukopenia  Baseline WBC 2.5-3.6 range most recently. neutropenia noted following recent chemo   -- Received Neupogen on 4/19; ANC currently wnl     Chronic anemia  At baseline 7-8 range.  -- Monitor CBC, stable    DVT Prophylaxis: sequential compression device   Code Status: DNR/DNI     Disposition: Expected discharge in 4-5  days depending on end of radiation onc treatment. To TCU.    Kylee Angel MD  Text Page   (7am to 6pm)      Interval History   Patient calmer  today. Sleepy and comfortable appearing.    Spoke with pt's wife Valerie by phone. Valerie's understanding is that pt will go to TCU and resume chemo when possible. We did discuss hospice but she seems to want to start with TCU then transition to hospice if patient does not improve. She had specific facilities she is interested in and I passed her preferences along to the . Both are apparently difficult places to get into. Valerie has a lot of anxiety about COVID in these facilities but was clear that she cannot manage patient at home. She is therefore trying to manage risk by doing research on facilities and choosing only those that are very well rated.    -Data reviewed today: I reviewed all new labs and imaging results over the last 24 hours. I personally reviewed labs  today .    Physical Exam   Temp: 96.7  F (35.9  C) Temp src: Oral BP: 110/75 Pulse: 94 Heart Rate: 99 Resp: 16 SpO2: 98 % O2 Device: None (Room air)    There were no vitals filed for this visit.  Vital Signs with Ranges  Temp:  [96.7  F (35.9  C)-97.6  F (36.4  C)] 96.7  F (35.9  C)  Pulse:  [94] 94  Heart Rate:  [95-99] 99  Resp:  [16-18] 16  BP: (110-150)/(66-77) 110/75  SpO2:  [98 %-99 %] 98 %  I/O last 3 completed shifts:  In: 1201.67 [P.O.:520; I.V.:681.67]  Out: 300 [Urine:300]    Constitutional: Is alert and oriented x 2   Respiratory: Clear to auscultation bilaterally, no crackles or wheezing  Cardiovascular: Regular rate and rhythm, normal S1 and S2, and no murmur noted  GI: Normal bowel sounds, soft, non-distended, non-tender  Skin/Integumen: No rashes, no cyanosis, no edema  Neuro : moving all 4 extremities, occasionally confused.    Medications     - MEDICATION INSTRUCTIONS -         amoxicillin-clavulanate  1 tablet Oral Q12H STEVEN     dexamethasone  4 mg Intravenous Q8H     OLANZapine zydis  5 mg Oral BID     pantoprazole  40 mg Oral QAM AC     saccharomyces boulardii  500 mg Oral BID     senna-docusate  1 tablet Oral BID    Or      senna-docusate  2 tablet Oral BID     sodium chloride (PF)  3 mL Intracatheter Q8H       Data   Recent Labs   Lab 04/23/20  0729 04/22/20  0434 04/21/20  1813 04/21/20  0821 04/20/20  0456 04/19/20 0721 04/18/20  1600   WBC 9.7 4.0  --  2.0*  --  1.1* 0.9* 1.1*   HGB 8.2* 8.5*  --  7.6*  --  7.4* 7.7* 8.6*   MCV 93 94  --  92  --  91 91 98    204  --  133*  --  84* 99* 117*   INR  --   --   --   --   --   --   --  1.04     --   --   --   --  135 133 134   POTASSIUM 3.2*  --  3.7 3.1*   < > 3.2* 3.6 3.6   CHLORIDE 105  --   --   --   --  105 103 102   CO2 23  --   --   --   --  22 21 22   BUN 18  --   --   --   --  19 13 14   CR 0.58*  --   --   --   --  0.65* 0.56* 0.70   ANIONGAP 8  --   --   --   --  8 9 10   TIMOB 8.7  --   --   --   --  8.9 8.9 9.4   *  --   --   --   --  148* 131* 112*   ALBUMIN  --   --   --   --   --   --   --  3.4   PROTTOTAL  --   --   --   --   --   --   --  7.7   BILITOTAL  --   --   --   --   --   --   --  0.6   ALKPHOS  --   --   --   --   --   --   --  140   ALT  --   --   --   --   --   --   --  21   AST  --   --   --   --   --   --   --  30    < > = values in this interval not displayed.     Recent Labs   Lab 04/23/20  0729 04/20/20  0456 04/20/20  0405 04/19/20  0721 04/18/20  1600   * 148*  --  131* 112*   BGM  --   --  153*  --   --        Imaging:   No results found for this or any previous visit (from the past 24 hour(s)).

## 2020-04-23 NOTE — PLAN OF CARE
Discharge Planner OT   Patient plan for discharge: Pt did not state  Current status: pt SBA supine to sit EOB, CGA sit to stand, amb with FWW CGA to/from bathroom, stood at sink CGA x 1 min, fatigue from task, chair brought up behind pt to complete ADLS. Completed cognitive assessment SLUMS with pt score of 18/30 indicates dementia level, impairments noted with STM, attention and executive functions.   Barriers to return to prior living situation: impulsivity, willingness to follow safety instructions, incontinence, dynamic balance impairment, cognitive impairment  Recommendations for discharge: TCU per plan established by the occupational Therapist  Rationale for recommendations: Pt functioning below baseline and will benefit from continued skilled OT to maximize safety and independence.        Entered by: Amanda Bob 04/23/2020 8:55 AM

## 2020-04-23 NOTE — PLAN OF CARE
Shift Note:  Pt Aox4, calm and pleasant. Neuro's intact. Tylenol given for headache. PIV SL. Up A1 GB/W to BR. Tolerating regular diet, poor appetite. Pt received 3 of 5 radiation treatment today, Haldol given before. T/R Q2h when in bed.  Incontinent of bladder and bowels, voiding adequately.  Mepilex placed on bottom to prevent skin breakdown as it it pink and nonblanchable. Bruise present on R hip. Discharge pending progress. Will continue to monitor.

## 2020-04-24 NOTE — PLAN OF CARE
Discharge Planner OT   Patient plan for discharge: Home   Current status: Pt transferred to/from the toilet with CGA-minimum assist. Pt demonstrated decreased safety awareness with transfers and with walker safety. Pt initiated hygiene task while standing at the sink with CGA, pt then requesting to sit down, reporting increase in fatigue. Pt completed this hygiene task while seated on the toilet. With encouragement, Pt completed 1 additional grooming task at the sink with CGA.   Barriers to return to prior living situation: Current level of A for I/ADLS  Recommendations for discharge: TCU  Rationale for recommendations: Pt is below baseline in I/ADls and will benefit from skilled therapy to address safety and independence in I/ADls.        Entered by: Radha Huang 04/24/2020 4:34 PM

## 2020-04-24 NOTE — PROGRESS NOTES
Radiation therapy treatment #4 of 5. Pt received 400 cGy with 10 MV photon radiation to the whole brain for a total dose of 1,600 cGy to date.  Plan for 1 more radiation treatments with a total end dose of 2,000 cGy.

## 2020-04-24 NOTE — PLAN OF CARE
Discharge Planner PT   Patient plan for discharge: Open to TCU  Current status: Pt supine upon arrival, agreeable to PT. Pt donned face mask in supine. Supine>sit: SBA. Pt noted hyperventilating upon sitting at EOB and had to take the mask off immediately. Pt reported having a hard time tolerating face mask.  STS x 5 with SBA and verbal cues for proper hand placement. Pt is impulsive and doesn't always follow safety cues. Pt ambulated 80 ft x 2 using RW and CGA for safety. Pt participated in standing there ex with PT's guidance. Pt was assisted to transport chair with CGA and is left with nursing assistant.   Barriers to return to prior living situation: impulsivity at times, decreased safety awareness, decreased activity tolerance, impaired balance, stairs   Recommendations for discharge: TCU  Rationale for recommendations: Patient is below baseline for functional mobility & would benefit from continued physical therapy in the setting of a TCU for improving functional mobility, LE strength and tolerance for functional activities in order to return to baseline of functioning.

## 2020-04-24 NOTE — PROGRESS NOTES
Chart check Heme/Onc:     Notes reviewed. Nicolas was evaluated by PT and TCU has been recommended. Steroids remain at 4 mg every 8 hours. This can probably start to taper at discharge.I will change from IV to oral.      Nicolas is followed by Dr. Borrero in [place] and has been on oral chemotherapy with cyclophosphamide and etoposide (12 days on and 12 days off). This can be held during his TCU stay to allow for him to try to regain strength. I would have Nicolas follow up with Dr. Borrero after discharge from TCU to discuss next steps in his care (resumption of chemotherapy vs best supportive care with eventual hospice). I have talked with Nicolas about hospice given his aggressive malignancy and poor performance status. He will complete whole brain radiation therapy on 4/27/2020.     Luis Alberto MORGAN, CNP  [facility]  [phone] (office) or [phone] (cell)

## 2020-04-24 NOTE — PLAN OF CARE
Pt. A/Ox4, forgetful, impulsive at times. VSS on RA, but HOLLOWAY. Denies pain. Neuros intact. Up A1 W/GB. Blanchable redness on coccyx, mepilex in place. Bruise on R hip. Incontinent at times, condom catheter in place. K replaced last night, up to 4.1. Radiation 4/5 today, haldol given beforehand at 1415. Plan for last radiation on 4/27, discharge pending TCU vs hospice.

## 2020-04-24 NOTE — PLAN OF CARE
Pt. A/Ox4, impulsive at times.  VSS on RA. Denies  pain. Neuros intact. Up A1 W/GB. Blanchable redness on coccyx, mepilex in place. Bruise on R hip. Incontinent, condom catheter in place. K 3.2, replaced and recheck 3.6. Discharge pending TCU vs hospice. Plan for radiation 4/5 today.

## 2020-04-24 NOTE — PROGRESS NOTES
SW:    D: Call placed to [facility] in [place] to follow up on referral sent yesterday. ANCELMO was told that this facility doesn't work with [facility] and was declined and [facility] isn't taking new patients at this time due to COVID. ANCELMO called Valerie, patient's wife to discuss other TCU options. Wife discussed that it might be an option for the doctor to write an over ride for special circumstances as she would prefer [facility] as a placement. SW agreed to find out more about this option to see if it might be an option. Valerie also agreed to send referrals to [facility] and [facility]. She asked about the [facility] but this is not a TCU facility. ANCELMO sent referrals to above listed facilities through [facility]. Valerie also reported that she does not want patient to go to a facility that has COVID positive patients.     P: Will continue to follow for safe discharge      ARELIS Ricks    [facility]

## 2020-04-24 NOTE — PROGRESS NOTES
SPIRITUAL HEALTH SERVICES Progress Note  [facility]    Initiated f/u visit due to pt request.  Pt was alone in room.  Pt remembered writer from previous visit.  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  Pt believes that treatments are working, and states that he is feeling better.  SH provided emotional support, reflective conversation, and prayer.  SH remains available for f/u as needed.      Gilbert Frederick  Chaplain Resident

## 2020-04-24 NOTE — PROGRESS NOTES
[facility]    Hospitalist Progress Note    Assessment & Plan   John Batista is a 74 year old male with PMH significant for metastatic prostate cancer with mets to the bone and lungs on chemotherapy who was directly admitted from [facility] ER due to altered mental status and agitation with newly found brain metastases, lactic acidosis, and urinary tract infection.     Altered mental status with agitation  Newly discovered brain metastases  Metastatic prostate adenocarcinoma on chemotherapy - mets to the lung and bone  DNR/DNI  Follows with [place] [facility], 2 days on and 12-day off cycle, last dose 5 days PTA.  Chronic myalgias on PTA Dilaudid. Also follows with Urology Dr. Galvan, is on every 6-month Lupron injection. Head CT in ER revealed lesions in cerebellum and Rt cerebral hemisphere with vasogenic edema in Rt cerebral hemisphere, most likely 2/2 metastatic disease.   -- Continue solumedrol 4mg IV Q8h per neurosurgery recs  -- Appreciate input from oncology and neurosurgery; no plan for any surgery as per neurosurgery.  -- Appreciate palliative care input and assistance  -- Continue olanzapine BID  -- Rad Onc following and pt undergoing inpatient XRT  -- neuro checks changed to q shift. More frequent checks were contributing to agitation.  -- not currently requiring 1:1 sitter    Proteus UTI, complicated  Lactic acidosis   -- On po Augmentin. Anticipate 7-10 day course. Today is day 6 of abx.  -- Blood cxs NGTD     Chronic leukopenia  Baseline WBC 2.5-3.6 range most recently. neutropenia noted following recent chemo   -- Received Neupogen on 4/19; ANC currently wnl     Chronic anemia  At baseline 7-8 range.  -- Monitor CBC, stable    DVT Prophylaxis: sequential compression device   Code Status: DNR/DNI     Disposition: Expected discharge early next week. To TCU. SW aware of wife's preferences.    Kylee Angel MD  Text Page   (7am to 6pm)      Interval History   Patient looking well today.  "Awake, alert, coherent. This is the best I have seen him look in the past few days. He is able to state \"I realize now that I was kind of messed up for a while here.\" Didn't eat much yesterday. Going to order some ice cream this morning. No acute complaints.    -Data reviewed today: I reviewed all new labs and imaging results over the last 24 hours. I personally reviewed labs  today .    Physical Exam   Temp: 97  F (36.1  C) Temp src: Oral BP: 124/73 Pulse: 77 Heart Rate: 94 Resp: 16 SpO2: 98 % O2 Device: None (Room air)    There were no vitals filed for this visit.  Vital Signs with Ranges  Temp:  [96.5  F (35.8  C)-97.9  F (36.6  C)] 97  F (36.1  C)  Pulse:  [77] 77  Heart Rate:  [93-94] 94  Resp:  [16] 16  BP: (122-135)/(64-73) 124/73  SpO2:  [98 %-100 %] 98 %  I/O last 3 completed shifts:  In: -   Out: 750 [Urine:750]    Constitutional: Is alert and oriented x 2   Respiratory: Clear to auscultation bilaterally, no crackles or wheezing  Cardiovascular: Regular rate and rhythm, normal S1 and S2, and no murmur noted  GI: Normal bowel sounds, soft, non-distended, non-tender  Skin/Integumen: No rashes, no cyanosis, no edema  Neuro : moving all 4 extremities, occasionally confused.    Medications     - MEDICATION INSTRUCTIONS -         amoxicillin-clavulanate  1 tablet Oral Q12H STEVEN     dexamethasone  4 mg Oral Q8H STEVEN     OLANZapine zydis  5 mg Oral BID     pantoprazole  40 mg Oral QAM AC     saccharomyces boulardii  500 mg Oral BID     senna-docusate  1 tablet Oral BID    Or     senna-docusate  2 tablet Oral BID     sodium chloride (PF)  3 mL Intracatheter Q8H       Data   Recent Labs   Lab 04/24/20  0212 04/23/20  0729 04/22/20  0434 04/21/20  1813 04/21/20  0821  04/20/20  0456 04/19/20  0721 04/18/20  1600   WBC  --  9.7 4.0  --  2.0*  --  1.1* 0.9* 1.1*   HGB  --  8.2* 8.5*  --  7.6*  --  7.4* 7.7* 8.6*   MCV  --  93 94  --  92  --  91 91 98   PLT  --  214 204  --  133*  --  84* 99* 117*   INR  --   --   --   -- "   --   --   --   --  1.04   NA  --  136  --   --   --   --  135 133 134   POTASSIUM 3.6 3.2*  --  3.7 3.1*   < > 3.2* 3.6 3.6   CHLORIDE  --  105  --   --   --   --  105 103 102   CO2  --  23  --   --   --   --  22 21 22   BUN  --  18  --   --   --   --  19 13 14   CR  --  0.58*  --   --   --   --  0.65* 0.56* 0.70   ANIONGAP  --  8  --   --   --   --  8 9 10   TIMBO  --  8.7  --   --   --   --  8.9 8.9 9.4   GLC  --  116*  --   --   --   --  148* 131* 112*   ALBUMIN  --   --   --   --   --   --   --   --  3.4   PROTTOTAL  --   --   --   --   --   --   --   --  7.7   BILITOTAL  --   --   --   --   --   --   --   --  0.6   ALKPHOS  --   --   --   --   --   --   --   --  140   ALT  --   --   --   --   --   --   --   --  21   AST  --   --   --   --   --   --   --   --  30    < > = values in this interval not displayed.     Recent Labs   Lab 04/23/20  0729 04/20/20  0456 04/20/20  0405 04/19/20  0721 04/18/20  1600   * 148*  --  131* 112*   BGM  --   --  153*  --   --        Imaging:   No results found for this or any previous visit (from the past 24 hour(s)).

## 2020-04-25 NOTE — PROGRESS NOTES
Progress Note     Primary Oncologist/Hematologist:  Dr. Borrero ([place])          Assessment and Plan:New actions/orders today (04/25/2020) are underlined.     Nicolas is a 74 year old admitted 4/18 with altered mental status     Newly discovered brain metastases  Metastatic prostate adenocarcinoma on chemotherapy - mets to the lung and bone.   Follows with Dr Borrero at [place] office. Also follows with Urology Dr. Galvan, is on every 6-month Lupron injection.   -Prostate cancer is quite refractory.   -Most recently started on palliative chemo with oral Cytoxan and etoposide, for 12 days on, 12 days off, last dose taken 4/14  -CT head 4/18 concerning for brain mets  -MRI brain 4/19 shows 10-15 lesions with vasogenic edema  -Neurosurgery onboard.  .   -Decadron 4 mg every 8 hours along with PPI. Suggest taper at time of discharge (4mg bid x 4 days and then 4mg daily for 1 week and stop)  -Receiving brain Rt treatments under the direction of Dr. Mcnair since 4/21.    -Scheduled to receive last RT treatment 4/27  -Noted plan to discharge to TCU 4/27 after last RT treatment.  Hold further chemotherapy until outpatient follow up with Dr. Borrero  -Outpatient follow up with Dr. Borrero 1-2 weeks following discharge to re-evaluate candidacy for continuing systemic treatments.     Pancytopenia secondary to chemotherapy  -related to treatments  -received Filgrastim 4/19, 4/20 and 4/21  -neutropenia and thrombocytopenia resolved  -CBC with differential 4/22 showed ANC of 2600.    -Transfuse for hemoglobin less than 6 (given blood shortage) and platelets less than 10,000     UTI  -Altered mental status most likely secondary to UTI and sepsis versus the brain mets.  -Cultures >100,000 colonies proteus mirabilis  -Complete course of Augmentin (proteus sensitive)     Encephalopathy  -Likely from infection, brain mets and steroids  -improved     Chronic pain  Continue Dilaudid as needed for pain.    DNI/DNR  Dispostion: noted plan to  discharge to TCU following last RT treatment on 4/27.  Please have  call clinic on 4/27 so we can arrange for a follow up visit for him.    Robson Yang MD        Interval History:     Nicolas answered questions appropriately.  No acute issues overnight.              Review of Systems:     The 5 point Review of Systems is negative other than noted in the HPI             Medications:   Scheduled Medications    amoxicillin-clavulanate  1 tablet Oral Q12H STEVEN     dexamethasone  4 mg Oral Q8H STEVEN     OLANZapine zydis  5 mg Oral BID     pantoprazole  40 mg Oral QAM AC     saccharomyces boulardii  500 mg Oral BID     senna-docusate  1 tablet Oral BID    Or     senna-docusate  2 tablet Oral BID     sodium chloride (PF)  3 mL Intracatheter Q8H     PRN Medications  acetaminophen **OR** [DISCONTINUED] acetaminophen **OR** acetaminophen, bisacodyl **OR** bisacodyl **OR** bisacodyl, bisacodyl, haloperidol lactate, HYDROmorphone, HYDROmorphone, labetalol, lidocaine 4%, lidocaine (buffered or not buffered), LORazepam, magnesium sulfate, - MEDICATION INSTRUCTIONS -, melatonin, naloxone, OLANZapine zydis, ondansetron **OR** ondansetron, polyethylene glycol, prochlorperazine **OR** prochlorperazine **OR** prochlorperazine, sodium chloride (PF)               Physical Exam:   Vitals were reviewed  Blood pressure 124/65, pulse 77, temperature 97.3  F (36.3  C), temperature source Oral, resp. rate 16, SpO2 98 %.  Wt Readings from Last 4 Encounters:   03/24/20 65.8 kg (145 lb)   02/13/20 72.1 kg (159 lb)   01/24/20 72.1 kg (159 lb)   01/02/20 68.9 kg (152 lb)       I/O last 3 completed shifts:  In: -   Out: 350 [Urine:350]    General: alert and answering questions appropriately  ENT: no thrush  Resp: clear to auscultation bilaterally   Heart: no murmurs/added sounds  Abd: soft, nontender, nondistended, bowel sounds heard.  Condom catheter in place  Neuro: alert, moving all 4 extremities   Extremities: no edema         Data:    All laboratory data and imaging studies reviewed.    CMP  Recent Labs   Lab 04/24/20  0800 04/24/20  0212 04/23/20  0729 04/21/20  1813  04/20/20  0456 04/19/20  0721 04/18/20  1600   NA  --   --  136  --   --  135 133 134   POTASSIUM 4.1 3.6 3.2* 3.7   < > 3.2* 3.6 3.6   CHLORIDE  --   --  105  --   --  105 103 102   CO2  --   --  23  --   --  22 21 22   ANIONGAP  --   --  8  --   --  8 9 10   GLC  --   --  116*  --   --  148* 131* 112*   BUN  --   --  18  --   --  19 13 14   CR  --   --  0.58*  --   --  0.65* 0.56* 0.70   GFRESTIMATED  --   --  >90  --   --  >90 >90 >90   GFRESTBLACK  --   --  >90  --   --  >90 >90 >90   TIMBO  --   --  8.7  --   --  8.9 8.9 9.4   MAG  --   --   --   --   --  2.0 1.9  --    PHOS  --   --   --   --   --   --  3.0  --    PROTTOTAL  --   --   --   --   --   --   --  7.7   ALBUMIN  --   --   --   --   --   --   --  3.4   BILITOTAL  --   --   --   --   --   --   --  0.6   ALKPHOS  --   --   --   --   --   --   --  140   AST  --   --   --   --   --   --   --  30   ALT  --   --   --   --   --   --   --  21    < > = values in this interval not displayed.     CBC  Recent Labs   Lab 04/23/20  0729 04/22/20  0434 04/21/20  0821 04/20/20 0456   WBC 9.7 4.0 2.0* 1.1*   RBC 2.65* 2.76* 2.48* 2.41*   HGB 8.2* 8.5* 7.6* 7.4*   HCT 24.7* 25.8* 22.8* 21.9*   MCV 93 94 92 91   MCH 30.9 30.8 30.6 30.7   MCHC 33.2 32.9 33.3 33.8   RDW 14.6 13.8 13.3 13.0    204 133* 84*     INR  Recent Labs   Lab 04/18/20  1600   INR 1.04

## 2020-04-25 NOTE — PLAN OF CARE
7a-7p nursing shift  Improved mental status since received whole brain radiation. Is A&O. Calm and cooperative.Neuros intact.  VSS on RA.  Tolerating reg diet. Gets up with SBA. Sat in chair for meals. C/O 7/10 pain in sacral/hip area controlled with Tylenol.  Incontinent of bladder, condom catheter (Petal kind) was replaced prior to  0730 by noc RN and stayed intact till 1200 at which time it fell off on own with urine saturating brief. Has good output. Mepilex on coccyx intact , red, blanchable.  Plan is for discharge to TCU after pt's final  radiation tx this Monday

## 2020-04-25 NOTE — PROGRESS NOTES
[facility]    Hospitalist Progress Note    Assessment & Plan   John Batista is a 74 year old male with PMH significant for metastatic prostate cancer with mets to the bone and lungs on chemotherapy who was directly admitted from [facility] ER due to altered mental status and agitation with newly found brain metastases, lactic acidosis, and urinary tract infection.     Altered mental status with agitation  Newly discovered brain metastases  Metastatic prostate adenocarcinoma on chemotherapy - mets to the lung and bone  DNR/DNI  Follows with [place] [facility]ology, 2 days on and 12-day off cycle, last dose 5 days PTA.  Chronic myalgias on PTA Dilaudid. Also follows with Urology Dr. Galvan, is on every 6-month Lupron injection. Head CT in ER revealed lesions in cerebellum and Rt cerebral hemisphere with vasogenic edema in Rt cerebral hemisphere, most likely 2/2 metastatic disease.   -- Continue solumedrol 4mg IV Q8h per neurosurgery recs  -- Appreciate input from oncology and neurosurgery; no plan for any surgery as per neurosurgery.  -- Appreciate palliative care input and assistance  -- Continue olanzapine BID  -- Rad Onc following and pt undergoing inpatient XRT  -- neuro checks changed to q shift. More frequent checks were contributing to agitation.  -- not currently requiring 1:1 sitter    Proteus UTI, complicated  Lactic acidosis   -- On po Augmentin. Anticipate 7-10 day course. Today is day 6 of abx.  -- Blood cxs NGTD     Chronic leukopenia  Baseline WBC 2.5-3.6 range most recently. neutropenia noted following recent chemo   -- Received Neupogen on 4/19; ANC currently wnl     Chronic anemia  At baseline 7-8 range.  -- Monitor CBC, stable    DVT Prophylaxis: sequential compression device   Code Status: DNR/DNI     Disposition: Expected discharge early next week. To TCU. SW aware of wife's preferences. Planned for 4/27.     Veronica Degroot MD  Text Page   (7am to 6pm)      Interval History   Patient  looking well today. Awake, alert, coherent. No acute complaints. Seen by oncology.     -Data reviewed today: I reviewed all new labs and imaging results over the last 24 hours. I personally reviewed labs  today .    Physical Exam   Temp: 97.3  F (36.3  C) Temp src: Oral BP: 124/65   Heart Rate: 86 Resp: 16 SpO2: 98 % O2 Device: None (Room air)    There were no vitals filed for this visit.  Vital Signs with Ranges  Temp:  [97.1  F (36.2  C)-97.6  F (36.4  C)] 97.3  F (36.3  C)  Heart Rate:  [84-87] 86  Resp:  [16] 16  BP: (106-124)/(62-65) 124/65  SpO2:  [98 %-99 %] 98 %  I/O last 3 completed shifts:  In: -   Out: 350 [Urine:350]    Constitutional: Is alert and oriented x 2   Respiratory: Clear to auscultation bilaterally, no crackles or wheezing  Cardiovascular: Regular rate and rhythm, normal S1 and S2, and no murmur noted  GI: Normal bowel sounds, soft, non-distended, non-tender  Skin/Integumen: No rashes, no cyanosis, no edema  Neuro : moving all 4 extremities, occasionally confused.    Medications     - MEDICATION INSTRUCTIONS -         amoxicillin-clavulanate  1 tablet Oral Q12H STEVEN     dexamethasone  4 mg Oral Q8H STEVEN     OLANZapine zydis  5 mg Oral BID     pantoprazole  40 mg Oral QAM AC     saccharomyces boulardii  500 mg Oral BID     senna-docusate  1 tablet Oral BID    Or     senna-docusate  2 tablet Oral BID     sodium chloride (PF)  3 mL Intracatheter Q8H       Data   Recent Labs   Lab 04/24/20  0800 04/24/20  0212 04/23/20  0729 04/22/20  0434  04/21/20  0821  04/20/20  0456 04/19/20  0721 04/18/20  1600   WBC  --   --  9.7 4.0  --  2.0*  --  1.1* 0.9* 1.1*   HGB  --   --  8.2* 8.5*  --  7.6*  --  7.4* 7.7* 8.6*   MCV  --   --  93 94  --  92  --  91 91 98   PLT  --   --  214 204  --  133*  --  84* 99* 117*   INR  --   --   --   --   --   --   --   --   --  1.04   NA  --   --  136  --   --   --   --  135 133 134   POTASSIUM 4.1 3.6 3.2*  --    < > 3.1*   < > 3.2* 3.6 3.6   CHLORIDE  --   --  105  --   --    --   --  105 103 102   CO2  --   --  23  --   --   --   --  22 21 22   BUN  --   --  18  --   --   --   --  19 13 14   CR  --   --  0.58*  --   --   --   --  0.65* 0.56* 0.70   ANIONGAP  --   --  8  --   --   --   --  8 9 10   TIMBO  --   --  8.7  --   --   --   --  8.9 8.9 9.4   GLC  --   --  116*  --   --   --   --  148* 131* 112*   ALBUMIN  --   --   --   --   --   --   --   --   --  3.4   PROTTOTAL  --   --   --   --   --   --   --   --   --  7.7   BILITOTAL  --   --   --   --   --   --   --   --   --  0.6   ALKPHOS  --   --   --   --   --   --   --   --   --  140   ALT  --   --   --   --   --   --   --   --   --  21   AST  --   --   --   --   --   --   --   --   --  30    < > = values in this interval not displayed.     Recent Labs   Lab 04/23/20  0729 04/20/20  0456 04/20/20  0405 04/19/20  0721 04/18/20  1600   * 148*  --  131* 112*   BGM  --   --  153*  --   --        Imaging:   No results found for this or any previous visit (from the past 24 hour(s)).

## 2020-04-25 NOTE — PLAN OF CARE
Discharge Planner PT   Patient plan for discharge: Open to TCU  Current status: Pt supine upon arrival, agreeable to PT. Pt is at SBA with supine>sit. Noted with dyspnea with bed mobility needing to return to supine x 2. Pt is edu on PLB and relaxation techniques. Pt ambulated 200 ft using RW and CGA for safety. Pt participated in standing LE there ex with PT's guidance. O2 sats >95% in room air with activity, HR ranged from 120- high 130's with activity. HR returned to 90's with rest.   Barriers to return to prior living situation: impulsivity at times, decreased safety awareness, decreased activity tolerance, impaired balance, stairs   Recommendations for discharge: TCU  Rationale for recommendations: Patient is below baseline for functional mobility & would benefit from continued physical therapy in the setting of a TCU for improving functional mobility, LE strength and tolerance for functional activities in order to return to baseline of functioning.

## 2020-04-25 NOTE — PROVIDER NOTIFICATION
MD Notification    Notified Person: MD    Notified Person Name: Dr Degroot    Notification Date/Time:  2:58 PM    Notification Interaction: text message sent    Purpose of Notification: Pt requesting resuming his home med Ferrous 150mg tablets. States he takes  2 tabs w brkfst and 1 tab with dinner. Has chronic anemia and HOLLOWAY, tires easily. HR tach 110's with activity.  Requesting  MD to consider  start 2 tab dose now.    Orders Received:waing reply    Comments:

## 2020-04-25 NOTE — PLAN OF CARE
A& O but forgetful. VSS. C/o pain, gave prn dilaudid and tylenol w/good relief. LS dim. Neuros intact. Up w/assist of one gb and walker. Condom cath, but some leaking, changed several times. Plan for discharge either to TCU or hospice Monday after last radiation treatment.

## 2020-04-25 NOTE — PLAN OF CARE
Discharge Planner OT   Patient plan for discharge: home  Current status: Pt awake and willing to participate in groomt tasks at sink.  Slightly impulsive at times, needed verbal cues.  Stood but then needed to sit quickly for a rest break.  Stood a second time and used walker to get to bathroom.  Completed toilet transfer to try BM with SBA.  Able to complete toilet hygiene himself.  SBA for washing hands and brushing teeth, too tired to participate after that.  Returned to bed.  Barriers to return to prior living situation: Current level of A for I/ADLS  Recommendations for discharge: TCU  Rationale for recommendations: Pt is below baseline in I/ADls and will benefit from skilled therapy to address safety and independence in I/ADls. Needs to build endurance to get tasks done on his own.       Entered by: Sourav Phan 04/25/2020 11:56 AM

## 2020-04-26 NOTE — PLAN OF CARE
A &O but forgetful at times, reorient prn. VSS. Neuros intact. C/o pain, gave prn po dilaudid and tylenol. Incontinent of stool x2. Incontinent of urine, has condom cath, changed x 3, good urine output. Up w/SBA. Mepilex to coccyx changed on evening. Plan for 5/5 radiation on Monday then discharge to TCU.

## 2020-04-26 NOTE — PLAN OF CARE
[id number]: A&Ox4. VSS on RA. C/o pain 8 out of 10 prn tylenol given. Regular diet, poor appetite Incontinent of B&B, has condom catheter in place w/ good UOP. Mepilex on coccyx intact. PIV SL. SBA. Plan: discharge after finding bed placement and final radiation on 4/27

## 2020-04-26 NOTE — PLAN OF CARE
Discharge Planner PT   Patient plan for discharge: Open to TCU  Current status: Pt supine upon arrival, agreeable to PT. Pt is at SBA with supine>sit. Pt sat at EOB with supervision, STS x 7 with CGA and verbal cues for proper hand placement. Pt ambulated 35 ft x 3 using RW and CGA fo safety with verbal cues for deep breathing. Pt participated in standing LE there ex with PT's guidance. Pt gets dyspnea with minimal exertion. Noted HR in the 120's with activity and 90's at rest.   Barriers to return to prior living situation: impulsivity at times, decreased safety awareness, decreased activity tolerance, impaired balance, stairs   Recommendations for discharge: TCU  Rationale for recommendations: Patient is below baseline for functional mobility & would benefit from continued physical therapy in the setting of a TCU for improving functional mobility, LE strength and tolerance for functional activities in order to return to baseline of functioning       Entered by: Tatum Gabriel 04/26/2020 12:07 PM

## 2020-04-26 NOTE — PROGRESS NOTES
[facility]    Hospitalist Progress Note    Assessment & Plan   John Batista is a 74 year old male with PMH significant for metastatic prostate cancer with mets to the bone and lungs on chemotherapy who was directly admitted [facility] due to altered mental status and agitation with newly found brain metastases, lactic acidosis, and urinary tract infection.     Altered mental status with agitation  Newly discovered brain metastases  Metastatic prostate adenocarcinoma on chemotherapy - mets to the lung and bone  DNR/DNI  Follows with [place] [facility], 2 days on and 12-day off cycle, last dose 5 days PTA.  Chronic myalgias on PTA Dilaudid. Also follows with Urology Dr. Galvan, is on every 6-month Lupron injection. Head CT in ER revealed lesions in cerebellum and Rt cerebral hemisphere with vasogenic edema in Rt cerebral hemisphere, most likely 2/2 metastatic disease.   -- Continue solumedrol 4mg IV Q8h per neurosurgery recs  -- Appreciate input from oncology and neurosurgery; no plan for any surgery as per neurosurgery.  -- Appreciate palliative care input and assistance  -- Continue olanzapine BID  -- Rad Onc following and pt undergoing inpatient XRT. Plan is for discharge to TCU after pt's final radiation tx this Monday when bed is available  -- neuro checks changed to q shift. More frequent checks were contributing to agitation.  -- not currently requiring 1:1 sitter    Proteus UTI, complicated  Lactic acidosis   -- On po Augmentin. Anticipate 7-10 day course. Today is day 6 of abx.  -- Blood cxs NGTD     Chronic leukopenia  Baseline WBC 2.5-3.6 range most recently. neutropenia noted following recent chemo   -- Received Neupogen on 4/19; ANC currently wnl     Chronic anemia  At baseline 7-8 range.  -- Monitor CBC, stable    DVT Prophylaxis: sequential compression device   Code Status: DNR/DNI     Disposition: Expected discharge tomorrow to TCU. SW aware of wife's preferences. Planned for  4/27. Plan is for discharge to TCU after pt's final radiation tx this Monday when bed is available    Veronica Degroot MD  Text Page   (7am to 6pm)      Interval History   Patient looking well today. Awake, alert, coherent. No acute complaints. Seen by oncology. Plan is for discharge to TCU after pt's final radiation tx this Monday when bed is available    -Data reviewed today: I reviewed all new labs and imaging results over the last 24 hours. I personally reviewed labs  today .    Physical Exam   Temp: 95.3  F (35.2  C) Temp src: Oral BP: 121/65 Pulse: 88 Heart Rate: 100 Resp: 14 SpO2: 98 % O2 Device: None (Room air)    There were no vitals filed for this visit.  Vital Signs with Ranges  Temp:  [95.3  F (35.2  C)-96.6  F (35.9  C)] 95.3  F (35.2  C)  Pulse:  [88] 88  Heart Rate:  [] 100  Resp:  [14-16] 14  BP: (121-133)/(65-73) 121/65  SpO2:  [98 %-100 %] 98 %  I/O last 3 completed shifts:  In: 1160 [P.O.:1160]  Out: 1800 [Urine:1800]    Constitutional: Is alert and oriented x 2   Respiratory: Clear to auscultation bilaterally, no crackles or wheezing  Cardiovascular: Regular rate and rhythm, normal S1 and S2, and no murmur noted  GI: Normal bowel sounds, soft, non-distended, non-tender  Skin/Integumen: No rashes, no cyanosis, no edema  Neuro : moving all 4 extremities, occasionally confused.    Medications     - MEDICATION INSTRUCTIONS -         amoxicillin-clavulanate  1 tablet Oral Q12H STEVEN     dexamethasone  4 mg Oral Q8H STEVEN     famotidine  20 mg Oral BID     iron polysaccharides  150 mg Oral Daily with supper     iron polysaccharides  300 mg Oral Daily     OLANZapine zydis  5 mg Oral BID     pantoprazole  40 mg Oral QAM AC     saccharomyces boulardii  500 mg Oral BID     senna-docusate  1 tablet Oral BID    Or     senna-docusate  2 tablet Oral BID     sodium chloride (PF)  3 mL Intracatheter Q8H       Data   Recent Labs   Lab 04/26/20  0707 04/24/20  0800 04/24/20  0212 04/23/20  0729 04/22/20  0434   04/20/20  0456   WBC 11.7*  --   --  9.7 4.0   < > 1.1*   HGB 8.5*  --   --  8.2* 8.5*   < > 7.4*   MCV 95  --   --  93 94   < > 91     --   --  214 204   < > 84*     --   --  136  --   --  135   POTASSIUM 4.1 4.1 3.6 3.2*  --    < > 3.2*   CHLORIDE 104  --   --  105  --   --  105   CO2 21  --   --  23  --   --  22   BUN 24  --   --  18  --   --  19   CR 0.64*  --   --  0.58*  --   --  0.65*   ANIONGAP 9  --   --  8  --   --  8   TIMBO 8.4*  --   --  8.7  --   --  8.9   *  --   --  116*  --   --  148*   ALBUMIN 2.9*  --   --   --   --   --   --    PROTTOTAL 6.2*  --   --   --   --   --   --    BILITOTAL 0.3  --   --   --   --   --   --    ALKPHOS 121  --   --   --   --   --   --    ALT 42  --   --   --   --   --   --    AST 28  --   --   --   --   --   --     < > = values in this interval not displayed.     Recent Labs   Lab 04/26/20  0707 04/23/20  0729 04/20/20  0456 04/20/20  0405   * 116* 148*  --    BGM  --   --   --  153*       Imaging:   No results found for this or any previous visit (from the past 24 hour(s)).

## 2020-04-26 NOTE — PLAN OF CARE
Discharge Planner OT   Patient plan for discharge: TCU  Current status: Pt completed bed mobility (I)ly. Transfers and ambulates with SBA and WW, cues for proper walker use (tends to leave it behind with medeiros affixed to it). Pt had been incontinent of BM in brief; OT assisted for clean up. Needed Mod A for clean up and brief change. Pt completed low toilet transfer with grab bar and CGA. Washed hands at sink with CGA, but pt became SOB and very tired from light ADLs. , O2 99%, /139. Notified RN.  Barriers to return to prior living situation: very limited activity tolerance, level of assist with ADL and mobility  Recommendations for discharge: TCU  Rationale for recommendations:  Due to his very limited tolerance for activity and need for cues and assist, pt would benefit from continued inpt therapy. If pt were to go home, would need Home PT/OT and CGA with all mobility and would need assist with dressing, bathing and all IADL.       Entered by: Virgie Vergara 04/26/2020 3:25 PM

## 2020-04-26 NOTE — PROGRESS NOTES
Progress Note     Primary Oncologist/Hematologist:  Dr. Borrero ([place])          Assessment and Plan:New actions/orders today (04/26/2020) are underlined.     Nicolas is a 74 year old admitted 4/18 with altered mental status     Newly discovered brain metastases  Metastatic prostate adenocarcinoma on chemotherapy - mets to the lung and bone.   Follows with Dr Borrero at [place] office. Also follows with Urology Dr. Galvan, is on every 6-month Lupron injection.   -Prostate cancer is quite refractory.   -Most recently started on palliative chemo with oral Cytoxan and etoposide, for 12 days on, 12 days off, last dose taken 4/14  -CT head 4/18 concerning for brain mets  -MRI brain 4/19 shows 10-15 lesions with vasogenic edema  -Neurosurgery onboard.  .   -Decadron 4 mg every 8 hours along with PPI. Suggest taper at time of discharge (4mg bid x 4 days and then 4mg daily for 1 week and stop)  -Receiving brain Rt treatments under the direction of Dr. Mcnair since 4/21.    -Scheduled to receive last RT treatment 4/27  -Noted plan to discharge to TCU 4/27 after last RT treatment.  Hold further chemotherapy until outpatient follow up with Dr. Borrero  -Outpatient follow up with Dr. Borrero 1-2 weeks following discharge to re-evaluate candidacy for continuing systemic treatments.     Pancytopenia secondary to chemotherapy  -related to treatments  -received Filgrastim 4/19, 4/20 and 4/21  -neutropenia and thrombocytopenia resolved  -CBC with differential 4/22 showed ANC of 2600.    -Transfuse for hemoglobin less than 6 (given blood shortage) and platelets less than 10,000     UTI  -Altered mental status most likely secondary to UTI and sepsis versus the brain mets.  -Cultures >100,000 colonies proteus mirabilis  -Complete course of Augmentin (proteus sensitive)     Encephalopathy  -Likely from infection, brain mets and steroids  -improved     Chronic pain  Continue Dilaudid as needed for pain.    DNI/DNR  Dispostion: noted plan to  discharge to TCU following last RT treatment on 4/27.  Please have  call clinic on 4/27 so we can arrange for a follow up visit for him.    Robson Yang MD        Interval History:     Nicolas answered questions appropriately.  No acute issues overnight.              Review of Systems:     The 5 point Review of Systems is negative other than noted in the HPI             Medications:   Scheduled Medications    amoxicillin-clavulanate  1 tablet Oral Q12H STEVEN     dexamethasone  4 mg Oral Q8H STEVEN     famotidine  20 mg Oral BID     iron polysaccharides  150 mg Oral Daily with supper     iron polysaccharides  300 mg Oral Daily     OLANZapine zydis  5 mg Oral BID     pantoprazole  40 mg Oral QAM AC     saccharomyces boulardii  500 mg Oral BID     senna-docusate  1 tablet Oral BID    Or     senna-docusate  2 tablet Oral BID     sodium chloride (PF)  3 mL Intracatheter Q8H     PRN Medications  acetaminophen **OR** [DISCONTINUED] acetaminophen **OR** acetaminophen, bisacodyl **OR** bisacodyl **OR** bisacodyl, bisacodyl, haloperidol lactate, HYDROmorphone, HYDROmorphone, labetalol, lidocaine 4%, lidocaine (buffered or not buffered), LORazepam, magnesium sulfate, - MEDICATION INSTRUCTIONS -, melatonin, naloxone, OLANZapine zydis, ondansetron **OR** ondansetron, polyethylene glycol, prochlorperazine **OR** prochlorperazine **OR** prochlorperazine, sodium chloride (PF)               Physical Exam:   Vitals were reviewed  Blood pressure 133/73, pulse 88, temperature 96.6  F (35.9  C), temperature source Axillary, resp. rate 14, SpO2 100 %.  Wt Readings from Last 4 Encounters:   03/24/20 65.8 kg (145 lb)   02/13/20 72.1 kg (159 lb)   01/24/20 72.1 kg (159 lb)   01/02/20 68.9 kg (152 lb)       I/O last 3 completed shifts:  In: 1160 [P.O.:1160]  Out: 1800 [Urine:1800]    General: alert and answering questions appropriately  ENT: no thrush  Resp: clear to auscultation bilaterally   Heart: no murmurs/added sounds  Abd:  soft, nontender, nondistended, bowel sounds heard.  Condom catheter in place  Neuro: alert, moving all 4 extremities   Extremities: no edema         Data:   All laboratory data and imaging studies reviewed.    CMP  Recent Labs   Lab 04/26/20  0707 04/24/20  0800 04/24/20  0212 04/23/20  0729  04/20/20  0456     --   --  136  --  135   POTASSIUM 4.1 4.1 3.6 3.2*   < > 3.2*   CHLORIDE 104  --   --  105  --  105   CO2 21  --   --  23  --  22   ANIONGAP 9  --   --  8  --  8   *  --   --  116*  --  148*   BUN 24  --   --  18  --  19   CR 0.64*  --   --  0.58*  --  0.65*   GFRESTIMATED >90  --   --  >90  --  >90   GFRESTBLACK >90  --   --  >90  --  >90   TIMBO 8.4*  --   --  8.7  --  8.9   MAG  --   --   --   --   --  2.0   PROTTOTAL 6.2*  --   --   --   --   --    ALBUMIN 2.9*  --   --   --   --   --    BILITOTAL 0.3  --   --   --   --   --    ALKPHOS 121  --   --   --   --   --    AST 28  --   --   --   --   --    ALT 42  --   --   --   --   --     < > = values in this interval not displayed.     CBC  Recent Labs   Lab 04/26/20  0707 04/23/20  0729 04/22/20  0434 04/21/20  0821   WBC 11.7* 9.7 4.0 2.0*   RBC 2.68* 2.65* 2.76* 2.48*   HGB 8.5* 8.2* 8.5* 7.6*   HCT 25.5* 24.7* 25.8* 22.8*   MCV 95 93 94 92   MCH 31.7 30.9 30.8 30.6   MCHC 33.3 33.2 32.9 33.3   RDW 15.6* 14.6 13.8 13.3    214 204 133*     INR  No lab results found in last 7 days.

## 2020-04-26 NOTE — PROGRESS NOTES
SW:  The facilities which wife has expressed interest in are not options.  [facility] doesn't work with [facility]  [facility] - declined, reason not known.  [facility] out of network and thus the patient would have high out network payments  [facility] is in the network for [facility].  The facility doesn't have a Monday vacancy and if they did,   Anya stated she also would need to review this assessment with her nursing supervisor before being able to offer a room.  Also [facility] does have 2 separate TCU units for COVID + patients  which wife has stated she wishes to avoid.    Plan:  Will need to update wife and begin more referrals tomorrow.  It is difficult to determine with the w/e admission staff whether a TCU in is the [facility] network.      Update at 1550  Spoke with patient's wife today.  She reports she doesn't want to be the person to tell patient he is going to a TCU because she doesn't want him to think she doesn't want him home.  She does want him home but knows she cannot provide the care he needs at this time.  Writer updated her that her preferences for TCU are not available.  She would like writer to have patient assessed by [facility] and determined with [facility] what patient's out of pocket expenses would be.  She also is interested in [facility].  She has toured facilities and one facility she is not interested in is [facility].   She learned of [facility] in [place] and is very interested in this facility. She has been told the staff can provide a high level of care including hospice care. Writer explained this facility is not a TCU rather RON/Care Suite.  Explained the therapy  would only be 3 x a week under home care guidelines where at a TCU the level is up to 2x a day.  Wife decided to proceed with TCU to optimize his strength and then consider admission to [facility] post TCU.   At this time, wife in agreement with referrals to [facility], [facility].    Writer mentioned MD was anticipating discharge tomorrow.  She feels patient will be too tired after radiation and also doesn't want to be rushed into making a decision.  She is using the Medicare web site for ratings and is touring some facilities.  She wants patient in a quality facility, preferring a TCU which doesn't have COVID + patients on the same unit patient is on.  She also prefers a facility in the [employer]    PLAN On Monday will be calling [place] for a list of TCU's in their network.  Will also speak with [facility] & [facility].

## 2020-04-26 NOTE — PLAN OF CARE
7a-7p nursing shift  Improved mental status since received whole brain radiation. Is A&O. Is more quiet today, withdrawn at times.Neuros intact.  VSS on RA.  Poor appetite.  Gets up with assist of 1 to chair. Was in chair x2 this shift and walked in aranda x1. PT/OT following. PRN Tylenol and oral Dilaudid controlling his bilateral posterior hip pain. Incontinent of bladder (as baseline since his past prostate surgery) condom catheter in place. Has good output. Mepilex on coccyx intact -changed last noc x2 by prev shift after pt had 2 incontinent stools. Coccyx with blanchable erythema.   Plan is for discharge to TCU after pt's final  radiation tx this Monday when bed is available

## 2020-04-27 NOTE — PROGRESS NOTES
SW:  D:  Wife called this morning and requested additional referral be made to [facility] in [facility].   Anthony at [facility] is assessing patient at this time and reports generally they are In Network for [employer].   Wife's first choice is [facility] as it is a sister facility of the [facility] in [place].  She is interested in patient admitting to [facility] post TCU pending his status.   PLAN:  Will proceed once Jasmina Partida makes a decision.     Update at 1300  Dr Ingram as written discharge orders.  Writer has not heard back from [facility] yet however [facility] has called stating they can offer patient a room. Wife is aware of [facility] option and states she will accept [facility] if [facility] declines patient.  At this time, [facility] has been called and left message asking if they can accept patient.  If writer doesn't hear back by 1330, writer will call the wife and recommended she accept the room at [facility] and move forward with insurance authorization.

## 2020-04-27 NOTE — PROGRESS NOTES
"SPIRITUAL HEALTH SERVICES Progress Note  [facility]    Initiated f/u visit per pt request.  Pt was in room alone.  Pt shared that he feels uncomfortable in his hospital bed, and stated that he wanted to be discharged home instead of TCU, believing that he could do physical therapy by \"doing projects in his garage\" and \"getting the yard ready for spring.\"  Pt states that he has done woodworking since he was a child, and enjoys building cabinets and furniture.  Pt asked for prayer to \"get me home.\"  SH offered supportive listening, reflective conversation, and prayer.  SH will f/u as needed.      Gilbert Frederick  Chaplain Resident    "

## 2020-04-27 NOTE — PLAN OF CARE
Discharge Planner PT   Patient plan for discharge: TCU  Current status: Limited by endurance, good effort.  SBA-CGA for transfers and gait with walker, increased amb distance today to 80'.  HR to 160 briefly following gait, 90s at rest.  Barriers to return to prior living situation: weakness, assist for all mobility, functional endurance  Recommendations for discharge: TCU  Rationale for recommendations: Skilled PT indicated to maximize functional strength and endurance for return to home environment.       Entered by: Gloria Jorgensen 04/27/2020 11:42 AM

## 2020-04-27 NOTE — PROGRESS NOTES
SW:  D;  Patient has been accepted by [facility] TCU if he doesn't use medical cannabis while there.  Reviewed with bedside nurse and MAR.  It doesn't appear that patient has used the medical cannabis here.  Wife reports patient used cannabis at night for sleep while at home.  Since he has not used the cannabis here wife felt she would hope patient will be fine with not using and then can enter [facility].  [facility] is [facility] for pre-authorization and hopes to admit patient tomorrow.  The TCU room is a private room at no additional cost. Patient will have a daily co payment of $20.00 for day 1-2, this was reviewed with wife.  Met with patient to discuss the TCU arrangements.  He is adamant that he doesn't need to go to TCU for therapy.  He feels able to return home with his wife. He reports he gets short of breath and fatigues because his hemoglobin is low and will stay low.  He feels he will get more exercise at home by having to be up and taking care of himself rather than laying in a bed like he has here.    Wife alerted of patient's opposition.  She spoke with patient over the phone explaining she doesn't feel she can provide the support he needs giving the example that if he falls she couldn't get him up.  She said the conversation did not go well.  She asked if we can override his opposition .  She questions if his reasoning is affected by his brain tumor.  Writer explained we cannot force him to enter a TCU.    She is going to let patient think overnight and speak again with him in the morning.  Writer also asked if patient's adult children could influence patient to accept going to the care center.  She will talk with her son's this evening.   PLAN: Will follow up with patient, wife and provider tomorrow morning

## 2020-04-27 NOTE — PLAN OF CARE
Discharge Planner OT   Patient plan for discharge: none stated today  Current status: pt completed supine to sit EOB SBA, independent to don slipper socks, cues needed for hand placement with all sit to/from stands, pt stood with CGA, amb with FWW CGA to/from bathroom, pt fatigue and SOB with activity, pt alternating sit/stand at sink with SBA/CGA for ADLS. Pt standing tolerance ~ 1min.   Barriers to return to prior living situation: very limited activity tolerance  Recommendations for discharge: TCU per plan established by the Occupational Therapist  Rationale for recommendations:  Due to his very limited tolerance for activity and need for cues and assist, pt would benefit from continued inpt therapy. If pt were to go home, would need Home PT/OT and CGA with all mobility and would need assist with dressing, bathing and all IADL.       Entered by: Amanda Bob 04/27/2020 9:34 AM

## 2020-04-27 NOTE — PLAN OF CARE
Pt is A and O X4, forgetful at times. VSS on RA. Reports sacrum pain. Prn tylenol given and effective. Dilaudid effective. Had multiple incontinent episodes ( BM), with multiple soft BMs. Skin red from frequent wiping: barrier cream applied. New Mepilex to coccyx. Condom catheter changed this evening,working well. Had final radiation today. Discharge pending placement to TCU. SW following. SBA+ BG+W to bathroom. Continue to monitor.

## 2020-04-27 NOTE — PROGRESS NOTES
[facility]    Hospitalist Progress Note    Assessment & Plan   John Batista is a 74 year old male with PMH significant for metastatic prostate cancer with mets to the bone and lungs on chemotherapy who was directly admitted from [facility] ER due to altered mental status and agitation with newly found brain metastases, lactic acidosis, and urinary tract infection.     Altered mental status with agitation  Newly discovered brain metastases  Metastatic prostate adenocarcinoma on chemotherapy - mets to the lung and bone  DNR/DNI  Follows with [facility], 2 days on and 12-day off cycle, last dose 5 days PTA.  Chronic myalgias on PTA Dilaudid. Also follows with Urology Dr. Galvan, is on every 6-month Lupron injection. Head CT in ER revealed lesions in cerebellum and Rt cerebral hemisphere with vasogenic edema in Rt cerebral hemisphere, most likely 2/2 metastatic disease.   -- Continue solumedrol 4mg IV Q8h per neurosurgery recs.  Currently on dexamethasone 4 mg every 8 hours p.o.  Leukocytosis most likely due to being on steroids.  No other signs of active infection currently  -- Appreciate input from oncology and neurosurgery; no plan for any surgery as per neurosurgery.  -- Appreciate palliative care input and assistance  -- Continue olanzapine BID  -- Rad Onc following and pt undergoing inpatient XRT. Plan is for discharge to TCU after pt's final radiation treatment today.  Social work consulted and working on placement to TCU  -- neuro checks changed to q shift. More frequent checks were contributing to agitation.  -- not currently requiring 1:1 sitter  Status much improved    Proteus UTI, complicated  Lactic acidosis   -- On po Augmentin. Anticipate 7-10 day course. Today is day 6 of abx.  -- Blood cxs NGTD     Chronic leukopenia  Baseline WBC 2.5-3.6 range most recently. neutropenia noted following recent chemo   -- Received Neupogen on 4/19; ANC currently wnl     Chronic anemia  At baseline  7-8 range.  -- Monitor CBC, stable    DVT Prophylaxis: sequential compression device   Code Status: DNR/DNI     Disposition: Expected discharge tomorrow to TCU when TCU bed is available.  Insurance authorization is pending     Edie Ingram MD        Interval History      Patient doing well today. Awake, alert, coherent. No acute complaints.     -Data reviewed today: I reviewed all new labs and imaging results over the last 24 hours. I personally reviewed labs  today .    Physical Exam   Temp: 96.9  F (36.1  C) Temp src: Axillary BP: 138/69 Pulse: 74 Heart Rate: 70 Resp: 18 SpO2: 99 % O2 Device: None (Room air)    There were no vitals filed for this visit.  Vital Signs with Ranges  Temp:  [96.6  F (35.9  C)-98.8  F (37.1  C)] 96.9  F (36.1  C)  Pulse:  [74-88] 74  Heart Rate:  [] 70  Resp:  [16-18] 18  BP: ()/() 138/69  SpO2:  [98 %-100 %] 99 %  I/O last 3 completed shifts:  In: 600 [P.O.:600]  Out: 1030 [Urine:1030]    Constitutional: Is alert and oriented x 2   Respiratory: Clear to auscultation bilaterally, no crackles or wheezing  Cardiovascular: Regular rate and rhythm, normal S1 and S2, and no murmur noted  GI: Normal bowel sounds, soft, non-distended, non-tender  Skin/Integumen: No rashes, no cyanosis, no edema  Neuro : moving all 4 extremities, occasionally confused.    Medications     - MEDICATION INSTRUCTIONS -         amoxicillin-clavulanate  1 tablet Oral Q12H STEVEN     dexamethasone  4 mg Oral Q8H STEVEN     famotidine  20 mg Oral BID     iron polysaccharides  150 mg Oral Daily with supper     iron polysaccharides  300 mg Oral Daily     OLANZapine zydis  5 mg Oral BID     pantoprazole  40 mg Oral QAM AC     saccharomyces boulardii  500 mg Oral BID     senna-docusate  1 tablet Oral BID    Or     senna-docusate  2 tablet Oral BID     sodium chloride (PF)  3 mL Intracatheter Q8H       Data   Recent Labs   Lab 04/27/20  0857 04/26/20  0707 04/24/20  0800  04/23/20  0729   WBC 16.2* 11.7*  --    --  9.7   HGB 8.8* 8.5*  --   --  8.2*   MCV 96 95  --   --  93    307  --   --  214    134  --   --  136   POTASSIUM 4.0 4.1 4.1   < > 3.2*   CHLORIDE 101 104  --   --  105   CO2 24 21  --   --  23   BUN 24 24  --   --  18   CR 0.66 0.64*  --   --  0.58*   ANIONGAP 8 9  --   --  8   TIMBO 8.5 8.4*  --   --  8.7   * 118*  --   --  116*   ALBUMIN 3.0* 2.9*  --   --   --    PROTTOTAL 6.3* 6.2*  --   --   --    BILITOTAL 0.4 0.3  --   --   --    ALKPHOS 108 121  --   --   --    ALT 41 42  --   --   --    AST 24 28  --   --   --     < > = values in this interval not displayed.     Recent Labs   Lab 04/27/20  0857 04/26/20  0707 04/23/20  0729   * 118* 116*       Imaging:   No results found for this or any previous visit (from the past 24 hour(s)).

## 2020-04-27 NOTE — PROGRESS NOTES
Mr. Batista was brought to radiation therapy today for treatment #5 of 5 total treatments. He received a dose today of 400 cGy and has now received a total dose of 200 cGy to the whole brain. He has now completed his course of radiotherapy to his whole brain.    Claudia Wu RTT

## 2020-04-27 NOTE — PROGRESS NOTES
Oncology chart check:     Note plans for discharge to TCU after final radiation and when bed is available.  Patient has 5/4 appt with Dr. Borrero in [place] at 11:25 am (entered into discharge instructions, along with instructions for rescheduling, if needed)    Pierce James  Nurse Practitioner  [facility]

## 2020-04-27 NOTE — PLAN OF CARE
A & O x 4 but forgetful. VSS. C/o pain, gave prn po dilaudid and tylenol. Had 2 BM incontinent of stool x 1; condom catheter in place w/good UOP. Mepliex to coccyx in place. Up to BR w/SBA. Final brain radiation today (5/5) and if TCU placement found, will discharge and SW is following.

## 2020-04-28 NOTE — PLAN OF CARE
Occupational Therapy Discharge Summary    Reason for therapy discharge:    Discharged to transitional care facility.    Progress towards therapy goal(s). See goals on Care Plan in [facility] electronic health record for goal details.  Goals not met.  Barriers to achieving goals:   discharge from facility.    Therapy recommendation(s):    Continued therapy is recommended.  Rationale/Recommendations:  at TCU to improve ind/safety w/ ADL's.

## 2020-04-28 NOTE — PLAN OF CARE
[id number]: Pt is A&O- forgetful. VSS. C/o of leg pain, PRN dilaudid and tylenol given x1. Leg massage effective. Reg diet. Up SBA. Incontinent, condom cath in place. PIV SL. Neuros intact. Plan for possible discharge to [facility] tomorrow.

## 2020-04-28 NOTE — PLAN OF CARE
Pt A/Ox4. VSS on RA. Reports pain in sacrum and legs, PRN dilaudid given x1 and tylenol x1. Regular diet. SBA w/ GB and [facility]. Incontinent of bowel and bladder, has condom catheter on. Protective mepilex on coccyx. PIV SL. Possible discharge to [facility] today, SW following.

## 2020-04-28 NOTE — DISCHARGE SUMMARY
[facility]  Discharge Summary        John Batista MRN# [id number]   YOB: 1945 Age: 74 year old     Date of Admission:  4/18/2020  Date of Discharge:  4/28/2020  Admitting Physician:  Vicki Bacon DO  Discharge Physician: Edie Ingram MD  Discharging Service: Hospitalist     Primary Provider: Rosario Martinez  Primary Care Physician Phone Number: [phone]         Discharge Diagnoses/Problem Oriented Hospital Course (Providers):    John Batista was admitted on 4/18/2020 by Vicki Bacon DO and I would refer you to their history and physical.  The following problems were addressed during his hospitalization:  Assessment & Plan     John Batista is a 74 year old male with PMH significant for metastatic prostate cancer with mets to the bone and lungs on chemotherapy who was directly admitted from [facility] ER due to altered mental status and agitation with newly found brain metastases, lactic acidosis, and urinary tract infection.     Altered mental status with agitation  Newly discovered brain metastases  Metastatic prostate adenocarcinoma on chemotherapy - mets to the lung and bone  DNR/DNI  Follows with [place] [facility], 2 days on and 12-day off cycle, last dose 5 days PTA.  Chronic myalgias on PTA Dilaudid. Also follows with Urology Dr. Galvan, is on every 6-month Lupron injection. Head CT in ER revealed lesions in cerebellum and Rt cerebral hemisphere with vasogenic edema in Rt cerebral hemisphere, most likely 2/2 metastatic disease.   -- Continue solumedrol 4mg IV Q8h per neurosurgery recs.  Currently on dexamethasone 4 mg every 8 hours p.o.  Leukocytosis most likely due to being on steroids.  No other signs of active infection currently  -- Appreciate input from oncology and neurosurgery; no plan for any surgery as per neurosurgery.  -- Appreciate palliative care input and assistance  -- Continue olanzapine BID  -- Rad Onc following and pt  undergoing inpatient XRT. Plan is for discharge to TCU after pt's final radiation treatment on 4/27/20.  Social work consulted and working on placement to TCU  -- neuro checks changed to q shift. More frequent checks were contributing to agitation.  -- not currently requiring 1:1 sitter  Mental Status much improved     Proteus UTI, complicated  Lactic acidosis   -- On po Augmentin. Anticipate 7-10 day course. Today is day 6 of abx.  -- Blood cxs NGTD     Chronic leukopenia  Baseline WBC 2.5-3.6 range most recently. neutropenia noted following recent chemo   -- Received Neupogen on 4/19; ANC currently wnl     Chronic anemia  At baseline 7-8 range.  -- Monitor CBC, stable     DVT Prophylaxis: sequential compression device   Code Status: DNR/DNI      Disposition: to TCU today     Edie Ingram MD                  Code Status:      DNR / DNI         Important Results:      See below          Pending Results:        Unresulted Labs Ordered in the Past 30 Days of this Admission     No orders found from 3/19/2020 to 4/19/2020.               Discharge Instructions and Follow-Up:      Follow-up Appointments     Follow Up      You have an appointment with Dr. Borrero at [facility] in [place] on   5/4/2020 at 11:25am. If you need to cancel or reschedule that appointment,   please call [phone].         Follow Up and recommended labs and tests      F/u with oncology on 5/4/20. Recheck Bmp in 1week                  Discharge Disposition:      Discharged to short-term care facility         Discharge Medications:        Discharge Medication List as of 4/28/2020 10:54 AM      START taking these medications    Details   dexamethasone (DECADRON) 4 MG tablet Take 1 tablet (4 mg) by mouth every 8 hours, Disp-90 tablet,R-0, Transitional      OLANZapine (ZYPREXA) 5 MG tablet Take 1 tablet (5 mg) by mouth 2 times daily, Disp-60 tablet,R-0, Transitional      OLANZapine zydis (ZYPREXA) 5 MG ODT Take 1 tablet (5 mg) by mouth 2 times  daily as needed for agitation, Disp-60 tablet,R-0, Transitional      pantoprazole (PROTONIX) 40 MG EC tablet Take 1 tablet (40 mg) by mouth every morning (before breakfast), Disp-30 tablet,R-0, Transitional      senna-docusate (SENOKOT-S/PERICOLACE) 8.6-50 MG tablet Take 2 tablets by mouth 2 times daily, Transitional         CONTINUE these medications which have CHANGED    Details   amoxicillin-clavulanate (AUGMENTIN) 875-125 MG tablet Take 1 tablet by mouth every 12 hours for 1 day, Disp-2 tablet,R-0, Transitional      HYDROmorphone (DILAUDID) 4 MG tablet Take 1 tablet (4 mg) by mouth every 4 hours as needed for severe pain, Disp-30 tablet,R-0, Transitional         CONTINUE these medications which have NOT CHANGED    Details   acetaminophen (TYLENOL) 500 MG tablet Take 500 mg by mouth every 4 hours as needed for mild pain , Historical      Calcium-Magnesium-Zinc 333-133-5 MG TABS per tablet Take 1 tablet by mouth 2 times daily (with meals) , Historical      Cholecalciferol (VITAMIN D) 1000 UNITS capsule Take 2,000 Units by mouth 2 times daily , Historical      famotidine (PEPCID) 20 MG tablet Take 20 mg by mouth 2 times daily, Historical      leuprolide (ELIGARD) 45 MG injection Inject 45 mg Subcutaneous every 6 months., Historical      Multiple Vitamin (ONE-A-DAY 55 PLUS OR) Take 1 tablet by mouth daily , Historical      Polysaccharide Iron Complex (FERREX 150 PO) 3 times daily (with meals) Take 1 capsule by mouth three times daily, Historical      prochlorperazine (COMPAZINE) 10 MG tablet Take 10 mg by mouth every 8 hours as needed for nausea or vomiting, Historical      saccharomyces boulardii (FLORASTOR) 250 MG capsule Take 500 mg by mouth 2 times daily , Historical      vitamin C (ASCORBIC ACID) 500 MG tablet Take 250 mg by mouth 3 times daily (with meals), Historical         STOP taking these medications       cyclophosphamide (CYTOXAN) 50 MG capsule Comments:   Reason for Stopping:         etoposide  (VEPESID) 50 MG capsule Comments:   Reason for Stopping:         medical cannabis (Patient's own supply) Comments:   Reason for Stopping:         NONFORMULARY Comments:   Reason for Stopping:         predniSONE (DELTASONE) 10 MG tablet Comments:   Reason for Stopping:                    Allergies:       No Known Allergies         Consultations This Hospital Stay:      Consultation during this admission received from oncology         Condition and Physical Exam on Discharge:      Discharge condition: Stable   Discharge vitals: Blood pressure 106/62, pulse 79, temperature 95.5  F (35.3  C), temperature source Oral, resp. rate 16, SpO2 99 %.     Constitutional: Alert and awake, NAD    Lungs: CTA   Cardiovascular: RRR   Abdomen: Soft, NT, ND, BS+   Skin: Warm and dry    Other:            Discharge Orders for Skilled Facility (from Discharge Orders):        After Care Instructions     Activity - Up with nursing assistance      Advance Diet as Tolerated      Follow this diet upon discharge: Orders Placed This Encounter      Snacks/Supplements Adult: Boost Plus; Between Meals      Room Service      Regular Diet Adult         Fall precautions      General info for SNF      Length of Stay Estimate: Short Term Care: Estimated # of Days 31-90  Condition at Discharge: Stable  Level of care:skilled   Rehabilitation Potential: Good  Admission H&P remains valid and up-to-date: Yes  Recent Chemotherapy: N/A  Use Nursing Home Standing Orders: Yes         Mantoux instructions      Give two-step Mantoux (PPD) Per Facility Policy Yes                    Rehab orders for Skilled Facility (from Discharge Orders):      Referrals     Future Labs/Procedures    Occupational Therapy Adult Consult     Comments:    Evaluate and treat as clinically indicated.    Reason:  Metastatic prostate cancer    Physical Therapy Adult Consult     Comments:    Evaluate and treat as clinically indicated.    Reason:  Metastatic prostate cancer              Discharge Time:      Greater than 30 minutes.        Image Results From This Hospital Stay (For Non-EPIC Providers):        Results for orders placed or performed during the hospital encounter of 04/18/20   MR Brain w/o & w Contrast    Narrative    MRI BRAIN WITHOUT AND WITH CONTRAST  4/19/2020 7:36 PM    HISTORY:  AMS, newly found brain mets on head CT, known metastatic  prostate cancer.     TECHNIQUE:  Multiplanar, multisequence MRI of the brain without and  with 6mL Gadavist.    COMPARISON: Head CT on 4/18/2020.    FINDINGS: Multiple intra-axial enhancing lesions are noted in both  cerebral hemispheres and in both cerebellar hemispheres. The number of  lesions is approximately 10-15. The largest lesion is in the left  posterior paramedian parietal region abutting the superior sagittal  sinus. This lesion measures 1.4 x 1.4 x 2.0 cm in size. There is also  a 1.8 x 1.6 x 1.4 cm enhancing mass in the left cerebellum. There are  two separate enhancing lesions in the inferior right temporal  occipital region measuring 1.3 and 1.5 cm. There are also a few  smaller lesions in a similar location. Lesions are also present in the  right and left frontal lobes, the left parietal lobe, and in the right  cerebellum. These lesions show evidence of calcium or hemosiderin on  the gradient echo sequences. There is significant surrounding  vasogenic edema with regards to the temporal lesions as well as the  right frontal and both parietal lesions. Smaller amounts of vasogenic  edema are also seen in the left frontal lobe. There is localized mass  effect but no midline shift. Brain parenchyma is otherwise normal.  There are also several lesions in the high convexity region of the  calvarium bilaterally consistent with osseous deposits.      Impression    IMPRESSION:  1. Multiple intra-axial metastatic deposits.  2. Osseous metastases also noted bilaterally in the convexity regions.    DIEGO JACOBSON MD           Most Recent Lab  Results In EPIC (For Non-EPIC Providers):    Most Recent 3 CBC's:  Recent Labs   Lab Test 04/27/20  0857 04/26/20  0707 04/23/20  0729   WBC 16.2* 11.7* 9.7   HGB 8.8* 8.5* 8.2*   MCV 96 95 93    307 214      Most Recent 3 BMP's:  Recent Labs   Lab Test 04/27/20  0857 04/26/20  0707 04/24/20  0800  04/23/20  0729    134  --   --  136   POTASSIUM 4.0 4.1 4.1   < > 3.2*   CHLORIDE 101 104  --   --  105   CO2 24 21  --   --  23   BUN 24 24  --   --  18   CR 0.66 0.64*  --   --  0.58*   ANIONGAP 8 9  --   --  8   TIMBO 8.5 8.4*  --   --  8.7   * 118*  --   --  116*    < > = values in this interval not displayed.     Most Recent 3 Troponin's:  Recent Labs   Lab Test 08/27/19  1311 10/27/18  1448 10/27/18  1437 02/21/17 2000   TROPI <0.015 <0.015  --  <0.015  The 99th percentile for upper reference range is 0.045 ug/L.  Troponin values in   the range of 0.045 - 0.120 ug/L may be associated with risks of adverse   clinical events.     TROPONIN  --   --  0.02  --      Most Recent 3 INR's:  Recent Labs   Lab Test 04/18/20  1600 10/27/18  1448   INR 1.04 0.91     Most Recent 2 LFT's:  Recent Labs   Lab Test 04/27/20  0857 04/26/20  0707   AST 24 28   ALT 41 42   ALKPHOS 108 121   BILITOTAL 0.4 0.3     Most Recent Cholesterol Panel:  Recent Labs   Lab Test 11/07/12  0918   CHOL 244*   *   HDL 49   TRIG 185*     Most Recent 6 Bacteria Isolates From Any Culture (See EPIC Reports for Culture Details):  Recent Labs   Lab Test 04/18/20  1752 04/18/20  1600 12/22/19  0248 12/22/19  0240 12/22/19  0018 12/08/19  1946   CULT No growth  >100,000 colonies/mL  Proteus mirabilis  * No growth No growth No growth No growth No growth     Most Recent TSH, T4 and HgbA1c:  Recent Labs   Lab Test 12/18/19  0899 12/22/17   TSH  --  1.83   A1C 4.9  --

## 2020-04-28 NOTE — PROGRESS NOTES
SW:  D:  Wife called this morning explaining patient is now accepting her request that he transfer to TCU prior to returning home.   [facility] has received authorization from [employer] for patient to admit to the TCU.  Anthony, in admissions is requesting patient leave the hospital later morning.  Wife has requested medivan and is aware of the cost,  [facility] will be here at 1100.  Wife is requesting that both patient and the medivan  wear a mask during transport.   Wife wanted to make sure patient is continued on Florastor and writer confirmed with wife that this is on the medication list for the TCU  The TCU pharmacy provider does not have access to Vepesid. The TCU asks if patient can bring his home supply.  Writer spoke with patient's wife and she stated patient doesn't have a supply at home.  She believes this is the drug that he was only given 12 doses for home use and used the supply.  Writer is asking our care coordinator to contact [facility] to ask if the Vepesid is provided thru their clinic.  Writer is asking the bedside nurse to include wife when the AVS is reviewed with patient.   Writer is aware there are no visitors at the TCU.  She understand she can bring his clothes to the reception desk at the TCU.  Wife pleased with the discharge plan and very relieved patient is accepting of the discharge plan.  Orders and script have been sent to Anthony at [facility].   PAS is not requird by this facility.

## 2020-04-28 NOTE — PLAN OF CARE
Patient discharged at 11:11 AM to TCU  IV was discontinued. Pain at time of discharge was 0/10. Belongings returned to patient.  Discharge instructions and medications reviewed with patient.  Patient verbalized understanding and all questions were answered.    At time of discharge, patient condition was stable and left the unit in a wheelchair escorted by transport.      Orthopedic

## 2020-05-18 PROBLEM — R19.7 DIARRHEA: Status: ACTIVE | Noted: 2020-01-01

## 2020-05-18 NOTE — LETTER
Alyssa Meehan, [facility] ANCELMO   Inpatient Care Coordination   Supervisor  [facility]  [phone]

## 2020-05-18 NOTE — LETTER
Alyssa Meehan, [facility] ANCELMO   Inpatient Care Coordination   Supervisor  [facility]  [phone]

## 2020-05-18 NOTE — PROGRESS NOTES
ROOM # 223    Living Situation (if not independent, order SW consult): Home with wife  Facility name:  : [facility]    Activity level at baseline: Ind  Activity level on admit: Assist x1      Patient registered to observation; given Patient Bill of Rights; given the opportunity to ask questions about observation status and their plan of care.  Patient has been oriented to the observation room, bathroom and call light is in place.    Discussed discharge goals and expectations with patient/family.

## 2020-05-18 NOTE — TELEPHONE ENCOUNTER
Wife calling in on behalf of pt-   On 4/18/20 he was found unresponsive in his truck and sent to ED by EMS. Per wife there were 15 cancerous lesions found his brain. Completed 5 treatments of radiation and per wife his 'thinking' compacity is up to 80%  She also reports that he had a UTI and was treated with antibiotics. He was transferred to TCU and discharged Friday 5/15.     Her concerns today are taking care of the patient at home.   1- He has swollen legs/feet- look like elephant legs. He is unable to lift, she has to  to assist putting his pants on. Per wife the skkin does bounce back up when she presses on his feet. There is little difference in morning on size of feet/legs.     2- Constant soft/liquid diarrhea- incontinent.     3- Sores around his rectum and then above his butt    Wife reports that she has to be home 24/day to care for him because he his a fall risk.     Adv to call ONC to see recommendations. Will huddle w/ providers here as Dr. Martinez is out all week and virtual next week.       Bertha MALDONADO RN

## 2020-05-18 NOTE — TELEPHONE ENCOUNTER
Huddled w/ KO- ER? Care coordination?     Call back to wife- they were NOT going hospice route at this time. She is waiting to hear back from [facility].     Adv that I would reach out to care coordination to for advice.     Adv that if she could not care for pt and that he has sores and swelling in bilateral legs then should go to ED. She is going to try to investigate a little more.     She did say that home care reached out to her, but she is not comfortable with people in her home.     Bertha MALDONADO RN       Reached out to care coordination- ED would probably be the best since pt's caregiver feels that he is unsafe at home and caregiver does not feel that she can care for him without assistance.     Bertha MALDONADO RN

## 2020-05-18 NOTE — H&P
[facility]    Hospitalist History and Physical    Name: John Batista    MRN: [id number]  YOB: 1945    Age: 74 year old  Date of Admission:  5/18/2020  Date of Service (when I saw the patient): 05/18/20    Assessment & Plan   John Batista is a 74 year old male with PMH significant for metastatic prostate cancer with metastases to the bone, lungs, and brain no longer on chemotherapy, h/o c diff colitis, chronic anemia, and recent hospital stay at [facility] for altered mental status with agitation related to newly discovered brain metastases where he was discharged to TCU on 4/28 and returned home on 5/15/20. The patient was seen in [facility] clinic today due to concern for ongoing diarrhea, LE edema, and concern for spouse to be able to care for patient anymore.     Workup in clinic per discussion with Nikki Gerardo CNP at [facility] revealed: VSS, normal WBC of 8.0 with ANC of 7.6, platelet count of 193, Hgb of 8.8, sodium of 133, and potassium of 4.4.     #Chronic diarrhea: reportedly has diarrhea that has been chronic since 02/2020 with no recent change, denies it appearing watery or having foul odor. He is incontinent of stool. Suspect this is contributing to his sacral and buttocks wounds. Afebrile and WBC WNL in clinic today.   - Consider checking c diff and enteric panel is concerns for worsening diarrhea     #Sacral and buttocks wounds: suspect pressure wounds from sitting for long periods of time and not offloading when laying in bed.   - WOC consult     #LE edema: reportedly new onset of bilateral LE edema a few weeks ago from feet up to knees. 2-3+ bilateral pitting edema on exam, unclear etiology, differential currently includes underlying undiagnosed heart failure, lymphedema, or venous stasis. No associated dyspnea or hypoxia. Unable to review labs from clinic today, will obtain labs again this evening and based on these consider ordering  echocardiogram and trial a dose of IV Lasix.   - Elevate LE   - ACE wraps   - Check CMP and proBNP this evening    #Failure to thrive: due to the patient's progressive fatigue and increased care needs his spouse is no longer able to care for the patient on her own. She has been caring from his since he returned from TCU on 5/15. Per discussion with patient and wife in clinic today they are not open to home hospice or home care at this time due to COVID19. Will likely need placement.   - SW consult     #Metastatic prostate adenocarcinoma with mets to lung, bone, and brain: follows with Dr. Borrero of [facility] and Dr. Galvan of [facility]rology. He was receiving Lupron injection every 6 months. Previously on palliative chemotherapy with Cytoxan and Etoposide (12 days on, 12 days off) until his recent hospitalization in 04/2020, no longer on chemotherapy after brain mets discovered. Received brain radiation treatments from 4/21-4/27.    - Continue PTA Decadron 4 mg BID    #Confusion: reportedly has confusion at night and part of his increased confusion at other times during the day was thought to be related to his steroid dose, Dexamethasone was decreased to 4 mg BID recently by oncology. Suspect an element of his brain mets are contributing as well. Currently pleasant with only mild forgetfulness in relation to details during interview. No current hallucinations.   - Continue to monitor     #Recent complicated proteus UTI: diagnosed during recent hospital stay and completed course of Augmentin while at TCU. Afebrile and denies dysuria.     #Chronic anemia: Hgb stable at 8.8 with baseline of 7-8    DVT Prophylaxis: Pneumatic Compression Devices  Code Status: DNR / DNI, confirmed with patient based on previous admissions  Disposition: Expected discharge in 24-48 hours once safe disposition established     Primary Care Physician   Rosario Martinez    Chief Complaint   Diarrhea, buttocks wounds, and inability for wife  to care for patient     History obtained from discussion with Nikki Gerardo CNP at [facility] , chart review, and interview with patient.      History of Present Illness   John Batista is a 74 year old male who presents with diarrhea, multiple sacral and buttocks wounds, and inability for wife to care for him anymore.  The patient is reportedly incontinent of bowel and bladder at baseline.  The patient's wife is concerned about skin breakdown over his sacrum and buttocks due to his incontinence as well as lack of mobility.  The patient states that he has had diarrhea since February of this year without any recent changes and no increased foul odor.  He does have a history of C. difficile where his diarrhea was way worse than it is now.  Denies any blood in his stool or associated abdominal pain.  The patient states that he is sedentary most of the time and is not good about offloading his buttocks.  Denies any pain in his sacrum or buttocks.  Denies any recent nausea vomiting, or dysuria.  The patient states that he started having lower extremity swelling a few weeks ago without any associated pain.  He has tried elevating without significant improvement but denies trial of compression stockings.  Denies any recent fever, chills, chest pain, shortness of breath, fatigue, or weakness.  Denies any prior history of heart failure.  Denies any recent medication changes.  The patient uses a walker at baseline.  Per the patient's wife he is having episodes of confusion at night.  Patient's wife brought him into the clinic today since she feels she is unable to care for him anymore on her own.  The patient and wife have declined having a home hospice nurse or home nurse come into their home due to COVID-19.  Per discussion with oncology today the patient is not a candidate for any further treatment for his known metastatic prostate cancer.    Past Medical History    Past Medical History:   Diagnosis Date      Chronic infection     cdiff     Diverticulitis      History of blood transfusion 2018    GI bleed     Prostate cancer (H) 12/2011    Dr. Galvan; now seeing Dr. Ruben Reese at [place]     Past Surgical History   Past Surgical History:   Procedure Laterality Date     COLONOSCOPY N/A 11/29/2016    Procedure: COLONOSCOPY;  Surgeon: Alon Lo MD;  Location: [facility] OR     COLONOSCOPY N/A 12/3/2016    Procedure: COLONOSCOPY;  Surgeon: Carmenza Akbar MD;  Location: [facility] GI     COLONOSCOPY N/A 10/30/2018    Procedure: COMBINED COLONOSCOPY, SINGLE OR MULTIPLE BIOPSY/POLYPECTOMY BY BIOPSY;  Surgeon: Anatoly Tam MD;  Location:  GI     CYSTOSCOPY       CYSTOSCOPY, TRANSURETHRAL RESECTION (TUR) PROSTATE, COMBINED N/A 1/2/2020    Procedure: Video cystoscopy, channel tranurethral resection of prostate;  Surgeon: David Galvan MD;  Location: [facility] OR     ESOPHAGOSCOPY, GASTROSCOPY, DUODENOSCOPY (EGD), COMBINED N/A 10/28/2018    Procedure: COMBINED ESOPHAGOSCOPY, GASTROSCOPY, DUODENOSCOPY (EGD);  Surgeon: Codey De La Torre MD;  Location:  GI     ESOPHAGOSCOPY, GASTROSCOPY, DUODENOSCOPY (EGD), COMBINED Left 10/31/2018    Procedure: ESOPHAGOSCOPY, GASTROSCOPY, DUODENOSCOPY (EGD) with Pill Cam  Rm 334;  Surgeon: Anatoly Tam MD;  Location:  GI     EXAM UNDER ANESTHESIA ANUS N/A 11/29/2016    Procedure: EXAM UNDER ANESTHESIA ANUS;  Surgeon: Alon Lo MD;  Location: [facility] OR     HC COLONOSCOPY THRU STOMA, DIAGNOSTIC  2003    normal, 2000 had polyps     HERNIA REPAIR, INGUINAL RT/LT       OTHER SURGICAL HISTORY Right 03/16/2020    Biopsy of Right Lung     SIGMOIDOSCOPY FLEXIBLE N/A 9/10/2014    Procedure: SIGMOIDOSCOPY FLEXIBLE;  Surgeon: Madhuri Drake MD;  Location:  GI     VASECTOMY         Prior to Admission Medications   Prior to Admission Medications   Prescriptions Last Dose Informant Patient Reported? Taking?   Calcium-Magnesium-Zinc 333-133-5 MG TABS per tablet  Spouse/Significant  Other Yes No   Sig: Take 1 tablet by mouth 2 times daily (with meals)    Cholecalciferol (VITAMIN D) 1000 UNITS capsule  Spouse/Significant Other Yes No   Sig: Take 2,000 Units by mouth 2 times daily    HYDROmorphone (DILAUDID) 4 MG tablet   No No   Sig: Take 1 tablet (4 mg) by mouth every 4 hours as needed for severe pain   Multiple Vitamin (ONE-A-DAY 55 PLUS OR)  Spouse/Significant Other Yes No   Sig: Take 1 tablet by mouth daily    OLANZapine (ZYPREXA) 5 MG tablet   No No   Sig: Take 1 tablet (5 mg) by mouth 2 times daily   OLANZapine zydis (ZYPREXA) 5 MG ODT   No No   Sig: Take 1 tablet (5 mg) by mouth 2 times daily as needed for agitation   Polysaccharide Iron Complex (FERREX 150 PO)  Spouse/Significant Other Yes No   Sig: 3 times daily (with meals) Take 1 capsule by mouth three times daily   acetaminophen (TYLENOL) 500 MG tablet  Spouse/Significant Other Yes No   Sig: Take 500 mg by mouth every 4 hours as needed for mild pain    amoxicillin-clavulanate (AUGMENTIN) 875-125 MG tablet   No No   Sig: Take 1 tablet by mouth every 12 hours for 1 day   dexamethasone (DECADRON) 4 MG tablet   No No   Sig: Take 1 tablet (4 mg) by mouth every 8 hours   famotidine (PEPCID) 20 MG tablet  Spouse/Significant Other Yes No   Sig: Take 20 mg by mouth 2 times daily   leuprolide (ELIGARD) 45 MG injection  Spouse/Significant Other Yes No   Sig: Inject 45 mg Subcutaneous every 6 months.   pantoprazole (PROTONIX) 40 MG EC tablet   No No   Sig: Take 1 tablet (40 mg) by mouth every morning (before breakfast)   prochlorperazine (COMPAZINE) 10 MG tablet  Spouse/Significant Other Yes No   Sig: Take 10 mg by mouth every 8 hours as needed for nausea or vomiting   saccharomyces boulardii (FLORASTOR) 250 MG capsule  Spouse/Significant Other Yes No   Sig: Take 500 mg by mouth 2 times daily    senna-docusate (SENOKOT-S/PERICOLACE) 8.6-50 MG tablet   No No   Sig: Take 2 tablets by mouth 2 times daily   vitamin C (ASCORBIC ACID) 500 MG tablet   Spouse/Significant Other Yes No   Sig: Take 250 mg by mouth 3 times daily (with meals)      Facility-Administered Medications: None     Allergies   No Known Allergies    Social History   Social History     Tobacco Use     Smoking status: Former Smoker     Types: Cigarettes     Last attempt to quit: 1980     Years since quittin.4     Smokeless tobacco: Former User   Substance Use Topics     Alcohol use: No     Social History     Social History Narrative     Not on file       Family History   Family history reviewed with patient and is noncontributory.    Review of Systems   A Comprehensive greater than 10 system review of systems was carried out.  Pertinent positives and negatives are noted above.  Otherwise negative for contributory information.    Physical Exam   Temp: 97.9  F (36.6  C) Temp src: Oral BP: 138/68 Pulse: 81   Resp: 20 SpO2: 96 % O2 Device: None (Room air)    Vital Signs with Ranges  Temp:  [97.9  F (36.6  C)] 97.9  F (36.6  C)  Pulse:  [81] 81  Resp:  [20] 20  BP: (138)/(68) 138/68  SpO2:  [96 %] 96 %  146 lbs 0 oz    GEN:  Alert, oriented x 3, appears comfortable, forgetful at times, no overt distress  HEENT:  Normocephalic/atraumatic, no scleral icterus, no nasal discharge, mouth moist.  CV:  Regular rate and rhythm, no murmur or JVD.  S1 + S2 noted, no S3 or S4.  LUNGS:  Clear to auscultation bilaterally without rales/rhonchi/wheezing/retractions.  Symmetric chest rise on inhalation noted.  ABD:  Active bowel sounds, soft, non-tender/non-distended.  No rebound/guarding/rigidity.  EXT: 2-3+ bilateral LE edema from feet up to knees.  No cyanosis. No acute joint synovitis noted.  SKIN:  Dry to touch, no exanthems noted in the visualized areas. Skin breakdown over sacrum and near anus with erythema that appears blanchable.   NEURO:  Symmetric muscle strength, sensation to touch grossly intact.  Coordination symmetric on general exam.  No new focal deficits appreciated.    Data   Data reviewed  today:  I personally reviewed labs discussed over the phone with oncology provider, Nikki Doty CNP and those obtained this evening once patient was admitted.     Results for orders placed or performed during the hospital encounter of 05/18/20   Comprehensive metabolic panel     Status: Abnormal   Result Value Ref Range    Sodium 134 133 - 144 mmol/L    Potassium 4.2 3.4 - 5.3 mmol/L    Chloride 103 94 - 109 mmol/L    Carbon Dioxide 25 20 - 32 mmol/L    Anion Gap 6 3 - 14 mmol/L    Glucose 152 (H) 70 - 99 mg/dL    Urea Nitrogen 25 7 - 30 mg/dL    Creatinine 0.57 (L) 0.66 - 1.25 mg/dL    GFR Estimate >90 >60 mL/min/[1.73_m2]    GFR Estimate If Black >90 >60 mL/min/[1.73_m2]    Calcium 7.8 (L) 8.5 - 10.1 mg/dL    Bilirubin Total 0.3 0.2 - 1.3 mg/dL    Albumin 2.1 (L) 3.4 - 5.0 g/dL    Protein Total 5.6 (L) 6.8 - 8.8 g/dL    Alkaline Phosphatase 91 40 - 150 U/L    ALT 40 0 - 70 U/L    AST 36 0 - 45 U/L   NT proBNP inpatient and ED     Status: None   Result Value Ref Range    N-Terminal Pro BNP Inpatient 906 0 - 1,800 pg/mL       Chelsea Paz PA-C

## 2020-05-19 NOTE — PROGRESS NOTES
[facility]  Hospitalist Progress Note  Gaby Wood PA-C 05/19/2020    Reason for Stay (Diagnosis): failure to thrive, metastatic prostate ca, chronic diarrhea         Assessment and Plan:      Summary of Stay: John Batista is a 74 year old male with PMH significant for metastatic prostate cancer with metastases to the bone, lungs, and brain no longer on chemotherapy, h/o c diff colitis, chronic anemia, and recent hospital stay at [facility] for altered mental status with agitation related to newly discovered brain metastases, s/p 5 sessions of radiation, and discharged to TCU on 4/28 and returned home on 5/15/20. He was seen by [facility] on 5/18 and directly admitted on 5/18 for failure to thrive, ongoing diarrhea and LE edema.      Problem List:   #Chronic diarrhea: reportedly has diarrhea that has been chronic since 02/2020 with no recent change, denies it appearing watery or having foul odor. He is incontinent of stool. Wife reports [facility] recommended retesting for c diff given hx of this and recent abx use. Sacral and perianal wounds present. Stools are somewhat formed today, not consistent with c diff, will continue to monitor. If stools becomes watery or more loose, consider rectal tube to promote healing of sacral and perianal wounds. Afebrile and WBC WNL in clinic today.   - Consider checking c diff and enteric panel is concerns for worsening diarrhea      #Sacral and buttocks wounds: suspect pressure wounds from sitting for long periods of time and not offloading when laying in bed. Incontinent stools contributing.   - WOC consult complete, recommending Triad paste to Buttocks, sacral, perianal  wound: BID and PRN incontinence  - consider rectal tube if stools become looser      #LE edema: reportedly new onset of bilateral LE edema a few weeks ago from feet up to knees. 2-3+ bilateral pitting edema on exam, unclear etiology. BNP is within normal limits, doubt heart  failure. Likely lymphedema or venous stasis.   - continue to elevate LE   - ACE wraps      #Failure to thrive: due to the patient's progressive fatigue and increased care needs his spouse is no longer able to care for the patient on her own. She has been caring from his since he returned from TCU on 5/15. Wife unable to manage the ongoing, frequent incontinence of stools. Pt also displays mild confusion at night, wife feels he is not safe at home, she also fears agitation related to brain mets. Per discussion with patient's wife, she does not want home hospice or home care at this time due to COVID19. She is open to hospice care, would like patient to be discharged to a facility.   - SW consulted and assisting with discharge planning.      #Metastatic prostate adenocarcinoma with mets to lung, bone, and brain: follows with Dr. Borrero of [facility] and Dr. Galvan of Urology. He was receiving Lupron injection every 6 months. Previously on palliative chemotherapy with Cytoxan and Etoposide (12 days on, 12 days off) until his recent hospitalization in 04/2020, no longer on chemotherapy after brain mets discovered. Received brain radiation treatments from 4/21-4/27.    - Continue PTA Decadron 4 mg BID     #Confusion: reportedly has confusion at night and part of his increased confusion at other times during the day was thought to be related to his steroid dose, Dexamethasone was decreased to 4 mg BID recently by oncology. Suspect an element of his brain mets are contributing as well. Currently pleasant with only mild forgetfulness in relation to details during interview. No current hallucinations.   - Continue to monitor      #Recent complicated proteus UTI: diagnosed during recent hospital stay and completed course of Augmentin while at TCU. Afebrile and denies dysuria.      #Chronic anemia: Hgb stable at 8.8 with baseline of 7-8    DVT Prophylaxis: anticipate short stay, ambulate as able.   Code Status: DNR /  DNI  Discharge Dispo: facility +/- hospice  Estimated Disch Date / # of Days until Disch: 1-2 days pending placement        Interval History (Subjective):      Pt denies complaints today, denies pain.                   Physical Exam:      Last Vital Signs:  /52 (BP Location: Right arm)   Pulse 65   Temp 98.3  F (36.8  C) (Oral)   Resp 18   Ht 1.829 m (6')   Wt 66.2 kg (146 lb)   SpO2 97%   BMI 19.80 kg/m        Intake/Output Summary (Last 24 hours) at 5/19/2020 1323  Last data filed at 5/19/2020 0800  Gross per 24 hour   Intake 480 ml   Output --   Net 480 ml       Constitutional: Awake, alert, cooperative, no apparent distress   Respiratory: Clear to auscultation bilaterally, no crackles or wheezing   Cardiovascular: Regular rate and rhythm, normal S1 and S2, and no murmur noted   Abdomen: Normal bowel sounds, soft, non-distended, non-tender   Skin: No rashes, no cyanosis, dry to touch. Pressure, friction injury wounds to sacrum and perianal area   Neuro: Alert and oriented x3, no weakness, numbness, memory loss   Extremities: 2+ bilateral LE edema, normal range of motion   Other(s):        All other systems: Negative          Medications:      All current medications were reviewed with changes reflected in problem list.         Data:      All new lab and imaging data was reviewed.   Labs:  Recent Labs   Lab 05/18/20 2119   NTBNPI 906     Recent Labs   Lab 05/19/20  0547   WBC 7.9   HGB 8.0*   HCT 25.8*   *        Recent Labs   Lab 05/19/20 0547 05/18/20 2119    134   POTASSIUM 4.1 4.2   CHLORIDE 103 103   CO2 27 25   ANIONGAP 5 6   GLC 99 152*   BUN 24 25   CR 0.52* 0.57*   GFRESTIMATED >90 >90   GFRESTBLACK >90 >90   TIMBO 8.0* 7.8*   PROTTOTAL  --  5.6*   ALBUMIN  --  2.1*   BILITOTAL  --  0.3   ALKPHOS  --  91   AST  --  36   ALT  --  40      Imaging:   No results found for this or any previous visit (from the past 48 hour(s)).    Gaby Wood PA-C

## 2020-05-19 NOTE — PLAN OF CARE
PRIMARY DIAGNOSIS: GENERALIZED WEAKNESS, DIARRHEA    OUTPATIENT/OBSERVATION GOALS TO BE MET BEFORE DISCHARGE  1. Orthostatic performed: No    2. Tolerating PO medications: Yes    3. Return to near baseline physical activity: No    4. Cleared for discharge by consultants (if involved): No    Discharge Planner Nurse   Safe discharge environment identified: TBD  Barriers to discharge: Yes       Entered by: Sailaja Tobin 05/19/2020      Please review provider order for any additional goals.   Nurse to notify provider when observation goals have been met and patient is ready for discharge.

## 2020-05-19 NOTE — PLAN OF CARE
PRIMARY DIAGNOSIS: Failure to Thrive     OUTPATIENT/OBSERVATION GOALS TO BE MET BEFORE DISCHARGE  1. Orthostatic performed: N/A    2. Tolerating PO medications: Yes    3. Return to near baseline physical activity: Yes, 1 assist with walker and gait belt.     4. Cleared for discharge by consultants (if involved): N/A    Discharge Planner Nurse   Safe discharge environment identified: Yes, possible discharge to walker west 5/20 or 5/21   Barriers to discharge: Yes, placement        Entered by: Jessie Toscano 05/19/2020 4:32 PM     Please review provider order for any additional goals.   Nurse to notify provider when observation goals have been met and patient is ready for discharge.    Coccyx areas x3 cleansed and triad paste applied. Patient tolerated well.

## 2020-05-19 NOTE — PLAN OF CARE
PRIMARY DIAGNOSIS: GENERALIZED WEAKNESS, DIARRHEA    OUTPATIENT/OBSERVATION GOALS TO BE MET BEFORE DISCHARGE  1. Orthostatic performed: No    2. Tolerating PO medications: Yes    3. Return to near baseline physical activity: No    4. Cleared for discharge by consultants (if involved): No    Pt A&O, forgetful. VSS. Denies any pain or other complaints. BLE with +3/4 edema, legs elevated while in bed. Denies numbness/tingling to extremities. Incontinent of stool, PRN cares provided. Suspected pressure injuries x3 noted around coccyx, mepilex in place. Additional excoriation noted around rectum. Pt up with Ax1 w/ gait belt. Plan for WOC and SW consults. Will continue to monitor.    Discharge Planner Nurse   Safe discharge environment identified: TBD  Barriers to discharge: Yes       Entered by: Sailaja Tobin 05/19/2020      Please review provider order for any additional goals.   Nurse to notify provider when observation goals have been met and patient is ready for discharge.

## 2020-05-19 NOTE — PROGRESS NOTES
[facility] WOC Nurse Inpatient Wound Assessment   Reason for consultation: Buttocks, sacral, perianal  wounds     Assessment  Buttocks, sacral, perianal  wound due to Pressure Injury, Friction, Shear and Incontinence Associated Dermatitis (IAD)  Status: initial assessment    Treatment Plan  1. Pressure Injury prevention (please order supplies if not in room)    Turn every 2 hours, side to side avoid supine, if ineffective consider low air loss bed    Float heels off end of pillows if ineffective order Prevalon Heel Lift boots (#[id number])    Prevent sliding by limiting HOB to 30 degrees or less unless contraindicated, use knee gatch first, if ineffective consider Low air loss    If patient must sit, order  Chair cushion (#[id number]),  please limit sitting to an hour at a time    Optimize nutrition per RD  2. Buttocks, sacral, perianal  Wounds Triad paste (#[id number]): BID and PRN incontinence   Orders Written  WOC Nurse follow-up plan:weekly  Nursing to notify the Provider(s) and re-consult the WOC Nurse if wound(s) deteriorates or new skin concern.    Patient History  According to provider note(s):  74 year old male with PMH significant for metastatic prostate cancer with metastases to the bone, lungs, and brain no longer on palliative chemotherapy, h/o c diff colitis, chronic anemia. At [facility] 5/18/2020 for ongoing diarrhea (since 2/2020), LE edema x a few weeks ago (2-3+ pitting unknown etiology) and concern for spouses ability to care for patient.anymore.  VSS, normal WBC of 8.0 with ANC of 7.6, platelet count of 193, Hgb of 8.8, sodium of 133, and potassium of 4.4. Provider note indicated consideration for C-diff re-test, edema work up/diuresis, LTC or hospice placement     Objective Data  Patient no oriented to time or place    Containment of urine/stool: Brief   Medical Devices:Rondon Catheter: not present    Orders Placed This Encounter      Regular Diet Adult    Intake/Output Summary (Last 24  hours) at 2020 1302  Last data filed at 2020 0800  Gross per 24 hour   Intake 480 ml   Output --   Net 480 ml     Nutrition:     Orders Placed This Encounter      Regular Diet Adult      Recent Labs   Lab Test 20  0547 20  2119  20  0721 20  1600  19  1639   ALBUMIN  --  2.1*   < >  --  3.4   < >  --    HGB 8.0*  --    < > 7.7* 8.6*   < > 9.2*   INR  --   --   --   --  1.04  --   --    WBC 7.9  --    < > 0.9* 1.1*   < > 17.9*   A1C  --   --   --   --   --   --  4.9   CRP  --   --   --  33.1*  --   --   --     < > = values in this interval not displayed.   labrcntip(g,bgm:6)@   Lab Results   Component Value Date    A1C 4.9 2019         Temperature: Temp (24hrs), Av.9  F (36.6  C)      Pressure Injury (Ole) Risk Assessment  Sensory Perception: 3-->limited  -does not feel wounds  Moisture: 3-->occasionally moist   Activity: 3-->walks occasionally     Mobility: 3-->slightly limited -turns to side with minimal assist when asked  Nutrition: 2-->probably inadequate -RD consulted  Friction Shear-1 problem  Ole Score: 14      Focused Skin/Wound Exam        Right photo-Severe perianal denudement with several partial thickness wounds secondary to fecal incontinence. Widespread erythema without satellite lesions    Left photo  Coccyx 2x1cm, R) buttocks 1.5x1 cm,  L) buttocks 1 x 0.5 cm  Wound Base: predominantly yellow adherent nonviable-Multilayer silicone foam (Mepelix) does not stay secured  Tunneling N/A  Undermining N/A  Palpation of the wound bed: normal   Periwound skin: erythema- blanchable and irritant dermatitis  Periwound Temperature: normal   Drainage:, none  Odor: none  Pain: absent, none    Interventions  Current support surface: Standard  Atmos Air mattress  Current off-loading measures: Pillows under calves  Visual inspection and assessment completed partial (upper body is still dressed, RN states he will call if any skin issues are noticed after upper  body skin inspection)  Wound Care: Location/frequency BID and PRN incontinence    Cleanse all wounds with continence cleanser     Apply Triad paste    Local wound care rationale:     Protect periwound skin    Promote moist wound healing without tissue dehydration      Provide selective debridement (autolysis) of nonviable tissue     Provide protection of periwound skin     Pressure Injury prevention (please order supplies if not in room)    Turn every 2 hours, side to side avoid supine, if ineffective consider low air loss    Float heels off end of pillows if ineffective order Prevalon Heel Lift boots (#[id number])    Prevent sliding by limiting HOB to 30 degrees or less unless contraindicated, use knee gatch first, if ineffective consider Low air loss    If patient must sit, order  Chair cushion (#[id number]),  please limit sitting to an hour at a time    Optimize nutrition per RD      Discussed plan of care with Nurse, provider and patient    Mayra Kaufman MS RN CWOCN

## 2020-05-19 NOTE — CONSULTS
Care Transition Initial Assessment -      Met with: patient and spoke with wifeValerie on the phone    Active Problems:    Diarrhea       DATA  Lives With: spouse   Quality of Family Relationships: helpful, involved, supportive  Description of Support System: Supportive, Involved  Who is your support system?: Wife, Children  Support Assessment: Adequate family and caregiver support. Good support network with wife and sons.  Identified issues/concerns regarding health management: Needing more care than the wife can provide at home.  Four days ago the patient went home from a TCU, wife to assist with care. Patient was more than she could handle and brought patient back to hospital. Wife reports that she has discussed with patient and family the need for hospice care. Discussed the Medicare compare list for hospice programs and discussed associated Medicare star ratings to assist with choice for referrals/discharge planning. Education was given to wife that star rating are updated/manitained by Medicare and can mikki reviewed by visiting www.medicare.gov; yes. Wife reports that she ahs already picked a place for patient to go, she has put down a payment and she would like her  to go to [facility]. Spoke with Susan at [facility], they do  Have a bed for placement but first she needs to review his care. Informed was faxed to her.    Wife has also chosen [facility] program. Referral will be made once [facility] accepts patient.     Quality of Family Relationships: helpful, involved, supportive  Transportation Anticipated: health plan transportation    ASSESSMENT  Cognitive Status: alert and oriented, able to verbalize his needs and concerns and is able to make his own decisions.  Concerns to be addressed: Concerns have been discuss, we are working on placement.   PLAN  Financial costs for the patient includes transportation costs.  Patient given options and choices for discharge:  yes.  Patient/family is agreeable to the plan?  yes  Transportation/person available to transport on day of discharge  is medical transport.  Patient Goals and Preferences: Care suites with hospice .  Patient anticipates discharging to: Care suites with hospice .    JANEE Lopez   Inpatient Care Coordination   Supervisor  [facility]  [phone]

## 2020-05-19 NOTE — PHARMACY-ADMISSION MEDICATION HISTORY
Admission medication history interview status for this patient is complete. See [facility] admission navigator for allergy information, prior to admission medications and immunization status.     Medication history interview done via telephone during Covid-19 pandemic, indicate source(s): Patient and Family  Medication history resources (including written lists, pill bottles, clinic record):None  [facility]    Changes made to PTA medication list:  Added: prednisone  Deleted: zyprexa, senna, protonix,   Changed: vit d, dexamethasone to BID, pepcid to prn, vit c to daily    Actions taken by pharmacist (provider contacted, etc):called spouse for med rec     Additional medication history information:None    Medication reconciliation/reorder completed by provider prior to medication history?  no   (Y/N)     For patients on insulin therapy: no  (Y/N)  Sliding scale Novolog: -  Do you have a baseline novolog pre-meal dose:   __  units with meals or __ units/carb unit with meals  Do you eat three meals a day:  -  How many times do you check your blood glucose per day:  -  How many episodes of hypoglycemia do you have per week: -  Do you have a Continuous glucose monitor (CGM) : - (remind pt that not approved for hospital use)  Any specific barriers to therapy? -  (cost, comfortable with injections, confident with current diabetes regimen?)      Prior to Admission medications    Medication Sig Last Dose Taking? Auth Provider   acetaminophen (TYLENOL) 500 MG tablet Take 500 mg by mouth every 4 hours as needed for mild pain   Yes Reported, Patient   Calcium-Magnesium-Zinc 333-133-5 MG TABS per tablet Take 1 tablet by mouth 2 times daily (with meals)  5/18/2020 at x1 Yes Reported, Patient   Cholecalciferol (VITAMIN D) 1000 UNITS capsule Take 1,000 Units by mouth daily  5/18/2020 at Unknown time Yes Reported, Patient   dexamethasone (DECADRON) 4 MG tablet Take 4 mg by mouth 2 times daily (with meals) 5/18/2020 at x1  Yes Unknown, Entered By History   famotidine (PEPCID) 20 MG tablet Take 20 mg by mouth 2 times daily as needed   Yes Unknown, Entered By History   Fe-Succ Ac-C-Thre Ac-B12-FA (FERREX 150 FORTE PLUS PO) Take 2 tablets by mouth every morning 5/18/2020 at Unknown time Yes Unknown, Entered By History   Fe-Succ Ac-C-Thre Ac-B12-FA (FERREX 150 FORTE PLUS PO) Take 1 tablet by mouth At Bedtime 5/17/2020 at Unknown time Yes Unknown, Entered By History   HYDROmorphone (DILAUDID) 4 MG tablet Take 4 mg by mouth 3 times daily At 0800, 1600, 2200 5/18/2020 at x1 Yes Unknown, Entered By History   leuprolide (ELIGARD) 45 MG injection Inject 45 mg Subcutaneous every 6 months. Past Month at Unknown time Yes Reported, Patient   multivitamin peds with iron (FLINTSTONES PLUS IRON) CHEW Take 1 tablet by mouth every morning 5/18/2020 at Unknown time Yes Unknown, Entered By History   predniSONE (DELTASONE) 10 MG tablet Take 10 mg by mouth daily At NOON  Yes Unknown, Entered By History   prochlorperazine (COMPAZINE) 10 MG tablet Take 10 mg by mouth every 8 hours as needed for nausea or vomiting  Yes Unknown, Entered By History   saccharomyces boulardii (FLORASTOR) 250 MG capsule Take 500 mg by mouth 2 times daily  5/18/2020 at x1 Yes Reported, Patient   vitamin C (ASCORBIC ACID) 500 MG tablet Take 500 mg by mouth daily  5/18/2020 at Unknown time Yes Unknown, Entered By History

## 2020-05-19 NOTE — PLAN OF CARE
PRIMARY DIAGNOSIS: Failure to thrive    OUTPATIENT/OBSERVATION GOALS TO BE MET BEFORE DISCHARGE  1. Orthostatic performed: No    2. Tolerating PO medications: Yes    3. Return to near baseline physical activity: Yes    4. Cleared for discharge by consultants (if involved): No    Discharge Planner Nurse   Safe discharge environment identified: Yes  Barriers to discharge: Yes       Entered by: Joshua Handy 05/19/2020        Please review provider order for any additional goals.   Nurse to notify provider when observation goals have been met and patient is ready for discharge.  Pt alert and oriented x3. Forgetful at times. Has some lower back pain, given diluadid this morning. Regular diet, boost shakes between meals. 3 pressure wounds around coccyx, WOC nurse consult. Applying wound cleanser and triad paste. Turning on sides q 2hrs. BLE edema, ace wraps places this AM at 10 am. Up assist x1 w/ walker and GB. 2 stools this am - noé colored and formed. SW following for discharge. Will cont supportive cares.    /64 (BP Location: Right arm)   Pulse 76   Temp 97.5  F (36.4  C) (Oral)   Resp 16   Ht 1.829 m (6')   Wt 66.2 kg (146 lb)   SpO2 97%   BMI 19.80 kg/m

## 2020-05-19 NOTE — PLAN OF CARE
PRIMARY DIAGNOSIS: GENERALIZED WEAKNESS    OUTPATIENT/OBSERVATION GOALS TO BE MET BEFORE DISCHARGE  1. Orthostatic performed: N/A    2. Tolerating PO medications: Yes    3. Return to near baseline physical activity: assist x1 with gait belt    4. Cleared for discharge by consultants (if involved): No    Discharge Planner Nurse   Safe discharge environment identified: No  Barriers to discharge: Yes       Entered by: Alethea Saab 05/18/2020 7:05 PM     Please review provider order for any additional goals.   Nurse to notify provider when observation goals have been met and patient is ready for discharge.

## 2020-05-19 NOTE — PLAN OF CARE
PRIMARY DIAGNOSIS: GENERALIZED WEAKNESS    OUTPATIENT/OBSERVATION GOALS TO BE MET BEFORE DISCHARGE  1. Orthostatic performed: N/A    2. Tolerating PO medications: Yes    3. Return to near baseline physical activity: assist x1 with gait belt    4. Cleared for discharge by consultants (if involved): No    Discharge Planner Nurse   Safe discharge environment identified: No  Barriers to discharge: Yes       Entered by: Alethea Saab 05/18/2020 7:06 PM     Please review provider order for any additional goals.   Nurse to notify provider when observation goals have been met and patient is ready for discharge.    Patient alert and oriented, but forgetful  VSS, on room air  Denies pain  Lungs clears, denies SOB  +3/+4 BLE edema  Butt wounds, pressure injury- mepilex and barrier cream applied  Offloaded with pillows  Tolerating diet, denies nausea  No PIV access at this time   Discharge to be determined

## 2020-05-19 NOTE — PLAN OF CARE
Pt alert and oriented x3. Forgetful at times. Has some lower back pain, given diluadid this morning. Regular diet, boost shakes between meals. 3 pressure wounds around coccyx, WOC nurse consult. Applying wound cleanser and triad paste. Turning on sides q 2hrs. BLE edema, ace wraps places this AM at 10 am. Up assist x1 w/ walker and GB. 2 stools this am - noé colored and formed. SW following for discharge. Will cont supportive cares.    /52 (BP Location: Right arm)   Pulse 75   Temp 97.6  F (36.4  C) (Oral)   Resp 16   Ht 1.829 m (6')   Wt 66.2 kg (146 lb)   SpO2 98%   BMI 19.80 kg/m

## 2020-05-20 NOTE — PROGRESS NOTES
"Pt ambulated in the hallway Ax1 with gait belt and walker.Stated feeling \"stiff\".Denies dizziness.  "

## 2020-05-20 NOTE — PROGRESS NOTES
Hospitalist Cross Cover Note:      Notified by nursing staff that patient is C diff positive. Will start PO Vancomycin 125 mg QID.    Virgie Paz PA-C

## 2020-05-20 NOTE — PLAN OF CARE
PRIMARY DIAGNOSIS: c-diff     OUTPATIENT/OBSERVATION GOALS TO BE MET BEFORE DISCHARGE  1. Orthostatic performed: N/A     2. Tolerating PO fluid and/or antibiotics (if applicable): Yes     3. Nausea/Vomiting/Diarrhea symptoms improved: No nausea or emesis. Diarrhea seems improved this shift, formed loose stool today      4. Pain status: Pain free.     5. Return to near baseline physical activity: No, up with assist of 1, walker/gait belt     Discharge Planner Nurse   Safe discharge environment identified: Yes  Barriers to discharge: SW following    Clifford Palomares RN          Please review provider order for any additional goals. Nurse to notify provider when observation goals have been met and patient is ready for discharge.     Pt is alert and oriented, up with assist of 1 and walker/gait belt. Regular diet. SW following regarding hospice. Patient will discharge tomorrow and go to [facility] on hospice, transportation at 11 am tomorrow. Wound care done this morning, incontinent at baseline. Will continue to monitor and provide cares.

## 2020-05-20 NOTE — DISCHARGE SUMMARY
Discharge Planner      Discharge Plans in progress: Patient has been accepted at [facility], discharge for Thursday. Referral has been made to [facility], they will see patient and family at facility tomorrow. Spoke with wife, Valerie, informed her of plan for discharge. She is very comfortable with plan, she had asked for [facility] and her 1st choice for placement was the care suites. She has authorized medical transportation, $75 to  and $5 a mile.     Barriers to discharge plan: anticipate no barriers.    Follow up plan: HE wheelchair  at 11am Thursday. Discharge to [facility] tomorrow, Thursday 5/21/20 on [facility].    Alyssa Meehan LICSW ANCELMO   Inpatient Care Coordination   Supervisor  [facility]  [phone]         Entered by: Alyssa Meehan 05/20/2020 10:31 AM

## 2020-05-20 NOTE — PROGRESS NOTES
PRIMARY DIAGNOSIS: C-diff   OUTPATIENT/OBSERVATION GOALS TO BE MET BEFORE DISCHARGE:  1. ADLs back to baseline: No    2. Activity and level of assistance: Up 1 assist/gait belt/ walker.    3. Pain status: Improved-controlled with oral pain medications- patient on Dilaudid     4. Return to near baseline physical activity: No     Discharge Planner Nurse   Safe discharge environment identified: Yes  Barriers to discharge: No       Entered by: Jessenia Simons 05/20/2020 6:36 PM     Please review provider order for any additional goals.   Nurse to notify provider when observation goals have been met and patient is ready for discharge.    Patient has been alert and oriented x4. Patient very calm and cooperative. VS WNL and documented on the FS. Patient continues on enteric precautions for c-diff (on vancomycin po). Per report stool is turning more soft and not as watery. Sacral, buttocks, perianal wounds wound care completed today. LE edema has ace wraps on. Patient ate 100% of his dinner tonight. Patient is a 1 assist with gait belt and walker to transfer. Patient can have Dilaudid for pain. Plan: discharge on hospice to [facility] via [facility] at 1100.    /55 (BP Location: Left arm)   Pulse 75   Temp 98  F (36.7  C) (Oral)   Resp 16   Ht 1.829 m (6')   Wt 66.2 kg (146 lb)   SpO2 98%   BMI 19.80 kg/m    ]

## 2020-05-20 NOTE — PROGRESS NOTES
[facility]    Hospitalist Progress Note      Assessment & Plan   John Batista is a 74 year old male with PMH significant for metastatic prostate cancer with metastases to the bone, lungs, and brain no longer on chemotherapy, h/o c diff colitis, chronic anemia, and recent hospital stay at [facility] for altered mental status with agitation related to newly discovered brain metastases, s/p 5 sessions of radiation, and discharged to TCU on 4/28 and returned home on 5/15/20. He was seen by [facility] on 5/18 and directly admitted to observation on 5/18 for failure to thrive, ongoing diarrhea and LE edema.    #C. difficile infection  One formed stool today. No abdominal pain, fever, reported.   Started on oral vancomycin 5/19 after testing positive for c.difficile infection, per report chronic diarrhea since 2/2020.  Treated with Augmentin early in March for UTI.   - continue PO vancomycin     #Sacral, buttocks, perianal wounds  Secondary to pressure injury, incontinence dermatitis.   - WOC consulted, recommending Triad paste to Buttocks, sacral, perianal wound     #LE Edema  Improved with elevation, ACE wraps. New onset of bilateral LE edema a few weeks ago from feet up to knees. Unclear etiology with suspected lymphedema. BNP is within normal limits, doubt heart failure.  - continue to elevate LE   - ACE wraps      #Failure to thrive  Progressive fatigue, weight loss, increasing care needs with mild nightly confusion.  Spouse unable to provide these for the patient, she has been caring for him since he returned from the TCU on 5/15.  Per discussion with spouse she would not like home care assistance or hospice due to pandemic conditions.  -Social work consulted, assisted with discharge planning.  Family has elected [facility] care which will be arranged at discharge to the care suites  -Hospice intake at [facility] planned for 5/21, hospice care referral  placed  -Discussed hospice care intent with patient spouse Valerie, and Nicolas. He expressed understanding that he would not be going home at discharge from the hospital as he previously planned     #Metastatic prostate adenocarcinoma with mets to lung, bone, and brain  Follows with Dr. Borrero of [facility] and Dr. Galvan of [facility]rology.  In follow-up visit on 4/22 with primary oncology LIYAH, noted that prognosis was reviewed with Dr. Borrero and felt to be 3 to 6 months.  Hospice was discussed at this time however not initiated.    He was receiving Lupron injection every 6 months. Previously on palliative chemotherapy with Cytoxan and Etoposide (12 days on, 12 days off) until his recent hospitalization in 04/2020, no longer on chemotherapy after brain mets discovered. Received brain radiation treatments from 4/21-4/27.   - Continue PTA Decadron 4 mg BID  - PTA oral dilaudid 4 mg TID resumed during hospital stay, due to increased pain with pressure wound cares, prn 1-2 mg dilaudid ordered to be given prior to these cares     #Confusion   No delirium on evaluation, hallucinations, or impulsive behavior.   Reportedly has confusion at night and part of his increased confusion at other times during the day was thought to be related to his steroid dose, Dexamethasone was decreased to 4 mg BID recently by oncology. Suspect an element of his brain mets are contributing as well. Currently pleasant with only mild forgetfulness in relation to details during interview.   - Continue to monitor      #Recent complicated proteus UTI   Afebrile and denies dysuria.   Was diagnosed during recent hospital stay, treated with course of Augmentin while at TCU.     #Chronic anemia  Hgb stable on admission at 8.8 with baseline of 7-8.      DVT Prophylaxis: Ambulate every shift  Code Status: DNR/DNI  Expected discharge: Tomorrow, recommended to [facility]     Meri Degroot PA-C  Text Page (7am - 5pm, M-F)    Interval History    Patient reports discomfort in his bottom at wound care site, L>R.  No abdominal pain. Afebrile, no cough, fevers, emesis.  Tolerating normal diet.  Ambulating without difficulty assisted with nursing staff and walker.  Looser stools lessening in frequency.  One formed bowel movement today.    Discussed plan of care with patient's spouse, Valerie on the phone.     -Data reviewed today: I reviewed all new labs and imaging results over the last 24 hours.    Physical Exam   Temp: 97.6  F (36.4  C) Temp src: Oral BP: 131/62 Pulse: 75 Heart Rate: 65 Resp: 18 SpO2: 98 % O2 Device: None (Room air)    Vitals:    05/18/20 1748   Weight: 66.2 kg (146 lb)     Vital Signs with Ranges  Temp:  [96.9  F (36.1  C)-97.7  F (36.5  C)] 97.6  F (36.4  C)  Pulse:  [74-76] 75  Heart Rate:  [65-72] 65  Resp:  [16-18] 18  BP: (104-131)/(52-70) 131/62  SpO2:  [97 %-98 %] 98 %  I/O last 3 completed shifts:  In: 240 [P.O.:240]  Out: -       Constitutional:Awake, alert, no apparent distress  Respiratory:  Normal work of breathing. Lungs clear to auscultation bilaterally, no crackles or wheezing.  Cardiovascular: Regular rate and rhythm, normal S1 and S2, and no murmur appreciated.   GI: Normal bowel sounds, soft, non-distended, non-tender.   Skin/Integument: pale appearance. Bilateral +2 lower extremity edema. Scattered skin tears/breakdown over sacral regions near anus with surrounding blanchable erythema.  No purulent drainage or fluctuance.  Neuro: Moving all extremities with symmetrical strength. Coordination and sensation grossly intact. Speech clear. No focal deficits.   Psych: Appropriate affect. Intermittently forgetful.        Medications       dexamethasone  4 mg Oral BID w/meals     HYDROmorphone  4 mg Oral TID     saccharomyces boulardii  500 mg Oral BID     vancomycin  125 mg Oral 4x Daily       Data   Recent Labs   Lab 05/19/20  0547 05/18/20  2119   WBC 7.9  --    HGB 8.0*  --    *  --      --     134    POTASSIUM 4.1 4.2   CHLORIDE 103 103   CO2 27 25   BUN 24 25   CR 0.52* 0.57*   ANIONGAP 5 6   TIMBO 8.0* 7.8*   GLC 99 152*   ALBUMIN  --  2.1*   PROTTOTAL  --  5.6*   BILITOTAL  --  0.3   ALKPHOS  --  91   ALT  --  40   AST  --  36       No results found for this or any previous visit (from the past 24 hour(s)).

## 2020-05-20 NOTE — PROGRESS NOTES
Focus: wound care  D: follow up to [facility] skin assessment and POC with bedside RN. RN reports that pt is getting up and ambulating to BR and chair; stooling has lessened and he is feeling somewhat better.   Will plan to include Stoma powder before Triad paste application to improve adherence.   I: discussion with RN, review of chart, order supplies.  A/P: Buttocks, sacral, perianal  wound due to Pressure Injury, Friction, Shear and Incontinence Associated Dermatitis (IAD)  Status: follow up and will plan to include Stoma powder with Triad paste  Will follow weekly

## 2020-05-20 NOTE — PLAN OF CARE
"PRIMARY DIAGNOSIS: FTT    OUTPATIENT/OBSERVATION GOALS TO BE MET BEFORE DISCHARGE  1. Orthostatic performed: N/A    2. Tolerating PO medications: Yes    3. Return to near baseline physical activity: Yes, patient is a 1 assist with walker     4. Cleared for discharge by consultants (if involved): N/A    Discharge Planner Nurse   Safe discharge environment identified: Working on placement with [facility]   Barriers to discharge: SW working on placement at [facility] per Wife.        Entered by: Jessie Toscano 05/19/2020 9:05 PM     Please review provider order for any additional goals.   Nurse to notify provider when observation goals have been met and patient is ready for discharge.    Patient is C-diff positive. Patient does have severe perianal denudement. Cleansed after stools with wound cleanser and triad paste applied. Stool too thick and pasty, but will consider rectal tube if stool gets looser. Patient did eat 100% of dinner. Patient confused intermittently. Patient needs a lot of direction otherwise seems \"lost\". Wife interested in getting patient into [facility] and SW working on that. Patient incontinent of bowel and bladder. 1 assist with walker and gait belt. Will continue to monitor.   "

## 2020-05-20 NOTE — PLAN OF CARE
PRIMARY DIAGNOSIS: c-diff    OUTPATIENT/OBSERVATION GOALS TO BE MET BEFORE DISCHARGE  1. Orthostatic performed: N/A    2. Tolerating PO fluid and/or antibiotics (if applicable): Yes    3. Nausea/Vomiting/Diarrhea symptoms improved: No,  occasional and loose    4. Pain status: Pain free.    5. Return to near baseline physical activity: No, up with assist of 1, walker/gait belt    Discharge Planner Nurse   Safe discharge environment identified: Yes  Barriers to discharge: No       Entered by: Dona Aguirre 05/20/2020 2:02 AM     Please review provider order for any additional goals.   Nurse to notify provider when observation goals have been met and patient is ready for discharge.    Pt is alert and oriented, up with assist of 1 and walker/gait belt. Pt continues with loose stools, is now c-diff positive and on oral vanco. Awaiting placement for hospice cares

## 2020-05-20 NOTE — PLAN OF CARE
PRIMARY DIAGNOSIS: c-diff     OUTPATIENT/OBSERVATION GOALS TO BE MET BEFORE DISCHARGE  1. Orthostatic performed: N/A     2. Tolerating PO fluid and/or antibiotics (if applicable): Yes     3. Nausea/Vomiting/Diarrhea symptoms improved: No,  occasional and loose     4. Pain status: Pain free.     5. Return to near baseline physical activity: No, up with assist of 1, walker/gait belt     Discharge Planner Nurse   Safe discharge environment identified: Yes  Barriers to discharge: No            Please review provider order for any additional goals.   Nurse to notify provider when observation goals have been met and patient is ready for discharge.     Pt is alert and oriented, up with assist of 1 and walker/gait belt. Pt continues with loose stools, is now c-diff positive and on oral vanco. Awaiting placement for hospice cares

## 2020-05-20 NOTE — PLAN OF CARE
PRIMARY DIAGNOSIS: c-diff     OUTPATIENT/OBSERVATION GOALS TO BE MET BEFORE DISCHARGE  1. Orthostatic performed: N/A     2. Tolerating PO fluid and/or antibiotics (if applicable): Yes     3. Nausea/Vomiting/Diarrhea symptoms improved: No,  occasional and loose     4. Pain status: Pain free.     5. Return to near baseline physical activity: No, up with assist of 1, walker/gait belt     Discharge Planner Nurse   Safe discharge environment identified: Yes  Barriers to discharge: No    Clifford Palomares RN          Please review provider order for any additional goals. Nurse to notify provider when observation goals have been met and patient is ready for discharge.     Pt is alert and oriented, up with assist of 1 and walker/gait belt. Pt continues with loose stools,  c-diff positive and on oral vanco. Regular diet. Provider updated wife this morning. SW following regarding hospice. Patient will discharge possibly tomorrow and go to [facility]. Will continue to monitor and provide cares.

## 2020-05-21 NOTE — PLAN OF CARE
PRIMARY DIAGNOSIS: c-diff     OUTPATIENT/OBSERVATION GOALS TO BE MET BEFORE DISCHARGE  1. Orthostatic performed: N/A     2. Tolerating PO fluid and/or antibiotics (if applicable): Yes     3. Nausea/Vomiting/Diarrhea symptoms improved: No nausea or emesis. Diarrhea seems improved this shift, formed loose stool today      4. Pain status: Pain free.     5. Return to near baseline physical activity: No, up with assist of 1, walker/gait belt     Discharge Planner Nurse   Safe discharge environment identified: Yes  Barriers to discharge: SW following    Clifford Palomares RN          Please review provider order for any additional goals. Nurse to notify provider when observation goals have been met and patient is ready for discharge.     Pt is alert and oriented, up with assist of 1 and walker/gait belt. Regular diet. SW following regarding hospice. Patient will discharge today to [facility] on hospice, wheelchair transportation at 11 am. Wound care done this morning, incontinent at baseline. Will continue to monitor and provide cares.

## 2020-05-21 NOTE — PROGRESS NOTES
PRIMARY DIAGNOSIS: Chronic diarrhea, C-diff   OUTPATIENT/OBSERVATION GOALS TO BE MET BEFORE DISCHARGE:  1. ADLs back to baseline: No    2. Activity and level of assistance: Up 1 assist/gait belt/ walker.    3. Pain status: Improved-controlled with oral pain medications    4. Return to near baseline physical activity: No     Discharge Planner Nurse   Safe discharge environment identified: Yes  Barriers to discharge: No       Entered by: Theodora Zuniga 05/21/2020 12:59 AM     VSS. Pt pale in color. Up assist x1 with walker. Pain 7/10 to buttocks, on scheduled PO dilaudid. Open pressure ulcers to buttocks, using kee spray/triad cream per WOC. On PO vanco for + Cdiff. Pt to discharge on hospice to [facility] via [facility] at 11am. Will continue to monitor.       Please review provider order for any additional goals.   Nurse to notify provider when observation goals have been met and patient is ready for discharge.    ]

## 2020-05-21 NOTE — PLAN OF CARE
Patient's After Visit Summary was reviewed with patient.   Patient verbalized understanding of After Visit Summary, recommended follow up and was given an opportunity to ask questions.   Discharge medications sent home with patient/family: Not applicable   Discharged with transport tech. Wheelchair transport ride.     OBSERVATION patient END time: 1110

## 2020-05-21 NOTE — DISCHARGE SUMMARY
[facility]    Discharge Summary  Hospitalist     Date of Admission:  5/18/2020  Date of Discharge:  5/21/2020 11:13 AM  Discharging Provider: Meri Degroot PA-C  Date of Service (when I saw the patient): 5/21/2020    Discharge Diagnoses   Metastatic prostate cancer  Recurrent C difficile colitis  Lower extremity edema    History of Present Illness   John Batista is a 74 year old male with PMH significant for metastatic prostate cancer with metastases to the bone, lungs, and brain no longer on chemotherapy, h/o c diff colitis, chronic anemia, and recent hospital stay at [facility] for altered mental status with agitation related to newly discovered brain metastases, s/p 5 sessions of radiation, and discharged to TCU on 4/28 and returned home on 5/15/20. He was seen by [facility] on 5/18 and directly admitted to observation on 5/18 for failure to thrive, ongoing diarrhea and LE edema.  Please see admitting H & P for full details of the encounter.     Hospital Course   John Batista was admitted on 5/18/2020.  The following problems were addressed during his hospitalization:    #Recurrent C. difficile infection  Started on oral vancomycin 5/19 after testing positive for c.difficile infection, per report chronic diarrhea since 2/2020, recent abx use (March) for complicated proteus UTI.  - continue PO vancomycin, this should be down tapered      #Sacral, buttocks, perianal wounds  Secondary to pressure injury, incontinence dermatitis.   - WOC consulted, recommending Triad paste to Buttocks, sacral, perianal wound      #LE Edema  Improved with elevation, ACE wraps. New onset of bilateral LE edema a few weeks ago from feet up to knees. Unclear etiology with suspected lymphedema. BNP is within normal limits, doubt heart failure.  - continue to elevate LE   - ACE wraps      #Failure to thrive  Progressive fatigue, weight loss, increasing care needs with mild nightly confusion.   Spouse unable to provide these for the patient, she has been caring for him since he returned from the TCU on 5/15. Per discussion with spouse she would not like home care assistance or hospice due to COVID 19 pandemic conditions.  -Family has elected [facility] care which will be arranged at discharge to the care suites  -Hospice intake at [facility] planned for 5/21       #Metastatic prostate adenocarcinoma with mets to lung, bone, and brain  Follows with Dr. Borrero of [facility] and Dr. Galvan of [facility]rology.  In follow-up visit on 4/22 with primary oncology LIYAH, noted that prognosis was reviewed with Dr. Borrero and felt to be 3 to 6 months at which time Hospice was discussed.   Previously receiving Lupron injection every 6 months. Palliative chemotherapy with Cytoxan and Etoposide (12 days on, 12 days off) until his recent hospitalization in 04/2020, no longer on chemotherapy after brain mets discovered. Received brain radiation treatments from 4/21-4/27.   - Continue PTA Decadron 4 mg BID  - PTA oral dilaudid 4 mg TID resumed during hospital stay, prn 1-2 mg dilaudid ordered to be given prior to sacral wound cares     #Confusion   Intermittent memory impairment noted. No delirium, hallucinations, or impulsive behavior. He does have known brain metastasis.     Pending Results   None.    Code Status   DNR / DNI       Primary Care Physician   Rosario Martinez      INTERVAL HISTORY  No complaints. Improvement in discomfort with wound cares. Stools have slowed with one formed bowel movement this morning. Tolerating normal diet. No fevers, abdominal pain. No chest pain, dyspnea.     Wife, Valerie, updated with plan of care.       /62 (BP Location: Right arm)   Pulse 75   Temp 97.4  F (36.3  C) (Oral)   Resp 18   Ht 1.829 m (6')   Wt 66.2 kg (146 lb)   SpO2 98%   BMI 19.80 kg/m        Constitutional:Awake, alert, no apparent distress  Respiratory:  Normal work of breathing. Lungs clear to  auscultation bilaterally, no crackles or wheezing.  Cardiovascular: Regular rate and rhythm, normal S1 and S2, and no murmur appreciated.   GI: Normal bowel sounds, soft, non-distended, non-tender.   Skin/Integument: pale appearance. Bilateral +2 lower extremity non pitting edema. Scattered skin tears/breakdown over sacral regions near anus with surrounding blanchable erythema.  No purulent drainage or fluctuance.  Neuro: Moving all extremities with symmetrical strength. Coordination and sensation grossly intact. Speech clear. No focal deficits.   Psych: Appropriate affect. Intermittently forgetful.      Discharge Disposition   Admited to hospice care, care facility:   Agency: [facility]  Condition at discharge: Stable    Consultations This Hospital Stay   SOCIAL WORK IP CONSULT  WOUND OSTOMY CONTINENCE NURSE  IP CONSULT  SOCIAL WORK IP CONSULT    Time Spent on this Encounter   Meri BILLS PA-C, personally saw the patient today and spent greater than 30 minutes discharging this patient.    Discharge Orders      HOSPICE REFERRAL      General info for SNF    Length of Stay Estimate: Long Term Care  Condition at Discharge: Declining  Level of care:skilled   Rehabilitation Potential: Poor  Admission H&P remains valid and up-to-date: Yes  Recent Chemotherapy: prior to April 2020  Use Nursing Home Standing Orders: Yes     Mantoux instructions    Give two-step Mantoux (PPD) Per Facility Policy Yes     Reason for your hospital stay    You were hospitalized for ongoing diarrhea, LE edema, and increasing care requirements. During hospital stay found to be positive for c. difficile infection for which antibiotics were started to treat.     Activity - Up with nursing assistance     Bladder scan    X 2 for post void residual     Wound care (specify)    Site:  Buttocks, sacral, perianal wounds  Instructions:    Pressure Injury prevention (please order supplies if not in room)    Turn every 2 hours, side to side avoid supine,  if ineffective consider low air loss bed    Float heels off end of pillows if ineffective order Prevalon Heel Lift boots (#[id number])    Prevent sliding by limiting HOB to 30 degrees or less unless contraindicated, use knee gatch first, if ineffective consider Low air loss    If patient must sit, order  Chair cushion (#[id number]),  please limit sitting to an hour at a time    Optimize nutrition per RD  2. Buttocks, sacral, perianal  Wounds Triad paste. include Stoma powder before Triad paste application to improve adherence     Follow Up and recommended labs and tests    Follow up with Hospice care team, Nursing home physician.     Advance Diet as Tolerated    Follow this diet upon discharge: Regular     Discharge Medications   Current Discharge Medication List      START taking these medications    Details   vancomycin (VANCOCIN) 125 MG capsule Take 1 capsule (125 mg) by mouth 4 times daily BID for 7 days starting 5/31  Once daily starting 6/7   Every other day starting 6/21 for 3-3 weeks for prolonged taper  Qty:      Associated Diagnoses: C. difficile colitis         CONTINUE these medications which have CHANGED    Details   !! HYDROmorphone (DILAUDID) 2 MG tablet Take 0.5-1 tablets (1-2 mg) by mouth 2 times daily as needed for breakthrough pain Prior to wound cares  Qty: 12 tablet, Refills: 0    Associated Diagnoses: Prostate cancer metastatic to bone (H)      !! HYDROmorphone (DILAUDID) 4 MG tablet Take 1 tablet (4 mg) by mouth 3 times daily At 0800, 1600, 2200  Qty: 21 tablet, Refills: 0    Associated Diagnoses: Acute lower gastrointestinal hemorrhage       !! - Potential duplicate medications found. Please discuss with provider.      CONTINUE these medications which have NOT CHANGED    Details   acetaminophen (TYLENOL) 500 MG tablet Take 500 mg by mouth every 4 hours as needed for mild pain       Calcium-Magnesium-Zinc 333-133-5 MG TABS per tablet Take 1 tablet by mouth 2 times daily (with meals)        Cholecalciferol (VITAMIN D) 1000 UNITS capsule Take 1,000 Units by mouth daily       dexamethasone (DECADRON) 4 MG tablet Take 4 mg by mouth 2 times daily (with meals)      famotidine (PEPCID) 20 MG tablet Take 20 mg by mouth 2 times daily as needed       !! Fe-Succ Ac-C-Thre Ac-B12-FA (FERREX 150 FORTE PLUS PO) Take 2 tablets by mouth every morning      !! Fe-Succ Ac-C-Thre Ac-B12-FA (FERREX 150 FORTE PLUS PO) Take 1 tablet by mouth At Bedtime      leuprolide (ELIGARD) 45 MG injection Inject 45 mg Subcutaneous every 6 months.      multivitamin peds with iron (FLINTSTONES PLUS IRON) CHEW Take 1 tablet by mouth every morning      predniSONE (DELTASONE) 10 MG tablet Take 10 mg by mouth daily At NOON      prochlorperazine (COMPAZINE) 10 MG tablet Take 10 mg by mouth every 8 hours as needed for nausea or vomiting      saccharomyces boulardii (FLORASTOR) 250 MG capsule Take 500 mg by mouth 2 times daily       vitamin C (ASCORBIC ACID) 500 MG tablet Take 500 mg by mouth daily        !! - Potential duplicate medications found. Please discuss with provider.        Allergies   No Known Allergies  Data   Most Recent 3 CBC's:  Recent Labs   Lab Test 05/19/20  0547 04/27/20  0857 04/26/20  0707   WBC 7.9 16.2* 11.7*   HGB 8.0* 8.8* 8.5*   * 96 95    358 307      Most Recent 3 BMP's:  Recent Labs   Lab Test 05/19/20  0547 05/18/20 2119 04/27/20  0857    134 133   POTASSIUM 4.1 4.2 4.0   CHLORIDE 103 103 101   CO2 27 25 24   BUN 24 25 24   CR 0.52* 0.57* 0.66   ANIONGAP 5 6 8   TIMBO 8.0* 7.8* 8.5   GLC 99 152* 118*     Most Recent 2 LFT's:  Recent Labs   Lab Test 05/18/20 2119 04/27/20  0857   AST 36 24   ALT 40 41   ALKPHOS 91 108   BILITOTAL 0.3 0.4       Meri Degroot PA-C  [facility]

## 2020-05-21 NOTE — DISCHARGE SUMMARY
Discharge Planner      Discharge Plans in progress: Patient is discharging to [facility] today. Orders have been faxed to [facility]. Spoke with wife, Valeire she had no questions or concerns. Spoke with Susan at  Care [facility]es, they are all set for patient's arrival.    Barriers to discharge plan: Zeynep avila no barriers.    Follow up plan: [facility] wheelchair transport at 11am today.    JANEE Lopez   Inpatient Care Coordination   Supervisor  [facility]  [phone]         Entered by: Alyssa Meehan 05/21/2020 9:52 AM

## 2020-05-21 NOTE — PROGRESS NOTES
Wound care done. Dilaudid given prior to care. Dressed and cleaned up. Ready for transport at 11 am.

## 2020-05-21 NOTE — PROGRESS NOTES
PRIMARY DIAGNOSIS: Chronic diarrhea, C-diff   OUTPATIENT/OBSERVATION GOALS TO BE MET BEFORE DISCHARGE:  1. ADLs back to baseline: No    2. Activity and level of assistance: Up 1 assist/gait belt/ walker.    3. Pain status: Improved-controlled with oral pain medications    4. Return to near baseline physical activity: No     Discharge Planner Nurse   Safe discharge environment identified: Yes  Barriers to discharge: No       Entered by: Theodora Zuniga 05/21/2020 4:49 AM     VSS. Pt pale in color. Up assist x1 with walker. On scheduled PO dilaudid for buttock pain. Open pressure ulcers to buttocks, using kee spray/triad cream per WOC. On PO vanco for + Cdiff. No stools overnight thus far. Pt to discharge on hospice to [facility] via [facility] at 11am. Will continue to monitor.     Please review provider order for any additional goals.   Nurse to notify provider when observation goals have been met and patient is ready for discharge.    ]

## 2020-05-21 NOTE — PROGRESS NOTES
PRIMARY DIAGNOSIS: Chronic diarrhea, C-diff   OUTPATIENT/OBSERVATION GOALS TO BE MET BEFORE DISCHARGE:  1. ADLs back to baseline: No    2. Activity and level of assistance: Up 1 assist/gait belt/ walker.    3. Pain status: Improved-controlled with oral pain medications    4. Return to near baseline physical activity: No     Discharge Planner Nurse   Safe discharge environment identified: Yes  Barriers to discharge: No       Entered by: Theodora Zuniga 05/20/2020 10:07 PM       Pt A&Ox4, VSS. Pt pale in color. Up assist x1 with walker. Pain 7/10 to buttocks, on scheduled PO dilaudid. Per pt report stools improved somewhat, not as loose and less frequent. On PO vanco for + Cdiff. Pt to discharge on hospice to [facility] via [facility] at 11am. Will continue to monitor.       Please review provider order for any additional goals.   Nurse to notify provider when observation goals have been met and patient is ready for discharge.    ]

## 2020-05-22 NOTE — PROGRESS NOTES
Clinic Care Coordination Contact    Situation: Patient chart reviewed by care coordinator.    Background: RNCC received CTS handoff. No information to review in letter. Patient was hospitalized at [facility] from 05/18 to 05/21 with a diagnosis of metastatic prostate cancer.     Assessment: Patient was discharged to [facility] with [facility].     Plan/Recommendations: Not a candidate for care coordination outreach. No outreach to be completed. RNCC will remain available as needed.     Makenzie Cervantes RN Care Coordinator  [facility]  Email: [email or web address]  Phone: [phone]

## 2020-06-19 ENCOUNTER — TELEPHONE (OUTPATIENT)
Dept: FAMILY MEDICINE | Facility: CLINIC | Age: 75
End: 2020-06-19

## 2020-06-19 ENCOUNTER — MEDICAL CORRESPONDENCE (OUTPATIENT)
Dept: HEALTH INFORMATION MANAGEMENT | Facility: CLINIC | Age: 75
End: 2020-06-19

## 2020-06-19 NOTE — TELEPHONE ENCOUNTER
Coco from [facility] calling stating that they faxed over a plan of care and certification of terminal illness form on 6/12 to be signed and haven't received it back yet. Coco is wondering if Dr. Martinez has received it or if they should refax. Please call Coco at [phone].    Cee Jordan,

## 2020-06-19 NOTE — TELEPHONE ENCOUNTER
Orders are not in clinic, called Coco, advised to fax to [phone], once received will walk to provider.

## 2020-06-26 ENCOUNTER — TELEPHONE (OUTPATIENT)
Dept: FAMILY MEDICINE | Facility: CLINIC | Age: 75
End: 2020-06-26

## 2020-06-26 NOTE — TELEPHONE ENCOUNTER
Received [facility] orders, placed in Dr Martinez in basket.    Please review, sign and fax back to [phone].

## 2020-07-06 ENCOUNTER — MEDICAL CORRESPONDENCE (OUTPATIENT)
Dept: HEALTH INFORMATION MANAGEMENT | Facility: CLINIC | Age: 75
End: 2020-07-06

## 2021-05-26 VITALS
SYSTOLIC BLOOD PRESSURE: 109 MMHG | DIASTOLIC BLOOD PRESSURE: 65 MMHG | OXYGEN SATURATION: 93 % | HEART RATE: 86 BPM | TEMPERATURE: 97.4 F | RESPIRATION RATE: 18 BRPM

## 2021-06-04 VITALS
DIASTOLIC BLOOD PRESSURE: 69 MMHG | SYSTOLIC BLOOD PRESSURE: 119 MMHG | RESPIRATION RATE: 16 BRPM | WEIGHT: 128.2 LBS | HEART RATE: 78 BPM | BODY MASS INDEX: 17.39 KG/M2 | TEMPERATURE: 97 F | OXYGEN SATURATION: 99 %

## 2021-06-04 VITALS
RESPIRATION RATE: 16 BRPM | TEMPERATURE: 97.2 F | DIASTOLIC BLOOD PRESSURE: 64 MMHG | SYSTOLIC BLOOD PRESSURE: 111 MMHG | WEIGHT: 128 LBS | HEART RATE: 83 BPM | BODY MASS INDEX: 17.36 KG/M2 | OXYGEN SATURATION: 98 %

## 2021-06-04 VITALS
SYSTOLIC BLOOD PRESSURE: 101 MMHG | TEMPERATURE: 98.2 F | WEIGHT: 128 LBS | OXYGEN SATURATION: 98 % | RESPIRATION RATE: 18 BRPM | DIASTOLIC BLOOD PRESSURE: 63 MMHG | BODY MASS INDEX: 17.36 KG/M2 | HEART RATE: 92 BPM

## 2021-06-04 VITALS
OXYGEN SATURATION: 99 % | SYSTOLIC BLOOD PRESSURE: 97 MMHG | TEMPERATURE: 97.2 F | DIASTOLIC BLOOD PRESSURE: 62 MMHG | HEART RATE: 85 BPM | RESPIRATION RATE: 18 BRPM | BODY MASS INDEX: 19.73 KG/M2 | WEIGHT: 145.5 LBS

## 2021-06-07 NOTE — PROGRESS NOTES
"[facility]    [id number]   Code Status: DNR/DNI      CHIEF COMPLAINT/REASON FOR VISIT:  Chief Complaint   Patient presents with     Review Of Multiple Medical Conditions     AMS, UTI        HISTORY:      HPI: John is a 74 y.o. male undergoing physical and occupational therapy at [facility]. He is with past medical history significant for anemia, diverticulitis, Leukopenia and  prostate cancer with mets to the bone and lungs, He was admitted to [facility] from 4/18-4/28 with altered mental status, newly found brain mets, lactic acidosis and UTI    Today he is seen to review multiple medical issues and establish care. He denied CP or shortness of breath. Denied constipation  He does report having diarrhea. He reported he had C-diff approximately 6 months ago and was on antibiotics.  He tells me it is nothing like that  and declined imodium. He was alert and oriented X3. He ddi report sacral pain from his fall and tells me the medications are\"definitely helpful\". He denied cough or congestion, afebrile. He is ambulating 55 feet with a front wheeled walker.       Past Medical History:   Diagnosis Date     Anemia      Brain metastases (H)      Diverticulitis      History of blood transfusion      Leukopenia      Prostate cancer (H)      Prostate cancer metastatic to bone (H)              Family History   Problem Relation Age of Onset     Breast cancer Neg Hx      Coronary artery disease Neg Hx      Cancer Neg Hx         colorectal      Diabetes Neg Hx      Hypertension Neg Hx      Social History     Socioeconomic History     Marital status:      Spouse name: Not on file     Number of children: Not on file     Years of education: Not on file     Highest education level: Not on file   Occupational History     Not on file   Social Needs     Financial resource strain: Not on file     Food insecurity     Worry: Not on file     " Inability: Not on file     Transportation needs     Medical: Not on file     Non-medical: Not on file   Tobacco Use     Smoking status: Former Smoker     Types: Cigarettes     Smokeless tobacco: Former User   Substance and Sexual Activity     Alcohol use: Not Currently     Drug use: Not on file     Sexual activity: Not on file   Lifestyle     Physical activity     Days per week: Not on file     Minutes per session: Not on file     Stress: Not on file   Relationships     Social connections     Talks on phone: Not on file     Gets together: Not on file     Attends [employer] service: Not on file     Active member of club or organization: Not on file     Attends meetings of clubs or organizations: Not on file     Relationship status: Not on file     Intimate partner violence     Fear of current or ex partner: Not on file     Emotionally abused: Not on file     Physically abused: Not on file     Forced sexual activity: Not on file   Other Topics Concern     Not on file   Social History Narrative     Not on file         Review of Systems   Constitutional: Positive for fatigue. Negative for activity change, appetite change, chills and fever.   HENT: Negative for congestion and sore throat.    Eyes: Negative for visual disturbance.   Respiratory: Negative for cough, shortness of breath and wheezing.    Cardiovascular: Negative for chest pain and leg swelling.   Gastrointestinal: Positive for diarrhea. Negative for abdominal distention, abdominal pain, constipation and nausea.   Genitourinary: Negative for dysuria.   Musculoskeletal: Positive for back pain. Negative for arthralgias and myalgias.   Skin: Negative for color change, rash and wound.   Neurological: Negative for dizziness, weakness and numbness.   Psychiatric/Behavioral: Negative for agitation, behavioral problems and sleep disturbance.       Vitals:    04/30/20 1527   BP: 97/62   Pulse: 85   Resp: 18   Temp: 97.2  F (36.2  C)   SpO2: 99%   Weight: 145 lb 8 oz  (66 kg)       Physical Exam  Constitutional:       Appearance: He is well-developed.      Comments: Pleasant gentleman in no acute distress.    HENT:      Head: Normocephalic.   Eyes:      Conjunctiva/sclera: Conjunctivae normal.   Neck:      Musculoskeletal: Normal range of motion.   Cardiovascular:      Rate and Rhythm: Normal rate and regular rhythm.      Heart sounds: Normal heart sounds. No murmur.   Pulmonary:      Effort: No respiratory distress.      Breath sounds: Normal breath sounds. No wheezing or rales.   Abdominal:      General: Bowel sounds are normal. There is no distension.      Palpations: Abdomen is soft.      Tenderness: There is no abdominal tenderness.   Musculoskeletal: Normal range of motion.   Skin:     General: Skin is warm.   Neurological:      Mental Status: He is alert and oriented to person, place, and time.   Psychiatric:         Behavior: Behavior normal.           LABS:   No results found for this or any previous visit (from the past 240 hour(s)).  Current Outpatient Medications   Medication Sig     acetaminophen (TYLENOL) 500 MG tablet Take 500 mg by mouth every 4 (four) hours as needed for pain.     ascorbic acid, vitamin C, (VITAMIN C) 250 MG tablet Take 250 mg by mouth 3 (three) times a day.     calcium-magnesium-zinc 333-133-5 mg Tab Take 1 tablet by mouth 2 (two) times a day.     cholecalciferol, vitamin D3, 1,000 unit (25 mcg) tablet Take 2,000 Units by mouth 2 (two) times a day.     dexAMETHasone (DECADRON) 4 MG tablet Take 4 mg by mouth 3 (three) times a day.     famotidine (PEPCID) 20 MG tablet Take 20 mg by mouth 2 (two) times a day.     HYDROmorphone (DILAUDID) 4 MG tablet Take 4 mg by mouth every 4 (four) hours as needed for pain.     multivit-min/folic/vit K/lycop (ONE-A-DAY MEN'S 50 PLUS ORAL) Take 1 tablet by mouth daily.     OLANZapine (ZYPREXA) 5 MG tablet Take 5 mg by mouth 2 (two) times a day. And 5 mg q12h prn     pantoprazole (PROTONIX) 20 MG tablet Take 40 mg  by mouth daily.     polysaccharide iron complex (NIFEREX) 150 mg iron capsule Take 1 capsule by mouth 3 (three) times a day with meals.     prochlorperazine (COMPAZINE) 10 MG tablet Take 10 mg by mouth every 8 (eight) hours as needed for nausea.     Saccharomyces boulardii (FLORASTOR) 250 mg capsule Take 500 mg by mouth 2 (two) times a day.     senna-docusate (SENNOSIDES-DOCUSATE SODIUM) 8.6-50 mg tablet Take 2 tablets by mouth 2 (two) times a day.     ASSESSMENT:      ICD-10-CM    1. Altered mental status, unspecified altered mental status type  R41.82    2. Urinary tract infection without hematuria, site unspecified  N39.0    3. Diarrhea, unspecified type  R19.7        PLAN:    Altered mental status- newly diagnosed brain mets- Follows up with Oncology oriented today and no behavioral issues. [facility] 28/30    UTI- Completed Augmentin     Chronic anemia-Baseline 7-8 range  per records Last HGB 8.8 on 4/27/20    Leukopenia- elevated WBC 16.2 Possibly related to steroids, Follow up with Oncology and completed antibiotics for a UTI     Diarrhea , He reported having C-diff approximately 6 months ago and was on antibiotics.  He tells me it is nothing like that  and declined imodium.    Electronically signed by: Verena Gabriel CNP

## 2021-06-07 NOTE — TELEPHONE ENCOUNTER
[facility] Nurse Triage Telephone Note      Provider: ANTONIO Marin  Facility: [facility]    Facility Type: TCU    Caller: Pipe  Call Back Number:  [phone]    Allergies: Patient has no known allergies.    Reason for call: Nurse reporting BMP results.       Verbal Order/Direction given by Provider: No new orders.      Provider giving order: SHIRA Oviedo    Verbal order given to: Pipe Staples RN

## 2021-06-07 NOTE — PROGRESS NOTES
[facility]    Facility:   [facility] [[id number]]   Code Status: DNR/DNI      CHIEF COMPLAINT/REASON FOR VISIT:  Chief Complaint   Patient presents with     Review Of Multiple Medical Conditions     pain management, loose stools       HISTORY:      HPI: John is a 74 y.o. male undergoing physical and occupational therapy at [facility]. He is with past medical history significant for anemia, diverticulitis, Leukopenia and  prostate cancer with mets to the bone and lungs, He was admitted to [facility] from 4/18-4/28 with altered mental status, newly found brain mets, lactic acidosis and UTI    Today he is seen for reports of increased pain. He denied CP or shortness of breath. Denied constipation  He does report having diarrhea but not watery. . He reported he had C-diff approximately 6 months ago and was on antibiotics.  He tells me it is nothing like that  and declined imodium. He was alert and oriented X3. Previously he reported adequate pain relief and today he tells me his pain is never below 7/10 after medications  He denied cough or congestion, afebrile. He is ambulating 55 feet with a front wheeled walker. He did have a discrepancy in his weights showing a 20 pound weight loss in 3 days. Staff to reweigh him when he returns from his Oncology appointment. He does tell me his baseline is 145 pounds.       Past Medical History:   Diagnosis Date     Anemia      Brain metastases (H)      Diverticulitis      History of blood transfusion      Leukopenia      Prostate cancer (H)      Prostate cancer metastatic to bone (H)              Family History   Problem Relation Age of Onset     Breast cancer Neg Hx      Coronary artery disease Neg Hx      Cancer Neg Hx         colorectal      Diabetes Neg Hx      Hypertension Neg Hx      Social History     Socioeconomic History     Marital status:      Spouse name: Not on file     Number of children: Not  on file     Years of education: Not on file     Highest education level: Not on file   Occupational History     Not on file   Social Needs     Financial resource strain: Not on file     Food insecurity     Worry: Not on file     Inability: Not on file     Transportation needs     Medical: Not on file     Non-medical: Not on file   Tobacco Use     Smoking status: Former Smoker     Types: Cigarettes     Smokeless tobacco: Former User   Substance and Sexual Activity     Alcohol use: Not Currently     Drug use: Not on file     Sexual activity: Not on file   Lifestyle     Physical activity     Days per week: Not on file     Minutes per session: Not on file     Stress: Not on file   Relationships     Social connections     Talks on phone: Not on file     Gets together: Not on file     Attends Tenriism service: Not on file     Active member of club or organization: Not on file     Attends meetings of clubs or organizations: Not on file     Relationship status: Not on file     Intimate partner violence     Fear of current or ex partner: Not on file     Emotionally abused: Not on file     Physically abused: Not on file     Forced sexual activity: Not on file   Other Topics Concern     Not on file   Social History Narrative     Not on file         Review of Systems   Constitutional: Positive for fatigue. Negative for activity change, appetite change, chills and fever.   HENT: Negative for congestion and sore throat.    Eyes: Negative for visual disturbance.   Respiratory: Negative for cough, shortness of breath and wheezing.    Cardiovascular: Negative for chest pain and leg swelling.   Gastrointestinal: Positive for diarrhea. Negative for abdominal distention, abdominal pain, constipation and nausea.   Genitourinary: Negative for dysuria.   Musculoskeletal: Positive for back pain. Negative for arthralgias and myalgias.   Skin: Negative for color change, rash and wound.   Neurological: Negative for dizziness, weakness and  numbness.   Psychiatric/Behavioral: Negative for agitation, behavioral problems and sleep disturbance.       Vitals:    05/04/20 1218   BP: 109/65   Pulse: 86   Resp: 18   Temp: 97.4  F (36.3  C)   SpO2: 93%       Physical Exam  Constitutional:       Appearance: He is well-developed.      Comments: Pleasant gentleman in no acute distress.    HENT:      Head: Normocephalic.   Eyes:      Conjunctiva/sclera: Conjunctivae normal.   Neck:      Musculoskeletal: Normal range of motion.   Cardiovascular:      Rate and Rhythm: Normal rate and regular rhythm.      Heart sounds: Normal heart sounds. No murmur.   Pulmonary:      Effort: No respiratory distress.      Breath sounds: Normal breath sounds. No wheezing or rales.   Abdominal:      General: Bowel sounds are normal. There is no distension.      Palpations: Abdomen is soft.      Tenderness: There is no abdominal tenderness.   Musculoskeletal: Normal range of motion.   Skin:     General: Skin is warm.   Neurological:      Mental Status: He is alert and oriented to person, place, and time.   Psychiatric:         Behavior: Behavior normal.           LABS:   Recent Results (from the past 240 hour(s))   Basic Metabolic Panel   Result Value Ref Range    Sodium 133 (L) 136 - 145 mmol/L    Potassium 3.7 3.5 - 5.0 mmol/L    Chloride 101 98 - 107 mmol/L    CO2 22 22 - 31 mmol/L    Anion Gap, Calculation 10 5 - 18 mmol/L    Glucose 113 70 - 125 mg/dL    Calcium 8.0 (L) 8.5 - 10.5 mg/dL    BUN 25 8 - 28 mg/dL    Creatinine 0.70 0.70 - 1.30 mg/dL    GFR MDRD Af Amer >60 >60 mL/min/1.73m2    GFR MDRD Non Af Amer >60 >60 mL/min/1.73m2     Current Outpatient Medications   Medication Sig     acetaminophen (TYLENOL) 500 MG tablet Take 500 mg by mouth every 4 (four) hours as needed for pain.     ascorbic acid, vitamin C, (VITAMIN C) 250 MG tablet Take 250 mg by mouth 3 (three) times a day.     calcium-magnesium-zinc 333-133-5 mg Tab Take 1 tablet by mouth 2 (two) times a day.      cholecalciferol, vitamin D3, 1,000 unit (25 mcg) tablet Take 2,000 Units by mouth 2 (two) times a day.     dexAMETHasone (DECADRON) 4 MG tablet Take 4 mg by mouth 3 (three) times a day.     famotidine (PEPCID) 20 MG tablet Take 20 mg by mouth 2 (two) times a day.     HYDROmorphone (DILAUDID) 4 MG tablet Take 4 mg by mouth every 4 (four) hours as needed for pain.     multivit-min/folic/vit K/lycop (ONE-A-DAY MEN'S 50 PLUS ORAL) Take 1 tablet by mouth daily.     OLANZapine (ZYPREXA) 5 MG tablet Take 5 mg by mouth 2 (two) times a day. And 5 mg q12h prn     pantoprazole (PROTONIX) 20 MG tablet Take 40 mg by mouth daily.     polysaccharide iron complex (NIFEREX) 150 mg iron capsule Take 1 capsule by mouth 3 (three) times a day with meals.     prochlorperazine (COMPAZINE) 10 MG tablet Take 10 mg by mouth every 8 (eight) hours as needed for nausea.     Saccharomyces boulardii (FLORASTOR) 250 mg capsule Take 500 mg by mouth 2 (two) times a day.     senna-docusate (SENNOSIDES-DOCUSATE SODIUM) 8.6-50 mg tablet Take 2 tablets by mouth 2 (two) times a day.     ASSESSMENT:      ICD-10-CM    1. Severe protein-calorie malnutrition (H)  E43    2. Pain management  R52        PLAN:      Sacral pain management Continue Tylenol and dilaudid as ordered, add Lidocaine patches topically  4% daily      Altered mental status- newly diagnosed brain mets- Follows up with Oncology oriented today and no behavioral issues. [facility] 28/30    UTI- Completed Augmentin     Chronic anemia-Baseline 7-8 range  per records Last HGB 8.8 on 4/27/20    Leukopenia- elevated WBC 16.2 Possibly related to steroids, Follow up with Oncology and completed antibiotics for a UTI     Diarrhea , He reported having C-diff approximately 6 months ago and was on antibiotics.  He tells me it is nothing like that  and declined imodium.    Electronically signed by: Verena Gabriel, CNP

## 2021-06-08 NOTE — PROGRESS NOTES
"[facility]   Video Visit    Code Status: DNR    John Batista is a 74 y.o. male who is being evaluated via a billable video visit.      The patient has been notified of following:     \"This video visit will be conducted via a call between you and your physician/provider. We have found that certain health care needs can be provided without the need for an in-person physical exam.  This service lets us provide the care you need with a video conversation.  If a prescription is necessary we can send it to the facility team.  If lab work is needed we can place an order through the facility team to have that test done at a later time.    If during the course of the call the physician/provider feels a video visit is not appropriate, you will not be charged for this service.\"     Physician/provider has received verbal consent for a Video Visit from the patient? Yes    Patient would like the video invitation sent by: Nerissa Velasquez NP Send to : [facility]    Video Start Time: 1608    Chief Complaint/Reason for Visit:  Chief Complaint   Patient presents with     Problem Visit     pain check, diarrhea, med clarifications       HPI:   John is a 74 y.o. male who was recently admitted 4/18-4/28 to [facility] for altered mental status, UTI, lactic acidosis. He had previously  Known to have prostate cancer with mets to bone and lung and now found to h ave metastasis to brain. He also had fall with sacral pain and some diarrhea. [facility]. He has PMH of anemia, diverticulitis, prostate cancer with metastasis.     Today Mr. Batista is seen via video visit in his TCU room. He says that he sleeps ok. He is having a lot of pain in his bottom or pelvic area. He rates it a 8/10 right  Now and is asking for pain pills during visit. He says the tylenol does help some and so does the dilaudid. He is agreeable to scheduling tylenol for him so he doesn't have to ask " for it. He says he has no nausea or vomiting and is starting to eat a little more. He denies bowel issues but was having diarrhea before and this is improving. He denies any falls since being at walker. He is not having any shortness of breath. He does have a dry cough but declines cough syrup. He is not having any urinary trouble but is having issues with hemorrhoids from when he had diarrhea before and requests cream for this. He has headaches at times but not severe. He thinks therapy is going ok. He denies any anxiety or agitation recently. He thinks his memory is ok. He weighs around 125lbs at home normally.     I have reviewed and updated the patient's Past Medical History, Social History, Family History and Medication List.    ALLERGIES  Patient has no known allergies.    Review of Systems    Vitals:    05/06/20 0700   BP: 111/64   Pulse: 83   Resp: 16   Temp: 97.2  F (36.2  C)   SpO2: 98%   Weight: 128 lb (58.1 kg)     ROS negative other than what was discussed in HPI.     Labs:  I have reviewed his most recent labs and discussed pertinent values with him.     Additional provider notes: Discussed with  nursing    Assessment/Plan:    ICD-10-CM    1. Metastasis from malignant neoplasm of prostate (H)  C79.9     C61    2. Brain metastases (H)  C79.31    3. Disorientation  R41.0    4. Severe protein-calorie malnutrition (H)  E43    5. Diverticulitis  K57.92    6. Sacral pain  M53.3    7. Frequent falls  R29.6         DC tylenol orders  Schedule tylenol 1000mg po three times a day  Dc vitamin d and c due to 2020 pandemic med reduction guidelines, discussed risk v benefit  DC pantoprazole and notify if develops GERD symptoms  DC MVI  Change senna docusate to two times a day prn due to recent diarrhea  anusol cream two times a day until hemorrhoids resolved  Dietary consult, weights 3 times weekly for malnutirtion  [facility] 28/30 improving cognition, continue to provide safe environment   Fall prevention protocol  F/u  with oncology to resume chemo on 5/12  Continue zyprexa-one prn use in the last few days    Video-Visit Details    Type of service:  Video Visit    Video End Time (time video stopped): 1635    Originating Location (pt. Location):[facility] [[id number]]    Distant Location (provider location):  [facility]     Mode of Communication:  Zoom Video Conference        Nerissa Velasquez NP

## 2021-06-08 NOTE — PROGRESS NOTES
[facility]    [facility] [[id number][facility]   Code Status: DNR/DNI      CHIEF COMPLAINT/REASON FOR VISIT:  Chief Complaint   Patient presents with     Discharge Summary       HISTORY:      HPI: John is a 74 y.o. male undergoing physical and occupational therapy at [facility]. He is with past medical history significant for anemia, diverticulitis, Leukopenia and  prostate cancer with mets to the bone and lungs, He was admitted to [facility] from 4/18-4/28 with altered mental status, newly found brain mets, lactic acidosis and UTI    Today he is seen for a face to face for discharge. He will discharge to home on 5/15/20 with current medications and treatments. He will have [facility] PT/OT/HHA, RN and SW. He denied CP or shortness of breath. Denied constipation  He does report having diarrhea but not watery.  He was alert and oriented X3. He denied cough or congestion, afebrile. He is ambulating  with a front wheeled walker.     Past Medical History:   Diagnosis Date     Anemia      Brain metastases (H)      Diverticulitis      History of blood transfusion      Leukopenia      Prostate cancer (H)      Prostate cancer metastatic to bone (H)              Family History   Problem Relation Age of Onset     Breast cancer Neg Hx      Coronary artery disease Neg Hx      Cancer Neg Hx         colorectal      Diabetes Neg Hx      Hypertension Neg Hx      Social History     Socioeconomic History     Marital status:      Spouse name: Not on file     Number of children: Not on file     Years of education: Not on file     Highest education level: Not on file   Occupational History     Not on file   Social Needs     Financial resource strain: Not on file     Food insecurity     Worry: Not on file     Inability: Not on file     Transportation needs     Medical: Not on file     Non-medical: Not on file   Tobacco Use     Smoking status:  Former Smoker     Types: Cigarettes     Smokeless tobacco: Former User   Substance and Sexual Activity     Alcohol use: Not Currently     Drug use: Not on file     Sexual activity: Not on file   Lifestyle     Physical activity     Days per week: Not on file     Minutes per session: Not on file     Stress: Not on file   Relationships     Social connections     Talks on phone: Not on file     Gets together: Not on file     Attends Quaker service: Not on file     Active member of club or organization: Not on file     Attends meetings of clubs or organizations: Not on file     Relationship status: Not on file     Intimate partner violence     Fear of current or ex partner: Not on file     Emotionally abused: Not on file     Physically abused: Not on file     Forced sexual activity: Not on file   Other Topics Concern     Not on file   Social History Narrative     Not on file         Review of Systems   Constitutional: Positive for fatigue. Negative for activity change, appetite change, chills and fever.   HENT: Negative for congestion and sore throat.    Eyes: Negative for visual disturbance.   Respiratory: Negative for cough, shortness of breath and wheezing.    Cardiovascular: Negative for chest pain and leg swelling.   Gastrointestinal: Negative for abdominal distention, abdominal pain, constipation and nausea.        Loose stools but not watery. He does have a HX of C- diff.    Genitourinary: Negative for dysuria.   Musculoskeletal: Positive for back pain. Negative for arthralgias and myalgias.   Skin: Negative for color change, rash and wound.   Neurological: Negative for dizziness, weakness and numbness.   Psychiatric/Behavioral: Negative for agitation, behavioral problems and sleep disturbance.       Vitals:    05/14/20 0803   BP: 119/69   Pulse: 78   Resp: 16   Temp: 97  F (36.1  C)   SpO2: 99%   Weight: 128 lb 3.2 oz (58.2 kg)       Physical Exam  Constitutional:       Appearance: He is well-developed.       Comments: Pleasant gentleman in no acute distress.    HENT:      Head: Normocephalic.   Eyes:      Conjunctiva/sclera: Conjunctivae normal.   Neck:      Musculoskeletal: Normal range of motion.   Cardiovascular:      Rate and Rhythm: Normal rate and regular rhythm.      Heart sounds: Normal heart sounds. No murmur.   Pulmonary:      Effort: No respiratory distress.      Breath sounds: Normal breath sounds. No wheezing or rales.   Abdominal:      General: Bowel sounds are normal. There is no distension.      Palpations: Abdomen is soft.      Tenderness: There is no abdominal tenderness.   Musculoskeletal: Normal range of motion.   Skin:     General: Skin is warm.   Neurological:      Mental Status: He is alert and oriented to person, place, and time.   Psychiatric:         Behavior: Behavior normal.           LABS:   No results found for this or any previous visit (from the past 240 hour(s)).  Current Outpatient Medications   Medication Sig     acetaminophen (TYLENOL) 500 MG tablet Take 1,000 mg by mouth 3 (three) times a day.      dexAMETHasone (DECADRON) 4 MG tablet Take 4 mg by mouth 3 (three) times a day.     famotidine (PEPCID) 20 MG tablet Take 20 mg by mouth 2 (two) times a day.     HYDROmorphone (DILAUDID) 4 MG tablet Take 1 tablet (4 mg total) by mouth every 6 (six) hours as needed for pain.     multivit-min/folic/vit K/lycop (ONE-A-DAY MEN'S 50 PLUS ORAL) Take 1 tablet by mouth daily.     OLANZapine (ZYPREXA) 5 MG tablet Take 5 mg by mouth 2 (two) times a day. And 5 mg q12h prn     polysaccharide iron complex (NIFEREX) 150 mg iron capsule Take 1 capsule by mouth 3 (three) times a day with meals.     prochlorperazine (COMPAZINE) 10 MG tablet Take 10 mg by mouth every 8 (eight) hours as needed for nausea.     Saccharomyces boulardii (FLORASTOR) 250 mg capsule Take 500 mg by mouth 2 (two) times a day.     senna-docusate (SENNOSIDES-DOCUSATE SODIUM) 8.6-50 mg tablet Take 2 tablets by mouth 2 (two) times a day  as needed.      ASSESSMENT:      ICD-10-CM    1. Metastasis from malignant neoplasm of prostate (H)  C79.9     C61    2. Brain metastases (H)  C79.31        PLAN:      Sacral pain management Continue Tylenol and dilaudid as ordered,  Lidocaine patches topically  4% daily      Altered mental status- newly diagnosed brain mets- Follows up with Oncology oriented today and no behavioral issues. [facility] 28/30    UTI- Completed Augmentin     Chronic anemia-Baseline 7-8 range  per records Last HGB 8.8 on 4/27/20    Leukopenia- elevated WBC 16.2 Possibly related to steroids, Follow up with Oncology and completed antibiotics for a UTI     Diarrhea , He reported having C-diff approximately 6 months ago and was on antibiotics. Loose but not watery.       DISCHARGE PLAN/FACE TO FACE:  I certify that this patient is under my care and that I, or a nurse practitioner or physician's assistant working with me, had a face-to-face encounter that meets the physician face-to-face encounter requirements with this patient.   Date of Face-to-Face Encounter: 5/14/20    I certify that, based on my findings, the following services are medically necessary home health services:PT/OT/HHA/RN/SW    My clinical findings support the need for the above skilled services because: PT/OT for continued strength and endurance following new DX brain metastasis, UTI, HHA to assist with ADL's, RN for medication management and VS and SW for community resources and support following new brain metastasis      This patient is homebound because: He is deconditioned and easily fatigued following new brain metastasis and recent UTI along with weakness.       The patient is, or has been, under my care and I have initiated the establishment of the plan of care. This patient will be followed by a physician who will periodically review the plan of care.        Electronically signed by: Verena Gabriel CNP

## 2021-06-08 NOTE — PROGRESS NOTES
"[facility]   Video Visit    Code Status: DNR    John Batista is a 74 y.o. male who is being evaluated via a billable video visit.      The patient has been notified of following:     \"This video visit will be conducted via a call between you and your physician/provider. We have found that certain health care needs can be provided without the need for an in-person physical exam.  This service lets us provide the care you need with a video conversation.  If a prescription is necessary we can send it to the facility team.  If lab work is needed we can place an order through the facility team to have that test done at a later time.    If during the course of the call the physician/provider feels a video visit is not appropriate, you will not be charged for this service.\"     Physician/provider has received verbal consent for a Video Visit from the patient? Yes    Patient would like the video invitation sent by:[facility] Send to : Nerissa Velasquez NP    Video Start Time: 1330    Chief Complaint/Reason for Visit:  Chief Complaint   Patient presents with     Problem Visit     edema       HPI:   John is a 74 y.o. male who who was recently admitted 4/18-4/28 to [facility] for altered mental status, UTI, lactic acidosis. He had previously  Known to have prostate cancer with mets to bone and lung and now found to h ave metastasis to brain. He also had fall with sacral pain and some diarrhea. He was transferred to [facility] for further rehab. He has PMH of anemia, diverticulitis, prostate cancer with metastasis.     Today Mr. Batista is seen via video visit in his tcu room today. He reports therapy is going well. He denies any dizziness and working hard because he wants to go home. He denies any dizziness but does have lightheadedness at times. He mentions his hemoglobin level is only  8 and he can tell when it is low. He says it has been low for him for a long time though. He says his " bowels are moving well and no urinary issues. He denies any cough or shortness of breath. He has no medication concerns. Discussed that nursing had expressed concerns for swelling in hands and legs, especially ankles. He says this is actually improved for him and he is not worried about it. His weights are up a little 125-128lbs but he says he is eating better. He has no other concerns just anxious to get back home.     I have reviewed and updated the patient's Past Medical History, Social History, Family History and Medication List.    ALLERGIES  Patient has no known allergies.    Review of Systems    Vitals:    05/12/20 0700   BP: 101/63   Pulse: 92   Resp: 18   Temp: 98.2  F (36.8  C)   SpO2: 98%   Weight: 128 lb (58.1 kg)       Ros negative other than what discussed in HPi    Labs:  I have reviewed his most recent labs and discussed pertinent values with him during visit.     Additional provider notes: Discussed with  nursing    Assessment/Plan:    ICD-10-CM    1. Frequent falls  R29.6    2. Metastasis from malignant neoplasm of prostate (H)  C79.9     C61    3. Iron deficiency anemia due to chronic blood loss  D50.0    4. Sacral pain  M53.3    5. Severe protein-calorie malnutrition (H)  E43    6. Brain metastases (H)  C79.31    7. Dependent edema  R60.9       Pain better controlled continue current regimen  Bowels better controlled off stool softener, no changes today  Weights up but edema improved, continue to monitor  Continue PT and OT  [place] 28/30  Continue to provide safe environment   involved for discharge planning  Vahid rogers    Video-Visit Details    Type of service:  Video Visit    Video End Time (time video stopped): 1355    Originating Location (pt. Location):[facility] [[id number]]    Distant Location (provider location):  [facility]     Mode of Communication:  Zoom Video Conference      Nerissa Velasquez NP

## 2021-06-20 NOTE — LETTER
"Letter by Nerissa Velasquez NP at      Author: Nerissa Velasquez NP Service: -- Author Type: --    Filed:  Encounter Date: 5/12/2020 Status: (Other)         Patient: John Batista   MR Number: [id number]   YOB: 1945   Date of Visit: 5/12/2020     [facility]   Video Visit    Code Status: DNR    John Batista is a 74 y.o. male who is being evaluated via a billable video visit.      The patient has been notified of following:     \"This video visit will be conducted via a call between you and your physician/provider. We have found that certain health care needs can be provided without the need for an in-person physical exam.  This service lets us provide the care you need with a video conversation.  If a prescription is necessary we can send it to the facility team.  If lab work is needed we can place an order through the facility team to have that test done at a later time.    If during the course of the call the physician/provider feels a video visit is not appropriate, you will not be charged for this service.\"     Physician/provider has received verbal consent for a Video Visit from the patient? Yes    Patient would like the video invitation sent by:[facility] Send to : Nerissa Velasquez NP    Video Start Time: 1330    Chief Complaint/Reason for Visit:  Chief Complaint   Patient presents with   ? Problem Visit     edema       HPI:   John is a 74 y.o. male who who was recently admitted 4/18-4/28 to [facility] for altered mental status, UTI, lactic acidosis. He had previously  Known to have prostate cancer with mets to bone and lung and now found to h ave metastasis to brain. He also had fall with sacral pain and some diarrhea. [facility]. He has PMH of anemia, diverticulitis, prostate cancer with metastasis.     Today Mr. Batista is seen via video visit in his tcu room today. He reports therapy is going well. He denies any " dizziness and working hard because he wants to go home. He denies any dizziness but does have lightheadedness at times. He mentions his hemoglobin level is only  8 and he can tell when it is low. He says it has been low for him for a long time though. He says his bowels are moving well and no urinary issues. He denies any cough or shortness of breath. He has no medication concerns. Discussed that nursing had expressed concerns for swelling in hands and legs, especially ankles. He says this is actually improved for him and he is not worried about it. His weights are up a little 125-128lbs but he says he is eating better. He has no other concerns just anxious to get back home.     I have reviewed and updated the patient's Past Medical History, Social History, Family History and Medication List.    ALLERGIES  Patient has no known allergies.    Review of Systems    Vitals:    05/12/20 0700   BP: 101/63   Pulse: 92   Resp: 18   Temp: 98.2  F (36.8  C)   SpO2: 98%   Weight: 128 lb (58.1 kg)       Ros negative other than what discussed in HPi    Labs:  I have reviewed his most recent labs and discussed pertinent values with him during visit.     Additional provider notes: Discussed with  nursing    Assessment/Plan:    ICD-10-CM    1. Frequent falls  R29.6    2. Metastasis from malignant neoplasm of prostate (H)  C79.9     C61    3. Iron deficiency anemia due to chronic blood loss  D50.0    4. Sacral pain  M53.3    5. Severe protein-calorie malnutrition (H)  E43    6. Brain metastases (H)  C79.31    7. Dependent edema  R60.9       Pain better controlled continue current regimen  Bowels better controlled off stool softener, no changes today  Weights up but edema improved, continue to monitor  Continue PT and OT  Slums 28/30  Continue to provide safe environment   involved for discharge planning  Vahid rogers    Video-Visit Details    Type of service:  Video Visit    Video End Time (time video stopped):  1355    Originating Location (pt. Location):[facility] [[id number]]    Distant Location (provider location):  [facility]     Mode of Communication:  Zoom Video Conference      Nerissa Velasquez NP

## 2021-06-20 NOTE — LETTER
"Letter by Verena Gabriel CNP at      Author: Verena Gabriel CNP Service: -- Author Type: --    Filed:  Encounter Date: 4/29/2020 Status: (Other)         Patient: John Batista   MR Number: [id number]   YOB: 1945   Date of Visit: 4/29/2020     [facility]    Facility:   [facility] [[id number]]   Code Status: DNR/DNI      CHIEF COMPLAINT/REASON FOR VISIT:  Chief Complaint   Patient presents with   ? Review Of Multiple Medical Conditions     AMS, UTI        HISTORY:      HPI: John is a 74 y.o. male undergoing physical and occupational therapy at [facility]. He is with past medical history significant for anemia, diverticulitis, Leukopenia and  prostate cancer with mets to the bone and lungs, He was admitted to [facility] from 4/18-4/28 with altered mental status, newly found brain mets, lactic acidosis and UTI    Today he is seen to review multiple medical issues and establish care. He denied CP or shortness of breath. Denied constipation  He does report having diarrhea. He reported he had C-diff approximately 6 months ago and was on antibiotics.  He tells me it is nothing like that  and declined imodium. He was alert and oriented X3. He ddi report sacral pain from his fall and tells me the medications are\"definitely helpful\". He denied cough or congestion, afebrile. He is ambulating 55 feet with a front wheeled walker.       Past Medical History:   Diagnosis Date   ? Anemia    ? Brain metastases (H)    ? Diverticulitis    ? History of blood transfusion    ? Leukopenia    ? Prostate cancer (H)    ? Prostate cancer metastatic to bone (H)              Family History   Problem Relation Age of Onset   ? Breast cancer Neg Hx    ? Coronary artery disease Neg Hx    ? Cancer Neg Hx         colorectal    ? Diabetes Neg Hx    ? Hypertension Neg Hx      Social History     Socioeconomic History   ? Marital status:      Spouse name: Not on " file   ? Number of children: Not on file   ? Years of education: Not on file   ? Highest education level: Not on file   Occupational History   ? Not on file   Social Needs   ? Financial resource strain: Not on file   ? Food insecurity     Worry: Not on file     Inability: Not on file   ? Transportation needs     Medical: Not on file     Non-medical: Not on file   Tobacco Use   ? Smoking status: Former Smoker     Types: Cigarettes   ? Smokeless tobacco: Former User   Substance and Sexual Activity   ? Alcohol use: Not Currently   ? Drug use: Not on file   ? Sexual activity: Not on file   Lifestyle   ? Physical activity     Days per week: Not on file     Minutes per session: Not on file   ? Stress: Not on file   Relationships   ? Social connections     Talks on phone: Not on file     Gets together: Not on file     Attends [employer] service: Not on file     Active member of club or organization: Not on file     Attends meetings of clubs or organizations: Not on file     Relationship status: Not on file   ? Intimate partner violence     Fear of current or ex partner: Not on file     Emotionally abused: Not on file     Physically abused: Not on file     Forced sexual activity: Not on file   Other Topics Concern   ? Not on file   Social History Narrative   ? Not on file         Review of Systems   Constitutional: Positive for fatigue. Negative for activity change, appetite change, chills and fever.   HENT: Negative for congestion and sore throat.    Eyes: Negative for visual disturbance.   Respiratory: Negative for cough, shortness of breath and wheezing.    Cardiovascular: Negative for chest pain and leg swelling.   Gastrointestinal: Positive for diarrhea. Negative for abdominal distention, abdominal pain, constipation and nausea.   Genitourinary: Negative for dysuria.   Musculoskeletal: Positive for back pain. Negative for arthralgias and myalgias.   Skin: Negative for color change, rash and wound.   Neurological:  Negative for dizziness, weakness and numbness.   Psychiatric/Behavioral: Negative for agitation, behavioral problems and sleep disturbance.       Vitals:    04/30/20 1527   BP: 97/62   Pulse: 85   Resp: 18   Temp: 97.2  F (36.2  C)   SpO2: 99%   Weight: 145 lb 8 oz (66 kg)       Physical Exam  Constitutional:       Appearance: He is well-developed.      Comments: Pleasant gentleman in no acute distress.    HENT:      Head: Normocephalic.   Eyes:      Conjunctiva/sclera: Conjunctivae normal.   Neck:      Musculoskeletal: Normal range of motion.   Cardiovascular:      Rate and Rhythm: Normal rate and regular rhythm.      Heart sounds: Normal heart sounds. No murmur.   Pulmonary:      Effort: No respiratory distress.      Breath sounds: Normal breath sounds. No wheezing or rales.   Abdominal:      General: Bowel sounds are normal. There is no distension.      Palpations: Abdomen is soft.      Tenderness: There is no abdominal tenderness.   Musculoskeletal: Normal range of motion.   Skin:     General: Skin is warm.   Neurological:      Mental Status: He is alert and oriented to person, place, and time.   Psychiatric:         Behavior: Behavior normal.           LABS:   No results found for this or any previous visit (from the past 240 hour(s)).  Current Outpatient Medications   Medication Sig   ? acetaminophen (TYLENOL) 500 MG tablet Take 500 mg by mouth every 4 (four) hours as needed for pain.   ? ascorbic acid, vitamin C, (VITAMIN C) 250 MG tablet Take 250 mg by mouth 3 (three) times a day.   ? calcium-magnesium-zinc 333-133-5 mg Tab Take 1 tablet by mouth 2 (two) times a day.   ? cholecalciferol, vitamin D3, 1,000 unit (25 mcg) tablet Take 2,000 Units by mouth 2 (two) times a day.   ? dexAMETHasone (DECADRON) 4 MG tablet Take 4 mg by mouth 3 (three) times a day.   ? famotidine (PEPCID) 20 MG tablet Take 20 mg by mouth 2 (two) times a day.   ? HYDROmorphone (DILAUDID) 4 MG tablet Take 4 mg by mouth every 4 (four)  hours as needed for pain.   ? multivit-min/folic/vit K/lycop (ONE-A-DAY MEN'S 50 PLUS ORAL) Take 1 tablet by mouth daily.   ? OLANZapine (ZYPREXA) 5 MG tablet Take 5 mg by mouth 2 (two) times a day. And 5 mg q12h prn   ? pantoprazole (PROTONIX) 20 MG tablet Take 40 mg by mouth daily.   ? polysaccharide iron complex (NIFEREX) 150 mg iron capsule Take 1 capsule by mouth 3 (three) times a day with meals.   ? prochlorperazine (COMPAZINE) 10 MG tablet Take 10 mg by mouth every 8 (eight) hours as needed for nausea.   ? Saccharomyces boulardii (FLORASTOR) 250 mg capsule Take 500 mg by mouth 2 (two) times a day.   ? senna-docusate (SENNOSIDES-DOCUSATE SODIUM) 8.6-50 mg tablet Take 2 tablets by mouth 2 (two) times a day.     ASSESSMENT:      ICD-10-CM    1. Altered mental status, unspecified altered mental status type  R41.82    2. Urinary tract infection without hematuria, site unspecified  N39.0    3. Diarrhea, unspecified type  R19.7        PLAN:    Altered mental status- newly diagnosed brain mets- Follows up with Oncology oriented today and no behavioral issues. [facility] 28/30    UTI- Completed Augmentin     Chronic anemia-Baseline 7-8 range  per records Last HGB 8.8 on 4/27/20    Leukopenia- elevated WBC 16.2 Possibly related to steroids, Follow up with Oncology and completed antibiotics for a UTI     Diarrhea , He reported having C-diff approximately 6 months ago and was on antibiotics.  He tells me it is nothing like that  and declined imodium.    Electronically signed by: Verena Gabriel, CNP

## 2021-06-20 NOTE — LETTER
Letter by Verena Gabriel CNP at      Author: Verena Gabriel CNP Service: -- Author Type: --    Filed:  Encounter Date: 5/14/2020 Status: (Other)         Patient: John Batista   MR Number: [id number]   YOB: 1945   Date of Visit: 5/14/2020     [facility]    Facility:   [facility] [[id number]]   Code Status: DNR/DNI      CHIEF COMPLAINT/REASON FOR VISIT:  Chief Complaint   Patient presents with   ? Discharge Summary       HISTORY:      HPI: John is a 74 y.o. male undergoing physical and occupational therapy at [facility]. He is with past medical history significant for anemia, diverticulitis, Leukopenia and  prostate cancer with mets to the bone and lungs, He was admitted to [facility] from 4/18-4/28 with altered mental status, newly found brain mets, lactic acidosis and UTI    Today he is seen for a face to face for discharge. He will discharge to home on 5/15/20 with current medications and treatments. He will have [facility] home care services PT/OT/HHA, RN and SW. He denied CP or shortness of breath. Denied constipation  He does report having diarrhea but not watery.  He was alert and oriented X3. He denied cough or congestion, afebrile. He is ambulating  with a front wheeled walker.     Past Medical History:   Diagnosis Date   ? Anemia    ? Brain metastases (H)    ? Diverticulitis    ? History of blood transfusion    ? Leukopenia    ? Prostate cancer (H)    ? Prostate cancer metastatic to bone (H)              Family History   Problem Relation Age of Onset   ? Breast cancer Neg Hx    ? Coronary artery disease Neg Hx    ? Cancer Neg Hx         colorectal    ? Diabetes Neg Hx    ? Hypertension Neg Hx      Social History     Socioeconomic History   ? Marital status:      Spouse name: Not on file   ? Number of children: Not on file   ? Years of education: Not on file   ? Highest education level: Not on file   Occupational  History   ? Not on file   Social Needs   ? Financial resource strain: Not on file   ? Food insecurity     Worry: Not on file     Inability: Not on file   ? Transportation needs     Medical: Not on file     Non-medical: Not on file   Tobacco Use   ? Smoking status: Former Smoker     Types: Cigarettes   ? Smokeless tobacco: Former User   Substance and Sexual Activity   ? Alcohol use: Not Currently   ? Drug use: Not on file   ? Sexual activity: Not on file   Lifestyle   ? Physical activity     Days per week: Not on file     Minutes per session: Not on file   ? Stress: Not on file   Relationships   ? Social connections     Talks on phone: Not on file     Gets together: Not on file     Attends Denominational service: Not on file     Active member of club or organization: Not on file     Attends meetings of clubs or organizations: Not on file     Relationship status: Not on file   ? Intimate partner violence     Fear of current or ex partner: Not on file     Emotionally abused: Not on file     Physically abused: Not on file     Forced sexual activity: Not on file   Other Topics Concern   ? Not on file   Social History Narrative   ? Not on file         Review of Systems   Constitutional: Positive for fatigue. Negative for activity change, appetite change, chills and fever.   HENT: Negative for congestion and sore throat.    Eyes: Negative for visual disturbance.   Respiratory: Negative for cough, shortness of breath and wheezing.    Cardiovascular: Negative for chest pain and leg swelling.   Gastrointestinal: Negative for abdominal distention, abdominal pain, constipation and nausea.        Loose stools but not watery. He does have a HX of C- diff.    Genitourinary: Negative for dysuria.   Musculoskeletal: Positive for back pain. Negative for arthralgias and myalgias.   Skin: Negative for color change, rash and wound.   Neurological: Negative for dizziness, weakness and numbness.   Psychiatric/Behavioral: Negative for  agitation, behavioral problems and sleep disturbance.       Vitals:    05/14/20 0803   BP: 119/69   Pulse: 78   Resp: 16   Temp: 97  F (36.1  C)   SpO2: 99%   Weight: 128 lb 3.2 oz (58.2 kg)       Physical Exam  Constitutional:       Appearance: He is well-developed.      Comments: Pleasant gentleman in no acute distress.    HENT:      Head: Normocephalic.   Eyes:      Conjunctiva/sclera: Conjunctivae normal.   Neck:      Musculoskeletal: Normal range of motion.   Cardiovascular:      Rate and Rhythm: Normal rate and regular rhythm.      Heart sounds: Normal heart sounds. No murmur.   Pulmonary:      Effort: No respiratory distress.      Breath sounds: Normal breath sounds. No wheezing or rales.   Abdominal:      General: Bowel sounds are normal. There is no distension.      Palpations: Abdomen is soft.      Tenderness: There is no abdominal tenderness.   Musculoskeletal: Normal range of motion.   Skin:     General: Skin is warm.   Neurological:      Mental Status: He is alert and oriented to person, place, and time.   Psychiatric:         Behavior: Behavior normal.           LABS:   No results found for this or any previous visit (from the past 240 hour(s)).  Current Outpatient Medications   Medication Sig   ? acetaminophen (TYLENOL) 500 MG tablet Take 1,000 mg by mouth 3 (three) times a day.    ? dexAMETHasone (DECADRON) 4 MG tablet Take 4 mg by mouth 3 (three) times a day.   ? famotidine (PEPCID) 20 MG tablet Take 20 mg by mouth 2 (two) times a day.   ? HYDROmorphone (DILAUDID) 4 MG tablet Take 1 tablet (4 mg total) by mouth every 6 (six) hours as needed for pain.   ? multivit-min/folic/vit K/lycop (ONE-A-DAY MEN'S 50 PLUS ORAL) Take 1 tablet by mouth daily.   ? OLANZapine (ZYPREXA) 5 MG tablet Take 5 mg by mouth 2 (two) times a day. And 5 mg q12h prn   ? polysaccharide iron complex (NIFEREX) 150 mg iron capsule Take 1 capsule by mouth 3 (three) times a day with meals.   ? prochlorperazine (COMPAZINE) 10 MG  tablet Take 10 mg by mouth every 8 (eight) hours as needed for nausea.   ? Saccharomyces boulardii (FLORASTOR) 250 mg capsule Take 500 mg by mouth 2 (two) times a day.   ? senna-docusate (SENNOSIDES-DOCUSATE SODIUM) 8.6-50 mg tablet Take 2 tablets by mouth 2 (two) times a day as needed.      ASSESSMENT:      ICD-10-CM    1. Metastasis from malignant neoplasm of prostate (H)  C79.9     C61    2. Brain metastases (H)  C79.31        PLAN:      Sacral pain management Continue Tylenol and dilaudid as ordered,  Lidocaine patches topically  4% daily      Altered mental status- newly diagnosed brain mets- Follows up with Oncology oriented today and no behavioral issues. Slums 28/30    UTI- Completed Augmentin     Chronic anemia-Baseline 7-8 range  per records Last HGB 8.8 on 4/27/20    Leukopenia- elevated WBC 16.2 Possibly related to steroids, Follow up with Oncology and completed antibiotics for a UTI     Diarrhea , He reported having C-diff approximately 6 months ago and was on antibiotics. Loose but not watery.       DISCHARGE PLAN/FACE TO FACE:  I certify that this patient is under my care and that I, or a nurse practitioner or physician's assistant working with me, had a face-to-face encounter that meets the physician face-to-face encounter requirements with this patient.   Date of Face-to-Face Encounter: 5/14/20    I certify that, based on my findings, the following services are medically necessary home health services:PT/OT/HHA/RN/SW    My clinical findings support the need for the above skilled services because: PT/OT for continued strength and endurance following new DX brain metastasis, UTI, HHA to assist with ADL's, RN for medication management and VS and SW for community resources and support following new brain metastasis      This patient is homebound because: He is deconditioned and easily fatigued following new brain metastasis and recent UTI along with weakness.       The patient is, or has been, under my  care and I have initiated the establishment of the plan of care. This patient will be followed by a physician who will periodically review the plan of care.        Electronically signed by: Verena Gabriel CNP

## 2021-06-20 NOTE — LETTER
Letter by Verena Gabriel CNP at      Author: Verena Gabriel CNP Service: -- Author Type: --    Filed:  Encounter Date: 5/4/2020 Status: (Other)         Patient: John Batista   MR Number: [id number]   YOB: 1945   Date of Visit: 5/4/2020     [facility]    Facility:   [facility] [[id number]]   Code Status: DNR/DNI      CHIEF COMPLAINT/REASON FOR VISIT:  Chief Complaint   Patient presents with   ? Review Of Multiple Medical Conditions     pain management, loose stools       HISTORY:      HPI: John is a 74 y.o. male undergoing physical and occupational therapy at [facility]. He is with past medical history significant for anemia, diverticulitis, Leukopenia and  prostate cancer with mets to the bone and lungs, He was admitted to [facility] from 4/18-4/28 with altered mental status, newly found brain mets, lactic acidosis and UTI    Today he is seen for reports of increased pain. He denied CP or shortness of breath. Denied constipation  He does report having diarrhea but not watery. . He reported he had C-diff approximately 6 months ago and was on antibiotics.  He tells me it is nothing like that  and declined imodium. He was alert and oriented X3. Previously he reported adequate pain relief and today he tells me his pain is never below 7/10 after medications  He denied cough or congestion, afebrile. He is ambulating 55 feet with a front wheeled walker. He did have a discrepancy in his weights showing a 20 pound weight loss in 3 days. Staff to reweigh him when he returns from his Oncology appointment. He does tell me his baseline is 145 pounds.       Past Medical History:   Diagnosis Date   ? Anemia    ? Brain metastases (H)    ? Diverticulitis    ? History of blood transfusion    ? Leukopenia    ? Prostate cancer (H)    ? Prostate cancer metastatic to bone (H)              Family History   Problem Relation Age of Onset   ? Breast  cancer Neg Hx    ? Coronary artery disease Neg Hx    ? Cancer Neg Hx         colorectal    ? Diabetes Neg Hx    ? Hypertension Neg Hx      Social History     Socioeconomic History   ? Marital status:      Spouse name: Not on file   ? Number of children: Not on file   ? Years of education: Not on file   ? Highest education level: Not on file   Occupational History   ? Not on file   Social Needs   ? Financial resource strain: Not on file   ? Food insecurity     Worry: Not on file     Inability: Not on file   ? Transportation needs     Medical: Not on file     Non-medical: Not on file   Tobacco Use   ? Smoking status: Former Smoker     Types: Cigarettes   ? Smokeless tobacco: Former User   Substance and Sexual Activity   ? Alcohol use: Not Currently   ? Drug use: Not on file   ? Sexual activity: Not on file   Lifestyle   ? Physical activity     Days per week: Not on file     Minutes per session: Not on file   ? Stress: Not on file   Relationships   ? Social connections     Talks on phone: Not on file     Gets together: Not on file     Attends Nondenominational service: Not on file     Active member of club or organization: Not on file     Attends meetings of clubs or organizations: Not on file     Relationship status: Not on file   ? Intimate partner violence     Fear of current or ex partner: Not on file     Emotionally abused: Not on file     Physically abused: Not on file     Forced sexual activity: Not on file   Other Topics Concern   ? Not on file   Social History Narrative   ? Not on file         Review of Systems   Constitutional: Positive for fatigue. Negative for activity change, appetite change, chills and fever.   HENT: Negative for congestion and sore throat.    Eyes: Negative for visual disturbance.   Respiratory: Negative for cough, shortness of breath and wheezing.    Cardiovascular: Negative for chest pain and leg swelling.   Gastrointestinal: Positive for diarrhea. Negative for abdominal distention,  abdominal pain, constipation and nausea.   Genitourinary: Negative for dysuria.   Musculoskeletal: Positive for back pain. Negative for arthralgias and myalgias.   Skin: Negative for color change, rash and wound.   Neurological: Negative for dizziness, weakness and numbness.   Psychiatric/Behavioral: Negative for agitation, behavioral problems and sleep disturbance.       Vitals:    05/04/20 1218   BP: 109/65   Pulse: 86   Resp: 18   Temp: 97.4  F (36.3  C)   SpO2: 93%       Physical Exam  Constitutional:       Appearance: He is well-developed.      Comments: Pleasant gentleman in no acute distress.    HENT:      Head: Normocephalic.   Eyes:      Conjunctiva/sclera: Conjunctivae normal.   Neck:      Musculoskeletal: Normal range of motion.   Cardiovascular:      Rate and Rhythm: Normal rate and regular rhythm.      Heart sounds: Normal heart sounds. No murmur.   Pulmonary:      Effort: No respiratory distress.      Breath sounds: Normal breath sounds. No wheezing or rales.   Abdominal:      General: Bowel sounds are normal. There is no distension.      Palpations: Abdomen is soft.      Tenderness: There is no abdominal tenderness.   Musculoskeletal: Normal range of motion.   Skin:     General: Skin is warm.   Neurological:      Mental Status: He is alert and oriented to person, place, and time.   Psychiatric:         Behavior: Behavior normal.           LABS:   Recent Results (from the past 240 hour(s))   Basic Metabolic Panel   Result Value Ref Range    Sodium 133 (L) 136 - 145 mmol/L    Potassium 3.7 3.5 - 5.0 mmol/L    Chloride 101 98 - 107 mmol/L    CO2 22 22 - 31 mmol/L    Anion Gap, Calculation 10 5 - 18 mmol/L    Glucose 113 70 - 125 mg/dL    Calcium 8.0 (L) 8.5 - 10.5 mg/dL    BUN 25 8 - 28 mg/dL    Creatinine 0.70 0.70 - 1.30 mg/dL    GFR MDRD Af Amer >60 >60 mL/min/1.73m2    GFR MDRD Non Af Amer >60 >60 mL/min/1.73m2     Current Outpatient Medications   Medication Sig   ? acetaminophen (TYLENOL) 500 MG  tablet Take 500 mg by mouth every 4 (four) hours as needed for pain.   ? ascorbic acid, vitamin C, (VITAMIN C) 250 MG tablet Take 250 mg by mouth 3 (three) times a day.   ? calcium-magnesium-zinc 333-133-5 mg Tab Take 1 tablet by mouth 2 (two) times a day.   ? cholecalciferol, vitamin D3, 1,000 unit (25 mcg) tablet Take 2,000 Units by mouth 2 (two) times a day.   ? dexAMETHasone (DECADRON) 4 MG tablet Take 4 mg by mouth 3 (three) times a day.   ? famotidine (PEPCID) 20 MG tablet Take 20 mg by mouth 2 (two) times a day.   ? HYDROmorphone (DILAUDID) 4 MG tablet Take 4 mg by mouth every 4 (four) hours as needed for pain.   ? multivit-min/folic/vit K/lycop (ONE-A-DAY MEN'S 50 PLUS ORAL) Take 1 tablet by mouth daily.   ? OLANZapine (ZYPREXA) 5 MG tablet Take 5 mg by mouth 2 (two) times a day. And 5 mg q12h prn   ? pantoprazole (PROTONIX) 20 MG tablet Take 40 mg by mouth daily.   ? polysaccharide iron complex (NIFEREX) 150 mg iron capsule Take 1 capsule by mouth 3 (three) times a day with meals.   ? prochlorperazine (COMPAZINE) 10 MG tablet Take 10 mg by mouth every 8 (eight) hours as needed for nausea.   ? Saccharomyces boulardii (FLORASTOR) 250 mg capsule Take 500 mg by mouth 2 (two) times a day.   ? senna-docusate (SENNOSIDES-DOCUSATE SODIUM) 8.6-50 mg tablet Take 2 tablets by mouth 2 (two) times a day.     ASSESSMENT:      ICD-10-CM    1. Severe protein-calorie malnutrition (H)  E43    2. Pain management  R52        PLAN:      Sacral pain management Continue Tylenol and dilaudid as ordered, add Lidocaine patches topically  4% daily      Altered mental status- newly diagnosed brain mets- Follows up with Oncology oriented today and no behavioral issues. Slums 28/30    UTI- Completed Augmentin     Chronic anemia-Baseline 7-8 range  per records Last HGB 8.8 on 4/27/20    Leukopenia- elevated WBC 16.2 Possibly related to steroids, Follow up with Oncology and completed antibiotics for a UTI     Diarrhea , He reported having  C-diff approximately 6 months ago and was on antibiotics.  He tells me it is nothing like that  and declined imodium.    Electronically signed by: Verena Gabriel CNP

## 2021-06-20 NOTE — LETTER
"Letter by Nerissa Velasquez NP at      Author: Nerissa Velasquez NP Service: -- Author Type: --    Filed:  Encounter Date: 5/6/2020 Status: (Other)         Patient: John Batista   MR Number: [id number]   YOB: 1945   Date of Visit: 5/6/2020     [facility]   Video Visit    Code Status: DNR    John Batista is a 74 y.o. male who is being evaluated via a billable video visit.      The patient has been notified of following:     \"This video visit will be conducted via a call between you and your physician/provider. We have found that certain health care needs can be provided without the need for an in-person physical exam.  This service lets us provide the care you need with a video conversation.  If a prescription is necessary we can send it to the facility team.  If lab work is needed we can place an order through the facility team to have that test done at a later time.    If during the course of the call the physician/provider feels a video visit is not appropriate, you will not be charged for this service.\"     Physician/provider has received verbal consent for a Video Visit from the patient? Yes    Patient would like the video invitation sent by: Nerissa Velasquez NP Send to : [facility]    Video Start Time: 1608    Chief Complaint/Reason for Visit:  Chief Complaint   Patient presents with   ? Problem Visit     pain check, diarrhea, med clarifications       HPI:   John is a 74 y.o. male who was recently admitted 4/18-4/28 to [facility] for altered mental status, UTI, lactic acidosis. He had previously  Known to have prostate cancer with mets to bone and lung and now found to h ave metastasis to brain. He also had fall with sacral pain and some diarrhea. [facility]. He has PMH of anemia, diverticulitis, prostate cancer with metastasis.     Today Mr. Batista is seen via video visit in his TCU room. He says that he sleeps ok. He " is having a lot of pain in his bottom or pelvic area. He rates it a 8/10 right  Now and is asking for pain pills during visit. He says the tylenol does help some and so does the dilaudid. He is agreeable to scheduling tylenol for him so he doesn't have to ask for it. He says he has no nausea or vomiting and is starting to eat a little more. He denies bowel issues but was having diarrhea before and this is improving. He denies any falls since being at [facility]. He is not having any shortness of breath. He does have a dry cough but declines cough syrup. He is not having any urinary trouble but is having issues with hemorrhoids from when he had diarrhea before and requests cream for this. He has headaches at times but not severe. He thinks therapy is going ok. He denies any anxiety or agitation recently. He thinks his memory is ok. He weighs around 125lbs at home normally.     I have reviewed and updated the patient's Past Medical History, Social History, Family History and Medication List.    ALLERGIES  Patient has no known allergies.    Review of Systems    Vitals:    05/06/20 0700   BP: 111/64   Pulse: 83   Resp: 16   Temp: 97.2  F (36.2  C)   SpO2: 98%   Weight: 128 lb (58.1 kg)     ROS negative other than what was discussed in HPI.     Labs:  I have reviewed his most recent labs and discussed pertinent values with him.     Additional provider notes: Discussed with  nursing    Assessment/Plan:    ICD-10-CM    1. Metastasis from malignant neoplasm of prostate (H)  C79.9     C61    2. Brain metastases (H)  C79.31    3. Disorientation  R41.0    4. Severe protein-calorie malnutrition (H)  E43    5. Diverticulitis  K57.92    6. Sacral pain  M53.3    7. Frequent falls  R29.6         DC tylenol orders  Schedule tylenol 1000mg po three times a day  Dc vitamin d and c due to 2020 pandemic med reduction guidelines, discussed risk v benefit  DC pantoprazole and notify if develops GERD symptoms  DC MVI  Change senna docusate to  two times a day prn due to recent diarrhea  anusol cream two times a day until hemorrhoids resolved  Dietary consult, weights 3 times weekly for malnutirtion  Slums 28/30 improving cognition, continue to provide safe environment   Fall prevention protocol  F/u with oncology to resume chemo on 5/12  Continue zyprexa-one prn use in the last few days    Video-Visit Details    Type of service:  Video Visit    Video End Time (time video stopped): 1635    Originating Location (pt. Location):[facility] [[id number]]    Distant Location (provider location):  [facility]     Mode of Communication:  Zoom Video Conference        Nerissa Velasquez NP

## 2023-01-01 NOTE — PROVIDER NOTIFICATION
----- Message from David Soto MD sent at 2023  8:31 AM CDT -----  Repeat  screen has come back normal with no new concerns.   MD Notification    Notified Person: MD    Notified Person Name: Jordi    Notification Date/Time: 2345 04/18/20    Notification Interaction: Text page     Purpose of Notification: Lactic back at 1.0 and need of diet order    Orders Received: Pt NPO except for meds.     Comments:

## 2023-03-13 NOTE — NURSING NOTE
Here for prostat ecancer follow up  And due for Eligard injection.   Electrodesiccation Text: The wound bed was treated with electrodesiccation after the biopsy was performed.

## (undated) DEVICE — KIT ENDO TURNOVER/PROCEDURE W/CLEAN A SCOPE LINERS 103888

## (undated) DEVICE — ESU ELEC LOOP 24FR 20750G

## (undated) DEVICE — ESU GROUND PAD ADULT W/CORD E7507

## (undated) DEVICE — TUBING IRRIG TUR Y TYPE 96" LF 6543-01

## (undated) DEVICE — BAG CLEAR TRASH 1.3M 39X33" P4040C

## (undated) DEVICE — SOL SORBITOL 3% IRRIG 3000ML BAG 2B7357

## (undated) DEVICE — COVER FOOTSWITCH W/CINCH 20X24" 923267

## (undated) DEVICE — GLOVE PROTEXIS POWDER FREE SMT 7.5  2D72PT75X

## (undated) DEVICE — CATH FOLEY 3WAY 20FR 30ML LUBRICATH LATEX 0167L20

## (undated) DEVICE — LINEN HALF SHEET 5512

## (undated) DEVICE — DECANTER BAG 2002S

## (undated) DEVICE — PACK CYSTO CUSTOM RIDGES

## (undated) DEVICE — PREP TECHNI-CARE CHLOROXYLENOL 3% 4OZ BOTTLE C222-4ZWO

## (undated) DEVICE — CATH PLUG W/CAP 000076

## (undated) DEVICE — ESU ELEC ROLLERBALL 24FR 3MM  27050N

## (undated) DEVICE — SYR 50ML CATH TIP W/O NDL 309620

## (undated) DEVICE — SOL WATER IRRIG 1000ML BOTTLE 2F7114

## (undated) DEVICE — LINEN FULL SHEET 5511

## (undated) DEVICE — BAG URINARY DRAIN 4000ML LF 153509

## (undated) DEVICE — TUBING SET IRRIGATION 4 LEAD 90" DYND19124

## (undated) RX ORDER — FENTANYL CITRATE 50 UG/ML
INJECTION, SOLUTION INTRAMUSCULAR; INTRAVENOUS
Status: DISPENSED
Start: 2020-01-01

## (undated) RX ORDER — ONDANSETRON 2 MG/ML
INJECTION INTRAMUSCULAR; INTRAVENOUS
Status: DISPENSED
Start: 2020-01-01

## (undated) RX ORDER — FENTANYL CITRATE 50 UG/ML
INJECTION, SOLUTION INTRAMUSCULAR; INTRAVENOUS
Status: DISPENSED
Start: 2018-10-30

## (undated) RX ORDER — CEFAZOLIN SODIUM 2 G/100ML
INJECTION, SOLUTION INTRAVENOUS
Status: DISPENSED
Start: 2020-01-01

## (undated) RX ORDER — FENTANYL CITRATE 50 UG/ML
INJECTION, SOLUTION INTRAMUSCULAR; INTRAVENOUS
Status: DISPENSED
Start: 2018-10-31

## (undated) RX ORDER — MEPERIDINE HYDROCHLORIDE 50 MG/ML
INJECTION INTRAMUSCULAR; INTRAVENOUS; SUBCUTANEOUS
Status: DISPENSED
Start: 2020-01-01

## (undated) RX ORDER — PROPOFOL 10 MG/ML
INJECTION, EMULSION INTRAVENOUS
Status: DISPENSED
Start: 2020-01-01